# Patient Record
Sex: FEMALE
[De-identification: names, ages, dates, MRNs, and addresses within clinical notes are randomized per-mention and may not be internally consistent; named-entity substitution may affect disease eponyms.]

---

## 2017-07-30 ENCOUNTER — HOSPITAL (OUTPATIENT)
Dept: OTHER | Age: 82
End: 2017-07-30
Attending: EMERGENCY MEDICINE

## 2020-04-17 ENCOUNTER — HOSPITAL ENCOUNTER (EMERGENCY)
Facility: CLINIC | Age: 85
Discharge: HOME | End: 2020-04-17
Attending: EMERGENCY MEDICINE | Admitting: EMERGENCY MEDICINE
Payer: MEDICARE

## 2020-04-17 ENCOUNTER — APPOINTMENT (OUTPATIENT)
Dept: CT IMAGING | Facility: CLINIC | Age: 85
End: 2020-04-17
Attending: EMERGENCY MEDICINE
Payer: MEDICARE

## 2020-04-17 VITALS
HEART RATE: 100 BPM | SYSTOLIC BLOOD PRESSURE: 168 MMHG | RESPIRATION RATE: 13 BRPM | DIASTOLIC BLOOD PRESSURE: 111 MMHG | OXYGEN SATURATION: 98 % | WEIGHT: 170 LBS | TEMPERATURE: 98 F

## 2020-04-17 DIAGNOSIS — R42 DIZZINESS: ICD-10-CM

## 2020-04-17 DIAGNOSIS — R07.89 ATYPICAL CHEST PAIN: ICD-10-CM

## 2020-04-17 LAB
ALBUMIN UR-MCNC: NEGATIVE MG/DL
ANION GAP SERPL CALCULATED.3IONS-SCNC: 7 MMOL/L (ref 3–14)
APPEARANCE UR: CLEAR
BASOPHILS # BLD AUTO: 0.1 10E9/L (ref 0–0.2)
BASOPHILS NFR BLD AUTO: 0.8 %
BILIRUB UR QL STRIP: NEGATIVE
BUN SERPL-MCNC: 16 MG/DL (ref 7–30)
CALCIUM SERPL-MCNC: 9.5 MG/DL (ref 8.5–10.1)
CHLORIDE SERPL-SCNC: 105 MMOL/L (ref 94–109)
CO2 SERPL-SCNC: 26 MMOL/L (ref 20–32)
COLOR UR AUTO: ABNORMAL
CREAT SERPL-MCNC: 0.55 MG/DL (ref 0.52–1.04)
D DIMER PPP FEU-MCNC: 1.2 UG/ML FEU (ref 0–0.5)
DIFFERENTIAL METHOD BLD: NORMAL
EOSINOPHIL # BLD AUTO: 0.3 10E9/L (ref 0–0.7)
EOSINOPHIL NFR BLD AUTO: 2.9 %
ERYTHROCYTE [DISTWIDTH] IN BLOOD BY AUTOMATED COUNT: 13.2 % (ref 10–15)
GFR SERPL CREATININE-BSD FRML MDRD: 86 ML/MIN/{1.73_M2}
GLUCOSE SERPL-MCNC: 97 MG/DL (ref 70–99)
GLUCOSE UR STRIP-MCNC: NEGATIVE MG/DL
HCT VFR BLD AUTO: 45.7 % (ref 35–47)
HGB BLD-MCNC: 14.4 G/DL (ref 11.7–15.7)
HGB UR QL STRIP: NEGATIVE
IMM GRANULOCYTES # BLD: 0 10E9/L (ref 0–0.4)
IMM GRANULOCYTES NFR BLD: 0.4 %
KETONES UR STRIP-MCNC: NEGATIVE MG/DL
LEUKOCYTE ESTERASE UR QL STRIP: ABNORMAL
LYMPHOCYTES # BLD AUTO: 1.9 10E9/L (ref 0.8–5.3)
LYMPHOCYTES NFR BLD AUTO: 21.3 %
MCH RBC QN AUTO: 30.3 PG (ref 26.5–33)
MCHC RBC AUTO-ENTMCNC: 31.5 G/DL (ref 31.5–36.5)
MCV RBC AUTO: 96 FL (ref 78–100)
MONOCYTES # BLD AUTO: 0.9 10E9/L (ref 0–1.3)
MONOCYTES NFR BLD AUTO: 9.4 %
NEUTROPHILS # BLD AUTO: 5.9 10E9/L (ref 1.6–8.3)
NEUTROPHILS NFR BLD AUTO: 65.2 %
NITRATE UR QL: NEGATIVE
NRBC # BLD AUTO: 0 10*3/UL
NRBC BLD AUTO-RTO: 0 /100
PH UR STRIP: 7 PH (ref 5–7)
PLATELET # BLD AUTO: 252 10E9/L (ref 150–450)
POTASSIUM SERPL-SCNC: 4.4 MMOL/L (ref 3.4–5.3)
RBC # BLD AUTO: 4.76 10E12/L (ref 3.8–5.2)
RBC #/AREA URNS AUTO: 1 /HPF (ref 0–2)
SODIUM SERPL-SCNC: 138 MMOL/L (ref 133–144)
SOURCE: ABNORMAL
SP GR UR STRIP: 1.01 (ref 1–1.03)
SQUAMOUS #/AREA URNS AUTO: <1 /HPF (ref 0–1)
TROPONIN I SERPL-MCNC: <0.015 UG/L (ref 0–0.04)
UROBILINOGEN UR STRIP-MCNC: NORMAL MG/DL (ref 0–2)
WBC # BLD AUTO: 9 10E9/L (ref 4–11)
WBC #/AREA URNS AUTO: 2 /HPF (ref 0–5)

## 2020-04-17 PROCEDURE — 25000128 H RX IP 250 OP 636: Performed by: EMERGENCY MEDICINE

## 2020-04-17 PROCEDURE — 85025 COMPLETE CBC W/AUTO DIFF WBC: CPT | Performed by: EMERGENCY MEDICINE

## 2020-04-17 PROCEDURE — 25000125 ZZHC RX 250: Performed by: EMERGENCY MEDICINE

## 2020-04-17 PROCEDURE — 99285 EMERGENCY DEPT VISIT HI MDM: CPT | Mod: 25

## 2020-04-17 PROCEDURE — 81001 URINALYSIS AUTO W/SCOPE: CPT | Performed by: EMERGENCY MEDICINE

## 2020-04-17 PROCEDURE — 85379 FIBRIN DEGRADATION QUANT: CPT | Performed by: EMERGENCY MEDICINE

## 2020-04-17 PROCEDURE — 25000132 ZZH RX MED GY IP 250 OP 250 PS 637: Mod: GY | Performed by: EMERGENCY MEDICINE

## 2020-04-17 PROCEDURE — 80048 BASIC METABOLIC PNL TOTAL CA: CPT | Performed by: EMERGENCY MEDICINE

## 2020-04-17 PROCEDURE — 84484 ASSAY OF TROPONIN QUANT: CPT | Performed by: EMERGENCY MEDICINE

## 2020-04-17 PROCEDURE — 71275 CT ANGIOGRAPHY CHEST: CPT

## 2020-04-17 PROCEDURE — 93005 ELECTROCARDIOGRAM TRACING: CPT

## 2020-04-17 RX ORDER — ACETAMINOPHEN 500 MG
1000 TABLET ORAL ONCE
Status: COMPLETED | OUTPATIENT
Start: 2020-04-17 | End: 2020-04-17

## 2020-04-17 RX ORDER — IOPAMIDOL 755 MG/ML
500 INJECTION, SOLUTION INTRAVASCULAR ONCE
Status: COMPLETED | OUTPATIENT
Start: 2020-04-17 | End: 2020-04-17

## 2020-04-17 RX ADMIN — IOPAMIDOL 60 ML: 755 INJECTION, SOLUTION INTRAVENOUS at 18:36

## 2020-04-17 RX ADMIN — ACETAMINOPHEN 1000 MG: 500 TABLET, FILM COATED ORAL at 19:47

## 2020-04-17 RX ADMIN — SODIUM CHLORIDE 80 ML: 9 INJECTION, SOLUTION INTRAVENOUS at 18:36

## 2020-04-17 NOTE — ED PROVIDER NOTES
History     Chief Complaint:  Shortness of Breath      HPI   Kavita Merlos is a 84 year old female who presents with shortness of breath.  Patient reports 2-3 months of episodes of lightheadedness, bilateral arm pain, and shortness of breath.  She states these episodes only last a few minutes at a time and her last episode was 1 week ago.  She contacted her primary care physician who recommended she be evaluated in the emergency department.  Patient states she would not be here otherwise.  Patient does note a history of hypertension, but states she has had allergies to multiple antihypertensives and that when she checks her blood pressures at home they are 130s to 150 systolic.  She states that her blood pressure is frequently high when it was checked in the clinic and in the hospital.  Patient only takes eyedrops right now for macular degeneration.    Allergies:  Albuterol  Amlodipine  Losartan  Neosporin  Penicillins  Vicodin    Medications:    Eyedrops for macular degeneration    Past Medical History:    Hypertension  Macular degeneration  Heart murmur    Past Surgical History:    The patient does not have any pertinent past surgical history.    Family History:    No past pertinent family history.    Social History:  The patient denies tobacco or alcohol use.       Review of Systems   Constitutional: Negative for chills and fever.   HENT: Negative for congestion.    Respiratory: Positive for shortness of breath.    Cardiovascular: Positive for chest pain.   Gastrointestinal: Negative for abdominal pain, nausea and vomiting.   All other systems reviewed and are negative.        Physical Exam   First Vitals:  Patient Vitals for the past 24 hrs:   BP Temp Temp src Pulse Heart Rate Resp SpO2 Weight   04/17/20 1900 -- -- -- -- 104 18 94 % --   04/17/20 1815 (!) 189/113 -- -- 92 92 (!) 39 95 % --   04/17/20 1800 (!) 177/98 -- -- 95 87 (!) 31 94 % --   04/17/20 1745 (!) 178/93 -- -- 89 89 11 94 % --   04/17/20 1700  (!) 154/104 -- -- 98 -- -- -- --   04/17/20 1513 (!) 211/115 98  F (36.7  C) Tympanic 93 -- 20 97 % 77.1 kg (170 lb)       Physical Exam    Nursing note and vitals reviewed.  Constitutional: Cooperative.   HENT:   Mouth/Throat: Moist mucous membranes.   Eyes: EOMI, nonicteric sclera  Cardiovascular: Normal rate, regular rhythm  Pulmonary/Chest: Effort normal and breath sounds normal. No respiratory distress. No wheezes. No rales.   Abdominal: Soft. Nontender, nondistended, no guarding or rigidity. BS present.   Musculoskeletal: Normal range of motion.   Neurological: Alert. Moves all extremities spontaneously.   Skin: Skin is warm and dry. No rash noted.   Psychiatric: Normal mood and affect.     Emergency Department Course   ECG:  Indication: shortness of breath   Time: 1519  Vent. Rate 88 bpm. OK interval 136. QRS duration 90. QT/QTc 352/425. P-R-T axis 72 67 67.  Sinus rhythm Possible Left atrial enlargement Left ventricular hypertrophy Abnormal ECT. Read time: 1520    Imaging:  CT Chest Pulmonary embolism w/ contrast   IMPRESSION:   1.  No evidence for pulmonary embolism.   2.  Moderate emphysema.   3.  Cardiomegaly.  as per radiology.     Laboratory:  CBC: WBC: 9.0, HGB: 14.4, PLT: 252    BMP: Glucose 97,  o/w WNL (Creatinine: 0.55)    D Dimer: 1.2 (H)    1818 Troponin: <0.015    UA with Microscopic: Leukocyte Esterase: Trace, o/w WNL    Interventions:  1947 Tylenol 1000 mg Oral    Emergency Department Course:  Nursing notes and vitals reviewed. (1621) I performed an exam of the patient as documented above.     IV inserted. Medicine administered as documented above. Blood drawn. This was sent to the lab for further testing, results above.     The patient was sent for a CT Chest Pulmonary embolism w/ contrast while in the emergency department, findings above.     2050 I rechecked the patient and discussed the results of her workup thus far.     Findings and plan explained to the Patient. Patient discharged home  with instructions regarding supportive care, medications, and reasons to return. The importance of close follow-up was reviewed.     I personally reviewed the laboratory results with the Patient and answered all related questions prior to discharge.       Impression & Plan      Medical Decision Making:  Patient presents with chief complaint of short episodes of chest pain, shortness of breath, dizziness, and bilateral arm pain.  These episodes are atypical in nature only lasting a few minutes at a time.  Last episode was about a week ago.  Patient had follow-up with her primary who then recommended she be evaluated in the emergency department.  Her vital signs are unremarkable.  Her EKG suggests LVH, but does not have any signs of ischemia.  Troponin is negative, and given the duration of symptoms, single negative troponin is sufficient to rule out ACS at this time.  A d-dimer was elevated which prompted CT of her chest which was negative for acute etiology.  She was noted to have emphysematous changes which I discussed with her at bedside.  I did not hear any wheezing on exam, but we did discuss that this may be responsible for her shortness of breath.  She is not having significant coughing to suggest any COPD exacerbation.  Cardiomegaly was noted which is consistent with EKG showing LVH.  I discussed with patient that given her age, history, and risk factors, that stress test was likely indicated.  She suggests that she can only have nuclear medicine stress test, and does not want to be admitted to the hospital.  She states she had a stress test 5 or 6 years ago that was normal.  Given COVID-19 pandemic, this is somewhat reasonable, but I did give her strict return precautions.  I also recommended taking daily baby aspirin until her follow-up appointment.  Plan will be for her to contact her primary care provider on Monday to discuss further testing and return to ED for worsening complaints.  She is discharged in  stable condition.  All questions answered and she is in agreement with the plan.    Diagnosis:    ICD-10-CM    1. Atypical chest pain  R07.89    2. Dizziness  R42        Disposition:  discharged to home    Scribe Disclosure:  I, Savage Harmon, am serving as a scribe on 4/17/2020 at 4:21 PM to personally document services performed by Davon Quijano MD based on my observations and the provider's statements to me.     Savage Harmon  4/17/2020   [facility] EMERGENCY DEPARTMENT       Davon Quijano MD  04/18/20 0634

## 2020-04-17 NOTE — ED AVS SNAPSHOT
[facility] Emergency Department  [address]  [facility] [address]  Phone:  [phone]  Fax:  [phone]                                    Kavita Merlos   MRN: [id number]    Department:  [facility] Emergency Department   Date of Visit:  4/17/2020           After Visit Summary Signature Page    I have received my discharge instructions, and my questions have been answered. I have discussed any challenges I see with this plan with the nurse or doctor.    ..........................................................................................................................................  Patient/Patient Representative Signature      ..........................................................................................................................................  Patient Representative Print Name and Relationship to Patient    ..................................................               ................................................  Date                                   Time    ..........................................................................................................................................  Reviewed by Signature/Title    ...................................................              ..............................................  Date                                               Time          22EPIC Rev 08/18

## 2020-04-17 NOTE — ED TRIAGE NOTES
Patient reports 2-3 months of episodes of lightheadedness, bilateral arm pain, and shortness of breath.   She is currently pain free, but does feel short of breath.    Patient has a list of her medications and allergies.

## 2020-04-18 ASSESSMENT — ENCOUNTER SYMPTOMS
FEVER: 0
VOMITING: 0
CHILLS: 0
SHORTNESS OF BREATH: 1
NAUSEA: 0
ABDOMINAL PAIN: 0

## 2020-04-18 NOTE — ED NOTES
Pt verbalizes understanding of discharge plan, need to contact PCP to discuss follow up and S/S to return to ER for. Pt discharged ambulating well

## 2020-04-19 LAB — INTERPRETATION ECG - MUSE: NORMAL

## 2020-04-23 ENCOUNTER — TELEPHONE (OUTPATIENT)
Dept: CARDIOLOGY | Facility: CLINIC | Age: 85
End: 2020-04-23

## 2020-04-23 NOTE — TELEPHONE ENCOUNTER
Patient called asking what this letter meant that she received regarding her stress test. She read the letter and it was telling her to contact her insurance provider. I explained this is to protect her so she knows in advance whether they will pay for the test or not. She said she has never had a problem with Medicare paying for anything. She said she doesn't feel she needs to call. I told her it is up to her. She laughed and  said if they don't pay for it they'll never get the money because I don't have it.

## 2020-04-27 ENCOUNTER — HOSPITAL ENCOUNTER (OUTPATIENT)
Dept: CARDIOLOGY | Facility: CLINIC | Age: 85
Discharge: HOME | End: 2020-04-27
Attending: FAMILY MEDICINE | Admitting: FAMILY MEDICINE
Payer: MEDICARE

## 2020-04-27 DIAGNOSIS — R06.00 DYSPNEA: ICD-10-CM

## 2020-04-27 DIAGNOSIS — R07.89 ATYPICAL CHEST PAIN: ICD-10-CM

## 2020-04-27 PROCEDURE — 93018 CV STRESS TEST I&R ONLY: CPT | Performed by: INTERNAL MEDICINE

## 2020-04-27 PROCEDURE — 93325 DOPPLER ECHO COLOR FLOW MAPG: CPT | Mod: 26 | Performed by: INTERNAL MEDICINE

## 2020-04-27 PROCEDURE — 93016 CV STRESS TEST SUPVJ ONLY: CPT | Performed by: INTERNAL MEDICINE

## 2020-04-27 PROCEDURE — 25000128 H RX IP 250 OP 636

## 2020-04-27 PROCEDURE — 93321 DOPPLER ECHO F-UP/LMTD STD: CPT | Mod: 26 | Performed by: INTERNAL MEDICINE

## 2020-04-27 PROCEDURE — 25500064 ZZH RX 255 OP 636: Performed by: FAMILY MEDICINE

## 2020-04-27 PROCEDURE — 93325 DOPPLER ECHO COLOR FLOW MAPG: CPT | Mod: TC

## 2020-04-27 PROCEDURE — 93350 STRESS TTE ONLY: CPT | Mod: 26 | Performed by: INTERNAL MEDICINE

## 2020-04-27 PROCEDURE — 25000125 ZZHC RX 250: Performed by: FAMILY MEDICINE

## 2020-04-27 RX ORDER — SODIUM CHLORIDE 9 MG/ML
INJECTION, SOLUTION INTRAVENOUS CONTINUOUS
Status: ACTIVE | OUTPATIENT
Start: 2020-04-27 | End: 2020-04-27

## 2020-04-27 RX ORDER — DOBUTAMINE HYDROCHLORIDE 200 MG/100ML
10-50 INJECTION INTRAVENOUS CONTINUOUS
Status: ACTIVE | OUTPATIENT
Start: 2020-04-27 | End: 2020-04-27

## 2020-04-27 RX ORDER — ATROPINE SULFATE 0.1 MG/ML
.2-2 INJECTION INTRAVENOUS
Status: ACTIVE | OUTPATIENT
Start: 2020-04-27 | End: 2020-04-27

## 2020-04-27 RX ORDER — METOPROLOL TARTRATE 1 MG/ML
1-20 INJECTION, SOLUTION INTRAVENOUS
Status: ACTIVE | OUTPATIENT
Start: 2020-04-27 | End: 2020-04-27

## 2020-04-27 RX ORDER — DOBUTAMINE HYDROCHLORIDE 200 MG/100ML
INJECTION INTRAVENOUS
Status: COMPLETED
Start: 2020-04-27 | End: 2020-04-27

## 2020-04-27 RX ADMIN — HUMAN ALBUMIN MICROSPHERES AND PERFLUTREN 3 ML: 10; .22 INJECTION, SOLUTION INTRAVENOUS at 13:20

## 2020-04-27 RX ADMIN — DOBUTAMINE IN DEXTROSE 10 MCG/KG/MIN: 200 INJECTION, SOLUTION INTRAVENOUS at 13:27

## 2020-04-27 RX ADMIN — METOPROLOL TARTRATE 1 MG: 5 INJECTION INTRAVENOUS at 13:34

## 2020-04-27 RX ADMIN — METOPROLOL TARTRATE 1 MG: 5 INJECTION INTRAVENOUS at 13:30

## 2021-01-15 ENCOUNTER — HEALTH MAINTENANCE LETTER (OUTPATIENT)
Age: 86
End: 2021-01-15

## 2021-10-24 ENCOUNTER — HEALTH MAINTENANCE LETTER (OUTPATIENT)
Age: 86
End: 2021-10-24

## 2022-02-13 ENCOUNTER — HEALTH MAINTENANCE LETTER (OUTPATIENT)
Age: 87
End: 2022-02-13

## 2022-10-16 ENCOUNTER — HEALTH MAINTENANCE LETTER (OUTPATIENT)
Age: 87
End: 2022-10-16

## 2023-03-26 ENCOUNTER — HEALTH MAINTENANCE LETTER (OUTPATIENT)
Age: 88
End: 2023-03-26

## 2023-10-09 ASSESSMENT — ENCOUNTER SYMPTOMS
PALPITATIONS: 1
DIARRHEA: 0
DYSURIA: 1
DIZZINESS: 0
WEAKNESS: 0
MYALGIAS: 0
ABDOMINAL PAIN: 0
PARESTHESIAS: 0
FREQUENCY: 1
BREAST MASS: 0
CHILLS: 0
HEARTBURN: 0
JOINT SWELLING: 0
HEADACHES: 0
EYE PAIN: 0
SHORTNESS OF BREATH: 1
ARTHRALGIAS: 0
SORE THROAT: 0
COUGH: 1
FEVER: 0
NAUSEA: 1
HEMATOCHEZIA: 0
CONSTIPATION: 0
HEMATURIA: 0
NERVOUS/ANXIOUS: 0

## 2023-10-09 ASSESSMENT — ACTIVITIES OF DAILY LIVING (ADL)
CURRENT_FUNCTION: TRANSPORTATION REQUIRES ASSISTANCE
CURRENT_FUNCTION: SHOPPING REQUIRES ASSISTANCE

## 2023-10-13 ENCOUNTER — OFFICE VISIT (OUTPATIENT)
Dept: INTERNAL MEDICINE | Facility: CLINIC | Age: 88
End: 2023-10-13
Payer: MEDICARE

## 2023-10-13 ENCOUNTER — ANCILLARY PROCEDURE (OUTPATIENT)
Dept: GENERAL RADIOLOGY | Facility: CLINIC | Age: 88
End: 2023-10-13
Attending: INTERNAL MEDICINE
Payer: MEDICARE

## 2023-10-13 VITALS
HEIGHT: 64 IN | RESPIRATION RATE: 18 BRPM | OXYGEN SATURATION: 95 % | DIASTOLIC BLOOD PRESSURE: 88 MMHG | SYSTOLIC BLOOD PRESSURE: 150 MMHG | HEART RATE: 100 BPM | BODY MASS INDEX: 25.7 KG/M2 | WEIGHT: 150.5 LBS | TEMPERATURE: 98 F

## 2023-10-13 DIAGNOSIS — R00.2 INTERMITTENT PALPITATIONS: ICD-10-CM

## 2023-10-13 DIAGNOSIS — R01.1 HEART MURMUR: ICD-10-CM

## 2023-10-13 DIAGNOSIS — R06.09 DYSPNEA ON EXERTION: ICD-10-CM

## 2023-10-13 DIAGNOSIS — Z13.220 SCREENING FOR HYPERLIPIDEMIA: ICD-10-CM

## 2023-10-13 DIAGNOSIS — Z13.29 SCREENING FOR THYROID DISORDER: ICD-10-CM

## 2023-10-13 DIAGNOSIS — Z00.00 ENCOUNTER FOR MEDICARE ANNUAL WELLNESS EXAM: Primary | ICD-10-CM

## 2023-10-13 PROBLEM — H40.9 GLAUCOMA: Status: ACTIVE | Noted: 2023-10-13

## 2023-10-13 PROBLEM — H35.30 MACULAR DEGENERATION DISEASE: Status: ACTIVE | Noted: 2023-10-13

## 2023-10-13 LAB
ALBUMIN SERPL BCG-MCNC: 4.5 G/DL (ref 3.5–5.2)
ALP SERPL-CCNC: 78 U/L (ref 35–104)
ALT SERPL W P-5'-P-CCNC: 18 U/L (ref 0–50)
ANION GAP SERPL CALCULATED.3IONS-SCNC: 9 MMOL/L (ref 7–15)
AST SERPL W P-5'-P-CCNC: 28 U/L (ref 0–45)
BASO+EOS+MONOS # BLD AUTO: NORMAL 10*3/UL
BASO+EOS+MONOS NFR BLD AUTO: NORMAL %
BASOPHILS # BLD AUTO: 0 10E3/UL (ref 0–0.2)
BASOPHILS NFR BLD AUTO: 0 %
BILIRUB SERPL-MCNC: 0.6 MG/DL
BUN SERPL-MCNC: 19.7 MG/DL (ref 8–23)
CALCIUM SERPL-MCNC: 10 MG/DL (ref 8.8–10.2)
CHLORIDE SERPL-SCNC: 100 MMOL/L (ref 98–107)
CHOLEST SERPL-MCNC: 186 MG/DL
CREAT SERPL-MCNC: 0.62 MG/DL (ref 0.51–0.95)
DEPRECATED HCO3 PLAS-SCNC: 28 MMOL/L (ref 22–29)
EGFRCR SERPLBLD CKD-EPI 2021: 86 ML/MIN/1.73M2
EOSINOPHIL # BLD AUTO: 0.2 10E3/UL (ref 0–0.7)
EOSINOPHIL NFR BLD AUTO: 3 %
ERYTHROCYTE [DISTWIDTH] IN BLOOD BY AUTOMATED COUNT: 13.1 % (ref 10–15)
GLUCOSE SERPL-MCNC: 106 MG/DL (ref 70–99)
HCT VFR BLD AUTO: 46.1 % (ref 35–47)
HDLC SERPL-MCNC: 54 MG/DL
HGB BLD-MCNC: 14.8 G/DL (ref 11.7–15.7)
IMM GRANULOCYTES # BLD: 0 10E3/UL
IMM GRANULOCYTES NFR BLD: 0 %
LDLC SERPL CALC-MCNC: 109 MG/DL
LYMPHOCYTES # BLD AUTO: 1.7 10E3/UL (ref 0.8–5.3)
LYMPHOCYTES NFR BLD AUTO: 20 %
MCH RBC QN AUTO: 29.8 PG (ref 26.5–33)
MCHC RBC AUTO-ENTMCNC: 32.1 G/DL (ref 31.5–36.5)
MCV RBC AUTO: 93 FL (ref 78–100)
MONOCYTES # BLD AUTO: 0.8 10E3/UL (ref 0–1.3)
MONOCYTES NFR BLD AUTO: 9 %
NEUTROPHILS # BLD AUTO: 5.5 10E3/UL (ref 1.6–8.3)
NEUTROPHILS NFR BLD AUTO: 67 %
NONHDLC SERPL-MCNC: 132 MG/DL
PLATELET # BLD AUTO: 215 10E3/UL (ref 150–450)
POTASSIUM SERPL-SCNC: 4.5 MMOL/L (ref 3.4–5.3)
PROT SERPL-MCNC: 8 G/DL (ref 6.4–8.3)
RBC # BLD AUTO: 4.97 10E6/UL (ref 3.8–5.2)
SODIUM SERPL-SCNC: 137 MMOL/L (ref 135–145)
TRIGL SERPL-MCNC: 115 MG/DL
TSH SERPL DL<=0.005 MIU/L-ACNC: 1.38 UIU/ML (ref 0.3–4.2)
WBC # BLD AUTO: 8.2 10E3/UL (ref 4–11)

## 2023-10-13 PROCEDURE — 80053 COMPREHEN METABOLIC PANEL: CPT | Performed by: INTERNAL MEDICINE

## 2023-10-13 PROCEDURE — 36415 COLL VENOUS BLD VENIPUNCTURE: CPT | Performed by: INTERNAL MEDICINE

## 2023-10-13 PROCEDURE — 84443 ASSAY THYROID STIM HORMONE: CPT | Performed by: INTERNAL MEDICINE

## 2023-10-13 PROCEDURE — G0438 PPPS, INITIAL VISIT: HCPCS | Performed by: INTERNAL MEDICINE

## 2023-10-13 PROCEDURE — 71046 X-RAY EXAM CHEST 2 VIEWS: CPT | Mod: TC | Performed by: STUDENT IN AN ORGANIZED HEALTH CARE EDUCATION/TRAINING PROGRAM

## 2023-10-13 PROCEDURE — 99204 OFFICE O/P NEW MOD 45 MIN: CPT | Mod: 25 | Performed by: INTERNAL MEDICINE

## 2023-10-13 PROCEDURE — 93000 ELECTROCARDIOGRAM COMPLETE: CPT | Performed by: INTERNAL MEDICINE

## 2023-10-13 PROCEDURE — 80061 LIPID PANEL: CPT | Performed by: INTERNAL MEDICINE

## 2023-10-13 PROCEDURE — 85025 COMPLETE CBC W/AUTO DIFF WBC: CPT | Performed by: INTERNAL MEDICINE

## 2023-10-13 ASSESSMENT — ENCOUNTER SYMPTOMS
NAUSEA: 1
BREAST MASS: 0
MYALGIAS: 0
CHILLS: 0
HEMATURIA: 0
PARESTHESIAS: 0
HEADACHES: 0
EYE PAIN: 0
SHORTNESS OF BREATH: 1
DIARRHEA: 0
CONSTIPATION: 0
NERVOUS/ANXIOUS: 0
DIZZINESS: 0
COUGH: 1
PALPITATIONS: 1
DYSURIA: 1
JOINT SWELLING: 0
HEMATOCHEZIA: 0
FEVER: 0
HEARTBURN: 0
FREQUENCY: 1
SORE THROAT: 0
WEAKNESS: 0
ABDOMINAL PAIN: 0
ARTHRALGIAS: 0

## 2023-10-13 ASSESSMENT — PAIN SCALES - GENERAL: PAINLEVEL: NO PAIN (0)

## 2023-10-13 ASSESSMENT — ACTIVITIES OF DAILY LIVING (ADL)
CURRENT_FUNCTION: SHOPPING REQUIRES ASSISTANCE
CURRENT_FUNCTION: TRANSPORTATION REQUIRES ASSISTANCE

## 2023-10-13 NOTE — PROGRESS NOTES
"SUBJECTIVE:   Kavita is a 88 year old who presents for Preventive Visit.      Are you in the first 12 months of your Medicare coverage?  No    Patient is an 88-year-old  female who presents to the clinic as a new patient for her annual wellness check.  Her main concern today is in regards to her heart health.  Patient states that she has always had issues over the past several years with what she calls \"flubs of her heart.\"  Review of her records does show that she has had some issues with PVCs and PACs over the years.  She states that recently she did start to experience a new sensation in her heart.  She described it as a quivering sensation.  It has happened 2-3 times over the past 1 month.  These episodes will occur randomly and last for short period of time.  Patient denies any issues with difficulties breathing. She did report one episode of a sharp discomfort in her chest, but that it quickly resolved.  Patient reports that she has had a heart murmur that has been known about for several years, but she states no one has ever been concerned about it.  She does report some issues with fatigue and occasional shortness of breath.  Patient would like to have a cardiac evaluation completed today.  Otherwise, she reports that she is eating per her usual routine.  Patient is stooling voiding without issue.  She sleeps well at night.  Patient is fasting today.  She does take some ophthalmic drops for management of her glaucoma, but she does not currently take any oral medications.    Healthy Habits:     In general, how would you rate your overall health?  Good    Frequency of exercise:  6-7 days/week    Duration of exercise:  30-45 minutes    Do you usually eat at least 4 servings of fruit and vegetables a day, include whole grains    & fiber and avoid regularly eating high fat or \"junk\" foods?  No    Taking medications regularly:  Yes    Medication side effects:  None    Ability to successfully perform " activities of daily living:  Transportation requires assistance and shopping requires assistance    Home Safety:  No safety concerns identified    Hearing Impairment:  No hearing concerns    In the past 6 months, have you been bothered by leaking of urine?  No    In general, how would you rate your overall mental or emotional health?  Good    Additional concerns today:  Yes      Today's PHQ-2 Score:       10/13/2023    10:29 AM   PHQ-2 ( 1999 [employer])   Q1: Little interest or pleasure in doing things 0   Q2: Feeling down, depressed or hopeless 0   PHQ-2 Score 0   Q1: Little interest or pleasure in doing things Not at all   Q2: Feeling down, depressed or hopeless Not at all   PHQ-2 Score 0       Have you ever done Advance Care Planning? (For example, a Health Directive, POLST, or a discussion with a medical provider or your loved ones about your wishes): No, advance care planning information given to patient to review.  Patient declined advance care planning discussion at this time.     Fall risk  Fallen 2 or more times in the past year?: No  Any fall with injury in the past year?: No    Cognitive Screening   1) Repeat 3 items (Leader, Season, Table)    2) Clock draw:   NORMAL  3) 3 item recall: Recalls 3 objects  Results: 3 items recalled: COGNITIVE IMPAIRMENT LESS LIKELY    Mini-CogTM Copyright S [employer]. Licensed by the author for use in [facility]; reprinted with permission ([email or web address]). All rights reserved.      Do you have sleep apnea, excessive snoring or daytime drowsiness? : daytime drowsiness    Reviewed and updated as needed this visit by clinical staff    Allergies  Meds              Reviewed and updated as needed this visit by Provider                 Social History     Tobacco Use    Smoking status: Not on file    Smokeless tobacco: Not on file   Substance Use Topics    Alcohol use: Not on file           10/9/2023     1:21 PM   Alcohol Use   Prescreen: >3 drinks/day or >7 drinks/week? Not  Applicable     Do you have a current opioid prescription? No  Do you use any other controlled substances or medications that are not prescribed by a provider? None    Current providers sharing in care for this patient include:   Patient Care Team:  Ty Cordero MD as PCP - General    The following health maintenance items are reviewed in Epic and correct as of today:  Health Maintenance   Topic Date Due    ANNUAL REVIEW OF  ORDERS  Never done    ADVANCE CARE PLANNING  Never done    DTAP/TDAP/TD IMMUNIZATION (1 - Tdap) Never done    ZOSTER IMMUNIZATION (1 of 2) Never done    RSV VACCINE 60+ (1 - 1-dose 60+ series) Never done    MEDICARE ANNUAL WELLNESS VISIT  Never done    INFLUENZA VACCINE (1) 09/01/2023    COVID-19 Vaccine (5 - 2023-24 season) 09/01/2023    FALL RISK ASSESSMENT  10/13/2024    PHQ-2 (once per calendar year)  Completed    Pneumococcal Vaccine: 65+ Years  Completed    IPV IMMUNIZATION  Aged Out    HPV IMMUNIZATION  Aged Out    MENINGITIS IMMUNIZATION  Aged Out     Lab work is in process      Mammogram Screening - Patient over age 75, has elected to discontinue screenings.  Pertinent mammograms are reviewed under the imaging tab.    Review of Systems   Constitutional:  Negative for chills and fever.   HENT:  Negative for congestion, ear pain, hearing loss and sore throat.    Eyes:  Positive for visual disturbance. Negative for pain.   Respiratory:  Positive for cough and shortness of breath.    Cardiovascular:  Positive for chest pain and palpitations. Negative for peripheral edema.   Gastrointestinal:  Positive for nausea. Negative for abdominal pain, constipation, diarrhea, heartburn and hematochezia.   Breasts:  Negative for tenderness, breast mass and discharge.   Genitourinary:  Positive for dysuria, frequency and urgency. Negative for genital sores, hematuria, pelvic pain, vaginal bleeding and vaginal discharge.   Musculoskeletal:  Negative for arthralgias, joint swelling and  "myalgias.   Skin:  Negative for rash.   Neurological:  Negative for dizziness, weakness, headaches and paresthesias.   Psychiatric/Behavioral:  Positive for mood changes. The patient is not nervous/anxious.          OBJECTIVE:   Blood pressure (!) 150/88, pulse 100, temperature 98  F (36.7  C), temperature source Tympanic, resp. rate 18, height 1.626 m (5' 4\"), weight 68.3 kg (150 lb 8 oz), SpO2 95%, not currently breastfeeding.    Physical Exam  Vitals reviewed.   HENT:      Head: Normocephalic and atraumatic.      Right Ear: Tympanic membrane, ear canal and external ear normal.      Left Ear: Tympanic membrane, ear canal and external ear normal.      Mouth/Throat:      Mouth: Mucous membranes are moist.      Pharynx: Oropharynx is clear.   Eyes:      Extraocular Movements: Extraocular movements intact.      Conjunctiva/sclera: Conjunctivae normal.      Pupils: Pupils are equal, round, and reactive to light.   Cardiovascular:      Rate and Rhythm: Normal rate and regular rhythm.      Heart sounds: Murmur (Systolic ejection murmur best noted at left upper sternal border.) heard.   Pulmonary:      Effort: Pulmonary effort is normal.      Breath sounds: Normal breath sounds.   Abdominal:      General: Bowel sounds are normal.      Palpations: Abdomen is soft.   Musculoskeletal:      Cervical back: Normal range of motion and neck supple.   Skin:     General: Skin is warm and dry.      Capillary Refill: Capillary refill takes less than 2 seconds.   Neurological:      Mental Status: She is alert and oriented to person, place, and time. Mental status is at baseline.     Diagnostic testing: CMP, CBC, FLP, and TSH are pending.  EKG shows sinus rhythm with signs of left atrial enlargement.  Chest x-rays, 2 views, are pending.    ASSESSMENT / PLAN:   (Z00.00) Encounter for Medicare annual wellness exam  (primary encounter diagnosis)  Comment: Adult wellness plan reviewed.    (R00.2) Intermittent palpitations and (R06.09) " Dyspnea on exertion  Comment: At this time, we did elect to proceed with further evaluation of her heart based upon her reported symptoms.  Her EKG did show sinus rhythm with left atrial enlargement.  This does appear to be unchanged when compared with the previous EKG from April 2020.  We did discuss other potential etiologies of her symptoms including paroxysmal arrhythmias, anemia, electrode abnormalities, glucose issues, and thyroid abnormalities.  Patient does have a CMP, CBC, and TSH that is currently pending.  We also have chest x-ray pending to evaluate for her shortness of breath and dyspnea.  Patient will be contacted with results once they are available for review.    (R01.1) Heart murmur  Comment: Chronic issue.  Patient does have a prominent systolic ejection murmur that is most prominent at left upper sternal border.  Once her initial cardiac screening has been completed, it may be worthwhile to consider an echocardiogram to assess the structural integrity of her heart and condition of her valves.    (Z13.220) Screening for hyperlipidemia  Comment: Lipid panel reflex to direct LDL Fasting    (Z13.29) Screening for thyroid disorder  Comment: TSH with free T4 reflex         Patient has been advised of split billing requirements and indicates understanding: Yes      COUNSELING:  Reviewed preventive health counseling, as reflected in patient instructions        She has no history on file for tobacco use.      Appropriate preventive services were discussed with this patient, including applicable screening as appropriate for fall prevention, nutrition, physical activity, Tobacco-use cessation, weight loss and cognition.  Checklist reviewing preventive services available has been given to the patient.    Reviewed patients plan of care and provided an AVS. The Basic Care Plan (routine screening as documented in Health Maintenance) for Kavita meets the Care Plan requirement. This Care Plan has been established  and reviewed with the Patient.      Ty Cordero MD  [facility]    Identified Health Risks:  I have reviewed Opioid Use Disorder and Substance Use Disorder risk factors and made any needed referrals.

## 2023-10-13 NOTE — PATIENT INSTRUCTIONS
Patient Education   Personalized Prevention Plan  You are due for the preventive services outlined below.  Your care team is available to assist you in scheduling these services.  If you have already completed any of these items, please share that information with your care team to update in your medical record.  Health Maintenance Due   Topic Date Due     Diptheria Tetanus Pertussis (DTAP/TDAP/TD) Vaccine (1 - Tdap) Never done     Zoster (Shingles) Vaccine (1 of 2) Never done     RSV VACCINE 60+ (1 - 1-dose 60+ series) Never done     Flu Vaccine (1) 09/01/2023     COVID-19 Vaccine (5 - 2023-24 season) 09/01/2023     Learning About Dietary Guidelines  What are the Dietary Guidelines for Americans?     Dietary Guidelines for Americans provide tips for eating well and staying healthy. This helps reduce the risk for long-term (chronic) diseases.  These guidelines recommend that you:  Eat and drink the right amount for you. The U.[place] government's food guide is called MyPlate. It can help you make your own well-balanced eating plan.  Try to balance your eating with your activity. This helps you stay at a healthy weight.  Drink alcohol in moderation, if at all.  Limit foods high in salt, saturated fat, trans fat, and added sugar.  These guidelines are from the U.S. Department of Agriculture and the U.S. Department of Health and Human Services. They are updated every 5 years.  What is MyPlate?  MyPlate is the U.S. government's food guide. It can help you make your own well-balanced eating plan. A balanced eating plan means that you eat enough, but not too much, and that your food gives you the nutrients you need to stay healthy.  MyPlate focuses on eating plenty of whole grains, fruits, and vegetables, and on limiting fat and sugar. It is available online at www.ChooseMyPlate.gov.  How can you get started?  If you're trying to eat healthier, you can slowly change your eating habits over time. You don't have to make big  "changes all at once. Start by adding one or two healthy foods to your meals each day.  Grains  Choose whole-grain breads and cereals and whole-wheat pasta and whole-grain crackers.  Vegetables  Eat a variety of vegetables every day. They have lots of nutrients and are part of a heart-healthy diet.  Fruits  Eat a variety of fruits every day. Fruits contain lots of nutrients. Choose fresh fruit instead of fruit juice.  Protein foods  Choose fish and lean poultry more often. Eat red meat and fried meats less often. Dried beans, tofu, and nuts are also good sources of protein.  Dairy  Choose low-fat or fat-free products from this food group. If you have problems digesting milk, try eating cheese or yogurt instead.  Fats and oils  Limit fats and oils if you're trying to cut calories. Choose healthy fats when you cook. These include canola oil and olive oil.  Where can you learn more?  Go to https://www.[employer].net/patiented  Enter D676 in the search box to learn more about \"Learning About Dietary Guidelines.\"  Current as of: March 1, 2023               Content Version: 13.7    6546-3666 [employer].   Care instructions adapted under license by your healthcare professional. If you have questions about a medical condition or this instruction, always ask your healthcare professional. [employer] disclaims any warranty or liability for your use of this information.      Activities of Daily Living    Your Health Risk Assessment indicates you have difficulties with activities of daily living such as housework, bathing, preparing meals, taking medication, etc. Please make a follow up appointment for us to address this issue in more detail.     "

## 2023-10-13 NOTE — PROGRESS NOTES
The patient was counseled and encouraged to consider modifying their diet and eating habits. She was provided with information on recommended healthy diet options.  The patient reports that she has difficulty with activities of daily living. I have asked that the patient make a follow up appointment in *** weeks where this issue will be further evaluated and addressed.

## 2023-10-16 ENCOUNTER — MYC MEDICAL ADVICE (OUTPATIENT)
Dept: INTERNAL MEDICINE | Facility: CLINIC | Age: 88
End: 2023-10-16
Payer: MEDICARE

## 2023-10-16 DIAGNOSIS — R01.1 HEART MURMUR: Primary | ICD-10-CM

## 2023-10-16 NOTE — TELEPHONE ENCOUNTER
"Routed to covering provider.  Please see patient's mychart message. The following was discussed at patient's last office visit per Dr. Cordero:    \"(R01.1) Heart murmur  Comment: Chronic issue.  Patient does have a prominent systolic ejection murmur that is most prominent at left upper sternal border.  Once her initial cardiac screening has been completed, it may be worthwhile to consider an echocardiogram to assess the structural integrity of her heart and condition of her valves.\"  "

## 2023-10-24 NOTE — TELEPHONE ENCOUNTER
Sent [email or web address] message to patient.    Joaquin Dale, Triage RN Kelsie Arciniega  10:30 AM 10/24/2023

## 2023-10-25 ENCOUNTER — HOSPITAL ENCOUNTER (OUTPATIENT)
Dept: CARDIOLOGY | Facility: CLINIC | Age: 88
Discharge: HOME | End: 2023-10-25
Attending: INTERNAL MEDICINE | Admitting: INTERNAL MEDICINE
Payer: MEDICARE

## 2023-10-25 ENCOUNTER — OFFICE VISIT (OUTPATIENT)
Dept: CARDIOLOGY | Facility: CLINIC | Age: 88
End: 2023-10-25
Payer: MEDICARE

## 2023-10-25 VITALS
WEIGHT: 148 LBS | DIASTOLIC BLOOD PRESSURE: 103 MMHG | HEART RATE: 92 BPM | BODY MASS INDEX: 25.27 KG/M2 | SYSTOLIC BLOOD PRESSURE: 194 MMHG | HEIGHT: 64 IN | OXYGEN SATURATION: 96 %

## 2023-10-25 DIAGNOSIS — I35.0 NONRHEUMATIC AORTIC VALVE STENOSIS: Primary | ICD-10-CM

## 2023-10-25 DIAGNOSIS — R01.1 HEART MURMUR: ICD-10-CM

## 2023-10-25 LAB — LVEF ECHO: NORMAL

## 2023-10-25 PROCEDURE — 99203 OFFICE O/P NEW LOW 30 MIN: CPT | Mod: 25 | Performed by: INTERNAL MEDICINE

## 2023-10-25 PROCEDURE — 93306 TTE W/DOPPLER COMPLETE: CPT

## 2023-10-25 PROCEDURE — 93306 TTE W/DOPPLER COMPLETE: CPT | Mod: 26 | Performed by: INTERNAL MEDICINE

## 2023-10-25 RX ORDER — TIMOLOL MALEATE 6.8 MG/ML
1 SOLUTION OPHTHALMIC 2 TIMES DAILY
COMMUNITY

## 2023-10-25 RX ORDER — TRIAMCINOLONE ACETONIDE 1 MG/G
CREAM TOPICAL
COMMUNITY
Start: 2022-08-26 | End: 2023-10-25

## 2023-10-25 RX ORDER — TAFLUPROST OPTHALMIC 0 MG/.3ML
1 SOLUTION/ DROPS OPHTHALMIC AT BEDTIME
COMMUNITY

## 2023-10-25 RX ORDER — KETOROLAC TROMETHAMINE 5 MG/ML
SOLUTION OPHTHALMIC
COMMUNITY
Start: 2022-11-04 | End: 2023-10-25

## 2023-10-25 NOTE — PATIENT INSTRUCTIONS
Your echocardiogram shows that one of the valves in your heart, the aortic valve, is thickened and is not opening well.  The medical term for this is aortic stenosis.  This is in the severe range, meaning that the valve is tight, making it harder for the heart to pump the blood out.     The natural history of aortic stenosis is to gradually worsen over the years.  When it becomes severely tight, it can cause stress on the heart and cause symptoms of shortness of breath, chest pain, or fainting.    When the aortic stenosis becomes severe, the only treatment is to replace the valve with a new one.  There are two ways of doing this.  First option is an open heart surgery where a cardiovascular surgeon replaces the aortic valve.  The second option is a transcatheter aortic valve replacement (TAVR) where a cardiologist puts a new valve inside of the old valve, delivered by a catheter that usually goes through a blood vessel in the leg.     We have a team of people at [facility] who work together that order the necessary tests and then meet in a conference to determine the best option for replacing the aortic valve.

## 2023-10-25 NOTE — LETTER
10/25/2023    Ty Cordero MD  [address] [facility] [address]    RE: Kavita MOLINA Gaurang       Dear Colleague,     I had the pleasure of seeing Kavita Merlos in the [facility].  CARDIOLOGY CONSULT    REASON FOR CONSULT: Aortic stenosis    PRIMARY CARE PHYSICIAN:  Ty Cordero    HISTORY OF PRESENT ILLNESS:  88-year-old female seen for aortic stenosis.  She has vision impairment, hypertension.    2020 dobutamine stress echo was normal, aortic stenosis with mean 13 mmHg, V-max 2.5 m/s.    Overall patient is quite independent for her age.  Her main limitation is poor vision.  She lives independently and walks with a walker.  She will go 2 flights of stairs a few times per day.  She can also walk 1000 steps in her apartment unit hallways.  She has been progressively dyspneic over the past 6 to 12 months.  She feels generalized fatigue and tired all of the time.  She has no overt chest pain, syncope, or edema.  Blood pressure typically runs 130s at home.    Echo October 2023 showed EF 60%, severe aortic stenosis with mean 58 mmHg, V-max 4.8 m/s, area 0.6 cm , DI 0.19.    PAST MEDICAL HISTORY:  1.  Aortic stenosis    MEDICATIONS:  No current outpatient medications on file.     No current facility-administered medications for this visit.       ALLERGIES:  Allergies   Allergen Reactions    Albuterol     Amlodipine     Losartan     Neosporin [Neomycin-Polymyxin-Gramicidin]     Penicillins     Tape [Adhesive Tape]     Vicodin [Hydrocodone-Acetaminophen]        SOCIAL HISTORY:  I have reviewed this patient's social history and updated it with pertinent information if needed. Kavita JESSE Merlos  reports that she has never smoked. She has never been exposed to tobacco smoke. She has never used smokeless tobacco.    FAMILY HISTORY:  Brother and mother with valve replacement, details unknown    REVIEW OF SYSTEMS:  Constitutional:  No weight loss, fever, chills  HEENT:  Eyes:  No visual loss,  "blurred vision, double vision or yellow sclerae. No hearing loss, sneezing, congestion, runny nose or sore throat.  Skin:  No rash or itching.  Cardiovascular: per HPI  Respiratory: per HPI  GI:  No anorexia, nausea, vomiting or diarrhea. No abdominal pain or blood.  :  No dysurea, hematuria  Neurologic:  No headache, paralysis, ataxia, numbness or tingling in the extremities. No change in bowel or bladder control.  Musculoskeletal:  No muscle pain  Hematologic:  No bleeding or bruising.  Lymphatics:  No enlarged nodes. No history of splenectomy.  Endocrine:  No reports of sweating, cold or heat intolerance. No polyuria or polydipsia.  Allergies:  No history of asthma, hives, eczema or rhinitis.    PHYSICAL EXAM:  BP (!) 194/103 (BP Location: Right arm, Patient Position: Sitting, Cuff Size: Adult Small)   Pulse 92   Ht 1.626 m (5' 4\")   Wt 67.1 kg (148 lb)   LMP  (LMP Unknown)   SpO2 96%   BMI 25.40 kg/m    Constitutional: awake, alert, no distress  Eyes: PERRL, sclera nonicteric  ENT: trachea midline  Respiratory: Lungs clear  Cardiovascular: Regular rate and rhythm, 3/6 late peaking systolic murmur at the upper sternal border, no edema  GI: nondistended, nontender, bowel sounds present  Lymph/Hematologic: no lymphadenopathy  Skin: dry, no rash  Musculoskeletal: good muscle tone, strength 5/5 in upper and lower extremities  Neurologic: no focal deficits  Neuropsychiatric: appropriate affact    DATA:  Labs:   October 13: Potassium 4.5, creatinine 0.6, TSH 1.4  Recent Labs   Lab Test 10/13/23  1526   CHOL 186   HDL 54   *   TRIG 115     EKG: October 13: Sinus rhythm, rate 80    ASSESSMENT:  88-year-old female seen for very severe aortic stenosis.  She has had progressive symptoms of fatigue and dyspnea in the past 1 year.  Fortunately she has no overt heart failure type symptoms and ejection fraction is still preserved.  We talked about the natural history of aortic stenosis and the need for valve " replacement.  She will hopefully be a good candidate for TAVR.  We talked about needing an angiogram and chest CTA.  She is agreeable to moving forward with this work-up.    RECOMMENDATIONS:  1.  Severe aortic stenosis  -Refer to structural heart clinic to initiate TAVR work-up    Follow-up with general cardiology based on timing of TAVR.    Lazaro Garay MD  Cardiology - [facility]  Pager:  [phone]  Text Page  October 25, 2023        Thank you for allowing me to participate in the care of your patient.      Sincerely,     Lazaro Garay MD     [facility]  cc:   No referring provider defined for this encounter.

## 2023-10-25 NOTE — PROGRESS NOTES
CARDIOLOGY CONSULT    REASON FOR CONSULT: Aortic stenosis    PRIMARY CARE PHYSICIAN:  Ty Cordero    HISTORY OF PRESENT ILLNESS:  88-year-old female seen for aortic stenosis.  She has vision impairment, hypertension.    2020 dobutamine stress echo was normal, aortic stenosis with mean 13 mmHg, V-max 2.5 m/s.    Overall patient is quite independent for her age.  Her main limitation is poor vision.  She lives independently and walks with a walker.  She will go 2 flights of stairs a few times per day.  She can also walk 1000 steps in her apartment unit hallways.  She has been progressively dyspneic over the past 6 to 12 months.  She feels generalized fatigue and tired all of the time.  She has no overt chest pain, syncope, or edema.  Blood pressure typically runs 130s at home.    Echo October 2023 showed EF 60%, severe aortic stenosis with mean 58 mmHg, V-max 4.8 m/s, area 0.6 cm , DI 0.19.    PAST MEDICAL HISTORY:  1.  Aortic stenosis    MEDICATIONS:  No current outpatient medications on file.     No current facility-administered medications for this visit.       ALLERGIES:  Allergies   Allergen Reactions    Albuterol     Amlodipine     Losartan     Neosporin [Neomycin-Polymyxin-Gramicidin]     Penicillins     Tape [Adhesive Tape]     Vicodin [Hydrocodone-Acetaminophen]        SOCIAL HISTORY:  I have reviewed this patient's social history and updated it with pertinent information if needed. Kavita MOLINA Merlos  reports that she has never smoked. She has never been exposed to tobacco smoke. She has never used smokeless tobacco.    FAMILY HISTORY:  Brother and mother with valve replacement, details unknown    REVIEW OF SYSTEMS:  Constitutional:  No weight loss, fever, chills  HEENT:  Eyes:  No visual loss, blurred vision, double vision or yellow sclerae. No hearing loss, sneezing, congestion, runny nose or sore throat.  Skin:  No rash or itching.  Cardiovascular: per HPI  Respiratory: per HPI  GI:  No anorexia,  "nausea, vomiting or diarrhea. No abdominal pain or blood.  :  No dysurea, hematuria  Neurologic:  No headache, paralysis, ataxia, numbness or tingling in the extremities. No change in bowel or bladder control.  Musculoskeletal:  No muscle pain  Hematologic:  No bleeding or bruising.  Lymphatics:  No enlarged nodes. No history of splenectomy.  Endocrine:  No reports of sweating, cold or heat intolerance. No polyuria or polydipsia.  Allergies:  No history of asthma, hives, eczema or rhinitis.    PHYSICAL EXAM:  BP (!) 194/103 (BP Location: Right arm, Patient Position: Sitting, Cuff Size: Adult Small)   Pulse 92   Ht 1.626 m (5' 4\")   Wt 67.1 kg (148 lb)   LMP  (LMP Unknown)   SpO2 96%   BMI 25.40 kg/m    Constitutional: awake, alert, no distress  Eyes: PERRL, sclera nonicteric  ENT: trachea midline  Respiratory: Lungs clear  Cardiovascular: Regular rate and rhythm, 3/6 late peaking systolic murmur at the upper sternal border, no edema  GI: nondistended, nontender, bowel sounds present  Lymph/Hematologic: no lymphadenopathy  Skin: dry, no rash  Musculoskeletal: good muscle tone, strength 5/5 in upper and lower extremities  Neurologic: no focal deficits  Neuropsychiatric: appropriate affact    DATA:  Labs:   October 13: Potassium 4.5, creatinine 0.6, TSH 1.4  Recent Labs   Lab Test 10/13/23  1526   CHOL 186   HDL 54   *   TRIG 115     EKG: October 13: Sinus rhythm, rate 80    ASSESSMENT:  88-year-old female seen for very severe aortic stenosis.  She has had progressive symptoms of fatigue and dyspnea in the past 1 year.  Fortunately she has no overt heart failure type symptoms and ejection fraction is still preserved.  We talked about the natural history of aortic stenosis and the need for valve replacement.  She will hopefully be a good candidate for TAVR.  We talked about needing an angiogram and chest CTA.  She is agreeable to moving forward with this work-up.    RECOMMENDATIONS:  1.  Severe aortic " stenosis  -Refer to structural heart clinic to initiate TAVR work-up    Follow-up with general cardiology based on timing of TAVR.    Lazaro Garay MD  Cardiology - [facility]  Pager:  [phone]  Text Page  October 25, 2023

## 2023-10-27 ENCOUNTER — TELEPHONE (OUTPATIENT)
Dept: INTERNAL MEDICINE | Facility: CLINIC | Age: 88
End: 2023-10-27
Payer: MEDICARE

## 2023-10-27 ENCOUNTER — TELEPHONE (OUTPATIENT)
Dept: CARDIOLOGY | Facility: CLINIC | Age: 88
End: 2023-10-27
Payer: MEDICARE

## 2023-10-27 DIAGNOSIS — I35.0 NONRHEUMATIC AORTIC VALVE STENOSIS: Primary | ICD-10-CM

## 2023-10-27 DIAGNOSIS — R01.1 HEART MURMUR: Primary | ICD-10-CM

## 2023-10-27 NOTE — TELEPHONE ENCOUNTER
Structural Heart Clinic [facility]    TAVR referral received from: Dr. Garay    Chart and recent lab results reviewed.  Most recent BMP from 10/13/23 shows creatinine 0.62 and GFR 86.   TAVR CT: Scheduled 11/16/23 at 1:00pm  Contrast allergy: No  Contacted patient's daughter Penelope to introduce self, provide education on aortic stenosis.   Arranged TAVR consult with Dr. Duarte on 11/30/23  Provided direct contact information. Asked that they call in the meantime with any new or worsening symptoms.     Meagan Foster, RN  Structural Heart Coordinator  [facility]

## 2023-10-27 NOTE — TELEPHONE ENCOUNTER
Patient daughter calls. She is asking for clarification on the patients recent echo appointment. They were able to see a cardiologist that day after the ECHO as there was an opening. Janette is asking if PCP still needs to enter a referral for patient see Cardio or not.     Janette will also call Cardio to see what they have offer for clarification as well.        Daughter [phone] / Janette

## 2023-10-27 NOTE — TELEPHONE ENCOUNTER
[facility] Call Center    Phone Message    May a detailed message be left on voicemail: yes     Reason for Call: Other: please call to schedule for Nonrheumatic aortic valve stenosis     Action Taken: Other: cardio    Travel Screening: Not Applicable

## 2023-10-30 NOTE — TELEPHONE ENCOUNTER
If they have already seen the cardiologist, I would not anticipate that they would require a referral at this time.

## 2023-10-30 NOTE — TELEPHONE ENCOUNTER
Patient's daughter calling back.  Advised of provider's message below.    She is asking for a cardiology referral to be placed in chart - referral pended.    Please advise, thanks.

## 2023-11-16 ENCOUNTER — HOSPITAL ENCOUNTER (OUTPATIENT)
Dept: CARDIOLOGY | Facility: CLINIC | Age: 88
Discharge: HOME | End: 2023-11-16
Attending: INTERNAL MEDICINE | Admitting: INTERNAL MEDICINE
Payer: MEDICARE

## 2023-11-16 VITALS — SYSTOLIC BLOOD PRESSURE: 187 MMHG | DIASTOLIC BLOOD PRESSURE: 99 MMHG | HEART RATE: 103 BPM

## 2023-11-16 DIAGNOSIS — I35.0 NONRHEUMATIC AORTIC VALVE STENOSIS: ICD-10-CM

## 2023-11-16 LAB
CREAT BLD-MCNC: 0.7 MG/DL (ref 0.5–1)
EGFRCR SERPLBLD CKD-EPI 2021: >60 ML/MIN/1.73M2

## 2023-11-16 PROCEDURE — G1010 CDSM STANSON: HCPCS

## 2023-11-16 PROCEDURE — 82565 ASSAY OF CREATININE: CPT

## 2023-11-16 PROCEDURE — G1010 CDSM STANSON: HCPCS | Performed by: INTERNAL MEDICINE

## 2023-11-16 PROCEDURE — 71275 CT ANGIOGRAPHY CHEST: CPT | Mod: 26 | Performed by: INTERNAL MEDICINE

## 2023-11-16 PROCEDURE — 250N000011 HC RX IP 250 OP 636: Performed by: INTERNAL MEDICINE

## 2023-11-16 PROCEDURE — 74174 CTA ABD&PLVS W/CONTRAST: CPT | Mod: 26 | Performed by: INTERNAL MEDICINE

## 2023-11-16 RX ORDER — DIPHENHYDRAMINE HYDROCHLORIDE 50 MG/ML
25-50 INJECTION INTRAMUSCULAR; INTRAVENOUS
Status: DISCONTINUED | OUTPATIENT
Start: 2023-11-16 | End: 2023-11-17 | Stop reason: HOSPADM

## 2023-11-16 RX ORDER — METHYLPREDNISOLONE SODIUM SUCCINATE 125 MG/2ML
125 INJECTION, POWDER, LYOPHILIZED, FOR SOLUTION INTRAMUSCULAR; INTRAVENOUS
Status: DISCONTINUED | OUTPATIENT
Start: 2023-11-16 | End: 2023-11-17 | Stop reason: HOSPADM

## 2023-11-16 RX ORDER — DIPHENHYDRAMINE HCL 25 MG
25 CAPSULE ORAL
Status: DISCONTINUED | OUTPATIENT
Start: 2023-11-16 | End: 2023-11-17 | Stop reason: HOSPADM

## 2023-11-16 RX ORDER — ACYCLOVIR 200 MG/1
0-1 CAPSULE ORAL
Status: DISCONTINUED | OUTPATIENT
Start: 2023-11-16 | End: 2023-11-17 | Stop reason: HOSPADM

## 2023-11-16 RX ORDER — IOPAMIDOL 755 MG/ML
500 INJECTION, SOLUTION INTRAVASCULAR ONCE
Status: COMPLETED | OUTPATIENT
Start: 2023-11-16 | End: 2023-11-16

## 2023-11-16 RX ORDER — ONDANSETRON 2 MG/ML
4 INJECTION INTRAMUSCULAR; INTRAVENOUS
Status: DISCONTINUED | OUTPATIENT
Start: 2023-11-16 | End: 2023-11-17 | Stop reason: HOSPADM

## 2023-11-16 RX ADMIN — IOPAMIDOL 115 ML: 755 INJECTION, SOLUTION INTRAVENOUS at 13:54

## 2023-11-30 ENCOUNTER — TELEPHONE (OUTPATIENT)
Dept: OTHER | Facility: CLINIC | Age: 88
End: 2023-11-30

## 2023-11-30 ENCOUNTER — OFFICE VISIT (OUTPATIENT)
Dept: CARDIOLOGY | Facility: CLINIC | Age: 88
End: 2023-11-30
Attending: INTERNAL MEDICINE
Payer: MEDICARE

## 2023-11-30 VITALS
SYSTOLIC BLOOD PRESSURE: 162 MMHG | BODY MASS INDEX: 24.33 KG/M2 | DIASTOLIC BLOOD PRESSURE: 90 MMHG | HEIGHT: 64 IN | HEART RATE: 86 BPM | OXYGEN SATURATION: 96 % | WEIGHT: 142.5 LBS

## 2023-11-30 DIAGNOSIS — R93.1 ABNORMAL FINDINGS DIAGNOSTIC IMAGING OF HEART AND CORONARY CIRCULATION: ICD-10-CM

## 2023-11-30 DIAGNOSIS — I35.0 NONRHEUMATIC AORTIC VALVE STENOSIS: Primary | ICD-10-CM

## 2023-11-30 DIAGNOSIS — I71.40 ABDOMINAL AORTIC ANEURYSM (AAA) (H): ICD-10-CM

## 2023-11-30 PROCEDURE — 93000 ELECTROCARDIOGRAM COMPLETE: CPT | Performed by: INTERNAL MEDICINE

## 2023-11-30 PROCEDURE — 99214 OFFICE O/P EST MOD 30 MIN: CPT | Performed by: INTERNAL MEDICINE

## 2023-11-30 RX ORDER — ZINC GLUCONATE 50 MG
50 TABLET ORAL EVERY OTHER DAY
COMMUNITY

## 2023-11-30 RX ORDER — UBIDECARENONE 100 MG
100 CAPSULE ORAL DAILY
COMMUNITY

## 2023-11-30 RX ORDER — VITAMIN B COMPLEX
TABLET ORAL DAILY
COMMUNITY

## 2023-11-30 RX ORDER — VITAMIN E 268 MG
400 CAPSULE ORAL EVERY OTHER DAY
COMMUNITY

## 2023-11-30 RX ORDER — MULTIVITAMIN,THERAPEUTIC
1 TABLET ORAL DAILY
COMMUNITY

## 2023-11-30 RX ORDER — MULTIVIT WITH MINERALS/LUTEIN
1000 TABLET ORAL DAILY
COMMUNITY

## 2023-11-30 RX ORDER — UBIDECARENONE 75 MG
100 CAPSULE ORAL
COMMUNITY

## 2023-11-30 RX ORDER — OMEGA-3 FATTY ACIDS/FISH OIL 300-1000MG
1 CAPSULE ORAL EVERY OTHER DAY
COMMUNITY

## 2023-11-30 NOTE — PROGRESS NOTES
Cardiology Consultation       Assessment & Plan     1.  Severe aortic stenosis   2.  Macular degeneration  3.  Hyperlipidemia  4.  PAD with AAA    Recommendations    1.  Severe symptomatic aortic stenosis: Discussed the pathophysiology of your stenosis and potential therapeutic measures to address this.  She is wanting to proceed.  Her family accompany her.  We will need to get a coronary angiogram, risk benefits of potential complications were discussed and she is wanting to proceed.    2.  She has a AAA noted: We will have her established with our vascular surgery colleagues to make sure no additional measures are needed prior to considering TAVR.    3.  She does have some calcification smaller dimensions on her external iliac arteries.  May need intravascular lithotripsy to facilitate TAVR access femoral.  We will review CT scan.    4.  Following diagnostic testing we will present her to our TAVR clinic for a consensus opinion.  Thank you kindly for consult      Seth Duarte MD, MD        HPI:    Patient is an 88-year-old woman who follows with my colleague Dr. Garay.  Her main issues unfortunately are impaired vision due to macular degeneration.  From a cardiovascular perspective she has felt dyspneic and fatigued over the last 6 to 12 months.  It appears that her blood pressure is oftentimes elevated in clinic settings however at home it appears to be in the 130s systolic.  Given her symptoms she underwent an echocardiogram the findings as listed below.  She is sent for evaluation in the TAVR clinic.  She lives independently and typically walks with a walker.  She does go up a flight of stairs if need be and can walk at thousand steps in her apartment unit hallway.  She had a TAVR directed CT and the findings are as listed below.          Echo 2023  Severe valvular aortic stenosis.  The calculated aortic valve area is 0.6cm2.  The mean AoV pressure gradient is 57mmHg.  The visual ejection  fraction is 60-65%.  There is mild concentric left ventricular hypertrophy.  There is mild-moderate biatrial enlargement.        TAVR CT  1.  See below for TAVR related aortic annular and vascular  measurements.   2.  An infrarenal abdominal aortic aneurysm measuring 42.3 mm x 40.9  mm is noted with prominent mural thrombus.  3.  Please review detailed radiology report, to follow separately, for  incidental non-cardiovascular findings.     FINDINGS:     1.  Severely calcified trileafletaortic valve. Aortic valve calcium  score is 1402. The LVOT is not calcified. The ascending aorta is  mildly calcified, aortic arch is  moderately calcified, the descending  thoracic aorta is moderately calcified.   2.  The arch vessel branching pattern is normal.   3.  The suprarenal abdominal aorta is moderately calcified. An  infrarenal abdominal aortic aneurysm measuring 42.3 mm x 40.9 mm is  noted with prominent mural thrombus.  4.  There is mild ostial stenosis of the celiac trunk. Moderate  proximal stenosis of the superior mesenteric artery is noted. Mild  ostial stenosis of the right renal artery is noted.  5.  There is no acute aortic pathology, such as dissection, intramural  hematoma, or contained rupture.      MEASUREMENTS:   Representative dimensions of the thoracoabdominal aorta are as  follows:     1. AORTIC ANNULUS MEASUREMENTS:     1.  The average aortic annulus diameter is 24.3 mm, (long diameter is  25.6 mm and short diameter is 22.7 mm)  2.  Aortic annulus area is 4.66 cm2  3.  Aortic annulus perimeter is 78.1 mm  4.  Suggested 3-cusp coaxial angle for aortic valve is (ELICEO 12 , CAU  10) and alternate A-P coaxial angle is (ELICEO 2 , CAU 26 ), these   angles will vary depending upon the patient's body position  5.  Aortic root angle is 53.4  6.  Left main - Annulus distance: 10.7 mm, RCA - Annulus distance:  11.5 mm     2. LVOT MEASUREMENTS:     1.  The average LVOT diameter (measured 4 mm below annular plane)  is  26.0 mm  2.  LVOT area is 5.32 cm2  3.  LVOT perimeter is 83.6 mm     3. AORTA MEASUREMENTS     1.  The aortic root at the sinuses of Valsalva (left cusp, right cusp,  non cusp): 29.1 mm x  29.2 mm x 28.9 mm  2.  The sinotubular junction:  23.4 mm x 23.2 mm  3.  Sinotubular junction height: 19.6 mm x 18.8 mm  4.  Proximal ascending aorta: 28.9 mm x 27.9 mm  5.  Distal abdominal aorta proximal to the bifurcation: 20.8 mm x 13.6  mm  6.  Innominate artery 3 cm from ostium: 10.8 mm x 8.88 mm  7.  Left common carotid artery 3 cm from ostium: 7.22 mm x 6.80 mm     4. ILIOFEMORAL MEASUREMENTS:     RIGHT:  1.  Right common iliac artery: 5.98 mm x 6.47 mm.This vessel  demonstrates severe eccentric calcification.  2.  Right external iliac artery: 7.44 mm x 7.25 mm   3.  Right common femoral artery: 7.78 mm x 5.59 mm   4.  Tortuosity: mild   5.  Calcification: severe      LEFT:  1.  Left common iliac artery: 7.86 mm x  5.33 mm. This vessel  demonstrates severe eccentric calcification.  2.  Left external iliac artery: 7.43 mm x 7.12 mm  3.  Left common femoral artery: 9.05 mm x 7.87 mm  4.  Tortuosity: moderate   5.  Calcification: severe       Primary Care Physician   Ty Cordero    Patient Active Problem List   Diagnosis    Macular degeneration disease    Glaucoma       Past Medical History   I have reviewed this patient's medical history and updated it with pertinent information if needed.   No past medical history on file.    Past Surgical History   I have reviewed this patient's surgical history and updated it with pertinent information if needed.  No past surgical history on file.    Prior to Admission Medications   Cannot display prior to admission medications because the patient has not been admitted in this contact.     [unfilled]  [unfilled]  Allergies   Allergies   Allergen Reactions    Albuterol     Amlodipine     Bimatoprost Itching    Brimonidine Itching    Clotrimazole Itching    Dorzolamide  Hcl-Timolol Mal Itching    Latanoprost Itching    Lisinopril     Losartan      depression    Neomycin-Polymyxin-Gramicidin Swelling    Neosporin [Neomycin-Polymyxin-Gramicidin]     Penicillins     Tape [Adhesive Tape]     Timolol Itching    Travoprost Itching    Vicodin [Hydrocodone-Acetaminophen]        Social History    reports that she has never smoked. She has never been exposed to tobacco smoke. She has never used smokeless tobacco. She reports that she does not currently use alcohol. She reports that she does not currently use drugs.    Family History   No family history on file.    Review of Systems   The comprehensive 10 point Review of Systems is negative other than noted in the HPI or here.     Physical Exam   Vital Signs with Ranges     Wt Readings from Last 4 Encounters:   10/25/23 67.1 kg (148 lb)   10/13/23 68.3 kg (150 lb 8 oz)   04/17/20 77.1 kg (170 lb)     [unfilled]      Vitals:     Eyes: PERRL, sclera nonicteric  ENT: trachea midline  Respiratory: Lungs clear  Cardiovascular: Regular rate and rhythm, 3/6 late peaking systolic murmur at the upper sternal border, no edema  GI: nondistended, nontender, bowel sounds present  Lymph/Hematologic: no lymphadenopathy  Skin: dry, no rash  Musculoskeletal: good muscle tone, strength 5/5 in upper and lower extremities  Neurologic: no focal deficits  Neuropsychiatric: appropriate affact

## 2023-11-30 NOTE — PROGRESS NOTES
TAVR Coordinator visit:  Provided additional education regarding TAVR procedure, after being present for discussion with physician. Explained the work-up process and next steps for patient. Patient provided our direct contact number and instructed to call with any questions.     Completed frailty testing and KCCQ.   5 meter walk: 6.2 seconds             6.0 seconds             6.4 seconds  Frailty score: 2/5 (eyeball and ambulation)    KCCQ Results:   1a. 5  1b. 5  1c. 6  2. 5  3. 5  4. 7  5. 5  6. 5  7. 1  8a. 5  8b. 5  8c. 5    Preliminary STS Risk Score: 3.79%  NYHA Class: II    Plan is for patient to proceed with coronary angiogram as next step. Will send vascular referral and message vascular team that Dr. Duarte would like patient to see Dr. Barkley to review her abdominal aortic aneurysm found on her TAVR CT.      Dana Avila RN  Structural Heart Coordinator  [facility]

## 2023-11-30 NOTE — TELEPHONE ENCOUNTER
"Referral received via [employer] on 11/30/23.    Pt referred to [facility] by Seth Duarte MD  for abdominal aortic aneurysm.   \"She has a AAA noted: We will have her established with our vascular surgery colleagues to make sure no additional measures are needed prior to considering TAVR.\"       Pt needs to be scheduled for NEW VASCULAR PATIENT consult with vascular medicine.  Will route to scheduling to coordinate an appointment next week.     Appt note:  Pt referred to [facility] by Seth Duarte MD  for abdominal aortic aneurysm.   \"She has a AAA noted: We will have her established with our vascular surgery colleagues to make sure no additional measures are needed prior to considering TAVR.\" CT angiogram TAVR in [facility].    Janae SAUER, RN    [facility]  Office: [phone]  Fax: [phone]        "

## 2023-11-30 NOTE — LETTER
11/30/2023    Ty Cordero MD  [address] [facility] [address]    RE: Kavita MOLINA Gaurang       Dear Colleague,     I had the pleasure of seeing Kavita Jonesoll in the [facility].      Cardiology Consultation       Assessment & Plan    1.  Severe aortic stenosis   2.  Macular degeneration  3.  Hyperlipidemia  4.  PAD with AAA    Recommendations    1.  Severe symptomatic aortic stenosis: Discussed the pathophysiology of your stenosis and potential therapeutic measures to address this.  She is wanting to proceed.  Her family accompany her.  We will need to get a coronary angiogram, risk benefits of potential complications were discussed and she is wanting to proceed.    2.  She has a AAA noted: We will have her established with our vascular surgery colleagues to make sure no additional measures are needed prior to considering TAVR.    3.  She does have some calcification smaller dimensions on her external iliac arteries.  May need intravascular lithotripsy to facilitate TAVR access femoral.  We will review CT scan.    4.  Following diagnostic testing we will present her to our TAVR clinic for a consensus opinion.  Thank you kindly for consult      Seth Duarte MD, MD        HPI:    Patient is an 88-year-old woman who follows with my colleague Dr. Garay.  Her main issues unfortunately are impaired vision due to macular degeneration.  From a cardiovascular perspective she has felt dyspneic and fatigued over the last 6 to 12 months.  It appears that her blood pressure is oftentimes elevated in clinic settings however at home it appears to be in the 130s systolic.  Given her symptoms she underwent an echocardiogram the findings as listed below.  She is sent for evaluation in the TAVR clinic.  She lives independently and typically walks with a walker.  She does go up a flight of stairs if need be and can walk at thousand steps in her apartment unit hallway.  She had a TAVR  directed CT and the findings are as listed below.          Echo 2023  Severe valvular aortic stenosis.  The calculated aortic valve area is 0.6cm2.  The mean AoV pressure gradient is 57mmHg.  The visual ejection fraction is 60-65%.  There is mild concentric left ventricular hypertrophy.  There is mild-moderate biatrial enlargement.        TAVR CT  1.  See below for TAVR related aortic annular and vascular  measurements.   2.  An infrarenal abdominal aortic aneurysm measuring 42.3 mm x 40.9  mm is noted with prominent mural thrombus.  3.  Please review detailed radiology report, to follow separately, for  incidental non-cardiovascular findings.     FINDINGS:     1.  Severely calcified trileafletaortic valve. Aortic valve calcium  score is 1402. The LVOT is not calcified. The ascending aorta is  mildly calcified, aortic arch is  moderately calcified, the descending  thoracic aorta is moderately calcified.   2.  The arch vessel branching pattern is normal.   3.  The suprarenal abdominal aorta is moderately calcified. An  infrarenal abdominal aortic aneurysm measuring 42.3 mm x 40.9 mm is  noted with prominent mural thrombus.  4.  There is mild ostial stenosis of the celiac trunk. Moderate  proximal stenosis of the superior mesenteric artery is noted. Mild  ostial stenosis of the right renal artery is noted.  5.  There is no acute aortic pathology, such as dissection, intramural  hematoma, or contained rupture.      MEASUREMENTS:   Representative dimensions of the thoracoabdominal aorta are as  follows:     1. AORTIC ANNULUS MEASUREMENTS:     1.  The average aortic annulus diameter is 24.3 mm, (long diameter is  25.6 mm and short diameter is 22.7 mm)  2.  Aortic annulus area is 4.66 cm2  3.  Aortic annulus perimeter is 78.1 mm  4.  Suggested 3-cusp coaxial angle for aortic valve is (Divehi 12 , CAU  10) and alternate A-P coaxial angle is (Divehi 2 , CAU 26 ), these   angles will vary depending upon the patient's body  position  5.  Aortic root angle is 53.4  6.  Left main - Annulus distance: 10.7 mm, RCA - Annulus distance:  11.5 mm     2. LVOT MEASUREMENTS:     1.  The average LVOT diameter (measured 4 mm below annular plane) is  26.0 mm  2.  LVOT area is 5.32 cm2  3.  LVOT perimeter is 83.6 mm     3. AORTA MEASUREMENTS     1.  The aortic root at the sinuses of Valsalva (left cusp, right cusp,  non cusp): 29.1 mm x  29.2 mm x 28.9 mm  2.  The sinotubular junction:  23.4 mm x 23.2 mm  3.  Sinotubular junction height: 19.6 mm x 18.8 mm  4.  Proximal ascending aorta: 28.9 mm x 27.9 mm  5.  Distal abdominal aorta proximal to the bifurcation: 20.8 mm x 13.6  mm  6.  Innominate artery 3 cm from ostium: 10.8 mm x 8.88 mm  7.  Left common carotid artery 3 cm from ostium: 7.22 mm x 6.80 mm     4. ILIOFEMORAL MEASUREMENTS:     RIGHT:  1.  Right common iliac artery: 5.98 mm x 6.47 mm.This vessel  demonstrates severe eccentric calcification.  2.  Right external iliac artery: 7.44 mm x 7.25 mm   3.  Right common femoral artery: 7.78 mm x 5.59 mm   4.  Tortuosity: mild   5.  Calcification: severe      LEFT:  1.  Left common iliac artery: 7.86 mm x  5.33 mm. This vessel  demonstrates severe eccentric calcification.  2.  Left external iliac artery: 7.43 mm x 7.12 mm  3.  Left common femoral artery: 9.05 mm x 7.87 mm  4.  Tortuosity: moderate   5.  Calcification: severe       Primary Care Physician  Ty Cordero    Patient Active Problem List   Diagnosis    Macular degeneration disease    Glaucoma       Past Medical History  I have reviewed this patient's medical history and updated it with pertinent information if needed.   No past medical history on file.    Past Surgical History  I have reviewed this patient's surgical history and updated it with pertinent information if needed.  No past surgical history on file.    Prior to Admission Medications  Cannot display prior to admission medications because the patient has not been admitted  in this contact.     [unfilled]  [unfilled]  Allergies  Allergies   Allergen Reactions    Albuterol     Amlodipine     Bimatoprost Itching    Brimonidine Itching    Clotrimazole Itching    Dorzolamide Hcl-Timolol Mal Itching    Latanoprost Itching    Lisinopril     Losartan      depression    Neomycin-Polymyxin-Gramicidin Swelling    Neosporin [Neomycin-Polymyxin-Gramicidin]     Penicillins     Tape [Adhesive Tape]     Timolol Itching    Travoprost Itching    Vicodin [Hydrocodone-Acetaminophen]        Social History   reports that she has never smoked. She has never been exposed to tobacco smoke. She has never used smokeless tobacco. She reports that she does not currently use alcohol. She reports that she does not currently use drugs.    Family History  No family history on file.    Review of Systems  The comprehensive 10 point Review of Systems is negative other than noted in the HPI or here.     Physical Exam  Vital Signs with Ranges     Wt Readings from Last 4 Encounters:   10/25/23 67.1 kg (148 lb)   10/13/23 68.3 kg (150 lb 8 oz)   04/17/20 77.1 kg (170 lb)     [unfilled]      Vitals:     Eyes: PERRL, sclera nonicteric  ENT: trachea midline  Respiratory: Lungs clear  Cardiovascular: Regular rate and rhythm, 3/6 late peaking systolic murmur at the upper sternal border, no edema  GI: nondistended, nontender, bowel sounds present  Lymph/Hematologic: no lymphadenopathy  Skin: dry, no rash  Musculoskeletal: good muscle tone, strength 5/5 in upper and lower extremities  Neurologic: no focal deficits  Neuropsychiatric: appropriate affact      Thank you for allowing me to participate in the care of your patient.      Sincerely,     Seth Duarte MD     [facility]  cc:   Lazaro Garay MD

## 2023-11-30 NOTE — TELEPHONE ENCOUNTER
Future Appointments   Date Time Provider Department Center   12/6/2023 10:30 AM Enrico Mendoza MD [facility]

## 2023-12-05 ENCOUNTER — TELEPHONE (OUTPATIENT)
Dept: OTHER | Facility: CLINIC | Age: 88
End: 2023-12-05
Payer: MEDICARE

## 2023-12-05 NOTE — TELEPHONE ENCOUNTER
----- Message from Peg Crawford sent at 12/4/2023  2:12 PM CST -----  Regarding: RE: new vascular referral  I will have Suki review and then route an encounter to scheduling to get the patient scheduled.     Peg  ----- Message -----  From: Dana Avila, RN  Sent: 11/30/2023  12:50 PM CST  To: Peg Crawford  Subject: new vascular referral                            Zee Ruvalcaba,    I did just place a referral but wanted to give you a heads up that we met this pt in TAVR clinic today and her TAVR CT picked up a 42.3mm X 40.9 mm infrarenal abdominal aortic aneurysm. Dr. Duarte was hoping patient could meet with Dr. Barkley to make sure nothing additional needs to get done for this.     She still has some work-up to go for her TAVR so not emergent by any means!    Thanks,  Dana!   [phone]

## 2023-12-06 ENCOUNTER — HOSPITAL ENCOUNTER (OUTPATIENT)
Dept: ULTRASOUND IMAGING | Facility: CLINIC | Age: 88
Discharge: HOME | End: 2023-12-06
Attending: INTERNAL MEDICINE
Payer: MEDICARE

## 2023-12-06 ENCOUNTER — OFFICE VISIT (OUTPATIENT)
Dept: OTHER | Facility: CLINIC | Age: 88
End: 2023-12-06
Attending: INTERNAL MEDICINE
Payer: MEDICARE

## 2023-12-06 VITALS
SYSTOLIC BLOOD PRESSURE: 177 MMHG | BODY MASS INDEX: 24.89 KG/M2 | HEIGHT: 64 IN | OXYGEN SATURATION: 97 % | HEART RATE: 94 BPM | WEIGHT: 145.8 LBS | DIASTOLIC BLOOD PRESSURE: 97 MMHG

## 2023-12-06 DIAGNOSIS — K55.1 SUPERIOR MESENTERIC ARTERY STENOSIS (H): ICD-10-CM

## 2023-12-06 DIAGNOSIS — I77.1 SUBCLAVIAN ARTERY STENOSIS (H): ICD-10-CM

## 2023-12-06 DIAGNOSIS — R09.89 DIMINISHED PULSES IN LOWER EXTREMITY: ICD-10-CM

## 2023-12-06 DIAGNOSIS — Z87.891 FORMER SMOKER: ICD-10-CM

## 2023-12-06 DIAGNOSIS — I71.43 INFRARENAL ABDOMINAL AORTIC ANEURYSM (AAA) WITHOUT RUPTURE (H): ICD-10-CM

## 2023-12-06 DIAGNOSIS — I71.43 INFRARENAL ABDOMINAL AORTIC ANEURYSM (AAA) WITHOUT RUPTURE (H): Primary | ICD-10-CM

## 2023-12-06 DIAGNOSIS — I35.0 AORTIC VALVE STENOSIS, ETIOLOGY OF CARDIAC VALVE DISEASE UNSPECIFIED: ICD-10-CM

## 2023-12-06 DIAGNOSIS — E78.5 HYPERLIPIDEMIA LDL GOAL <70: ICD-10-CM

## 2023-12-06 DIAGNOSIS — I70.0 ATHEROSCLEROSIS OF ABDOMINAL AORTA (H): ICD-10-CM

## 2023-12-06 DIAGNOSIS — I10 BENIGN ESSENTIAL HYPERTENSION: ICD-10-CM

## 2023-12-06 PROCEDURE — 99417 PROLNG OP E/M EACH 15 MIN: CPT | Performed by: INTERNAL MEDICINE

## 2023-12-06 PROCEDURE — 93924 LWR XTR VASC STDY BILAT: CPT

## 2023-12-06 PROCEDURE — 93925 LOWER EXTREMITY STUDY: CPT

## 2023-12-06 PROCEDURE — 93930 UPPER EXTREMITY STUDY: CPT

## 2023-12-06 PROCEDURE — 99205 OFFICE O/P NEW HI 60 MIN: CPT | Performed by: INTERNAL MEDICINE

## 2023-12-06 PROCEDURE — G0463 HOSPITAL OUTPT CLINIC VISIT: HCPCS | Mod: 25 | Performed by: INTERNAL MEDICINE

## 2023-12-06 RX ORDER — ROSUVASTATIN CALCIUM 5 MG/1
5 TABLET, COATED ORAL DAILY
Qty: 30 TABLET | Refills: 3 | Status: SHIPPED | OUTPATIENT
Start: 2023-12-06 | End: 2024-03-21

## 2023-12-06 RX ORDER — ROSUVASTATIN CALCIUM 5 MG/1
5 TABLET, COATED ORAL DAILY
Qty: 30 TABLET | Refills: 3 | Status: SHIPPED | OUTPATIENT
Start: 2023-12-06 | End: 2023-12-06

## 2023-12-06 NOTE — RESULT ENCOUNTER NOTE
All of your imaging studies done today looks good.    Continue same plan discussed in the clinic    Follow-up with Dr. Barkley vascular surgeon as planned next week

## 2023-12-06 NOTE — PATIENT INSTRUCTIONS
Please take baby aspirin 81 mg with food as advised     Start crestor 5 mg daily new Rx sent     Arrange bilateral upper extremity arterial duplex , include subclavian arteries, SOLEDAD with exercise, bilateral leg arterial duplex     Referral to see Dr. Barkley vascular surgeon at [place] or [place]   Before the scheduled angiogram

## 2023-12-06 NOTE — PROGRESS NOTES
"Patient is here to discuss consult    BP (!) 185/136 (BP Location: Left arm, Patient Position: Chair, Cuff Size: Adult Regular)   Pulse 92   Ht 5' 4\" (1.626 m)   Wt 145 lb 12.8 oz (66.1 kg)   LMP  (LMP Unknown)   SpO2 97%   BMI 25.03 kg/m      Questions patient would like addressed today are: N/A.    Refills are needed: No    Has homecare services and agency name:  [facility]    "

## 2023-12-06 NOTE — PROGRESS NOTES
[facility] INITIAL VASCULAR MEDICINE CONSULT    (New Patient visit)    PRIMARY HEALTH CARE PROVIDER:  Ty Cordero MD      REFERRING HEALTH CARE PROVIDER;  Seth Duarte MD      REASON FOR CONSULT: Evaluation and management of abdominal aortic aneurysm recently found on imaging study for evaluation of severe aortic stenosis planning for TAVR.  She is asymptomatic        HPI: Kavita Merlos is a 88 year old very pleasant female with history of severe aortic stenosis, hypertension, hyperlipidemia recently seen by Dr. Duarte and underwent CT scan for TAVR protocol and noted 4.2 cm infrarenal abdominal aortic aneurysm, she is a former smoker.  She is totally asymptomatic.   Her blood pressure today in the clinic substantial discrepancy right side 68/45 left side 185/136 pulse was 92  At the end of the clinic visit repeat blood pressure not much difference between the right and left arm but the blood pressure is very high as documented 1 70-1 80 systolic  She tried multiple blood pressure medications in the past could not tolerate, she is listed as a 16 different medications allergies    She accompanied by her daughter and son today for this visit  She is not on any statin  She also has extensive atherosclerosis of abdominal aorta    Recent imaging also documented moderate proximal SMA stenosis  She denies any fear of food or abdominal pain or unintentional weight loss etc.    She is new to me reviewed available records in the epic and updated chart          PAST MEDICAL HISTORY  Past Medical History:   Diagnosis Date    AAA (abdominal aortic aneurysm) (H24)     Aortic stenosis     Benign essential hypertension with BP difference between arms     Hyperlipidemia LDL goal <70        CURRENT MEDICATIONS  co-enzyme Q-10 100 MG CAPS capsule, Take 100 mg by mouth daily  cyanocobalamin (VITAMIN B-12) 100 MCG tablet, Take 100 mcg by mouth daily  multivitamin, therapeutic  (THERA-VIT) TABS tablet, Take 1 tablet by mouth daily  omega 3 1000 MG CAPS,   ranibizumab (LUCENTIS) 0.5 MG/0.05ML SOLN, 0.5 mg by Intravitreal route once Once every 6-8 weeks  tafluprost (ZIOPTAN) 0.0015 % SOLN ophthalmic solution,   Timolol Maleate (TIMOPTIC OCUDOSE) 0.5 % ophthalmic solution,   vitamin C (ASCORBIC ACID) 1000 MG TABS, Take 1,000 mg by mouth daily  Vitamin D3 (CHOLECALCIFEROL) 25 mcg (1000 units) tablet, Take by mouth daily  vitamin E (TOCOPHEROL) 400 units (180 mg) capsule, Take by mouth daily  zinc gluconate 50 MG tablet, Take 40 mg by mouth daily    No current facility-administered medications on file prior to visit.      PAST SURGICAL HISTORY:  History reviewed. No pertinent surgical history.    ALLERGIES     Allergies   Allergen Reactions    Albuterol     Amlodipine     Bimatoprost Itching    Brimonidine Itching    Clotrimazole Itching    Dorzolamide Hcl-Timolol Mal Itching    Latanoprost Itching    Lisinopril     Losartan      depression    Neomycin-Polymyxin-Gramicidin Swelling    Neosporin [Neomycin-Polymyxin-Gramicidin]     Penicillins     Tape [Adhesive Tape]     Timolol Itching    Travoprost Itching    Vicodin [Hydrocodone-Acetaminophen]        FAMILY HISTORY  History reviewed. No pertinent family history.    VASCULAR FAMILY HISTORY  1st order relative with atherosclerotic PAD: No  1st order relative with AAA: No  Family history of Familial Hyperlipidemia No  Family History of Hypercoagulable state:No    VASCULAR RISK FACTORS  1. Diabetes:No   2. Smoking: quit smoking some time ago.  3. HTN: uncontrolled  4.Hyperlipidemia: Yes - uncontrolled      SOCIAL HISTORY  Social History     Socioeconomic History    Marital status: Single     Spouse name: Not on file    Number of children: Not on file    Years of education: Not on file    Highest education level: Not on file   Occupational History    Not on file   Tobacco Use    Smoking status: Never     Passive exposure: Never    Smokeless  tobacco: Never   Vaping Use    Vaping Use: Never used   Substance and Sexual Activity    Alcohol use: Not Currently    Drug use: Not Currently    Sexual activity: Not on file   Other Topics Concern    Not on file   Social History Narrative    Not on file     Social Determinants of Health     Financial Resource Strain: Low Risk  (10/9/2023)    Financial Resource Strain     Within the past 12 months, have you or your family members you live with been unable to get utilities (heat, electricity) when it was really needed?: No   Food Insecurity: Low Risk  (10/9/2023)    Food Insecurity     Within the past 12 months, did you worry that your food would run out before you got money to buy more?: No     Within the past 12 months, did the food you bought just not last and you didn t have money to get more?: No   Transportation Needs: Low Risk  (10/9/2023)    Transportation Needs     Within the past 12 months, has lack of transportation kept you from medical appointments, getting your medicines, non-medical meetings or appointments, work, or from getting things that you need?: No   Physical Activity: Not on file   Stress: Not on file   Social Connections: Not on file   Interpersonal Safety: High Risk (10/13/2023)    Interpersonal Safety     Do you feel physically and emotionally safe where you currently live?: No     Within the past 12 months, have you been hit, slapped, kicked or otherwise physically hurt by someone?: No     Within the past 12 months, have you been humiliated or emotionally abused in other ways by your partner or ex-partner?: No   Housing Stability: Low Risk  (10/9/2023)    Housing Stability     Do you have housing? : Yes     Are you worried about losing your housing?: No       ROS:   General: No change in weight, sleep or appetite.  Normal energy.  No fever or chills  Eyes: Negative for vision changes or eye problems  ENT: No problems with ears, nose or throat.  No difficulty swallowing.  Resp: No coughing,  "wheezing or shortness of breath  CV: No chest pains or palpitations  GI: No nausea, vomiting,  heartburn, abdominal pain, diarrhea, constipation or change in bowel habits  : No urinary frequency or dysuria, bladder or kidney problems  Musculoskeletal: No significant muscle or joint pains  Neurologic: No headaches, numbness, tingling, weakness, problems with balance or coordination  Psychiatric: No problems with anxiety, depression or mental health  Heme/immune/allergy: No history of bleeding or clotting problems or anemia.  No allergies or immune system problems  Endocrine: No history of thyroid disease, diabetes or other endocrine disorders  Skin: No rashes,worrisome lesions or skin problems  Vascular:  No claudication, lifestyle limiting or otherwise; no ischemic rest pain; no non-healing ulcers. No weakness, No loss of sensation        EXAM:  BP (!) 63/38 (BP Location: Right arm, Patient Position: Chair, Cuff Size: Adult Regular)   Pulse 91   Ht 5' 4\" (1.626 m)   Wt 145 lb 12.8 oz (66.1 kg)   LMP  (LMP Unknown)   SpO2 97%   BMI 25.03 kg/m    In general, the patient is a pleasant female in no apparent distress.    HEENT: NC/AT.  PERRLA.  EOMI.  Sclerae white, not injected.  Nares clear.  Pharynx without erythema or exudate.  Dentition intact.    Neck: No adenopathy.  No thyromegaly. Carotids +2/2 bilaterally without bruits.  No jugular venous distension.   Heart: RRR. 4/6 aortic stenotic murmur heard on the right side of the chest area  Lungs: CTA.  No ronchi, wheezes, rales.  No dullness to percussion.   Abdomen: Soft, nontender, nondistended. No organomegaly. No AAA.  No bruits.   Extremities: Decreased pedal pulses  Bruit in the right and left supra clavicular space  Aortic stenotic murmur heard in the right side of the chest        Labs:  LIPID RESULTS:  Lab Results   Component Value Date    CHOL 186 10/13/2023    HDL 54 10/13/2023     (H) 10/13/2023    TRIG 115 10/13/2023       LIVER ENZYME " RESULTS:  Lab Results   Component Value Date    AST 28 10/13/2023    ALT 18 10/13/2023       CBC RESULTS:  Lab Results   Component Value Date    WBC 8.2 10/13/2023    WBC 9.0 04/17/2020    RBC 4.97 10/13/2023    RBC 4.76 04/17/2020    HGB 14.8 10/13/2023    HGB 14.4 04/17/2020    HCT 46.1 10/13/2023    HCT 45.7 04/17/2020    MCV 93 10/13/2023    MCV 96 04/17/2020    MCH 29.8 10/13/2023    MCH 30.3 04/17/2020    MCHC 32.1 10/13/2023    MCHC 31.5 04/17/2020    RDW 13.1 10/13/2023    RDW 13.2 04/17/2020     10/13/2023     04/17/2020       BMP RESULTS:  Lab Results   Component Value Date     10/13/2023     04/17/2020    POTASSIUM 4.5 10/13/2023    POTASSIUM 4.4 04/17/2020    CHLORIDE 100 10/13/2023    CHLORIDE 105 04/17/2020    CO2 28 10/13/2023    CO2 26 04/17/2020    ANIONGAP 9 10/13/2023    ANIONGAP 7 04/17/2020     (H) 10/13/2023    GLC 97 04/17/2020    BUN 19.7 10/13/2023    BUN 16 04/17/2020    CR 0.7 11/16/2023    CR 0.62 10/13/2023    CR 0.55 04/17/2020    GFRESTIMATED >60 11/16/2023    GFRESTIMATED 86 04/17/2020    GFRESTBLACK >90 04/17/2020    JERRY 10.0 10/13/2023    JERRY 9.5 04/17/2020          THYROID RESULTS:  Lab Results   Component Value Date    TSH 1.38 10/13/2023       Procedures:       Procedure: CT ANGIOGRAM TAVR   Examination Date: 11/16/2023 4:52 PM   Indication: Severe aortic stenosis, Pre TAVR  Ordering Provider: KOFFI LIZARRAGA     TECHNIQUE: ECG gated CT of the heart, aorta and nongated CT of the  chest abdomen pelvis without and with IV contrast 115 mL was performed  per the PAULA protocol on a Siemens Dual Source Flash scanner without  incident. A noncontrast CT of the chest abdomen and pelvis was  performed followed by contrast-enhanced CTA of the heart in a spiral  gated mode with limited field-of-view followed by gated high pitch  spiral CTA of the chest, abdomen, pelvis at 120 kVP to evaluate the  thoracoabdominal aorta and iliac vasculature. Scan  protocol was  optimized to minimize radiation exposure. The total radiation exposure  was 736 DLP and 10.3 mSv. Images were reconstructed and analyzed on a  Contour Workstation.                                                                       IMPRESSION:     1.  See below for TAVR related aortic annular and vascular  measurements.   2.  An infrarenal abdominal aortic aneurysm measuring 42.3 mm x 40.9  mm is noted with prominent mural thrombus.  3.  Please review detailed radiology report, to follow separately, for  incidental non-cardiovascular findings.     FINDINGS:     1.  Severely calcified trileafletaortic valve. Aortic valve calcium  score is 1402. The LVOT is not calcified. The ascending aorta is  mildly calcified, aortic arch is  moderately calcified, the descending  thoracic aorta is moderately calcified.   2.  The arch vessel branching pattern is normal.   3.  The suprarenal abdominal aorta is moderately calcified. An  infrarenal abdominal aortic aneurysm measuring 42.3 mm x 40.9 mm is  noted with prominent mural thrombus.  4.  There is mild ostial stenosis of the celiac trunk. Moderate  proximal stenosis of the superior mesenteric artery is noted. Mild  ostial stenosis of the right renal artery is noted.  5.  There is no acute aortic pathology, such as dissection, intramural  hematoma, or contained rupture.      MEASUREMENTS:   Representative dimensions of the thoracoabdominal aorta are as  follows:     1. AORTIC ANNULUS MEASUREMENTS:     1.  The average aortic annulus diameter is 24.3 mm, (long diameter is  25.6 mm and short diameter is 22.7 mm)  2.  Aortic annulus area is 4.66 cm2  3.  Aortic annulus perimeter is 78.1 mm  4.  Suggested 3-cusp coaxial angle for aortic valve is (Marshallese 12 , CAU  10) and alternate A-P coaxial angle is (Marshallese 2 , CAU 26 ), these   angles will vary depending upon the patient's body position  5.  Aortic root angle is 53.4  6.  Left main - Annulus distance: 10.7 mm, RCA -  Annulus distance:  11.5 mm     2. LVOT MEASUREMENTS:     1.  The average LVOT diameter (measured 4 mm below annular plane) is  26.0 mm  2.  LVOT area is 5.32 cm2  3.  LVOT perimeter is 83.6 mm     3. AORTA MEASUREMENTS     1.  The aortic root at the sinuses of Valsalva (left cusp, right cusp,  non cusp): 29.1 mm x  29.2 mm x 28.9 mm  2.  The sinotubular junction:  23.4 mm x 23.2 mm  3.  Sinotubular junction height: 19.6 mm x 18.8 mm  4.  Proximal ascending aorta: 28.9 mm x 27.9 mm  5.  Distal abdominal aorta proximal to the bifurcation: 20.8 mm x 13.6  mm  6.  Innominate artery 3 cm from ostium: 10.8 mm x 8.88 mm  7.  Left common carotid artery 3 cm from ostium: 7.22 mm x 6.80 mm     4. ILIOFEMORAL MEASUREMENTS:     RIGHT:  1.  Right common iliac artery: 5.98 mm x 6.47 mm.This vessel  demonstrates severe eccentric calcification.  2.  Right external iliac artery: 7.44 mm x 7.25 mm   3.  Right common femoral artery: 7.78 mm x 5.59 mm   4.  Tortuosity: mild   5.  Calcification: severe      LEFT:  1.  Left common iliac artery: 7.86 mm x  5.33 mm. This vessel  demonstrates severe eccentric calcification.  2.  Left external iliac artery: 7.43 mm x 7.12 mm  3.  Left common femoral artery: 9.05 mm x 7.87 mm  4.  Tortuosity: moderate   5.  Calcification: severe      JT MORALES MD       ARTERIAL DUPLEX ULTRASOUND BILATERAL LOWER EXTREMITY   12/6/2023 1:58  PM      HISTORY: Infrarenal abdominal aortic aneurysm (AAA) without rupture  (H24). Diminished pulses in lower extremity.     COMPARISON: None.     FINDINGS: Color Doppler and spectral waveform analysis was performed  throughout the bilateral common femoral, profunda femoral, superficial  femoral, popliteal, anterior tibial, posterior tibial and peroneal  arteries.     Right: Right common femoral, profunda femoral, superficial femoral,  popliteal, anterior tibial and posterior tibial arteries are patent  with multiphasic waveforms. Peroneal artery is not visualized.      Left: Left common femoral, profunda femoral, superficial femoral,  popliteal, anterior tibial, posterior tibial and peroneal arteries are  patent with multiphasic waveforms.                                                                      IMPRESSION:   1. Patent arteries throughout the right lower extremity with  two-vessel runoff.  3. Patent arteries throughout the left lower extremity with  three-vessel runoff.     MARY IRAHETA DO     US SOLEDAD DOPPLER WITH EXERCISE BILATERAL   12/6/2023 1:58 PM      HISTORY: Atherosclerosis of abdominal aorta (H24); Diminished pulses  in lower extremity.     COMPARISON: None.     FINDINGS:  Right SOLEDAD:   PT: 0.96  DP:  0.98     Left SOLEDAD:   PT:  0.97  DP:  1.0      Right Digital brachial index: 0.72  Left Digital Brachial index: 0.73     Waveforms: Monophasic in the right posterior tibial artery. Triphasic  in the right dorsalis pedis, left posterior tibial and left dorsalis  pedis arteries.                                                                      IMPRESSION: Normal ABIs bilaterally without evidence of arterial  insufficiency.     SOLEDAD CRITERIA:  >1.4 NC  0.95-1.4 Normal  0.90 - 0.94 Mild  0.5 - 0.89 Moderate  0.2 - 0.49 Severe  <0.2 Critical     MARY IRAHETA DO         SYSTEM ID:  [id number]     [place] [facility]  DATE: 12/6/2023     US UPPER EXTREMITY ARTERIAL DUPLEX BILATERAL     INDICATION: Blood pressure difference. Evaluate for subclavian artery  stenosis.  COMPARISON: None.     TECHNIQUE: Duplex imaging is performed utilizing gray-scale,  two-dimensional images, and color-flow imaging. Doppler waveform  analysis and spectral Doppler imaging is also performed.     FINDINGS:   Multiphasic waveforms throughout both upper extremities with no  significant arterial stenosis identified.                                                                      IMPRESSION:  1.  NORMAL BILATERAL UPPER EXTREMITY ARTERIAL DUPLEX WITH NO  SIGNIFICANT ARTERIAL STENOSIS  IDENTIFIED.     AMANDEEP BARRERA MD         SYSTEM ID:  [id number]    Assessment and Plan:     1. Infrarenal abdominal aortic aneurysm (AAA) without rupture (H24) 4.2X4.1 cm 11/2023 on TAVR CT  - Vascular Surgery Referral; Future  - US Lower Extremity Arterial Duplex Bilateral; Future    2. Atherosclerosis of abdominal aorta (H24)  - US SOLEDAD Doppler with Exercise Bilateral; Future    3. Aortic valve stenosis severe 0.6 cm2 on echo 10/2023    4. Hyperlipidemia LDL goal <70  - rosuvastatin (CRESTOR) 5 MG tablet; Take 1 tablet (5 mg) by mouth daily  Dispense: 30 tablet; Refill: 3    5. Benign essential hypertension with  significant BP difference in arms    6. Former smoker    7. Subclavian artery stenosis (H24) with BP difference ???    - Vascular Surgery Referral; Future  - US Upper Ext Arterial Duplex Bilateral; Future    8. Superior mesenteric artery stenosis , moderate proximal (H24), asymptomatic    - Vascular Surgery Referral; Future    9. Diminished pulses in lower extremity  - US SOLEDAD Doppler with Exercise Bilateral; Future  - US Lower Extremity Arterial Duplex Bilateral; Future     This is a very pleasant 88-year-old female with history of multiple medical problems as delineated above with known severe aortic stenosis plan for TAVR recent workup CT imaging revealed infrarenal AAA 4.2 x 4.1 cm and she is asymptomatic and also showed extensive atherosclerosis of abdominal aorta  Her LDL is not at goal and not taking any statins  Her blood pressure is labile and always elevated in the doctor's office today initial blood pressure readings huge discrepancy right side substantially lower than left side of the arm and also she had a bruit supraclavicular area  Repeat blood pressure with smaller cuff, no difference but blood pressure was still high  She tried multiple different blood pressure medications and she is not interested in taking any of the new medication for blood pressure.  She is willing to take  cholesterol-lowering medication  She is not even on aspirin    I reviewed imaging studies, cardiology evaluation in the epic.  Given her situation planning for coronary angiogram radial or brachial approach and also planning for TAVR in the near future I have arranged bilateral upper extremity arterial duplex unremarkable with multiphasic waveforms no arterial stenosis noted.  Arrange bilateral lower extremity arterial duplex which was unremarkable  Normal SOLEDAD    Suggested taking a baby aspirin with food  Initiated Crestor 5 mg daily (she is worried about any new medication and did not tolerate multiple different other medications in the past and she preferred low-dose, ideally she needs high-dose statin) new prescription sent, plan for fasting lipids in few months  She will benefit seeing vascular surgeon , arranged appointment with Dr. Barkley on December 11, 2023.       90 minutes spent on the date of the encounter doing chart review, history and exam, documentation, and further activities as noted above.  Prolonged services due to complex and complicated history, review of previous evaluation, I have arranged multiple imaging studies today reviewed results and also coordinated appointment with Dr. Barkley.  Patient accompanied by her son and daughter all of them had a lot of questions and all of them were answered    AVS with written instructions given    Enrico Mendoza MD,FAWENDY,FSVM,FNLA, FACP  Vascular Medicine  Clinical Hypertension Specialist   Clinical Lipidologist

## 2023-12-11 ENCOUNTER — OFFICE VISIT (OUTPATIENT)
Dept: OTHER | Facility: CLINIC | Age: 88
End: 2023-12-11
Attending: INTERNAL MEDICINE
Payer: MEDICARE

## 2023-12-11 ENCOUNTER — TELEPHONE (OUTPATIENT)
Dept: CARDIOLOGY | Facility: CLINIC | Age: 88
End: 2023-12-11

## 2023-12-11 VITALS — SYSTOLIC BLOOD PRESSURE: 167 MMHG | HEART RATE: 102 BPM | DIASTOLIC BLOOD PRESSURE: 112 MMHG

## 2023-12-11 DIAGNOSIS — I77.1 SUBCLAVIAN ARTERY STENOSIS (H): ICD-10-CM

## 2023-12-11 DIAGNOSIS — I71.43 INFRARENAL ABDOMINAL AORTIC ANEURYSM (AAA) WITHOUT RUPTURE (H): ICD-10-CM

## 2023-12-11 DIAGNOSIS — K55.1 SUPERIOR MESENTERIC ARTERY STENOSIS (H): ICD-10-CM

## 2023-12-11 DIAGNOSIS — R93.1 ABNORMAL FINDINGS DIAGNOSTIC IMAGING OF HEART AND CORONARY CIRCULATION: Primary | ICD-10-CM

## 2023-12-11 PROCEDURE — 99203 OFFICE O/P NEW LOW 30 MIN: CPT | Performed by: SURGERY

## 2023-12-11 PROCEDURE — G0463 HOSPITAL OUTPT CLINIC VISIT: HCPCS | Performed by: SURGERY

## 2023-12-11 RX ORDER — ASPIRIN 81 MG/1
243 TABLET, CHEWABLE ORAL ONCE
Status: CANCELLED | OUTPATIENT
Start: 2023-12-11

## 2023-12-11 RX ORDER — SODIUM CHLORIDE 9 MG/ML
INJECTION, SOLUTION INTRAVENOUS CONTINUOUS
Status: CANCELLED | OUTPATIENT
Start: 2023-12-11

## 2023-12-11 RX ORDER — LIDOCAINE 40 MG/G
CREAM TOPICAL
Status: CANCELLED | OUTPATIENT
Start: 2023-12-11

## 2023-12-11 RX ORDER — ASPIRIN 325 MG
325 TABLET ORAL ONCE
Status: CANCELLED | OUTPATIENT
Start: 2023-12-11 | End: 2023-12-11

## 2023-12-11 RX ORDER — POTASSIUM CHLORIDE 1500 MG/1
20 TABLET, EXTENDED RELEASE ORAL
Status: CANCELLED | OUTPATIENT
Start: 2023-12-11

## 2023-12-11 NOTE — PROGRESS NOTES
[facility]        Patient is here for a  follow up.    Pt is currently taking Statin.    BP (!) 167/112 (BP Location: Left arm, Patient Position: Chair, Cuff Size: Adult Small)   Pulse 102   LMP  (LMP Unknown)     The provider has been notified that the patient has no concerns.     Questions patient would like addressed today are: N/A.    Refills are needed: N/A    Has homecare services and agency name:  Tierra Dowd MA

## 2023-12-11 NOTE — TELEPHONE ENCOUNTER
Coronary angiogram/PCI/Right Heart Cath prep instructions.     Patient is scheduled for a Coronary Angio w/possible PCI at [facility] - [address] [place] [address] - Main Entrance of the Hospital on 12/19/2023.  Check in time is at 07:30 and procedure to follow.    Patient instructed to remain NPO for solid foods 8 hours prior to arrival and may have clear liquids up to 2 hours prior to arrival.    Patient does not require extra fluids prior to procedure.    Patient is not diabetic.    Patient is not on anticoagulation.    Patient is not on diuretics.     Patient is not currently taking ASA and has been advised to take one 325 mg tablet the day prior and morning of the procedure.    Pt is not on a SGLT2 inhibitor.    Pt is not on a GLP-1 Agonist    Patient advised to take their other daily medications the morning of the procedure with small sips of water.     Verified patient does not have a contrast allergy.    Verified patient has someone available to drive them home from the hospital and can stay with them for 24 hours after the procedure.     Patient advised to notify care team with any new COVID like symptoms prior to procedure.    Patient will check their temperature the morning of procedure and call [facility] at [phone] if temp is >100.0.    Patient is aware of visitor policy.    Patient expresses understanding of above instructions and denies further questions at this time.      HARITHA Cortez   [facility]

## 2023-12-11 NOTE — PROGRESS NOTES
I had the pleasure of seeing Mrs. Kavita Vallejo in the vascular surgery clinic today.[email or web address] she is a very pleasant 88-year-old female who is planning on undergoing a transaortic valve replacement under the care of Dr. Nielsen.[email or web address] during her workup she was found to have an infrarenal abdominal aortic aneurysm.  Patient denies abdominal or back pain.    We also do not have any comparative imaging.  Patient tells me that she has relocated from [place] about 4 years ago.  But she does not remember if she was under surveillance for an abdominal aortic aneurysm.    I reviewed the imaging with the patient, her son and her daughter.  She has a 4 cm x 4 cm infrarenal abdominal aortic aneurysm.  There is also mild stenosis of the celiac axis and the superior mesenteric artery.    Diagnosis: Infrarenal, small, asymptomatic abdominal aortic aneurysm.    Plan: I explained the pathophysiology of disease to her in detail.  Her infrarenal abdominal arctic aneurysm is asymptomatic and small.  No need for surgical intervention.  She can proceed with her transaortic valve replacement.  We will image her with sonography in 1 years time for surveillance.

## 2023-12-14 DIAGNOSIS — I71.43 INFRARENAL ABDOMINAL AORTIC ANEURYSM (AAA) WITHOUT RUPTURE (H): Primary | ICD-10-CM

## 2023-12-18 ENCOUNTER — TELEPHONE (OUTPATIENT)
Dept: MEDSURG UNIT | Facility: CLINIC | Age: 88
End: 2023-12-18
Payer: MEDICARE

## 2023-12-18 NOTE — TELEPHONE ENCOUNTER
Chart reviewed for upcoming appt.  Nursing orders in epic for procedure.  Per clinic note medications have been reviewed and pt is aware of arrival time.

## 2023-12-19 ENCOUNTER — HOSPITAL ENCOUNTER (OUTPATIENT)
Facility: CLINIC | Age: 88
Discharge: HOME | End: 2023-12-19
Attending: INTERNAL MEDICINE | Admitting: INTERNAL MEDICINE
Payer: MEDICARE

## 2023-12-19 VITALS
RESPIRATION RATE: 16 BRPM | HEIGHT: 64 IN | TEMPERATURE: 98 F | HEART RATE: 71 BPM | BODY MASS INDEX: 24.24 KG/M2 | SYSTOLIC BLOOD PRESSURE: 134 MMHG | DIASTOLIC BLOOD PRESSURE: 69 MMHG | WEIGHT: 142 LBS | OXYGEN SATURATION: 94 %

## 2023-12-19 DIAGNOSIS — R93.1 ABNORMAL FINDINGS DIAGNOSTIC IMAGING OF HEART AND CORONARY CIRCULATION: ICD-10-CM

## 2023-12-19 PROBLEM — Z98.890 STATUS POST CORONARY ANGIOGRAM: Status: ACTIVE | Noted: 2023-12-19

## 2023-12-19 LAB
ANION GAP SERPL CALCULATED.3IONS-SCNC: 9 MMOL/L (ref 7–15)
APTT PPP: 29 SECONDS (ref 22–38)
BUN SERPL-MCNC: 14.8 MG/DL (ref 8–23)
CALCIUM SERPL-MCNC: 10 MG/DL (ref 8.8–10.2)
CHLORIDE SERPL-SCNC: 102 MMOL/L (ref 98–107)
CREAT SERPL-MCNC: 0.7 MG/DL (ref 0.51–0.95)
DEPRECATED HCO3 PLAS-SCNC: 29 MMOL/L (ref 22–29)
EGFRCR SERPLBLD CKD-EPI 2021: 83 ML/MIN/1.73M2
ERYTHROCYTE [DISTWIDTH] IN BLOOD BY AUTOMATED COUNT: 13.8 % (ref 10–15)
GLUCOSE SERPL-MCNC: 112 MG/DL (ref 70–99)
HCO3 BLDV-SCNC: 26 MMOL/L (ref 21–28)
HCT VFR BLD AUTO: 42.5 % (ref 35–47)
HGB BLD-MCNC: 13.6 G/DL (ref 11.7–15.7)
INR PPP: 1.01 (ref 0.85–1.15)
LACTATE BLD-SCNC: 0.3 MMOL/L
MCH RBC QN AUTO: 29.8 PG (ref 26.5–33)
MCHC RBC AUTO-ENTMCNC: 32 G/DL (ref 31.5–36.5)
MCV RBC AUTO: 93 FL (ref 78–100)
PCO2 BLDV: 46 MM HG (ref 40–50)
PH BLDV: 7.35 [PH] (ref 7.32–7.43)
PLATELET # BLD AUTO: 213 10E3/UL (ref 150–450)
PO2 BLDV: 34 MM HG (ref 25–47)
POTASSIUM SERPL-SCNC: 5 MMOL/L (ref 3.4–5.3)
RBC # BLD AUTO: 4.57 10E6/UL (ref 3.8–5.2)
SAO2 % BLDV: 63 % (ref 94–100)
SODIUM SERPL-SCNC: 140 MMOL/L (ref 135–145)
WBC # BLD AUTO: 7.9 10E3/UL (ref 4–11)

## 2023-12-19 PROCEDURE — 250N000011 HC RX IP 250 OP 636: Mod: JZ | Performed by: INTERNAL MEDICINE

## 2023-12-19 PROCEDURE — 99152 MOD SED SAME PHYS/QHP 5/>YRS: CPT | Performed by: INTERNAL MEDICINE

## 2023-12-19 PROCEDURE — 250N000009 HC RX 250: Performed by: INTERNAL MEDICINE

## 2023-12-19 PROCEDURE — 250N000011 HC RX IP 250 OP 636: Performed by: INTERNAL MEDICINE

## 2023-12-19 PROCEDURE — 80048 BASIC METABOLIC PNL TOTAL CA: CPT | Performed by: INTERNAL MEDICINE

## 2023-12-19 PROCEDURE — 999N000184 HC STATISTIC TELEMETRY

## 2023-12-19 PROCEDURE — 99153 MOD SED SAME PHYS/QHP EA: CPT | Performed by: INTERNAL MEDICINE

## 2023-12-19 PROCEDURE — 85027 COMPLETE CBC AUTOMATED: CPT | Performed by: INTERNAL MEDICINE

## 2023-12-19 PROCEDURE — 272N000001 HC OR GENERAL SUPPLY STERILE: Performed by: INTERNAL MEDICINE

## 2023-12-19 PROCEDURE — 93005 ELECTROCARDIOGRAM TRACING: CPT

## 2023-12-19 PROCEDURE — C1894 INTRO/SHEATH, NON-LASER: HCPCS | Performed by: INTERNAL MEDICINE

## 2023-12-19 PROCEDURE — 82803 BLOOD GASES ANY COMBINATION: CPT

## 2023-12-19 PROCEDURE — 258N000003 HC RX IP 258 OP 636: Performed by: INTERNAL MEDICINE

## 2023-12-19 PROCEDURE — 93456 R HRT CORONARY ARTERY ANGIO: CPT | Mod: 26 | Performed by: INTERNAL MEDICINE

## 2023-12-19 PROCEDURE — 36415 COLL VENOUS BLD VENIPUNCTURE: CPT | Performed by: INTERNAL MEDICINE

## 2023-12-19 PROCEDURE — 93456 R HRT CORONARY ARTERY ANGIO: CPT | Performed by: INTERNAL MEDICINE

## 2023-12-19 PROCEDURE — 999N000071 HC STATISTIC HEART CATH LAB OR EP LAB

## 2023-12-19 PROCEDURE — 85610 PROTHROMBIN TIME: CPT | Performed by: INTERNAL MEDICINE

## 2023-12-19 PROCEDURE — C1751 CATH, INF, PER/CENT/MIDLINE: HCPCS | Performed by: INTERNAL MEDICINE

## 2023-12-19 PROCEDURE — C1769 GUIDE WIRE: HCPCS | Performed by: INTERNAL MEDICINE

## 2023-12-19 PROCEDURE — 85730 THROMBOPLASTIN TIME PARTIAL: CPT | Performed by: INTERNAL MEDICINE

## 2023-12-19 RX ORDER — ACETAMINOPHEN 325 MG/1
650 TABLET ORAL EVERY 4 HOURS PRN
Status: DISCONTINUED | OUTPATIENT
Start: 2023-12-19 | End: 2023-12-19 | Stop reason: HOSPADM

## 2023-12-19 RX ORDER — OXYCODONE HYDROCHLORIDE 5 MG/1
10 TABLET ORAL EVERY 4 HOURS PRN
Status: DISCONTINUED | OUTPATIENT
Start: 2023-12-19 | End: 2023-12-19 | Stop reason: HOSPADM

## 2023-12-19 RX ORDER — NALOXONE HYDROCHLORIDE 0.4 MG/ML
0.2 INJECTION, SOLUTION INTRAMUSCULAR; INTRAVENOUS; SUBCUTANEOUS
Status: DISCONTINUED | OUTPATIENT
Start: 2023-12-19 | End: 2023-12-19 | Stop reason: HOSPADM

## 2023-12-19 RX ORDER — OXYCODONE HYDROCHLORIDE 5 MG/1
5 TABLET ORAL EVERY 4 HOURS PRN
Status: DISCONTINUED | OUTPATIENT
Start: 2023-12-19 | End: 2023-12-19 | Stop reason: HOSPADM

## 2023-12-19 RX ORDER — SODIUM CHLORIDE 9 MG/ML
INJECTION, SOLUTION INTRAVENOUS CONTINUOUS
Status: DISCONTINUED | OUTPATIENT
Start: 2023-12-19 | End: 2023-12-19 | Stop reason: HOSPADM

## 2023-12-19 RX ORDER — FLUMAZENIL 0.1 MG/ML
0.2 INJECTION, SOLUTION INTRAVENOUS
Status: DISCONTINUED | OUTPATIENT
Start: 2023-12-19 | End: 2023-12-19 | Stop reason: HOSPADM

## 2023-12-19 RX ORDER — POTASSIUM CHLORIDE 1500 MG/1
20 TABLET, EXTENDED RELEASE ORAL
Status: DISCONTINUED | OUTPATIENT
Start: 2023-12-19 | End: 2023-12-19 | Stop reason: HOSPADM

## 2023-12-19 RX ORDER — IOPAMIDOL 755 MG/ML
INJECTION, SOLUTION INTRAVASCULAR
Status: DISCONTINUED | OUTPATIENT
Start: 2023-12-19 | End: 2023-12-19 | Stop reason: HOSPADM

## 2023-12-19 RX ORDER — ATROPINE SULFATE 0.1 MG/ML
0.5 INJECTION INTRAVENOUS
Status: DISCONTINUED | OUTPATIENT
Start: 2023-12-19 | End: 2023-12-19 | Stop reason: HOSPADM

## 2023-12-19 RX ORDER — NALOXONE HYDROCHLORIDE 0.4 MG/ML
0.4 INJECTION, SOLUTION INTRAMUSCULAR; INTRAVENOUS; SUBCUTANEOUS
Status: DISCONTINUED | OUTPATIENT
Start: 2023-12-19 | End: 2023-12-19 | Stop reason: HOSPADM

## 2023-12-19 RX ORDER — HEPARIN SODIUM 10000 [USP'U]/100ML
100-1000 INJECTION, SOLUTION INTRAVENOUS CONTINUOUS PRN
Status: DISCONTINUED | OUTPATIENT
Start: 2023-12-19 | End: 2023-12-19 | Stop reason: HOSPADM

## 2023-12-19 RX ORDER — LIDOCAINE 40 MG/G
CREAM TOPICAL
Status: DISCONTINUED | OUTPATIENT
Start: 2023-12-19 | End: 2023-12-19 | Stop reason: HOSPADM

## 2023-12-19 RX ORDER — ASPIRIN 325 MG
325 TABLET ORAL ONCE
Status: DISCONTINUED | OUTPATIENT
Start: 2023-12-19 | End: 2023-12-19 | Stop reason: HOSPADM

## 2023-12-19 RX ORDER — ASPIRIN 81 MG/1
81 TABLET ORAL
Status: ON HOLD | COMMUNITY
End: 2024-02-22

## 2023-12-19 RX ORDER — FENTANYL CITRATE 50 UG/ML
INJECTION, SOLUTION INTRAMUSCULAR; INTRAVENOUS
Status: DISCONTINUED | OUTPATIENT
Start: 2023-12-19 | End: 2023-12-19 | Stop reason: HOSPADM

## 2023-12-19 RX ORDER — FENTANYL CITRATE 50 UG/ML
25 INJECTION, SOLUTION INTRAMUSCULAR; INTRAVENOUS
Status: DISCONTINUED | OUTPATIENT
Start: 2023-12-19 | End: 2023-12-19 | Stop reason: HOSPADM

## 2023-12-19 RX ORDER — ASPIRIN 81 MG/1
243 TABLET, CHEWABLE ORAL ONCE
Status: DISCONTINUED | OUTPATIENT
Start: 2023-12-19 | End: 2023-12-19 | Stop reason: HOSPADM

## 2023-12-19 RX ADMIN — SODIUM CHLORIDE: 9 INJECTION, SOLUTION INTRAVENOUS at 07:56

## 2023-12-19 ASSESSMENT — ACTIVITIES OF DAILY LIVING (ADL)
ADLS_ACUITY_SCORE: 35

## 2023-12-19 NOTE — DISCHARGE INSTRUCTIONS
Cardiac Angiogram Discharge Instructions - Femoral    After you go home:    Have an adult stay with you until tomorrow.  Drink extra fluids for 2 days.  You may resume your normal diet.  No smoking       For 24 hours - due to the sedation you received:  Relax and take it easy.  Do NOT make any important or legal decisions.  Do NOT drive or operate machines at home or at work.  Do NOT drink alcohol.    Care of Groin Puncture Site:    For the first 24 hrs - check the puncture site every 1-2 hours while awake.  For 2 days, when you cough, sneeze, laugh or move your bowels, hold your hand over the puncture site and press firmly.  Remove the bandaid after 24 hours. If there is minor oozing, apply another bandaid and remove it after 12 hours.  It is normal to have a small bruise or pea size lump at the site.  You may shower tomorrow. Do NOT take a bath, or use a hot tub or pool for at least 3 days. Do NOT scrub the site. Do not use lotion or powder near the puncture site.    Activity:            For 2 days:  No stooping or squatting  Do NOT do any heavy activity such as exercise, lifting, or straining.   No housework, yard work or any activity that make you sweat  Do NOT lift more than 10 pounds    Bleeding:    If you start bleeding from the site in your groin, lie down flat and press firmly on/above the site for 10 minutes.   Once bleeding stops, lay flat for 2 hours.   Call [facility] as soon as you can.       Call 911 right away if you have heavy bleeding or bleeding that does not stop.      Medicines:    If you are taking an antiplatelet medication such as Plavix, Brilinta or Effient, do not stop taking it until you talk to your cardiologist.    If you are on Metformin (Glucophage), do not restart it until you have blood tests (within 2 to 3 days after discharge).  After you have your blood drawn, you may restart the Metformin.   Take your medications, including blood thinners, unless your provider tells you not to.     If you take Coumadin (Warfarin), have your INR checked by your provider in  3-5 days. Call your clinic to schedule this.  If you have stopped any medicines, check with your provider about when to restart them.    Follow Up Appointments:    Follow up with [facility] Nurse Practitioner at [facility] of patient preference in 7-10 days.    Call the clinic if:    You have increased pain or a large or growing hard lump around the site.  The site is red, swollen, hot or tender.  Blood or fluid is draining from the site.  You have chills or a fever greater than 101 F (38 C).  Your leg feels numb, cool or changes color.  You have hives, a rash or unusual itching.  New pain in the back or belly that you cannot control with Tylenol.  Any questions or concerns.          [facility] [place]:    [phone] [facility] (7 days a week)

## 2023-12-19 NOTE — PRE-PROCEDURE
GENERAL PRE-PROCEDURE:   Procedure:  Coronary angiogram, rhc  Date/Time:  12/19/2023 10:21 AM    Verbal consent obtained?: Yes    Written consent obtained?: Yes    Risks and benefits: Risks, benefits and alternatives were discussed    Consent given by:  Patient  Patient states understanding of procedure being performed: Yes    Patient's understanding of procedure matches consent: Yes    Procedure consent matches procedure scheduled: Yes    Expected level of sedation:  Moderate  Appropriately NPO:  Yes  ASA Class:  3  Mallampati  :  Grade 3- soft palate visible, posterior pharyngeal wall not visible  Lungs:  Lungs clear with good breath sounds bilaterally  Heart:  Normal heart sounds and rate  History & Physical reviewed:  History and physical reviewed and no updates needed  Statement of review:  I have reviewed the lab findings, diagnostic data, medications, and the plan for sedation

## 2023-12-19 NOTE — PROGRESS NOTES
Care Suites Discharge Summary    Discharge Criteria:   Discharge Criteria met per MD orders: Yes.   Vital signs stable.     Pt demonstrates ability to ambulate safely: Yes.  (See discharge questionnaire for additional information)    Discharge instructions & education:   Discharge instructions reviewed with patient and family. Patient verbalizes  understanding.   Additional patient education provided: cardiac angio    Medications:   Patient will be discharging on new medications- No. Patient verbalizes reason for use, start date, and side effects NA.    Items returned to patient:   Home and hospital acquired medications returned to patient NA   Listed belongings gathered and returned to patient: Yes    Patient discharged to home.     Jareth Nash RN

## 2023-12-19 NOTE — PROGRESS NOTES
Care Suites Post Procedure Note    Patient Information  Name: Kavita Merlos  Age: 88 year old    Post Procedure  Time patient returned to Care Suites: 1110  Concerns/abnormal assessment: none at present  If abnormal assessment, provider notified: N/A  Plan/Other: bedrest 2 hours    Mark Gutierrez RN

## 2023-12-19 NOTE — PROGRESS NOTES
Care Suites Admission Nursing Note    Patient Information  Name: Kavita Merlos  Age: 88 year old  Reason for admission:heart cath  Care Suites arrival time: 0715    Visitor Information  Name: brice     Patient Admission/Assessment   Pre-procedure assessment complete: Yes  If abnormal assessment/labs, provider notified: N/A  NPO: Yes  Medications held per instructions/orders: Yes  Consent: obtained  If applicable, pregnancy test status: n/a  Patient oriented to room: Yes  Education/questions answered: Yes  Plan/other: proceed    Discharge Planning  Discharge name/phone number: [phone] claytonángel- son and DTR  Overnight post sedation caregiver: family  Discharge location: home    Jareth Nash RN

## 2023-12-20 ENCOUNTER — TELEPHONE (OUTPATIENT)
Dept: CARDIOLOGY | Facility: CLINIC | Age: 88
End: 2023-12-20
Payer: MEDICARE

## 2023-12-21 ENCOUNTER — TELEPHONE (OUTPATIENT)
Dept: CARDIOLOGY | Facility: CLINIC | Age: 88
End: 2023-12-21
Payer: MEDICARE

## 2023-12-21 DIAGNOSIS — R06.02 SHORTNESS OF BREATH: ICD-10-CM

## 2023-12-21 DIAGNOSIS — I35.0 SEVERE AORTIC STENOSIS: Primary | ICD-10-CM

## 2023-12-21 NOTE — TELEPHONE ENCOUNTER
Patient was presented this morning at the multidisciplinary valve conference. Plan is to proceed with TAVR procedure.    Valve: Ross  Approach: L. TOY   Sedation: Conscious    Above information was called out to the patient and reviewed with patient's daughter, Janette. Janette was presented with Dr. Duarte's next available TAVR Procedure dates and was going to call me back following a conversation with the patient regarding timing.     Dana Avila RN  Structural Heart Coordinator  [facility]

## 2023-12-27 ENCOUNTER — OFFICE VISIT (OUTPATIENT)
Dept: CARDIOLOGY | Facility: CLINIC | Age: 88
End: 2023-12-27
Payer: MEDICARE

## 2023-12-27 VITALS
OXYGEN SATURATION: 98 % | HEART RATE: 94 BPM | DIASTOLIC BLOOD PRESSURE: 88 MMHG | BODY MASS INDEX: 24.5 KG/M2 | HEIGHT: 64 IN | SYSTOLIC BLOOD PRESSURE: 154 MMHG | WEIGHT: 143.5 LBS

## 2023-12-27 DIAGNOSIS — I10 BENIGN ESSENTIAL HYPERTENSION: ICD-10-CM

## 2023-12-27 DIAGNOSIS — I71.43 INFRARENAL ABDOMINAL AORTIC ANEURYSM (AAA) WITHOUT RUPTURE (H): ICD-10-CM

## 2023-12-27 DIAGNOSIS — E78.5 HYPERLIPIDEMIA LDL GOAL <100: ICD-10-CM

## 2023-12-27 DIAGNOSIS — I35.0 SEVERE AORTIC STENOSIS BY PRIOR ECHOCARDIOGRAM: Primary | ICD-10-CM

## 2023-12-27 DIAGNOSIS — I73.9 PAD (PERIPHERAL ARTERY DISEASE) (H): ICD-10-CM

## 2023-12-27 LAB
ATRIAL RATE - MUSE: 82 BPM
DIASTOLIC BLOOD PRESSURE - MUSE: NORMAL MMHG
INTERPRETATION ECG - MUSE: NORMAL
P AXIS - MUSE: 31 DEGREES
PR INTERVAL - MUSE: 128 MS
QRS DURATION - MUSE: 86 MS
QT - MUSE: 372 MS
QTC - MUSE: 434 MS
R AXIS - MUSE: 80 DEGREES
SYSTOLIC BLOOD PRESSURE - MUSE: NORMAL MMHG
T AXIS - MUSE: 72 DEGREES
VENTRICULAR RATE- MUSE: 82 BPM

## 2023-12-27 PROCEDURE — 99214 OFFICE O/P EST MOD 30 MIN: CPT | Performed by: NURSE PRACTITIONER

## 2023-12-27 NOTE — PROGRESS NOTES
Cardiology Clinic Progress Note  Kavita Merlos MRN# [id number]   YOB: 1935 Age: 88 year old     Primary cardiologist: Dr. Garay     Reason for visit: s/p coronary angiogram     History of presenting illness:    Kavita Merlos is a pleasant 88 year old patient with past medical history significant for hyperlipidemia, macular degeneration, PAD with AAA, and severe aortic stenosis.    She was recently referred to our structural clinic for consideration of transcatheter aortic valve replacement.  Her most recent echocardiogram showed progression of her aortic stenosis into the severe range characterized by valve area of 0.6 cm , mean gradient of 57 mmHg, and LVEF 60-65%.  She reports progressive dyspnea and fatigue over the last 6 to 12 months.      TAVR guided CT noted 4.2 cm infrarenal abdominal aortic aneurysm.  She also has some calcification and smaller dimensions of her external iliac arteries, but overall transfemoral access appears favorable.  She was subsequently seen by vascular surgery.  It was felt her infrarenal abdominal aortic aneurysm is small, and given the fact she is asymptomatic, there is no need for surgical intervention at this time.    More recently she underwent preoperative coronary angiogram right heart catheterization that showed nonobstructive coronary disease and normal right and left filling pressures.    She presents today for follow-up.  She is accompanied by her daughter, Janette.  She endorses ongoing dyspnea and fatigue.  She denies chest pain, syncope or near syncope, or palpitations.  No pain or bleeding from her right groin access site.    She is hypertensive in clinic today, though she generally runs higher during office visits.  Her home blood pressures are reportedly anywhere between 130s to 140s over 80s.  She has not tolerated numerous antihypertensive medications.         Assessment and Plan:     ASSESSMENT:    Severe symptomatic aortic stenosis with valve area  "of 0.6 cm2 and MG 57 mmHg  Infrarenal AAA measuring 4.2 cm, no surgical intervention indicated  Hyperlipidemia on rosuvastatin 5 mg daily  Hypertension, likely component of whitecoat, though has numerous medication intolerances    PLAN:     Overall Kavita is stable from a cardiovascular standpoint.  She will continue her current medication regimen.  Plan for TAVR on 2/20/2023 with Dr. Duarte, we will plan to see her in clinic closer to that date with labs.          Vilma Willett, DNP, APRN, CNP  Page: [phone] (8a-5p M-F)    Orders this Visit:  No orders of the defined types were placed in this encounter.    Orders Placed This Encounter   Medications    Multiple Vitamins-Minerals (PRESERVISION AREDS 2 PO)     Sig: Take by mouth daily     There are no discontinued medications.    Today's clinic visit entailed:  Review of the result(s) of each unique test - echo, labs, CT, cath  Ordering of each unique test  Prescription drug management  35 minutes spent by me on the date of the encounter doing chart review, review of test results, patient visit, documentation, and discussion with family   Provider  Link to [facility] Help Grid     The level of medical decision making during this visit was of moderate complexity.           Review of Systems:     Review of Systems:  Skin:        Eyes:  Positive for glaucoma  ENT:       Respiratory:  Positive for shortness of breath;dyspnea on exertion  Cardiovascular:  Negative for;chest pain;lightheadedness;dizziness;syncope or near-syncope;edema palpitations;Positive for;fatigue  Gastroenterology:      Genitourinary:       Musculoskeletal:       Neurologic:       Psychiatric:       Heme/Lymph/Imm:  Positive for allergies  Endocrine:  Negative thyroid disorder;diabetes            Physical Exam:   Vitals: BP (!) 154/88   Pulse 94   Ht 1.626 m (5' 4\")   Wt 65.1 kg (143 lb 8 oz)   SpO2 98%   BMI 24.63 kg/m    Constitutional:  cooperative        Skin:  warm and dry to the touch   " incision healing well    Head:  normocephalic        Eyes:  pupils equal and round   visually impaired    ENT:  not assessed this visit        Neck:  JVP normal        Chest:  clear to auscultation        Cardiac: regular rhythm;normal S1 and S2       systolic ejection murmur;grade 2          Abdomen:  abdomen soft        Vascular: pulses full and equal                                      Extremities and Back:  no edema        Neurological:  no gross motor deficits;affect appropriate             Medications:     Current Outpatient Medications   Medication Sig Dispense Refill    aspirin 81 MG EC tablet Take 81 mg by mouth twice a week      co-enzyme Q-10 100 MG CAPS capsule Take 100 mg by mouth daily      cyanocobalamin (VITAMIN B-12) 100 MCG tablet Take 100 mcg by mouth Every other day      Multiple Vitamins-Minerals (PRESERVISION AREDS 2 PO) Take by mouth daily      multivitamin, therapeutic (THERA-VIT) TABS tablet Take 1 tablet by mouth daily      omega 3 1000 MG CAPS Every other day      ranibizumab (LUCENTIS) 0.5 MG/0.05ML SOLN 0.5 mg by Intravitreal route once Once every 6-8 weeks, eye injection      rosuvastatin (CRESTOR) 5 MG tablet Take 1 tablet (5 mg) by mouth daily 30 tablet 3    tafluprost (ZIOPTAN) 0.0015 % SOLN ophthalmic solution Place 1 drop into both eyes at bedtime      Timolol Maleate (TIMOPTIC OCUDOSE) 0.5 % ophthalmic solution Place 1 drop into both eyes 2 times daily      vitamin C (ASCORBIC ACID) 1000 MG TABS Take 1,000 mg by mouth daily      Vitamin D3 (CHOLECALCIFEROL) 25 mcg (1000 units) tablet Take by mouth daily Daily in the winter      vitamin E (TOCOPHEROL) 400 units (180 mg) capsule Every other day      zinc gluconate 50 MG tablet Take 40 mg by mouth daily         No family history on file.    Social History     Socioeconomic History    Marital status: Single     Spouse name: Not on file    Number of children: Not on file    Years of education: Not on file    Highest education level:  Not on file   Occupational History    Not on file   Tobacco Use    Smoking status: Never     Passive exposure: Never    Smokeless tobacco: Never   Vaping Use    Vaping Use: Never used   Substance and Sexual Activity    Alcohol use: Not Currently    Drug use: Not Currently    Sexual activity: Not on file   Other Topics Concern    Not on file   Social History Narrative    Not on file     Social Determinants of Health     Financial Resource Strain: Low Risk  (10/9/2023)    Financial Resource Strain     Within the past 12 months, have you or your family members you live with been unable to get utilities (heat, electricity) when it was really needed?: No   Food Insecurity: Low Risk  (10/9/2023)    Food Insecurity     Within the past 12 months, did you worry that your food would run out before you got money to buy more?: No     Within the past 12 months, did the food you bought just not last and you didn t have money to get more?: No   Transportation Needs: Low Risk  (10/9/2023)    Transportation Needs     Within the past 12 months, has lack of transportation kept you from medical appointments, getting your medicines, non-medical meetings or appointments, work, or from getting things that you need?: No   Physical Activity: Not on file   Stress: Not on file   Social Connections: Not on file   Interpersonal Safety: High Risk (10/13/2023)    Interpersonal Safety     Do you feel physically and emotionally safe where you currently live?: No     Within the past 12 months, have you been hit, slapped, kicked or otherwise physically hurt by someone?: No     Within the past 12 months, have you been humiliated or emotionally abused in other ways by your partner or ex-partner?: No   Housing Stability: Low Risk  (10/9/2023)    Housing Stability     Do you have housing? : Yes     Are you worried about losing your housing?: No            Past Medical History:     Past Medical History:   Diagnosis Date    AAA (abdominal aortic aneurysm)  (H24)     Aortic stenosis     Benign essential hypertension with BP difference between arms     Hyperlipidemia LDL goal <70               Past Surgical History:     Past Surgical History:   Procedure Laterality Date    CV CORONARY ANGIOGRAM N/A 12/19/2023    Procedure: Coronary Angiogram;  Surgeon: Seth Duarte MD;  Location:  HEART CARDIAC CATH LAB    CV RIGHT HEART CATH MEASUREMENTS RECORDED N/A 12/19/2023    Procedure: Right Heart Catheterization;  Surgeon: Seth Duarte MD;  Location:  HEART CARDIAC CATH LAB              Allergies:   Albuterol, Amlodipine, Bimatoprost, Brimonidine, Clotrimazole, Dorzolamide hcl-timolol mal, Latanoprost, Lisinopril, Losartan, Neomycin-polymyxin-gramicidin, Neosporin [neomycin-polymyxin-gramicidin], Penicillins, Tape [adhesive tape], Timolol, Travoprost, and Vicodin [hydrocodone-acetaminophen]       Data:   All laboratory data reviewed:    Recent Labs   Lab Test 10/13/23  1526   *   HDL 54   NHDL 132*   CHOL 186   TRIG 115   TSH 1.38       Lab Results   Component Value Date    WBC 7.9 12/19/2023    WBC 9.0 04/17/2020    RBC 4.57 12/19/2023    RBC 4.76 04/17/2020    HGB 13.6 12/19/2023    HGB 14.4 04/17/2020    HCT 42.5 12/19/2023    HCT 45.7 04/17/2020    MCV 93 12/19/2023    MCV 96 04/17/2020    MCH 29.8 12/19/2023    MCH 30.3 04/17/2020    MCHC 32.0 12/19/2023    MCHC 31.5 04/17/2020    RDW 13.8 12/19/2023    RDW 13.2 04/17/2020     12/19/2023     04/17/2020       Lab Results   Component Value Date     12/19/2023     04/17/2020    POTASSIUM 5.0 12/19/2023    POTASSIUM 4.4 04/17/2020    CHLORIDE 102 12/19/2023    CHLORIDE 105 04/17/2020    CO2 29 12/19/2023    CO2 26 04/17/2020    ANIONGAP 9 12/19/2023    ANIONGAP 7 04/17/2020     (H) 12/19/2023    GLC 97 04/17/2020    BUN 14.8 12/19/2023    BUN 16 04/17/2020    CR 0.70 12/19/2023    CR 0.55 04/17/2020    GFRESTIMATED 83 12/19/2023    GFRESTIMATED >60 11/16/2023     "GFRESTIMATED 86 04/17/2020    GFRESTBLACK >90 04/17/2020    JERRY 10.0 12/19/2023    JERRY 9.5 04/17/2020      Lab Results   Component Value Date    AST 28 10/13/2023    ALT 18 10/13/2023       No results found for: \"A1C\"    Lab Results   Component Value Date    INR 1.01 12/19/2023         "

## 2023-12-27 NOTE — LETTER
12/27/2023    Ty Cordero MD  [address] [facility] [address]    RE: Kavita Merlos       Dear Colleague,     I had the pleasure of seeing Kavita Merlos in the [facility].  Cardiology Clinic Progress Note  Kavita Merlos MRN# [id number]   YOB: 1935 Age: 88 year old     Primary cardiologist: Dr. Garay     Reason for visit: s/p coronary angiogram     History of presenting illness:    Kavita Merlos is a pleasant 88 year old patient with past medical history significant for hyperlipidemia, macular degeneration, PAD with AAA, and severe aortic stenosis.    She was recently referred to our structural clinic for consideration of transcatheter aortic valve replacement.  Her most recent echocardiogram showed progression of her aortic stenosis into the severe range characterized by valve area of 0.6 cm , mean gradient of 57 mmHg, and LVEF 60-65%.  She reports progressive dyspnea and fatigue over the last 6 to 12 months.      TAVR guided CT noted 4.2 cm infrarenal abdominal aortic aneurysm.  She also has some calcification and smaller dimensions of her external iliac arteries, but overall transfemoral access appears favorable.  She was subsequently seen by vascular surgery.  It was felt her infrarenal abdominal aortic aneurysm is small, and given the fact she is asymptomatic, there is no need for surgical intervention at this time.    More recently she underwent preoperative coronary angiogram right heart catheterization that showed nonobstructive coronary disease and normal right and left filling pressures.    She presents today for follow-up.  She is accompanied by her daughter, Janette.  She endorses ongoing dyspnea and fatigue.  She denies chest pain, syncope or near syncope, or palpitations.  No pain or bleeding from her right groin access site.    She is hypertensive in clinic today, though she generally runs higher during office visits.  Her home blood pressures  are reportedly anywhere between 130s to 140s over 80s.  She has not tolerated numerous antihypertensive medications.         Assessment and Plan:     ASSESSMENT:    Severe symptomatic aortic stenosis with valve area of 0.6 cm2 and MG 57 mmHg  Infrarenal AAA measuring 4.2 cm, no surgical intervention indicated  Hyperlipidemia on rosuvastatin 5 mg daily  Hypertension, likely component of whitecoat, though has numerous medication intolerances    PLAN:     Overall Kavita is stable from a cardiovascular standpoint.  She will continue her current medication regimen.  Plan for TAVR on 2/20/2023 with Dr. Duarte, we will plan to see her in clinic closer to that date with labs.          Vilma Willett, DNP, APRN, CNP  Page: [phone] (8a-5p M-F)    Orders this Visit:  No orders of the defined types were placed in this encounter.    Orders Placed This Encounter   Medications    Multiple Vitamins-Minerals (PRESERVISION AREDS 2 PO)     Sig: Take by mouth daily     There are no discontinued medications.    Today's clinic visit entailed:  Review of the result(s) of each unique test - echo, labs, CT, cath  Ordering of each unique test  Prescription drug management  35 minutes spent by me on the date of the encounter doing chart review, review of test results, patient visit, documentation, and discussion with family   Provider  Link to [facility] Help Grid     The level of medical decision making during this visit was of moderate complexity.           Review of Systems:     Review of Systems:  Skin:        Eyes:  Positive for glaucoma  ENT:       Respiratory:  Positive for shortness of breath;dyspnea on exertion  Cardiovascular:  Negative for;chest pain;lightheadedness;dizziness;syncope or near-syncope;edema palpitations;Positive for;fatigue  Gastroenterology:      Genitourinary:       Musculoskeletal:       Neurologic:       Psychiatric:       Heme/Lymph/Imm:  Positive for allergies  Endocrine:  Negative thyroid disorder;diabetes         "    Physical Exam:   Vitals: BP (!) 154/88   Pulse 94   Ht 1.626 m (5' 4\")   Wt 65.1 kg (143 lb 8 oz)   SpO2 98%   BMI 24.63 kg/m    Constitutional:  cooperative        Skin:  warm and dry to the touch   incision healing well    Head:  normocephalic        Eyes:  pupils equal and round   visually impaired    ENT:  not assessed this visit        Neck:  JVP normal        Chest:  clear to auscultation        Cardiac: regular rhythm;normal S1 and S2       systolic ejection murmur;grade 2          Abdomen:  abdomen soft        Vascular: pulses full and equal                                      Extremities and Back:  no edema        Neurological:  no gross motor deficits;affect appropriate             Medications:     Current Outpatient Medications   Medication Sig Dispense Refill    aspirin 81 MG EC tablet Take 81 mg by mouth twice a week      co-enzyme Q-10 100 MG CAPS capsule Take 100 mg by mouth daily      cyanocobalamin (VITAMIN B-12) 100 MCG tablet Take 100 mcg by mouth Every other day      Multiple Vitamins-Minerals (PRESERVISION AREDS 2 PO) Take by mouth daily      multivitamin, therapeutic (THERA-VIT) TABS tablet Take 1 tablet by mouth daily      omega 3 1000 MG CAPS Every other day      ranibizumab (LUCENTIS) 0.5 MG/0.05ML SOLN 0.5 mg by Intravitreal route once Once every 6-8 weeks, eye injection      rosuvastatin (CRESTOR) 5 MG tablet Take 1 tablet (5 mg) by mouth daily 30 tablet 3    tafluprost (ZIOPTAN) 0.0015 % SOLN ophthalmic solution Place 1 drop into both eyes at bedtime      Timolol Maleate (TIMOPTIC OCUDOSE) 0.5 % ophthalmic solution Place 1 drop into both eyes 2 times daily      vitamin C (ASCORBIC ACID) 1000 MG TABS Take 1,000 mg by mouth daily      Vitamin D3 (CHOLECALCIFEROL) 25 mcg (1000 units) tablet Take by mouth daily Daily in the winter      vitamin E (TOCOPHEROL) 400 units (180 mg) capsule Every other day      zinc gluconate 50 MG tablet Take 40 mg by mouth daily         No family " history on file.    Social History     Socioeconomic History    Marital status: Single     Spouse name: Not on file    Number of children: Not on file    Years of education: Not on file    Highest education level: Not on file   Occupational History    Not on file   Tobacco Use    Smoking status: Never     Passive exposure: Never    Smokeless tobacco: Never   Vaping Use    Vaping Use: Never used   Substance and Sexual Activity    Alcohol use: Not Currently    Drug use: Not Currently    Sexual activity: Not on file   Other Topics Concern    Not on file   Social History Narrative    Not on file     Social Determinants of Health     Financial Resource Strain: Low Risk  (10/9/2023)    Financial Resource Strain     Within the past 12 months, have you or your family members you live with been unable to get utilities (heat, electricity) when it was really needed?: No   Food Insecurity: Low Risk  (10/9/2023)    Food Insecurity     Within the past 12 months, did you worry that your food would run out before you got money to buy more?: No     Within the past 12 months, did the food you bought just not last and you didn t have money to get more?: No   Transportation Needs: Low Risk  (10/9/2023)    Transportation Needs     Within the past 12 months, has lack of transportation kept you from medical appointments, getting your medicines, non-medical meetings or appointments, work, or from getting things that you need?: No   Physical Activity: Not on file   Stress: Not on file   Social Connections: Not on file   Interpersonal Safety: High Risk (10/13/2023)    Interpersonal Safety     Do you feel physically and emotionally safe where you currently live?: No     Within the past 12 months, have you been hit, slapped, kicked or otherwise physically hurt by someone?: No     Within the past 12 months, have you been humiliated or emotionally abused in other ways by your partner or ex-partner?: No   Housing Stability: Low Risk  (10/9/2023)     Housing Stability     Do you have housing? : Yes     Are you worried about losing your housing?: No            Past Medical History:     Past Medical History:   Diagnosis Date    AAA (abdominal aortic aneurysm) (H24)     Aortic stenosis     Benign essential hypertension with BP difference between arms     Hyperlipidemia LDL goal <70               Past Surgical History:     Past Surgical History:   Procedure Laterality Date    CV CORONARY ANGIOGRAM N/A 12/19/2023    Procedure: Coronary Angiogram;  Surgeon: Seth Duarte MD;  Location:  HEART CARDIAC CATH LAB    CV RIGHT HEART CATH MEASUREMENTS RECORDED N/A 12/19/2023    Procedure: Right Heart Catheterization;  Surgeon: Seth Duarte MD;  Location:  HEART CARDIAC CATH LAB              Allergies:   Albuterol, Amlodipine, Bimatoprost, Brimonidine, Clotrimazole, Dorzolamide hcl-timolol mal, Latanoprost, Lisinopril, Losartan, Neomycin-polymyxin-gramicidin, Neosporin [neomycin-polymyxin-gramicidin], Penicillins, Tape [adhesive tape], Timolol, Travoprost, and Vicodin [hydrocodone-acetaminophen]       Data:   All laboratory data reviewed:    Recent Labs   Lab Test 10/13/23  1526   *   HDL 54   NHDL 132*   CHOL 186   TRIG 115   TSH 1.38       Lab Results   Component Value Date    WBC 7.9 12/19/2023    WBC 9.0 04/17/2020    RBC 4.57 12/19/2023    RBC 4.76 04/17/2020    HGB 13.6 12/19/2023    HGB 14.4 04/17/2020    HCT 42.5 12/19/2023    HCT 45.7 04/17/2020    MCV 93 12/19/2023    MCV 96 04/17/2020    MCH 29.8 12/19/2023    MCH 30.3 04/17/2020    MCHC 32.0 12/19/2023    MCHC 31.5 04/17/2020    RDW 13.8 12/19/2023    RDW 13.2 04/17/2020     12/19/2023     04/17/2020       Lab Results   Component Value Date     12/19/2023     04/17/2020    POTASSIUM 5.0 12/19/2023    POTASSIUM 4.4 04/17/2020    CHLORIDE 102 12/19/2023    CHLORIDE 105 04/17/2020    CO2 29 12/19/2023    CO2 26 04/17/2020    ANIONGAP 9 12/19/2023     "ANIONGAP 7 04/17/2020     (H) 12/19/2023    GLC 97 04/17/2020    BUN 14.8 12/19/2023    BUN 16 04/17/2020    CR 0.70 12/19/2023    CR 0.55 04/17/2020    GFRESTIMATED 83 12/19/2023    GFRESTIMATED >60 11/16/2023    GFRESTIMATED 86 04/17/2020    GFRESTBLACK >90 04/17/2020    JERRY 10.0 12/19/2023    JERRY 9.5 04/17/2020      Lab Results   Component Value Date    AST 28 10/13/2023    ALT 18 10/13/2023       No results found for: \"A1C\"    Lab Results   Component Value Date    INR 1.01 12/19/2023         Thank you for allowing me to participate in the care of your patient.      Sincerely,     Vilam Willett, [facility]  cc:   No referring provider defined for this encounter.      "

## 2023-12-27 NOTE — PATIENT INSTRUCTIONS
Today's Plan:     - Plan for TAVR (valve replacement) on 2/20/24.   - You can get dental clearance at any time before that.   - We will see you in clinic prior to your valve procedure.     Dana RN ([phone]), Zainab RN ([phone]), and Meagan RN ([phone])    After Hours: [phone], option #2, ask for cardiologist on-call    Scheduling phone number: [phone]    Reminder: Please bring in all current medications, over the counter supplements and vitamin bottles to your next appointment.-    It was a pleasure seeing you today!     Vilma Willett, CARLA  12/27/2023    -

## 2023-12-29 NOTE — TELEPHONE ENCOUNTER
Patient's daughter, Janette, called back and stated they would like to plan for patient's TAVR procedure to occur on 02/20/2024 with Dr. Duarte.    Message was sent to , Arpita, to scheduled procedure and to call Janette and get patient scheduled for all pre-procedure required appointments.

## 2024-01-17 ENCOUNTER — TELEPHONE (OUTPATIENT)
Dept: CARDIOLOGY | Facility: CLINIC | Age: 89
End: 2024-01-17
Payer: MEDICARE

## 2024-01-17 NOTE — TELEPHONE ENCOUNTER
Voicemail received for Kavita's daughter, Janette, inquiring about dental forms and appointments. Called Janette back and stated if Kavita recently went to the dentist and did not have any concerns for active infections or abscesses we should be all set before patient's scheduled TAVR procedure on 02/20/2024. Encouraged Janette to call me back at my direct number [phone] with questions.

## 2024-02-08 LAB
ABO/RH(D): NORMAL
ANTIBODY SCREEN: NEGATIVE
SPECIMEN EXPIRATION DATE: NORMAL

## 2024-02-09 ENCOUNTER — LAB (OUTPATIENT)
Dept: LAB | Facility: CLINIC | Age: 89
End: 2024-02-09
Payer: MEDICARE

## 2024-02-09 ENCOUNTER — OFFICE VISIT (OUTPATIENT)
Dept: CARDIOLOGY | Facility: CLINIC | Age: 89
End: 2024-02-09
Payer: MEDICARE

## 2024-02-09 VITALS
WEIGHT: 142.6 LBS | OXYGEN SATURATION: 95 % | BODY MASS INDEX: 24.34 KG/M2 | HEART RATE: 84 BPM | SYSTOLIC BLOOD PRESSURE: 165 MMHG | DIASTOLIC BLOOD PRESSURE: 95 MMHG | HEIGHT: 64 IN

## 2024-02-09 DIAGNOSIS — I35.0 SEVERE AORTIC STENOSIS BY PRIOR ECHOCARDIOGRAM: Primary | ICD-10-CM

## 2024-02-09 DIAGNOSIS — R06.02 SHORTNESS OF BREATH: ICD-10-CM

## 2024-02-09 DIAGNOSIS — I35.0 SEVERE AORTIC STENOSIS: ICD-10-CM

## 2024-02-09 LAB
ANION GAP SERPL CALCULATED.3IONS-SCNC: 8 MMOL/L (ref 7–15)
BUN SERPL-MCNC: 12.2 MG/DL (ref 8–23)
CALCIUM SERPL-MCNC: 10.4 MG/DL (ref 8.8–10.2)
CHLORIDE SERPL-SCNC: 101 MMOL/L (ref 98–107)
CREAT SERPL-MCNC: 0.64 MG/DL (ref 0.51–0.95)
DEPRECATED HCO3 PLAS-SCNC: 30 MMOL/L (ref 22–29)
EGFRCR SERPLBLD CKD-EPI 2021: 85 ML/MIN/1.73M2
ERYTHROCYTE [DISTWIDTH] IN BLOOD BY AUTOMATED COUNT: 14 % (ref 10–15)
GLUCOSE SERPL-MCNC: 99 MG/DL (ref 70–99)
HCT VFR BLD AUTO: 41.9 % (ref 35–47)
HGB BLD-MCNC: 13.4 G/DL (ref 11.7–15.7)
INR PPP: 0.97 (ref 0.85–1.15)
MCH RBC QN AUTO: 29.8 PG (ref 26.5–33)
MCHC RBC AUTO-ENTMCNC: 32 G/DL (ref 31.5–36.5)
MCV RBC AUTO: 93 FL (ref 78–100)
NT-PROBNP SERPL-MCNC: 994 PG/ML (ref 0–1800)
PLATELET # BLD AUTO: 210 10E3/UL (ref 150–450)
POTASSIUM SERPL-SCNC: 5.1 MMOL/L (ref 3.4–5.3)
RBC # BLD AUTO: 4.49 10E6/UL (ref 3.8–5.2)
SODIUM SERPL-SCNC: 139 MMOL/L (ref 135–145)
WBC # BLD AUTO: 6.4 10E3/UL (ref 4–11)

## 2024-02-09 PROCEDURE — 85610 PROTHROMBIN TIME: CPT

## 2024-02-09 PROCEDURE — 80048 BASIC METABOLIC PNL TOTAL CA: CPT

## 2024-02-09 PROCEDURE — 36415 COLL VENOUS BLD VENIPUNCTURE: CPT

## 2024-02-09 PROCEDURE — 83880 ASSAY OF NATRIURETIC PEPTIDE: CPT

## 2024-02-09 PROCEDURE — 85027 COMPLETE CBC AUTOMATED: CPT

## 2024-02-09 PROCEDURE — 86900 BLOOD TYPING SEROLOGIC ABO: CPT

## 2024-02-09 PROCEDURE — 99215 OFFICE O/P EST HI 40 MIN: CPT | Performed by: NURSE PRACTITIONER

## 2024-02-09 NOTE — PROGRESS NOTES
STRUCTURAL HEART CLINIC  H&P    Referring Provider: Dr. Duarte    Primary Cardiologist: Dr. Garay     History of Present Illness  Kavita Merlos is a pleasant 88-year-old female with past medical history of hyperlipidemia, macular degeneration, peripheral arterial disease with AAA, and severe aortic stenosis who presents for pre-operative H&P in preparation for transcatheter aortic valve replacement on 2/20/2024 at [facility].     She was recently referred to our structural clinic for consideration of TAVR.  Recent echocardiogram showed progression of her aortic stenosis into the severe range characterized by valve area of 0.6 cm , mean gradient of 57 mmHg, and LVEF 60-65%.  She reports progressive dyspnea and fatigue over the last 6 to 12 months.       TAVR guided CT noted 4.2 cm infrarenal abdominal aortic aneurysm.  She also has some calcification and smaller dimensions of her external iliac arteries, but overall transfemoral access appears favorable.  She was subsequently seen by vascular surgery.  It was felt her AAA was small therefore no surgical intervention indicated.    More recently she underwent preoperative coronary angiogram right heart catheterization that showed nonobstructive coronary disease and normal right and left filling pressures.    Today she endorses ongoing dyspnea and fatigue.  She denies chest pain, syncope or near syncope, or palpitations.  She has no PND or orthopnea.  No infectious symptoms.  Her blood pressure is elevated today though she has a history of whitecoat hypertension.      Assessment and Plan     Kavita Merlos presents for pre-operative H&P in preparation for transcatheter aortic valve replacement on 2/20/2024 at [facility].       1. Severe aortic valve stenosis: We discussed the procedure in detail, including pre and post-procedure care, restrictions and follow-up.  She understands risks including 5-10% risk of heart block leading to  permanent pacemaker placement, 3% risk of bleeding, infection, vascular complication, 1-3% risk of stroke and very low risk (<1%) of serious complications such as cardiac perforation, aortic root rupture, dissection or death.     All questions answered  Type and screen orders complete  Supplies for scrubbing provided  No known contrast dye allergy  No trouble with anesthesia in the past  Labs today WNL, no s/s of infection    2. Chronic diastolic heart failure, NYHA class II, Stage B: Secondary to valvular heart disease. No acute volume overload on exam, though patient does endorse significant exertional dyspnea.    3.  Infrarenal AAA measuring 4.2 cm: No surgical intervention indicated.    4.  Hyperlipidemia: Continue rosuvastatin 5 mg daily.    5.  White coat hypertension: She has numerous medication intolerances, not currently on antihypertensive regimen.      Medication Recommendations:  Antiplatelet: Continue ASA 81 mg perioperatively   Supplements: Hold morning of procedure    Patient is optimized and is acceptable candidate for the proposed procedure.  No further diagnostic evaluation is needed. Pre-procedure instructions provided in written format.     Vilma Willett, DNP, APRN, CNP  Structural Heart Nurse Practitioner  [facility]   Pager: [phone]    Current Medications:  Current Outpatient Medications   Medication Sig Dispense Refill    aspirin 81 MG EC tablet Take 81 mg by mouth twice a week      co-enzyme Q-10 100 MG CAPS capsule Take 100 mg by mouth daily      cyanocobalamin (VITAMIN B-12) 100 MCG tablet Take 100 mcg by mouth Every other day      Multiple Vitamins-Minerals (PRESERVISION AREDS 2 PO) Take by mouth daily      multivitamin, therapeutic (THERA-VIT) TABS tablet Take 1 tablet by mouth daily      omega 3 1000 MG CAPS Every other day      ranibizumab (LUCENTIS) 0.5 MG/0.05ML SOLN 0.5 mg by Intravitreal route once Once every 6-8 weeks, eye injection      rosuvastatin (CRESTOR) 5 MG  tablet Take 1 tablet (5 mg) by mouth daily 30 tablet 3    tafluprost (ZIOPTAN) 0.0015 % SOLN ophthalmic solution Place 1 drop into both eyes at bedtime      Timolol Maleate (TIMOPTIC OCUDOSE) 0.5 % ophthalmic solution Place 1 drop into both eyes 2 times daily      vitamin C (ASCORBIC ACID) 1000 MG TABS Take 1,000 mg by mouth daily      Vitamin D3 (CHOLECALCIFEROL) 25 mcg (1000 units) tablet Take by mouth daily Daily in the winter      vitamin E (TOCOPHEROL) 400 units (180 mg) capsule Every other day      zinc gluconate 50 MG tablet Take 40 mg by mouth daily         Allergies:     Allergies   Allergen Reactions    Albuterol     Amlodipine     Bimatoprost Itching    Brimonidine Itching    Clotrimazole Itching    Dorzolamide Hcl-Timolol Mal Itching    Latanoprost Itching    Lisinopril     Losartan      depression    Neomycin-Polymyxin-Gramicidin Swelling    Neosporin [Neomycin-Polymyxin-Gramicidin]     Penicillins     Tape [Adhesive Tape]     Timolol Itching    Travoprost Itching    Vicodin [Hydrocodone-Acetaminophen]        Past Medical History:  Past Medical History:   Diagnosis Date    AAA (abdominal aortic aneurysm) (H24)     Aortic stenosis     Benign essential hypertension with BP difference between arms     Hyperlipidemia LDL goal <70        Past Surgical History:  Past Surgical History:   Procedure Laterality Date    CV CORONARY ANGIOGRAM N/A 12/19/2023    Procedure: Coronary Angiogram;  Surgeon: Seth Duarte MD;  Location:  HEART CARDIAC CATH LAB    CV RIGHT HEART CATH MEASUREMENTS RECORDED N/A 12/19/2023    Procedure: Right Heart Catheterization;  Surgeon: Seth Duarte MD;  Location:  HEART CARDIAC CATH LAB       Family History:  No family history on file.    Social History:  Social History     Socioeconomic History    Marital status: Single     Spouse name: None    Number of children: None    Years of education: None    Highest education level: None   Tobacco Use    Smoking status:  Never     Passive exposure: Never    Smokeless tobacco: Never   Vaping Use    Vaping Use: Never used   Substance and Sexual Activity    Alcohol use: Not Currently    Drug use: Not Currently     Social Determinants of Health     Financial Resource Strain: Low Risk  (10/9/2023)    Financial Resource Strain     Within the past 12 months, have you or your family members you live with been unable to get utilities (heat, electricity) when it was really needed?: No   Food Insecurity: Low Risk  (10/9/2023)    Food Insecurity     Within the past 12 months, did you worry that your food would run out before you got money to buy more?: No     Within the past 12 months, did the food you bought just not last and you didn t have money to get more?: No   Transportation Needs: Low Risk  (10/9/2023)    Transportation Needs     Within the past 12 months, has lack of transportation kept you from medical appointments, getting your medicines, non-medical meetings or appointments, work, or from getting things that you need?: No   Interpersonal Safety: High Risk (10/13/2023)    Interpersonal Safety     Do you feel physically and emotionally safe where you currently live?: No     Within the past 12 months, have you been hit, slapped, kicked or otherwise physically hurt by someone?: No     Within the past 12 months, have you been humiliated or emotionally abused in other ways by your partner or ex-partner?: No   Housing Stability: Low Risk  (10/9/2023)    Housing Stability     Do you have housing? : Yes     Are you worried about losing your housing?: No       Review of Systems:  Constitutional: No fever, chills, or sweats. No weight gain/loss   ENT: No visual disturbance, ear ache, epistaxis, sore throat  Allergies/Immunologic: Negative.   Respiratory: No cough, hemoptysia  Cardiovascular: As per HPI  GI: No nausea, vomiting, hematemesis, melena, or hematochezia  : No urinary frequency, dysuria, or hematuria  Integument:  "Negative  Psychiatric: Negative  Neuro: Negative  Endocrinology: Negative   Musculoskeletal: Negative      Physical Exam:  Vitals: BP (!) 165/95   Pulse 84   Ht 1.626 m (5' 4\")   Wt 64.7 kg (142 lb 9.6 oz)   SpO2 95%   BMI 24.48 kg/m     General: NAD  HEENT:  Dentition intact.    Neck: No jugular venous distension.   Heart: RRR with grade II CARLEY at RUSB  Lungs: CTA.    Abdomen: Soft, nontender, nondistended.   Extremities: No clubbing, cyanosis, or edema.  The pulses are +4/4 at the radial, brachial, femoral, popliteal, DP, and PT sites bilaterally.  No bruits are noted.  Neurologic: Alert and oriented to person/place/time, normal speech, gait and affect  Skin: No petechiae, purpura or rash.      Diagnostic Studies:  ECG: NSR  Personally reviewed and interpreted by me.    Coronary Angiogram: 12/2023  Conclusion       1.  Mild nonocclusive coronary artery disease  2.  Right heart cath as follows:                                                         RA:  6/3/3                                                      RV:  45/1/8                                                      PA:  47/12/30                                                      PCWP:  16/24/16                                                      PA sat:  63%                                                      FA sat:  98%                                                      CO:  3.9 L/min (chetan)                                                      CI:  2.3 L/min/m2    Echocardiogram: 10/25/2023  Interpretation Summary     Severe valvular aortic stenosis.  The calculated aortic valve area is 0.6cm2.  The mean AoV pressure gradient is 57mmHg.  The visual ejection fraction is 60-65%.  There is mild concentric left ventricular hypertrophy.  There is mild-moderate biatrial enlargement.      Laboratory Studies:  Personally reviewed and interpreted by me.    LIPID RESULTS:  Lab Results   Component Value Date    CHOL 186 10/13/2023    HDL 54 10/13/2023 " "    (H) 10/13/2023    TRIG 115 10/13/2023       LIVER ENZYME RESULTS:  Lab Results   Component Value Date    AST 28 10/13/2023    ALT 18 10/13/2023       CBC RESULTS:  Lab Results   Component Value Date    WBC 7.9 12/19/2023    WBC 9.0 04/17/2020    RBC 4.57 12/19/2023    RBC 4.76 04/17/2020    HGB 13.6 12/19/2023    HGB 14.4 04/17/2020    HCT 42.5 12/19/2023    HCT 45.7 04/17/2020    MCV 93 12/19/2023    MCV 96 04/17/2020    MCH 29.8 12/19/2023    MCH 30.3 04/17/2020    MCHC 32.0 12/19/2023    MCHC 31.5 04/17/2020    RDW 13.8 12/19/2023    RDW 13.2 04/17/2020     12/19/2023     04/17/2020       BMP RESULTS:  Lab Results   Component Value Date     12/19/2023     04/17/2020    POTASSIUM 5.0 12/19/2023    POTASSIUM 4.4 04/17/2020    CHLORIDE 102 12/19/2023    CHLORIDE 105 04/17/2020    CO2 29 12/19/2023    CO2 26 04/17/2020    ANIONGAP 9 12/19/2023    ANIONGAP 7 04/17/2020     (H) 12/19/2023    GLC 97 04/17/2020    BUN 14.8 12/19/2023    BUN 16 04/17/2020    CR 0.70 12/19/2023    CR 0.55 04/17/2020    GFRESTIMATED 83 12/19/2023    GFRESTIMATED >60 11/16/2023    GFRESTIMATED 86 04/17/2020    GFRESTBLACK >90 04/17/2020    JERRY 10.0 12/19/2023    JERRY 9.5 04/17/2020        A1C RESULTS:  No results found for: \"A1C\"    INR RESULTS:  Lab Results   Component Value Date    INR 1.01 12/19/2023         "

## 2024-02-09 NOTE — LETTER
2/9/2024    Ty Cordero MD  [address] [facility] [address]    RE: Kavita Merlos       Dear Colleague,     I had the pleasure of seeing Kavita Merlos in the [facility].      STRUCTURAL HEART CLINIC  H&P    Referring Provider: Dr. Duarte    Primary Cardiologist: Dr. Garay     History of Present Illness  Kavita Merlos is a pleasant 88-year-old female with past medical history of hyperlipidemia, macular degeneration, peripheral arterial disease with AAA, and severe aortic stenosis who presents for pre-operative H&P in preparation for transcatheter aortic valve replacement on 2/20/2024 at [facility].     She was recently referred to our structural clinic for consideration of TAVR.  Recent echocardiogram showed progression of her aortic stenosis into the severe range characterized by valve area of 0.6 cm , mean gradient of 57 mmHg, and LVEF 60-65%.  She reports progressive dyspnea and fatigue over the last 6 to 12 months.       TAVR guided CT noted 4.2 cm infrarenal abdominal aortic aneurysm.  She also has some calcification and smaller dimensions of her external iliac arteries, but overall transfemoral access appears favorable.  She was subsequently seen by vascular surgery.  It was felt her AAA was small therefore no surgical intervention indicated.    More recently she underwent preoperative coronary angiogram right heart catheterization that showed nonobstructive coronary disease and normal right and left filling pressures.    Today she endorses ongoing dyspnea and fatigue.  She denies chest pain, syncope or near syncope, or palpitations.  She has no PND or orthopnea.  No infectious symptoms.  Her blood pressure is elevated today though she has a history of whitecoat hypertension.      Assessment and Plan     Kavita Merlos presents for pre-operative H&P in preparation for transcatheter aortic valve replacement on 2/20/2024 at [facility].       1.  Severe aortic valve stenosis: We discussed the procedure in detail, including pre and post-procedure care, restrictions and follow-up.  She understands risks including 5-10% risk of heart block leading to permanent pacemaker placement, 3% risk of bleeding, infection, vascular complication, 1-3% risk of stroke and very low risk (<1%) of serious complications such as cardiac perforation, aortic root rupture, dissection or death.     All questions answered  Type and screen orders complete  Supplies for scrubbing provided  No known contrast dye allergy  No trouble with anesthesia in the past  Labs today WNL, no s/s of infection    2. Chronic diastolic heart failure, NYHA class II, Stage B: Secondary to valvular heart disease. No acute volume overload on exam, though patient does endorse significant exertional dyspnea.    3.  Infrarenal AAA measuring 4.2 cm: No surgical intervention indicated.    4.  Hyperlipidemia: Continue rosuvastatin 5 mg daily.    5.  White coat hypertension: She has numerous medication intolerances, not currently on antihypertensive regimen.      Medication Recommendations:  Antiplatelet: Continue ASA 81 mg perioperatively   Supplements: Hold morning of procedure    Patient is optimized and is acceptable candidate for the proposed procedure.  No further diagnostic evaluation is needed. Pre-procedure instructions provided in written format.     Vilma Willett, RAYMOND, APRN, CNP  Structural Heart Nurse Practitioner  [facility]   Pager: [phone]    Current Medications:  Current Outpatient Medications   Medication Sig Dispense Refill    aspirin 81 MG EC tablet Take 81 mg by mouth twice a week      co-enzyme Q-10 100 MG CAPS capsule Take 100 mg by mouth daily      cyanocobalamin (VITAMIN B-12) 100 MCG tablet Take 100 mcg by mouth Every other day      Multiple Vitamins-Minerals (PRESERVISION AREDS 2 PO) Take by mouth daily      multivitamin, therapeutic (THERA-VIT) TABS tablet Take 1 tablet by  mouth daily      omega 3 1000 MG CAPS Every other day      ranibizumab (LUCENTIS) 0.5 MG/0.05ML SOLN 0.5 mg by Intravitreal route once Once every 6-8 weeks, eye injection      rosuvastatin (CRESTOR) 5 MG tablet Take 1 tablet (5 mg) by mouth daily 30 tablet 3    tafluprost (ZIOPTAN) 0.0015 % SOLN ophthalmic solution Place 1 drop into both eyes at bedtime      Timolol Maleate (TIMOPTIC OCUDOSE) 0.5 % ophthalmic solution Place 1 drop into both eyes 2 times daily      vitamin C (ASCORBIC ACID) 1000 MG TABS Take 1,000 mg by mouth daily      Vitamin D3 (CHOLECALCIFEROL) 25 mcg (1000 units) tablet Take by mouth daily Daily in the winter      vitamin E (TOCOPHEROL) 400 units (180 mg) capsule Every other day      zinc gluconate 50 MG tablet Take 40 mg by mouth daily         Allergies:     Allergies   Allergen Reactions    Albuterol     Amlodipine     Bimatoprost Itching    Brimonidine Itching    Clotrimazole Itching    Dorzolamide Hcl-Timolol Mal Itching    Latanoprost Itching    Lisinopril     Losartan      depression    Neomycin-Polymyxin-Gramicidin Swelling    Neosporin [Neomycin-Polymyxin-Gramicidin]     Penicillins     Tape [Adhesive Tape]     Timolol Itching    Travoprost Itching    Vicodin [Hydrocodone-Acetaminophen]        Past Medical History:  Past Medical History:   Diagnosis Date    AAA (abdominal aortic aneurysm) (H24)     Aortic stenosis     Benign essential hypertension with BP difference between arms     Hyperlipidemia LDL goal <70        Past Surgical History:  Past Surgical History:   Procedure Laterality Date    CV CORONARY ANGIOGRAM N/A 12/19/2023    Procedure: Coronary Angiogram;  Surgeon: Seth Duarte MD;  Location:  HEART CARDIAC CATH LAB    CV RIGHT HEART CATH MEASUREMENTS RECORDED N/A 12/19/2023    Procedure: Right Heart Catheterization;  Surgeon: Seth Duarte MD;  Location:  HEART CARDIAC CATH LAB       Family History:  No family history on file.    Social History:  Social  History     Socioeconomic History    Marital status: Single     Spouse name: None    Number of children: None    Years of education: None    Highest education level: None   Tobacco Use    Smoking status: Never     Passive exposure: Never    Smokeless tobacco: Never   Vaping Use    Vaping Use: Never used   Substance and Sexual Activity    Alcohol use: Not Currently    Drug use: Not Currently     Social Determinants of Health     Financial Resource Strain: Low Risk  (10/9/2023)    Financial Resource Strain     Within the past 12 months, have you or your family members you live with been unable to get utilities (heat, electricity) when it was really needed?: No   Food Insecurity: Low Risk  (10/9/2023)    Food Insecurity     Within the past 12 months, did you worry that your food would run out before you got money to buy more?: No     Within the past 12 months, did the food you bought just not last and you didn t have money to get more?: No   Transportation Needs: Low Risk  (10/9/2023)    Transportation Needs     Within the past 12 months, has lack of transportation kept you from medical appointments, getting your medicines, non-medical meetings or appointments, work, or from getting things that you need?: No   Interpersonal Safety: High Risk (10/13/2023)    Interpersonal Safety     Do you feel physically and emotionally safe where you currently live?: No     Within the past 12 months, have you been hit, slapped, kicked or otherwise physically hurt by someone?: No     Within the past 12 months, have you been humiliated or emotionally abused in other ways by your partner or ex-partner?: No   Housing Stability: Low Risk  (10/9/2023)    Housing Stability     Do you have housing? : Yes     Are you worried about losing your housing?: No       Review of Systems:  Constitutional: No fever, chills, or sweats. No weight gain/loss   ENT: No visual disturbance, ear ache, epistaxis, sore throat  Allergies/Immunologic: Negative.  "  Respiratory: No cough, hemoptysia  Cardiovascular: As per HPI  GI: No nausea, vomiting, hematemesis, melena, or hematochezia  : No urinary frequency, dysuria, or hematuria  Integument: Negative  Psychiatric: Negative  Neuro: Negative  Endocrinology: Negative   Musculoskeletal: Negative      Physical Exam:  Vitals: BP (!) 165/95   Pulse 84   Ht 1.626 m (5' 4\")   Wt 64.7 kg (142 lb 9.6 oz)   SpO2 95%   BMI 24.48 kg/m     General: NAD  HEENT:  Dentition intact.    Neck: No jugular venous distension.   Heart: RRR with grade II CARLEY at RUSB  Lungs: CTA.    Abdomen: Soft, nontender, nondistended.   Extremities: No clubbing, cyanosis, or edema.  The pulses are +4/4 at the radial, brachial, femoral, popliteal, DP, and PT sites bilaterally.  No bruits are noted.  Neurologic: Alert and oriented to person/place/time, normal speech, gait and affect  Skin: No petechiae, purpura or rash.      Diagnostic Studies:  ECG: NSR  Personally reviewed and interpreted by me.    Coronary Angiogram: 12/2023  Conclusion       1.  Mild nonocclusive coronary artery disease  2.  Right heart cath as follows:                                                         RA:  6/3/3                                                      RV:  45/1/8                                                      PA:  47/12/30                                                      PCWP:  16/24/16                                                      PA sat:  63%                                                      FA sat:  98%                                                      CO:  3.9 L/min (chetan)                                                      CI:  2.3 L/min/m2    Echocardiogram: 10/25/2023  Interpretation Summary     Severe valvular aortic stenosis.  The calculated aortic valve area is 0.6cm2.  The mean AoV pressure gradient is 57mmHg.  The visual ejection fraction is 60-65%.  There is mild concentric left ventricular hypertrophy.  There is mild-moderate " "biatrial enlargement.      Laboratory Studies:  Personally reviewed and interpreted by me.    LIPID RESULTS:  Lab Results   Component Value Date    CHOL 186 10/13/2023    HDL 54 10/13/2023     (H) 10/13/2023    TRIG 115 10/13/2023       LIVER ENZYME RESULTS:  Lab Results   Component Value Date    AST 28 10/13/2023    ALT 18 10/13/2023       CBC RESULTS:  Lab Results   Component Value Date    WBC 7.9 12/19/2023    WBC 9.0 04/17/2020    RBC 4.57 12/19/2023    RBC 4.76 04/17/2020    HGB 13.6 12/19/2023    HGB 14.4 04/17/2020    HCT 42.5 12/19/2023    HCT 45.7 04/17/2020    MCV 93 12/19/2023    MCV 96 04/17/2020    MCH 29.8 12/19/2023    MCH 30.3 04/17/2020    MCHC 32.0 12/19/2023    MCHC 31.5 04/17/2020    RDW 13.8 12/19/2023    RDW 13.2 04/17/2020     12/19/2023     04/17/2020       BMP RESULTS:  Lab Results   Component Value Date     12/19/2023     04/17/2020    POTASSIUM 5.0 12/19/2023    POTASSIUM 4.4 04/17/2020    CHLORIDE 102 12/19/2023    CHLORIDE 105 04/17/2020    CO2 29 12/19/2023    CO2 26 04/17/2020    ANIONGAP 9 12/19/2023    ANIONGAP 7 04/17/2020     (H) 12/19/2023    GLC 97 04/17/2020    BUN 14.8 12/19/2023    BUN 16 04/17/2020    CR 0.70 12/19/2023    CR 0.55 04/17/2020    GFRESTIMATED 83 12/19/2023    GFRESTIMATED >60 11/16/2023    GFRESTIMATED 86 04/17/2020    GFRESTBLACK >90 04/17/2020    JERRY 10.0 12/19/2023    JERRY 9.5 04/17/2020        A1C RESULTS:  No results found for: \"A1C\"    INR RESULTS:  Lab Results   Component Value Date    INR 1.01 12/19/2023         Thank you for allowing me to participate in the care of your patient.      Sincerely,     Vilma Willett, [facility]  cc:   No referring provider defined for this encounter.      "

## 2024-02-09 NOTE — PATIENT INSTRUCTIONS
TAVR PRE-PROCEDURE INSTRUCTIONS:    - Your TAVR is scheduled Tuesday 02/20/2024, 2nd case. Please check-in at 09:30AM at the Registration Desk in the [facility] at [facility].    - Use the Hibiclens soap I have provided you the night before and morning of the procedure. Wash from chin to toes, please avoid your face and eyes.    - Nothing to eat or drink the morning of your TAVR.     Medication instructions:  - You may take your morning medications with sips of water.   - Please hold all vitamins and supplements the morning of your procedure.  - You will need to take 4 x 81 mg tablets of aspirin the morning of your TAVR.    -On the day of the procedure, you may have two visitors in the pre-operative area. Two visitors may see you when you get to your room in the Heart Center (2nd floor of [facility]).    - You will stay overnight on Tuesday night. We will monitor you overnight for signs of bleeding, stroke, as well as need for pacemaker. On Wednesday morning, you will have an echo of your new valve and work with cardiac rehab.     It was nice to see you today! Please call with any other questions, concerns, or any changes in your health: [phone].    Dana Avila RN  Structural Heart Coordinator  [facility]

## 2024-02-19 DIAGNOSIS — I35.0 SEVERE AORTIC STENOSIS BY PRIOR ECHOCARDIOGRAM: Primary | ICD-10-CM

## 2024-02-19 RX ORDER — ASPIRIN 325 MG
325 TABLET ORAL ONCE
Status: CANCELLED | OUTPATIENT
Start: 2024-02-19 | End: 2024-02-19

## 2024-02-19 RX ORDER — LIDOCAINE 40 MG/G
CREAM TOPICAL
Status: CANCELLED | OUTPATIENT
Start: 2024-02-19

## 2024-02-19 RX ORDER — SODIUM CHLORIDE 9 MG/ML
INJECTION, SOLUTION INTRAVENOUS CONTINUOUS
Status: CANCELLED | OUTPATIENT
Start: 2024-02-19

## 2024-02-19 RX ORDER — CLINDAMYCIN PHOSPHATE 900 MG/50ML
900 INJECTION, SOLUTION INTRAVENOUS
Status: CANCELLED | OUTPATIENT
Start: 2024-02-19

## 2024-02-19 NOTE — PROGRESS NOTES
PTA medications updated by Medication Scribe prior to surgery via phone call with patient (last doses completed by Nurse)     Medication history sources: Patient, Surescripts, and H&P  In the past week, patient estimated taking medication this percent of the time: Greater than 90%      Significant changes made to the medication list:  None      Additional medication history information:   Patient was advised to bring: Timoptic and Zioptan OP solutions    Medication reconciliation completed by provider prior to medication history? No    Time spent in this activity: 40 minutes    The information provided in this note is only as accurate as the sources available at the time of update(s)      Prior to Admission medications    Medication Sig Last Dose Taking? Auth Provider Long Term End Date   aspirin 81 MG EC tablet Take 81 mg by mouth twice a week  at am Yes Reported, Patient     co-enzyme Q-10 100 MG CAPS capsule Take 100 mg by mouth daily 2/18/2024 at am Yes Reported, Patient     cyanocobalamin (VITAMIN B-12) 100 MCG tablet Take 100 mcg by mouth Every other day 2/17/2024 at am Yes Reported, Patient     Multiple Vitamins-Minerals (PRESERVISION AREDS 2 PO) Take by mouth daily 2/18/2024 at PM Yes Reported, Patient     multivitamin, therapeutic (THERA-VIT) TABS tablet Take 1 tablet by mouth daily 2/19/2024 at am Yes Reported, Patient     omega 3 1000 MG CAPS Take 1 capsule by mouth every other day 2/17/2024 at Unknown Yes Reported, Patient     ranibizumab (LUCENTIS) 0.5 MG/0.05ML SOLN 0.5 mg by Intravitreal route once Once every 6-8 weeks, eye injection 1/24/2024 at pm Yes Reported, Patient     rosuvastatin (CRESTOR) 5 MG tablet Take 1 tablet (5 mg) by mouth daily 2/19/2024 at am Yes Enrico Mendoza MD Yes    tafluprost (ZIOPTAN) 0.0015 % SOLN ophthalmic solution Place 1 drop into both eyes at bedtime 2/19/2024 at pm Yes Reported, Patient     Timolol Maleate (TIMOPTIC OCUDOSE) 0.5 % ophthalmic solution Place 1  drop into both eyes 2 times daily  at am Yes Reported, Patient     vitamin C (ASCORBIC ACID) 1000 MG TABS Take 1,000 mg by mouth daily 2/18/2024 at pm Yes Reported, Patient     Vitamin D3 (CHOLECALCIFEROL) 25 mcg (1000 units) tablet Take by mouth daily Daily in the winter 2/18/2024 at pm Yes Reported, Patient     vitamin E (TOCOPHEROL) 400 units (180 mg) capsule Take 400 Units by mouth every other day 2/18/2024 at pm Yes Reported, Patient     zinc gluconate 50 MG tablet Take 50 mg by mouth every other day Unknown at Unknown Yes Reported, Patient         Medication history completed by: Anahy Vines LPN

## 2024-02-20 ENCOUNTER — HOSPITAL ENCOUNTER (INPATIENT)
Facility: CLINIC | Age: 89
LOS: 2 days | Discharge: HOME | DRG: 267 | End: 2024-02-22
Attending: INTERNAL MEDICINE | Admitting: INTERNAL MEDICINE
Payer: MEDICARE

## 2024-02-20 ENCOUNTER — HOSPITAL ENCOUNTER (OUTPATIENT)
Dept: CARDIOLOGY | Facility: CLINIC | Age: 89
Discharge: HOME | DRG: 267 | End: 2024-02-20
Attending: INTERNAL MEDICINE | Admitting: INTERNAL MEDICINE
Payer: MEDICARE

## 2024-02-20 DIAGNOSIS — I35.0 SEVERE AORTIC STENOSIS: ICD-10-CM

## 2024-02-20 DIAGNOSIS — I35.0 SEVERE AORTIC STENOSIS: Primary | ICD-10-CM

## 2024-02-20 DIAGNOSIS — I48.91 ATRIAL FIBRILLATION, UNSPECIFIED TYPE (H): ICD-10-CM

## 2024-02-20 DIAGNOSIS — I35.0 SEVERE AORTIC STENOSIS BY PRIOR ECHOCARDIOGRAM: ICD-10-CM

## 2024-02-20 DIAGNOSIS — Z95.2 S/P TAVR (TRANSCATHETER AORTIC VALVE REPLACEMENT): Primary | ICD-10-CM

## 2024-02-20 LAB
ABO/RH(D): NORMAL
ACT BLD: 220 SECONDS (ref 74–150)
ACT BLD: 321 SECONDS (ref 74–150)
ANION GAP SERPL CALCULATED.3IONS-SCNC: 10 MMOL/L (ref 7–15)
ANTIBODY SCREEN: NEGATIVE
BLD PROD TYP BPU: NORMAL
BLD PROD TYP BPU: NORMAL
BLOOD COMPONENT TYPE: NORMAL
BLOOD COMPONENT TYPE: NORMAL
BUN SERPL-MCNC: 9.1 MG/DL (ref 8–23)
CALCIUM SERPL-MCNC: 8.8 MG/DL (ref 8.8–10.2)
CHLORIDE SERPL-SCNC: 101 MMOL/L (ref 98–107)
CODING SYSTEM: NORMAL
CODING SYSTEM: NORMAL
CREAT SERPL-MCNC: 0.6 MG/DL (ref 0.51–0.95)
CROSSMATCH: NORMAL
CROSSMATCH: NORMAL
DEPRECATED HCO3 PLAS-SCNC: 26 MMOL/L (ref 22–29)
EGFRCR SERPLBLD CKD-EPI 2021: 86 ML/MIN/1.73M2
ERYTHROCYTE [DISTWIDTH] IN BLOOD BY AUTOMATED COUNT: 13.8 % (ref 10–15)
GLUCOSE BLDC GLUCOMTR-MCNC: 100 MG/DL (ref 70–99)
GLUCOSE SERPL-MCNC: 141 MG/DL (ref 70–99)
HCT VFR BLD AUTO: 37.6 % (ref 35–47)
HGB BLD-MCNC: 11.8 G/DL (ref 11.7–15.7)
ISSUE DATE AND TIME: NORMAL
ISSUE DATE AND TIME: NORMAL
MAGNESIUM SERPL-MCNC: 1.8 MG/DL (ref 1.7–2.3)
MCH RBC QN AUTO: 29.5 PG (ref 26.5–33)
MCHC RBC AUTO-ENTMCNC: 31.4 G/DL (ref 31.5–36.5)
MCV RBC AUTO: 94 FL (ref 78–100)
PLATELET # BLD AUTO: 169 10E3/UL (ref 150–450)
POTASSIUM SERPL-SCNC: 4 MMOL/L (ref 3.4–5.3)
RBC # BLD AUTO: 4 10E6/UL (ref 3.8–5.2)
SODIUM SERPL-SCNC: 137 MMOL/L (ref 135–145)
SPECIMEN EXPIRATION DATE: NORMAL
UNIT ABO/RH: NORMAL
UNIT ABO/RH: NORMAL
UNIT NUMBER: NORMAL
UNIT NUMBER: NORMAL
UNIT STATUS: NORMAL
UNIT STATUS: NORMAL
UNIT TYPE ISBT: 5100
UNIT TYPE ISBT: 5100
WBC # BLD AUTO: 10 10E3/UL (ref 4–11)

## 2024-02-20 PROCEDURE — C1725 CATH, TRANSLUMIN NON-LASER: HCPCS | Performed by: INTERNAL MEDICINE

## 2024-02-20 PROCEDURE — 99291 CRITICAL CARE FIRST HOUR: CPT | Performed by: NURSE PRACTITIONER

## 2024-02-20 PROCEDURE — C9764 REVASC INTRAVASC LITHOTRIPSY: HCPCS | Performed by: INTERNAL MEDICINE

## 2024-02-20 PROCEDURE — 250N000011 HC RX IP 250 OP 636: Mod: JZ | Performed by: NURSE PRACTITIONER

## 2024-02-20 PROCEDURE — C1889 IMPLANT/INSERT DEVICE, NOC: HCPCS | Performed by: INTERNAL MEDICINE

## 2024-02-20 PROCEDURE — 86900 BLOOD TYPING SEROLOGIC ABO: CPT | Performed by: INTERNAL MEDICINE

## 2024-02-20 PROCEDURE — 250N000013 HC RX MED GY IP 250 OP 250 PS 637: Performed by: NURSE PRACTITIONER

## 2024-02-20 PROCEDURE — C1760 CLOSURE DEV, VASC: HCPCS | Performed by: INTERNAL MEDICINE

## 2024-02-20 PROCEDURE — 93010 ELECTROCARDIOGRAM REPORT: CPT | Mod: 76 | Performed by: INTERNAL MEDICINE

## 2024-02-20 PROCEDURE — 93005 ELECTROCARDIOGRAM TRACING: CPT

## 2024-02-20 PROCEDURE — 250N000011 HC RX IP 250 OP 636: Performed by: INTERNAL MEDICINE

## 2024-02-20 PROCEDURE — 80048 BASIC METABOLIC PNL TOTAL CA: CPT | Performed by: NURSE PRACTITIONER

## 2024-02-20 PROCEDURE — 85347 COAGULATION TIME ACTIVATED: CPT

## 2024-02-20 PROCEDURE — 99153 MOD SED SAME PHYS/QHP EA: CPT | Performed by: INTERNAL MEDICINE

## 2024-02-20 PROCEDURE — 93325 DOPPLER ECHO COLOR FLOW MAPG: CPT | Mod: 26 | Performed by: INTERNAL MEDICINE

## 2024-02-20 PROCEDURE — 258N000003 HC RX IP 258 OP 636: Performed by: NURSE PRACTITIONER

## 2024-02-20 PROCEDURE — 272N000192 HC ACCESSORY CR2

## 2024-02-20 PROCEDURE — 250N000009 HC RX 250: Performed by: INTERNAL MEDICINE

## 2024-02-20 PROCEDURE — 272N000001 HC OR GENERAL SUPPLY STERILE: Performed by: INTERNAL MEDICINE

## 2024-02-20 PROCEDURE — 999N000157 HC STATISTIC RCP TIME EA 10 MIN

## 2024-02-20 PROCEDURE — 250N000011 HC RX IP 250 OP 636: Performed by: NURSE PRACTITIONER

## 2024-02-20 PROCEDURE — C1894 INTRO/SHEATH, NON-LASER: HCPCS | Performed by: INTERNAL MEDICINE

## 2024-02-20 PROCEDURE — 36415 COLL VENOUS BLD VENIPUNCTURE: CPT | Performed by: NURSE PRACTITIONER

## 2024-02-20 PROCEDURE — 258N000003 HC RX IP 258 OP 636: Performed by: INTERNAL MEDICINE

## 2024-02-20 PROCEDURE — 99152 MOD SED SAME PHYS/QHP 5/>YRS: CPT | Performed by: INTERNAL MEDICINE

## 2024-02-20 PROCEDURE — C1769 GUIDE WIRE: HCPCS | Performed by: INTERNAL MEDICINE

## 2024-02-20 PROCEDURE — C1887 CATHETER, GUIDING: HCPCS | Performed by: INTERNAL MEDICINE

## 2024-02-20 PROCEDURE — 250N000013 HC RX MED GY IP 250 OP 250 PS 637: Performed by: INTERNAL MEDICINE

## 2024-02-20 PROCEDURE — 33361 REPLACE AORTIC VALVE PERQ: CPT | Mod: 62 | Performed by: STUDENT IN AN ORGANIZED HEALTH CARE EDUCATION/TRAINING PROGRAM

## 2024-02-20 PROCEDURE — 04FD3ZZ FRAGMENTATION OF LEFT COMMON ILIAC ARTERY, PERCUTANEOUS APPROACH: ICD-10-PCS | Performed by: INTERNAL MEDICINE

## 2024-02-20 PROCEDURE — 83735 ASSAY OF MAGNESIUM: CPT | Performed by: NURSE PRACTITIONER

## 2024-02-20 PROCEDURE — 210N000002 HC R&B HEART CARE

## 2024-02-20 PROCEDURE — 33361 REPLACE AORTIC VALVE PERQ: CPT | Performed by: INTERNAL MEDICINE

## 2024-02-20 PROCEDURE — 85027 COMPLETE CBC AUTOMATED: CPT | Performed by: NURSE PRACTITIONER

## 2024-02-20 PROCEDURE — 86923 COMPATIBILITY TEST ELECTRIC: CPT | Performed by: INTERNAL MEDICINE

## 2024-02-20 PROCEDURE — 99223 1ST HOSP IP/OBS HIGH 75: CPT | Mod: FS | Performed by: NURSE PRACTITIONER

## 2024-02-20 PROCEDURE — 93325 DOPPLER ECHO COLOR FLOW MAPG: CPT

## 2024-02-20 PROCEDURE — 93321 DOPPLER ECHO F-UP/LMTD STD: CPT | Mod: 26 | Performed by: INTERNAL MEDICINE

## 2024-02-20 PROCEDURE — P9016 RBC LEUKOCYTES REDUCED: HCPCS | Performed by: INTERNAL MEDICINE

## 2024-02-20 PROCEDURE — 02RF38Z REPLACEMENT OF AORTIC VALVE WITH ZOOPLASTIC TISSUE, PERCUTANEOUS APPROACH: ICD-10-PCS | Performed by: INTERNAL MEDICINE

## 2024-02-20 PROCEDURE — 93308 TTE F-UP OR LMTD: CPT | Mod: 26 | Performed by: INTERNAL MEDICINE

## 2024-02-20 PROCEDURE — C1898 LEAD, PMKR, OTHER THAN TRANS: HCPCS | Performed by: INTERNAL MEDICINE

## 2024-02-20 DEVICE — VALVE AORTIC SAPEIN 3 UTLRA TAVR 23MM 9750TFX23A: Type: IMPLANTABLE DEVICE | Status: FUNCTIONAL

## 2024-02-20 DEVICE — DEVICE CLOSURE VASCULAR MANTA 18FR 2115: Type: IMPLANTABLE DEVICE | Status: FUNCTIONAL

## 2024-02-20 RX ORDER — HYDRALAZINE HYDROCHLORIDE 20 MG/ML
10-20 INJECTION INTRAMUSCULAR; INTRAVENOUS EVERY 4 HOURS PRN
Status: DISCONTINUED | OUTPATIENT
Start: 2024-02-20 | End: 2024-02-22 | Stop reason: HOSPADM

## 2024-02-20 RX ORDER — HEPARIN SODIUM 10000 [USP'U]/100ML
0-5000 INJECTION, SOLUTION INTRAVENOUS CONTINUOUS
Status: DISCONTINUED | OUTPATIENT
Start: 2024-02-20 | End: 2024-02-21

## 2024-02-20 RX ORDER — NITROGLYCERIN 0.4 MG/1
0.4 TABLET SUBLINGUAL EVERY 5 MIN PRN
Status: DISCONTINUED | OUTPATIENT
Start: 2024-02-20 | End: 2024-02-22 | Stop reason: HOSPADM

## 2024-02-20 RX ORDER — CLINDAMYCIN PHOSPHATE 900 MG/50ML
900 INJECTION, SOLUTION INTRAVENOUS
Status: COMPLETED | OUTPATIENT
Start: 2024-02-20 | End: 2024-02-20

## 2024-02-20 RX ORDER — DILTIAZEM HYDROCHLORIDE 30 MG/1
30 TABLET, FILM COATED ORAL EVERY 8 HOURS SCHEDULED
Status: COMPLETED | OUTPATIENT
Start: 2024-02-20 | End: 2024-02-21

## 2024-02-20 RX ORDER — LIDOCAINE 40 MG/G
CREAM TOPICAL
Status: DISCONTINUED | OUTPATIENT
Start: 2024-02-20 | End: 2024-02-20 | Stop reason: HOSPADM

## 2024-02-20 RX ORDER — SODIUM CHLORIDE 9 MG/ML
INJECTION, SOLUTION INTRAVENOUS CONTINUOUS
Status: DISCONTINUED | OUTPATIENT
Start: 2024-02-20 | End: 2024-02-20 | Stop reason: HOSPADM

## 2024-02-20 RX ORDER — ROSUVASTATIN CALCIUM 5 MG/1
5 TABLET, COATED ORAL DAILY
Status: DISCONTINUED | OUTPATIENT
Start: 2024-02-20 | End: 2024-02-22 | Stop reason: HOSPADM

## 2024-02-20 RX ORDER — HYDRALAZINE HYDROCHLORIDE 20 MG/ML
10 INJECTION INTRAMUSCULAR; INTRAVENOUS
Status: DISCONTINUED | OUTPATIENT
Start: 2024-02-20 | End: 2024-02-22 | Stop reason: HOSPADM

## 2024-02-20 RX ORDER — ASPIRIN 325 MG
325 TABLET ORAL ONCE
Status: COMPLETED | OUTPATIENT
Start: 2024-02-20 | End: 2024-02-20

## 2024-02-20 RX ORDER — ONDANSETRON 4 MG/1
4 TABLET, ORALLY DISINTEGRATING ORAL EVERY 6 HOURS PRN
Status: DISCONTINUED | OUTPATIENT
Start: 2024-02-20 | End: 2024-02-22 | Stop reason: HOSPADM

## 2024-02-20 RX ORDER — PROTAMINE SULFATE 10 MG/ML
INJECTION, SOLUTION INTRAVENOUS
Status: DISCONTINUED | OUTPATIENT
Start: 2024-02-20 | End: 2024-02-20 | Stop reason: HOSPADM

## 2024-02-20 RX ORDER — HEPARIN SODIUM 1000 [USP'U]/ML
INJECTION, SOLUTION INTRAVENOUS; SUBCUTANEOUS
Status: DISCONTINUED | OUTPATIENT
Start: 2024-02-20 | End: 2024-02-20 | Stop reason: HOSPADM

## 2024-02-20 RX ORDER — TIMOLOL 5 MG/ML
1 SOLUTION/ DROPS OPHTHALMIC 2 TIMES DAILY
Status: DISCONTINUED | OUTPATIENT
Start: 2024-02-21 | End: 2024-02-22 | Stop reason: HOSPADM

## 2024-02-20 RX ORDER — ACETAMINOPHEN 325 MG/1
325 TABLET ORAL EVERY 4 HOURS PRN
Status: DISCONTINUED | OUTPATIENT
Start: 2024-02-20 | End: 2024-02-22 | Stop reason: HOSPADM

## 2024-02-20 RX ORDER — TAFLUPROST OPTHALMIC 0 MG/.3ML
1 SOLUTION/ DROPS OPHTHALMIC AT BEDTIME
Status: DISCONTINUED | OUTPATIENT
Start: 2024-02-20 | End: 2024-02-22 | Stop reason: HOSPADM

## 2024-02-20 RX ORDER — METOPROLOL TARTRATE 1 MG/ML
INJECTION, SOLUTION INTRAVENOUS
Status: DISCONTINUED | OUTPATIENT
Start: 2024-02-20 | End: 2024-02-20 | Stop reason: HOSPADM

## 2024-02-20 RX ORDER — ONDANSETRON 2 MG/ML
4 INJECTION INTRAMUSCULAR; INTRAVENOUS EVERY 6 HOURS PRN
Status: DISCONTINUED | OUTPATIENT
Start: 2024-02-20 | End: 2024-02-22 | Stop reason: HOSPADM

## 2024-02-20 RX ORDER — FENTANYL CITRATE 50 UG/ML
INJECTION, SOLUTION INTRAMUSCULAR; INTRAVENOUS
Status: DISCONTINUED | OUTPATIENT
Start: 2024-02-20 | End: 2024-02-20 | Stop reason: HOSPADM

## 2024-02-20 RX ORDER — ASPIRIN 81 MG/1
81 TABLET ORAL DAILY
Status: DISCONTINUED | OUTPATIENT
Start: 2024-02-21 | End: 2024-02-22 | Stop reason: HOSPADM

## 2024-02-20 RX ADMIN — ONDANSETRON 4 MG: 2 INJECTION INTRAMUSCULAR; INTRAVENOUS at 19:27

## 2024-02-20 RX ADMIN — TAFLUPROST OPTHALMIC 1 DROP: 0 SOLUTION/ DROPS OPHTHALMIC at 21:20

## 2024-02-20 RX ADMIN — CLINDAMYCIN PHOSPHATE 900 MG: 900 INJECTION, SOLUTION INTRAVENOUS at 12:53

## 2024-02-20 RX ADMIN — AMIODARONE HYDROCHLORIDE 1 MG/MIN: 50 INJECTION, SOLUTION INTRAVENOUS at 20:55

## 2024-02-20 RX ADMIN — HEPARIN SODIUM 750 UNITS/HR: 10000 INJECTION, SOLUTION INTRAVENOUS at 21:16

## 2024-02-20 RX ADMIN — SODIUM CHLORIDE 500 ML: 9 INJECTION, SOLUTION INTRAVENOUS at 20:01

## 2024-02-20 RX ADMIN — ROSUVASTATIN CALCIUM 5 MG: 5 TABLET, FILM COATED ORAL at 19:50

## 2024-02-20 RX ADMIN — ACETAMINOPHEN 325 MG: 325 TABLET, FILM COATED ORAL at 19:50

## 2024-02-20 RX ADMIN — AMIODARONE HYDROCHLORIDE 150 MG: 1.5 INJECTION, SOLUTION INTRAVENOUS at 20:16

## 2024-02-20 RX ADMIN — DILTIAZEM HYDROCHLORIDE 30 MG: 30 TABLET, FILM COATED ORAL at 21:20

## 2024-02-20 RX ADMIN — SODIUM CHLORIDE: 9 INJECTION, SOLUTION INTRAVENOUS at 10:15

## 2024-02-20 RX ADMIN — HYDRALAZINE HYDROCHLORIDE 10 MG: 20 INJECTION INTRAMUSCULAR; INTRAVENOUS at 16:40

## 2024-02-20 ASSESSMENT — ACTIVITIES OF DAILY LIVING (ADL)
ADLS_ACUITY_SCORE: 24
ADLS_ACUITY_SCORE: 24
ADLS_ACUITY_SCORE: 27
ADLS_ACUITY_SCORE: 24
ADLS_ACUITY_SCORE: 24
ADLS_ACUITY_SCORE: 27
ADLS_ACUITY_SCORE: 22

## 2024-02-20 NOTE — H&P
[facility]      Cardiology H&P  Kavita Merlos MRN: [id number]  1935  Date of Admission:2/20/2024  Primary care provider: Ty Cordero      Assessment and Plan:     Kavita Merlos is a pleasant 88 year old admitted on 2/20/2024 for elective TAVR.     # Severe aortic stenosis s/p TAVR with 26 mm Ross Adelso 3 valve via RCFA  # New LBBB post valve deployment   Sheath sites soft without hematoma. No arrhythmias. Neuro's intact. Patient developed new LBBB post-valve deployment.     - Bedrest per protocol x 6 hours  - Neuro checks, per protocol  - Echocardiogram tomorrow  - Monitor groin sites  - EKG in morning   - ASA for anti-platelet therapy  - Cardiac rehab/PT/OT  - Continuous telemetry monitoring  - 30-day event monitor at discharge  - Lifelong antibiotic prophylaxis prior to all dental procedures.      # Chronic diastolic heart failure, NYHA class II   NTpBNP 994. Euvolemic on exam. No PTA diuretics   - Diurese as needed/able  - Daily weights  - Strict intake/output    # Hyperlipidemia: continue PTA rosuvastatin 5 mg daily   # AAA measuring measuring 4.2 cm per CT: stable per imaging  # Macular degeneration: continue PTA eye drops     FEN: Cardiac diet  Disposition: Home in 1-2 days with cardiac rehab pending stable post-op period  Code Status: FULL CODE    PRITI Hernandez, CNP  [facility] Structural/Interventional Cardiology Team  Pager: [phone]    Clinically Significant Risk Factors Present on Admission                # Drug Induced Platelet Defect: home medication list includes an antiplatelet medication            Chief Complaint:   Planned TAVR         History of Present Illness:   Kavita Merlos is a pleasant 88-year-old female with past medical history of hyperlipidemia, macular degeneration, peripheral arterial disease with AAA, and severe aortic stenosis who presented today for elective TAVR.     Recent echocardiogram showed progression of her aortic stenosis into the  severe range characterized by valve area of 0.6 cm , mean gradient of 57 mmHg, and LVEF 60-65%.  She reports progressive dyspnea and fatigue over the last 6 to 12 months.       Patient is now s/p 26 mm Ross Resilia valve via RCFA. Procedure went well without complication. No hemodynamic instability. She developed new LBBB post-valve deployment.            Review of Systems:    10 point review of systems negative except for stated above in HPI.          Past Medical History:   Medical History reviewed.   Past Medical History:   Diagnosis Date    AAA (abdominal aortic aneurysm) (H24)     Aortic stenosis     Benign essential hypertension with BP difference between arms     Hyperlipidemia LDL goal <70              Past Surgical History:   Surgical History reviewed.   Past Surgical History:   Procedure Laterality Date    CV CORONARY ANGIOGRAM N/A 12/19/2023    Procedure: Coronary Angiogram;  Surgeon: Seth Duarte MD;  Location:  HEART CARDIAC CATH LAB    CV RIGHT HEART CATH MEASUREMENTS RECORDED N/A 12/19/2023    Procedure: Right Heart Catheterization;  Surgeon: Seth Duarte MD;  Location:  HEART CARDIAC CATH LAB             Social History:   Social History reviewed.  Social History     Tobacco Use    Smoking status: Former     Types: Cigarettes     Passive exposure: Never    Smokeless tobacco: Never    Tobacco comments:     I quit when I was 71 years old, off/on prior to that starting when I was 17   Substance Use Topics    Alcohol use: Not Currently             Family History:   Family History reviewed.   History reviewed. No pertinent family history.          Allergies:     Allergies   Allergen Reactions    Albuterol     Amlodipine     Bimatoprost Itching    Brimonidine Itching    Clotrimazole Itching    Dorzolamide Hcl-Timolol Mal Itching    Latanoprost Itching    Lisinopril     Losartan      depression    Neomycin-Polymyxin-Gramicidin Swelling    Neosporin [Neomycin-Polymyxin-Gramicidin]  "    Penicillins     Tape [Adhesive Tape]     Timolol Itching    Travoprost Itching    Vicodin [Hydrocodone-Acetaminophen]              Medications:   Medications Reviewed.   Current Facility-Administered Medications   Medication    [START ON 2/21/2024] aspirin EC tablet 81 mg    fentaNYL (PF) (SUBLIMAZE) injection    heparin (porcine) injection    lidocaine (LMX4) cream    lidocaine 1 % 0.1-1 mL    lidocaine 1 %    metoprolol (LOPRESSOR) injection    midazolam (VERSED) injection    Patient is NOT allergic to contrast dye    protamine injection    rosuvastatin (CRESTOR) tablet 5 mg    sodium chloride (PF) 0.9% PF flush 3 mL    sodium chloride (PF) 0.9% PF flush 3 mL    sodium chloride (PF) 0.9% PF flush 3 mL    sodium chloride 0.9 % infusion    tafluprost (ZIOPTAN) ophthalmic solution 1 drop    Timoptic Ocudose 0.5 % ophthalmic solution 1 drop             Physical Exam:   Vitals were reviewed.  Blood pressure 105/84, pulse 112, temperature 98  F (36.7  C), temperature source Temporal, resp. rate 17, height 1.626 m (5' 4\"), weight 63.6 kg (140 lb 3.2 oz), SpO2 97%, not currently breastfeeding.    General: AAOx3, NAD  Skin: Not jaundiced, no rash, no ecchymoses; incision site CDI, soft, non-tender, no signs of hematoma. No bruit  HEENT: MMM, PERRLA, EOM intact  CV: RRR, normal S1S2, no murmur, clicks, rubs  Resp: Clear to auscultation bilaterally, no wheezes, rhonchi  Abd: Soft, non-tender, BS+, no masses appreciated  Extremities: warm and well perfused, palpable pulses, no edema  Neuro: No lateralizing symptoms or focal neurologic deficits        Labs:   Routine Labs:  Lab Results   Component Value Date    TROPI <0.015 04/17/2020     CMPNo lab results found in last 7 days.  CBCNo lab results found in last 7 days.  INRNo lab results found in last 7 days.        Diagnostics:    EKG 12Lead: new LBBB post-valve deployment     Echo:  Interpretation Summary     Severe valvular aortic stenosis.  The calculated aortic valve " area is 0.6cm2.  The mean AoV pressure gradient is 57mmHg.  The visual ejection fraction is 60-65%.  There is mild concentric left ventricular hypertrophy.  There is mild-moderate biatrial enlargement.    Coronary Angiogram/Right heart Cath: 12/2023  Conclusion       1.  Mild nonocclusive coronary artery disease  2.  Right heart cath as follows:                                                         RA:  6/3/3                                                      RV:  45/1/8                                                      PA:  47/12/30                                                      PCWP:  16/24/16                                                      PA sat:  63%                                                      FA sat:  98%                                                      CO:  3.9 L/min (chetan)                                                      CI:  2.3 L/min/m2         Plan    1.  Follow-up in TAVR clinic as scheduled     Medical Decision Making       30 MINUTES SPENT BY ME on the date of service doing chart review, history, exam, documentation & further activities per the note.

## 2024-02-20 NOTE — Clinical Note
The first balloon was inserted into the other vessel.Max pressure = 4 kym. Total duration = 25 seconds.     Max pressure = 4 kym. Total duration = 38 seconds.    Balloon reinflated a second time: Max pressure = 4 kym. Total duration = 38 seconds.  Balloon reinflated a third time: Max pressure = 4 kym. Total duration = 60 seconds.  Balloon reinflated a fourth time: Max pressure = 4 kym. Total duration = 40 seconds.

## 2024-02-21 ENCOUNTER — APPOINTMENT (OUTPATIENT)
Dept: CARDIOLOGY | Facility: CLINIC | Age: 89
DRG: 267 | End: 2024-02-21
Attending: NURSE PRACTITIONER
Payer: MEDICARE

## 2024-02-21 ENCOUNTER — APPOINTMENT (OUTPATIENT)
Dept: OCCUPATIONAL THERAPY | Facility: CLINIC | Age: 89
DRG: 267 | End: 2024-02-21
Attending: NURSE PRACTITIONER
Payer: MEDICARE

## 2024-02-21 ENCOUNTER — APPOINTMENT (OUTPATIENT)
Dept: OCCUPATIONAL THERAPY | Facility: CLINIC | Age: 89
DRG: 267 | End: 2024-02-21
Attending: INTERNAL MEDICINE
Payer: MEDICARE

## 2024-02-21 DIAGNOSIS — Z95.2 S/P TAVR (TRANSCATHETER AORTIC VALVE REPLACEMENT): Primary | ICD-10-CM

## 2024-02-21 DIAGNOSIS — Z95.3 S/P TAVR (TRANSCATHETER AORTIC VALVE REPLACEMENT), BIOPROSTHETIC: Primary | ICD-10-CM

## 2024-02-21 LAB
ANION GAP SERPL CALCULATED.3IONS-SCNC: 7 MMOL/L (ref 7–15)
BUN SERPL-MCNC: 13.8 MG/DL (ref 8–23)
CALCIUM SERPL-MCNC: 9 MG/DL (ref 8.8–10.2)
CHLORIDE SERPL-SCNC: 101 MMOL/L (ref 98–107)
CREAT SERPL-MCNC: 0.74 MG/DL (ref 0.51–0.95)
DEPRECATED HCO3 PLAS-SCNC: 28 MMOL/L (ref 22–29)
EGFRCR SERPLBLD CKD-EPI 2021: 78 ML/MIN/1.73M2
ERYTHROCYTE [DISTWIDTH] IN BLOOD BY AUTOMATED COUNT: 14 % (ref 10–15)
GLUCOSE BLDC GLUCOMTR-MCNC: 120 MG/DL (ref 70–99)
GLUCOSE SERPL-MCNC: 112 MG/DL (ref 70–99)
HCT VFR BLD AUTO: 37.8 % (ref 35–47)
HGB BLD-MCNC: 11.8 G/DL (ref 11.7–15.7)
LVEF ECHO: NORMAL
MAGNESIUM SERPL-MCNC: 2.1 MG/DL (ref 1.7–2.3)
MCH RBC QN AUTO: 29.4 PG (ref 26.5–33)
MCHC RBC AUTO-ENTMCNC: 31.2 G/DL (ref 31.5–36.5)
MCV RBC AUTO: 94 FL (ref 78–100)
PHOSPHATE SERPL-MCNC: 3.8 MG/DL (ref 2.5–4.5)
PLATELET # BLD AUTO: 155 10E3/UL (ref 150–450)
POTASSIUM SERPL-SCNC: 4.3 MMOL/L (ref 3.4–5.3)
RBC # BLD AUTO: 4.02 10E6/UL (ref 3.8–5.2)
SODIUM SERPL-SCNC: 136 MMOL/L (ref 135–145)
UFH PPP CHRO-ACNC: 0.19 IU/ML
WBC # BLD AUTO: 7.3 10E3/UL (ref 4–11)

## 2024-02-21 PROCEDURE — 210N000002 HC R&B HEART CARE

## 2024-02-21 PROCEDURE — 83735 ASSAY OF MAGNESIUM: CPT | Performed by: NURSE PRACTITIONER

## 2024-02-21 PROCEDURE — 36415 COLL VENOUS BLD VENIPUNCTURE: CPT | Performed by: NURSE PRACTITIONER

## 2024-02-21 PROCEDURE — 85520 HEPARIN ASSAY: CPT | Performed by: NURSE PRACTITIONER

## 2024-02-21 PROCEDURE — 82374 ASSAY BLOOD CARBON DIOXIDE: CPT | Performed by: NURSE PRACTITIONER

## 2024-02-21 PROCEDURE — 93010 ELECTROCARDIOGRAM REPORT: CPT | Performed by: INTERNAL MEDICINE

## 2024-02-21 PROCEDURE — 99233 SBSQ HOSP IP/OBS HIGH 50: CPT | Mod: FS | Performed by: NURSE PRACTITIONER

## 2024-02-21 PROCEDURE — 97535 SELF CARE MNGMENT TRAINING: CPT | Mod: GO | Performed by: OCCUPATIONAL THERAPIST

## 2024-02-21 PROCEDURE — 250N000013 HC RX MED GY IP 250 OP 250 PS 637: Performed by: INTERNAL MEDICINE

## 2024-02-21 PROCEDURE — 97110 THERAPEUTIC EXERCISES: CPT | Mod: GO | Performed by: OCCUPATIONAL THERAPIST

## 2024-02-21 PROCEDURE — 255N000002 HC RX 255 OP 636: Performed by: INTERNAL MEDICINE

## 2024-02-21 PROCEDURE — 250N000013 HC RX MED GY IP 250 OP 250 PS 637: Performed by: NURSE PRACTITIONER

## 2024-02-21 PROCEDURE — 84100 ASSAY OF PHOSPHORUS: CPT | Performed by: NURSE PRACTITIONER

## 2024-02-21 PROCEDURE — 97530 THERAPEUTIC ACTIVITIES: CPT | Mod: GO | Performed by: OCCUPATIONAL THERAPIST

## 2024-02-21 PROCEDURE — 93308 TTE F-UP OR LMTD: CPT | Mod: 26 | Performed by: INTERNAL MEDICINE

## 2024-02-21 PROCEDURE — 999N000208 ECHOCARDIOGRAM LIMITED

## 2024-02-21 PROCEDURE — 85014 HEMATOCRIT: CPT | Performed by: NURSE PRACTITIONER

## 2024-02-21 PROCEDURE — 93325 DOPPLER ECHO COLOR FLOW MAPG: CPT | Mod: 26 | Performed by: INTERNAL MEDICINE

## 2024-02-21 PROCEDURE — 99233 SBSQ HOSP IP/OBS HIGH 50: CPT | Performed by: INTERNAL MEDICINE

## 2024-02-21 PROCEDURE — 93005 ELECTROCARDIOGRAM TRACING: CPT

## 2024-02-21 PROCEDURE — 97166 OT EVAL MOD COMPLEX 45 MIN: CPT | Mod: GO | Performed by: OCCUPATIONAL THERAPIST

## 2024-02-21 PROCEDURE — 93321 DOPPLER ECHO F-UP/LMTD STD: CPT | Mod: 26 | Performed by: INTERNAL MEDICINE

## 2024-02-21 RX ORDER — DILTIAZEM HYDROCHLORIDE 120 MG/1
120 CAPSULE, COATED, EXTENDED RELEASE ORAL DAILY
Status: DISCONTINUED | OUTPATIENT
Start: 2024-02-22 | End: 2024-02-22 | Stop reason: HOSPADM

## 2024-02-21 RX ADMIN — TIMOLOL 1 DROP: 5 SOLUTION/ DROPS OPHTHALMIC at 06:10

## 2024-02-21 RX ADMIN — HUMAN ALBUMIN MICROSPHERES AND PERFLUTREN 9 ML: 10; .22 INJECTION, SOLUTION INTRAVENOUS at 08:45

## 2024-02-21 RX ADMIN — TIMOLOL 1 DROP: 5 SOLUTION/ DROPS OPHTHALMIC at 14:24

## 2024-02-21 RX ADMIN — APIXABAN 5 MG: 5 TABLET, FILM COATED ORAL at 20:20

## 2024-02-21 RX ADMIN — ACETAMINOPHEN 325 MG: 325 TABLET, FILM COATED ORAL at 06:00

## 2024-02-21 RX ADMIN — ROSUVASTATIN CALCIUM 5 MG: 5 TABLET, FILM COATED ORAL at 08:13

## 2024-02-21 RX ADMIN — DILTIAZEM HYDROCHLORIDE 30 MG: 30 TABLET, FILM COATED ORAL at 20:20

## 2024-02-21 RX ADMIN — TAFLUPROST OPTHALMIC 1 DROP: 0 SOLUTION/ DROPS OPHTHALMIC at 20:17

## 2024-02-21 RX ADMIN — DILTIAZEM HYDROCHLORIDE 30 MG: 30 TABLET, FILM COATED ORAL at 06:01

## 2024-02-21 RX ADMIN — APIXABAN 5 MG: 5 TABLET, FILM COATED ORAL at 08:13

## 2024-02-21 RX ADMIN — ASPIRIN 81 MG: 81 TABLET, COATED ORAL at 08:13

## 2024-02-21 RX ADMIN — DILTIAZEM HYDROCHLORIDE 30 MG: 30 TABLET, FILM COATED ORAL at 14:21

## 2024-02-21 ASSESSMENT — ACTIVITIES OF DAILY LIVING (ADL)
ADLS_ACUITY_SCORE: 28
PREVIOUS_RESPONSIBILITIES: MEAL PREP;HOUSEKEEPING;LAUNDRY;SHOPPING;MEDICATION MANAGEMENT;FINANCES
ADLS_ACUITY_SCORE: 29
ADLS_ACUITY_SCORE: 29
ADLS_ACUITY_SCORE: 28
ADLS_ACUITY_SCORE: 29
ADLS_ACUITY_SCORE: 28
ADLS_ACUITY_SCORE: 30
ADLS_ACUITY_SCORE: 28
ADLS_ACUITY_SCORE: 27
ADLS_ACUITY_SCORE: 28
ADLS_ACUITY_SCORE: 30
ADLS_ACUITY_SCORE: 29

## 2024-02-21 NOTE — CONSULTS
Patient did not elect Medicare D and has no other prescription coverage.     Prices using singlecare.com discount card:     Xarelto: $523/mo   Eliquis: $525/mo   Dabigatran: $177/mo   Jantoven (warfarin): $16/mo     Patient will pay lower drug costs by choosing a Medicare D plan for 2025.  Plans can be reviewed at medicare.gov October 15-December 7.  The Senior Linkage Line provides free assistance in choosing a plan by calling 212-030-1868.     Beth Crum  Pharmacy Technician/Liaison, Discharge Pharmacy   [phone] (voice or text)  [email or web address]

## 2024-02-21 NOTE — DISCHARGE SUMMARY
[address] [address]  p: [phone]    Discharge Summary: Cardiology Service    Kavita Merlos MRN# [id number]   YOB: 1935 Age: 88 year old       Admission Date: 2/20/2024  Discharge Date: 02/21/24      Brief HPI:  Kavita Merlos is a pleasant 88-year-old female with past medical history of hyperlipidemia, macular degeneration, peripheral arterial disease with infrarenal AAA measuring 4.2 cm, and severe aortic stenosis who underwent TAVR with 26 mm Ross Resilia valve on 2/20/24. Her procedure went well without complication. No hemodynamic instability or advanced AV block. Post-operatively she went into atrial fibrillation with RVR prompting RRT, she spontaneously converted. She was hypotensive with SBP in the 60's, improved with IV fluids. She was briefly placed on amiodarone infusion which has since been discontinued. She was initiated on oral diltiazem 30 mg TID for rate control and placed on heparin gtt. She otherwise remains hemodynamically stable. EKG today shows NSR. Post-procedure echocardiogram shows normal functioning valve. She is tolerating cardiac rehab.      Hospital Course by Diagnosis:  # Severe aortic stenosis s/p TAVR with 26 mm Ross Adelso 3 valve via RCFA  Sheath sites soft without hematoma. Neuro's intact. Post-operatively she went into Afib with RVR, spontaneously converted. She remains in NSR today. Post-procedure echo shows well seated bioprosthetic aortic valve with mean gradient of 9 mmHg.   - No aspirin as she will need to be on AC for stroke prophylaxis.   - 30-day event monitor at discharge  - Lifelong antibiotic prophylaxis prior to all dental procedures.  - Follow-up with structural clinic in 1 week.       # Post-operative atrial fibrillation with RVR, new onset   Patient went into Afib with RVR with rates in the 120's. She became hypotensive which improved s/p IV fluids. She spontaneously  converted, placed on amiodarone infusion and oral diltiazem 30 mg TID. Additionally she was placed on heparin gtt for anticoagulation. CHADs-VASc score of 5. She remains in NSR today.  Of note, the patient has no coverage for DOAC's.  She will be discharged home with a free 30-day supply.  She is not interested in Coumadin.  We will defer further discussion of long-term anticoagulation at her 1 week follow-up.  - Continue Eliquis 5 mg BID for stroke prophylaxis.   - Continue diltiazem  mg daily   - 30-day event monitor to assess burden and to ensure appropriate rate control.    # Chronic diastolic heart failure, NYHA class II   NTpBNP 994. LVEDP 18 mmHg. Euvolemic on exam. No PTA diuretics.   - Repeat echocardiogram in 1 month.     # Hyperlipidemia: continue PTA rosuvastatin 5 mg daily   # AAA measuring measuring 4.2 cm per CT: stable per imaging  # Macular degeneration: continue PTA eye drops     Pertinent Procedures:  1. TAVR with 26 mm Ross Resilia valve       Medication Changes:  See below     Discharge medications:   Current Discharge Medication List        START taking these medications    Details   apixaban ANTICOAGULANT (ELIQUIS) 5 MG tablet Take 1 tablet (5 mg) by mouth 2 times daily  Qty: 60 tablet, Refills: 0    Associated Diagnoses: Atrial fibrillation, unspecified type (H)      diltiazem ER COATED BEADS (CARDIZEM CD/CARTIA XT) 120 MG 24 hr capsule Take 1 capsule (120 mg) by mouth daily  Qty: 30 capsule, Refills: 0    Associated Diagnoses: Atrial fibrillation, unspecified type (H)           CONTINUE these medications which have NOT CHANGED    Details   co-enzyme Q-10 100 MG CAPS capsule Take 100 mg by mouth daily      cyanocobalamin (VITAMIN B-12) 100 MCG tablet Take 100 mcg by mouth Every other day      Multiple Vitamins-Minerals (PRESERVISION AREDS 2 PO) Take by mouth daily      multivitamin, therapeutic (THERA-VIT) TABS tablet Take 1 tablet by mouth daily      omega 3 1000 MG CAPS Take 1  capsule by mouth every other day      ranibizumab (LUCENTIS) 0.5 MG/0.05ML SOLN 0.5 mg by Intravitreal route once Once every 6-8 weeks, eye injection      rosuvastatin (CRESTOR) 5 MG tablet Take 1 tablet (5 mg) by mouth daily  Qty: 30 tablet, Refills: 3    Associated Diagnoses: Hyperlipidemia LDL goal <70      tafluprost (ZIOPTAN) 0.0015 % SOLN ophthalmic solution Place 1 drop into both eyes at bedtime      Timolol Maleate (TIMOPTIC OCUDOSE) 0.5 % ophthalmic solution Place 1 drop into both eyes 2 times daily      vitamin C (ASCORBIC ACID) 1000 MG TABS Take 1,000 mg by mouth daily      Vitamin D3 (CHOLECALCIFEROL) 25 mcg (1000 units) tablet Take by mouth daily Daily in the winter      vitamin E (TOCOPHEROL) 400 units (180 mg) capsule Take 400 Units by mouth every other day      zinc gluconate 50 MG tablet Take 50 mg by mouth every other day           STOP taking these medications       aspirin 81 MG EC tablet Comments:   Reason for Stopping:               Follow-up:  - Structural clinic in 1 week.     Labs or imaging requiring follow-up after discharge:  - Repeat echo and labs in 1 month     Condition on discharge  Temp:  [97.5  F (36.4  C)-98.2  F (36.8  C)] 97.5  F (36.4  C)  Pulse:  [] 55  Resp:  [9-37] 12  BP: ()/() 114/58  MAP:  [69 mmHg-108 mmHg] 98 mmHg  Arterial Line BP: ()/(48-74) 136/69  SpO2:  [89 %-100 %] 99 %  General: Alert, interactive, NAD  Eyes: sclera anicteric, EOMI  Neck: JVP 0, carotid 2+ bilaterally  Cardiovascular: regular rate and rhythm, normal S1 and S2, no murmurs, gallops, or rubs  Resp: clear to auscultation bilaterally, no rales, wheezes, or rhonchi  GI: Soft, nontender, nondistended. +BS.  No HSM or masses, no rebound or guarding.  Extremities: no edema, no cyanosis or clubbing, dorsalis pedis and posterior tibialis pulses 2+ bilaterally  Skin: Warm and dry, no jaundice or rash  Neuro: CN 2-12 intact, moves all extremities equally  Psych: Alert & oriented x  3    Imaging with results:  Echocardiogram: 2/21/24  Interpretation Summary     The visual ejection fraction is 65-70%.  There is moderate concentric left ventricular hypertrophy.  There is a bioprosthetic aortic valve. TAVR 23 mm ES. The mean AoV pressure  gradient is 9mm Hg.  There is no pericardial effusion    TAVR: 2/20/24  Conclusion         Infrarenal Abd AO lesion is 50% stenosed.     1. Lifestyle-limiting severe aortic stenosis.   2. Successful transfemoral transcatheter aortic valve replacement with a 23 mm Ross Adelso 3 Ultra valve (plus 2)  3. Adjunctive Shockwave therapy to facilitate large bore access  4. BAV prior to valve placement with a 20 mm balloon  5. Left heart catheterization with LVEDP of 18 mmHg prior to valve deployment.  6. Right and left common femoral arteriotomies successfully closed with closure devices.            Plan    1. Aspirin 81 mg po daily lifelong.  2. Bedrest per protocol.  3. Admit to the primary inpatient team for further evaluation and management.  5. Echocardiogram tomorrow.   6. Lifelong antibiotic prophylaxis prior to all dental procedures.     Patient Care Team:  Ty Cordero MD as PCP - General  Ty Cordero MD as Assigned PCP  Vilma Willett CNP as Assigned Heart and Vascular Provider    Vilma Willett DNP, APRN, CNP  [facility] Card[facility]  [phone]    Time Spent on this Encounter   Vilma ROGEL CNP, personally saw the patient today and spent greater than 30 minutes discharging this patient.

## 2024-02-21 NOTE — PLAN OF CARE
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 Plan for Echo in AM and continue to monitor.      Plan of Care Reviewed With: patient, family    Overall Patient Progress: no changeOverall Patient Progress: no change

## 2024-02-21 NOTE — PLAN OF CARE
Kavita is alert, oriented, pleasant, overwhelmed. VSS on room air while awake, needed 2L while napping. Tele: SR. Started Eliquis and PO diltiazem for episode of rapid a-fib overnight. Will discharge tomorrow on a monitor. Reported significant leg weakness overnight has improved. Neuros at baseline: blind in R eye, low peripheral vision in L. Bilateral groin sites are soft, tender only to palpation.     Up with SBA, gb, walker. Tolerated walking in hallway, but felt weak and dyspneic after the activity.    Anticipate discharge to home tomorrow with assistance from family. Please note, pt does not have insurance coverage for meds.

## 2024-02-21 NOTE — PROGRESS NOTES
[facility]      CSI Progress Note  Kavita Merlos MRN: [id number]  1935  Date of Admission:2/20/2024  Primary care provider: Ty Cordero        Assessment and Plan:     Kavita Merlos is a pleasant 88 year old admitted on 2/20/24 for elective TAVR.      # Severe aortic stenosis s/p TAVR with 26 mm Ross Adelso 3 valve via RCFA  Sheath sites soft without hematoma. Neuro's intact. Patient had transient LBBB post-valve deployment. Post-operatively she went into Afib with RVR, spontaneously converted. She remains in NSR today. Post-procedure echo well seated valve with normal gradient, no AI or PVL.   - Stop aspirin as she will need to be on AC for stroke prophylaxis.   - Cardiac rehab/PT/OT  - Monitor on telemetry   - Lifelong antibiotic prophylaxis prior to all dental procedures.      # Post-operative atrial fibrillation with RVR, new onset   Patient went into Afib with RVR with rates in the 120's. She became hypotensive which improved s/p IV fluids. She spontaneously converted, placed on amiodarone infusion and oral diltiazem 30 mg TID. Additionally she was placed on heparin gtt for anticoagulation. CHADs-VASc score of 5. She remains in NSR today.   - Start Eliquis 5 mg BID for stroke prophylaxis.   - Transition to diltiazem  mg daily   - 30-day event monitor to assess burden and to ensure appropriate rate control.  - Monitor on telemetry     # Chronic diastolic heart failure, NYHA class II   NTpBNP 994. LVEDP 18 mmHg. Euvolemic on exam. No PTA diuretics.   - Daily weights/2G Na diet      # Hyperlipidemia: continue PTA rosuvastatin 5 mg daily   # AAA measuring measuring 4.2 cm per CT: stable per imaging  # Macular degeneration: continue PTA eye drops       FEN: Cardiac diet  Disposition: Likely discharge home tomorrow pending stable post-op period  Code Status: FULL CODE      PRITI Hernandez, CNP  [facility] Structural/Interventional Cardiology   Pager: [phone]         Interval  History:   Patient went into Afib RVR overnight, spontaneously converted. She remains in NSR today. No acute concerns.   - s/p 25 mm Ross Resilia valve   - Neuro's intact  - Groin site CDI, soft, non-tender, no evidence of hematoma  - No arrhythmias overnight       Review of Systems:    10 point review of systems negative except for stated above in HPI.          Past Medical History:   Medical History reviewed.   Past Medical History:   Diagnosis Date    AAA (abdominal aortic aneurysm) (H24)     Aortic stenosis     Benign essential hypertension with BP difference between arms     Hyperlipidemia LDL goal <70              Past Surgical History:   Surgical History reviewed.   Past Surgical History:   Procedure Laterality Date    CV CORONARY ANGIOGRAM N/A 12/19/2023    Procedure: Coronary Angiogram;  Surgeon: Seth Duarte MD;  Location:  HEART CARDIAC CATH LAB    CV RIGHT HEART CATH MEASUREMENTS RECORDED N/A 12/19/2023    Procedure: Right Heart Catheterization;  Surgeon: Seth Duarte MD;  Location:  HEART CARDIAC CATH LAB             Social History:   Social History reviewed.  Social History     Tobacco Use    Smoking status: Former     Types: Cigarettes     Passive exposure: Never    Smokeless tobacco: Never    Tobacco comments:     I quit when I was 71 years old, off/on prior to that starting when I was 17   Substance Use Topics    Alcohol use: Not Currently             Family History:   Family History reviewed.   History reviewed. No pertinent family history.          Allergies:     Allergies   Allergen Reactions    Albuterol     Amlodipine     Bimatoprost Itching    Brimonidine Itching    Clotrimazole Itching    Dorzolamide Hcl-Timolol Mal Itching    Latanoprost Itching    Lisinopril     Losartan      depression    Neomycin-Polymyxin-Gramicidin Swelling    Neosporin [Neomycin-Polymyxin-Gramicidin]     Penicillins     Tape [Adhesive Tape]     Timolol Itching    Travoprost Itching     "Vicodin [Hydrocodone-Acetaminophen]              Medications:   Medications Reviewed.   Current Facility-Administered Medications   Medication    acetaminophen (TYLENOL) tablet 325 mg    apixaban ANTICOAGULANT (ELIQUIS) tablet 5 mg    aspirin EC tablet 81 mg    diltiazem (CARDIZEM) tablet 30 mg    [START ON 2/22/2024] diltiazem ER COATED BEADS (CARDIZEM CD/CARTIA XT) 24 hr capsule 120 mg    HOLD:  Metformin and metformin containing medications if patient received IV contrast with acute kidney injury or severe chronic kidney disease (stage IV or stage V; i.e., eGFR less than 30)    hydrALAZINE (APRESOLINE) injection 10 mg    hydrALAZINE (APRESOLINE) injection 10-20 mg    nitroGLYcerin (NITROSTAT) sublingual tablet 0.4 mg    ondansetron (ZOFRAN ODT) ODT tab 4 mg    Or    ondansetron (ZOFRAN) injection 4 mg    rosuvastatin (CRESTOR) tablet 5 mg    tafluprost (ZIOPTAN) ophthalmic solution 1 drop    timolol (PF) (TIMOPTIC OCUDOSE) 0.5 % ophthalmic solution 1 drop             Physical Exam:   Vitals were reviewed.  Blood pressure 124/58, pulse 85, temperature 97.5  F (36.4  C), temperature source Axillary, resp. rate 12, height 1.626 m (5' 4\"), weight 63.6 kg (140 lb 3.2 oz), SpO2 94%, not currently breastfeeding.    General: AAOx3, NAD  Skin: Not jaundiced, no rash, no ecchymoses. Groin site CDI, soft, non-tender, no signs of hematoma. No bruits.   HEENT: MMM, PERRLA, EOM intact  CV: RRR, normal S1S2, grade I CARLEY   Resp: Clear to auscultation bilaterally, no wheezes, rhonchi  Abd: Soft, non-tender, BS+, no masses appreciated  Extremities: warm and well perfused, palpable pulses, no edema  Neuro: No lateralizing symptoms or focal neurologic deficits        Labs:   Routine Labs:  Lab Results   Component Value Date    TROPI <0.015 04/17/2020     CMP  Recent Labs   Lab 02/21/24  1027 02/21/24  0600 02/20/24  2041 02/20/24  1809     --  137  --    POTASSIUM 4.3  --  4.0  --    CHLORIDE 101  --  101  --    CO2 28  --  26 "  --    ANIONGAP 7  --  10  --    * 120* 141* 100*   BUN 13.8  --  9.1  --    CR 0.74  --  0.60  --    GFRESTIMATED 78  --  86  --    JERRY 9.0  --  8.8  --    MAG 2.1  --  1.8  --    PHOS 3.8  --   --   --      CBC  Recent Labs   Lab 02/21/24  1027 02/20/24  2041   WBC 7.3 10.0   RBC 4.02 4.00   HGB 11.8 11.8   HCT 37.8 37.6   MCV 94 94   MCH 29.4 29.5   MCHC 31.2* 31.4*   RDW 14.0 13.8    169     INRNo lab results found in last 7 days.        Diagnostics:    EKG 12Lead: NSR with narrow complex    Echo: 2/21/24  Interpretation Summary     The visual ejection fraction is 65-70%.  There is moderate concentric left ventricular hypertrophy.  There is a bioprosthetic aortic valve. TAVR 23 mm ES. The mean AoV pressure  gradient is 9mm Hg.  There is no pericardial effusion.    TAVR: 2/20/24  Conclusion         Infrarenal Abd AO lesion is 50% stenosed.     1. Lifestyle-limiting severe aortic stenosis.   2. Successful transfemoral transcatheter aortic valve replacement with a 23 mm Ross Adelso 3 Ultra valve (plus 2)  3. Adjunctive Shockwave therapy to facilitate large bore access  4. BAV prior to valve placement with a 20 mm balloon  5. Left heart catheterization with LVEDP of 18 mmHg prior to valve deployment.  6. Right and left common femoral arteriotomies successfully closed with closure devices.            Plan    1. Aspirin 81 mg po daily lifelong.  2. Bedrest per protocol.  3. Admit to the primary inpatient team for further evaluation and management.  5. Echocardiogram tomorrow.   6. Lifelong antibiotic prophylaxis prior to all dental procedures.     Medical Decision Making       20 MINUTES SPENT BY ME on the date of service doing chart review, history, exam, documentation & further activities per the note.

## 2024-02-21 NOTE — PROGRESS NOTES
"CLINICAL NUTRITION SERVICES  -  ASSESSMENT NOTE    Recommendations Ordered by Registered Dietitian (RD): Encouraged po intake, emphasizing the importance of protein to preserve lean muscle mass   Malnutrition: 2/21  % Weight Loss:  Weight loss does not meet criteria for malnutrition; 7% wt loss in 4 months not meet  % Intake:  No decreased intake noted  Subcutaneous Fat Loss:  None observed  Muscle Loss:  Global mild muscle losses but suspect typical sarcopenia   Fluid Retention:  None noted    Malnutrition Diagnosis: Patient does not meet two of the above criteria necessary for diagnosing malnutrition     REASON FOR ASSESSMENT  Kavita Merlos is a 88 year old female seen by Registered Dietitian for Admission Nutrition Risk Screen for positive Malnutrition Score: 2 (02/20/24 1050) for unsure wt loss.     PMH of: hyperlipidemia, macular degeneration, peripheral arterial disease with AAA, and severe aortic stenosis who is now s/p TAVR on 2/20/2024.  Post procedure she had an RRT when she developed afib/flutter with rapid rates and some hemodynamic instability.  She was given diltiazem and amiodarone.  HR/rhythm improved.   By later AM 2/21 new rhythm medications were stopped as she had some bradycardia.     NUTRITION HISTORY  - Information obtained from chart review and pt at bedside, who's a sweet young lady  - The pt denied the unsure wt loss and reported her wt is \"fine\" and she \"may have lost a pound or two but she could gain that back easily.\"    - They explained she's not a huge eater and only eats when hungry. She said her po intake PTA was normal and had no reduced intake   - Chewing/swallowing issues or other Barriers to PO intakes: none  - Patient on a regular diet at home and a typical day of food/fluid intake is 2 meals  - Use of oral supplements: none and politely declined all supplements options     CURRENT NUTRITION ORDERS  Diet Order:   Regular and 2000 mg Sodium     Current Intake/Tolerance: " "Flowsheets show no intakes recorded yet this admission but pt said she was able to receive food just fine    GI:   No BM recorded yet this admission    NUTRITION FOCUSED PHYSICAL ASSESSMENT FOR DIAGNOSING MALNUTRITION)  Yes          Observed:    Global mild muscle losses but suspect typical sarcopenia     ANTHROPOMETRICS  Height: 5' 4\"  Weight: 140 lbs 3.2 oz (63.6 kg)  Body mass index is 24.07 kg/m .  Weight Status:  Normal BMI  IBW: 120 lb (54.5 kg)  % IBW: 117%  Weight History: 7% wt loss in 4 months  Wt Readings from Last 10 Encounters:   02/20/24 63.6 kg (140 lb 3.2 oz)   02/09/24 64.7 kg (142 lb 9.6 oz)   12/27/23 65.1 kg (143 lb 8 oz)   12/19/23 64.4 kg (142 lb)   12/06/23 66.1 kg (145 lb 12.8 oz)   11/30/23 64.6 kg (142 lb 8 oz)   10/25/23 67.1 kg (148 lb)   10/13/23 68.3 kg (150 lb 8 oz)   04/17/20 77.1 kg (170 lb)      MEDICATIONS  Reviewed    LABS  Electrolytes  Sodium (mmol/L)   Date Value   02/21/2024 136   04/17/2020 138     Potassium (mmol/L)   Date Value   02/21/2024 4.3   04/17/2020 4.4     Magnesium (mg/dL)   Date Value   02/21/2024 2.1     Phosphorus (mg/dL)   Date Value   02/21/2024 3.8    Blood Glucose  Glucose (mg/dL)   Date Value   02/21/2024 112 (H)   04/17/2020 97     GLUCOSE BY METER POCT (mg/dL)   Date Value   02/21/2024 120 (H)    Renal  Urea Nitrogen (mg/dL)   Date Value   02/21/2024 13.8   04/17/2020 16     Creatinine (mg/dL)   Date Value   02/21/2024 0.74   04/17/2020 0.55         ASSESSED NUTRITION NEEDS PER APPROVED PRACTICE GUIDELINES:  Dosing Weight 63.6 kg (actual)  Estimated Energy Needs: 8825-3867 kcals (25-30 Kcal/Kg)  Justification: maintenance  Estimated Protein Needs: 64-76 grams protein (1-1.2 g pro/Kg)  Justification: maintenance  Estimated Fluid Needs: 7102-5148 (1 mL/Kcal)  Justification: per provider pending fluid status    MALNUTRITION: As above     NUTRITION DIAGNOSIS:  Predicted suboptimal nutrient intake related to risk for po intake to decline while in acute care " setting     NUTRITION INTERVENTIONS  Recommendations / Nutrition Prescription  Encouraged po intake, emphasizing the importance of protein to preserve lean muscle mass    Implementation  General/healthful diet    Nutrition Goals  PO intake - Meet estimated needs      MONITORING AND EVALUATION:  Progress towards goals will be monitored and evaluated per protocol and Practice Guidelines    Yvan Dickerson RD, LD

## 2024-02-21 NOTE — PLAN OF CARE
Pt stable over night post TAVR, VSS, no c/o chest pain or SOB.  Bilateral groin sites c/d/I, no bruit noted.  Neuro's are at baseline without changes.  She has c/o bilateral leg weakness this morning and was seen by the House NP Valeria without intervention, weakness r/t bedrest and poor PO intake.  She has been weak when up to the BR with assist times 2 and a belt, she was also given an AM snack.  Tele:SR on an Amiodarone gtt.  Heparin gtt infusing without problems, no bleeding noted.  Tylenol given for c/o a HA. Pt was up to the BR times 2 to void without problems.

## 2024-02-21 NOTE — CONSULTS
[facility]  Consult Note - Hospitalist Service  Date of Admission:  2/20/2024  Consult Requested by: Cardiology Service   Reason for Consult: Post Procedure assistance with medical management    Assessment & Plan   Kavita Merlos, who is a 88 year old female with a past medical history significant for hyperlipidemia, macular degeneration, peripheral arterial disease with AAA, and severe aortic stenosis who is now s/p TAVR on 2/20/2024.  Post procedure she had an RRT when she developed afib/flutter with rapid rates and some hemodynamic instability.  She was given diltiazem and amiodarone.  HR/rhythm improved.   By later AM 2/21 new rhythm medications were stopped as she had some bradycardia.    Severe aortic stenosis s/p TAVR with 26 mm Ross Adelso 3 valve via RCFA  New LBBB post valve deployment   Episode of Afib/Flutter with RVR + RRT 2/20/24:  -  Post procedure site cares/activity restrictions per cardiology.  -  Will defer rhythm/rate control meds and anticoagulation to cardiology.   Echo is pending this AM.  -  Continue cardiac monitoring  -  If she continues doing better, likely can move out of [facility] level of care later today    HFpEF, NYHA class II:  NTpBNP 994. Euvolemic on exam. No PTA diuretics   - Diurese as needed/able.  Doesn't appear volume overloaded at moment.  - Daily weights    Generalized weakness:  -  Occurred last night when up on the new cardiac drip.  Now off the drip/rhythm better and she is feeling much better.  Will see how next activity goes.  Discussed checking orthostatics with RN.  Also therapy ordered for today.    Hyperlipidemia:   -  continue PTA rosuvastatin 5 mg daily     AAA measuring measuring 4.2 cm per CT: stable per imaging    Macular degeneration:   -  continue PTA eye drops, she reports vision stable       Clinically Significant Risk Factors Present on Admission                # Drug Induced Platelet Defect: home medication list includes an  antiplatelet medication                   Zhou Castaneda DO  Hospitalist Service  Securely message with [employer] (more info)  Text page via [facility] Paging/Directory   ______________________________________________________________________    Chief Complaint   Post TAVR    History is obtained from the patient    History of Present Illness   Kavita Merlos is a 88 year old female who underwent TAVR yesterday with post procedure rhythm/rate issues leading to RRT and transient use of amiodarone IV.  Today she is feeling better.  Denies current pain, sob, nausea.  She was weak/unsteady when last up but that was several hours ago.  Staff plans to have her up again shortly when her echo is done.  Patient denies other new complaints.  No change in chronic vision issues.      Past Medical History    Past Medical History:   Diagnosis Date    AAA (abdominal aortic aneurysm) (H24)     Aortic stenosis     Benign essential hypertension with BP difference between arms     Hyperlipidemia LDL goal <70        Past Surgical History   Past Surgical History:   Procedure Laterality Date    CV CORONARY ANGIOGRAM N/A 12/19/2023    Procedure: Coronary Angiogram;  Surgeon: Seth Duarte MD;  Location:  HEART CARDIAC CATH LAB    CV RIGHT HEART CATH MEASUREMENTS RECORDED N/A 12/19/2023    Procedure: Right Heart Catheterization;  Surgeon: Seth Duarte MD;  Location:  HEART CARDIAC CATH LAB       Medications   I have reviewed this patient's current medications          Physical Exam   Vital Signs: Temp: 97.5  F (36.4  C) Temp src: Axillary BP: 100/49 Pulse: 56   Resp: 12 SpO2: 99 % O2 Device: Nasal cannula Oxygen Delivery: 3 LPM  Weight: 140 lbs 3.2 oz    GEN:  Alert, oriented x 3, appears comfortable, NAD.  HEENT:  Normocephalic/atraumatic, no scleral icterus, no nasal discharge, mouth moist.  CV:  Regular rate and rhythm, soft murmur.  LUNGS:  Clear to auscultation bilaterally without rales/rhonchi/wheezing/retractions.   Symmetric chest rise on inhalation noted.  ABD:  Active bowel sounds, soft, non-tender/non-distended.  No rebound/guarding/rigidity.  EXT:  Trace edema.  No cyanosis.   SKIN:  Dry to touch, no exanthems noted in the visualized areas.  NEURO:  Lifts legs off bed, sensation grossly intact.  No new focal neuro deficits appreciated.      Medical Decision Making       50 MINUTES SPENT BY ME on the date of service doing chart review, history, exam, documentation & further activities per the note.      Data     I have personally reviewed the following data over the past 24 hrs:    10.0  \   11.8   / 169     137 101 9.1 /  120 (H)   4.0 26 0.60 \

## 2024-02-21 NOTE — PROVIDER NOTIFICATION
Pt complained on headache, asked for Tylenol, Dr. Chavez was paged, the writer get telephone order with read back for Tylenol 650 mg every 4 hrs PRN for moderate pain.

## 2024-02-21 NOTE — CODE/RAPID RESPONSE
[facility]    RRT Note  2/20/2024   Time Called: 1928    RRT called for AF with RVR    Code Status: Full Code      ASSESSMENT & PLAN  S/p TAVR now with A-flutter and subsequently A-fib with RVR  Hypotension 2/2 above  Kavita Merlos, who is a 88 year old female with a past medical history significant for hyperlipidemia, macular degeneration, peripheral arterial disease with AAA, and severe aortic stenosis who is now s/p TAVR on 2/20/2024.     I was paged to the bedside to evaluate Kavita Merlos for an acute episode of tachycardia with associated shortness of breath and chest pain/pressure.    Initially upon my arrival, the patient was lying comfortably in bed, she had converted to sinus tachycardia since RRT was originally called. Blood pressure 121/67, -115, RR 20, SpO2 95% on RA. Patient denied chest pain, shortness of breath and endorsed feeling much better at this time. Cardiology paged given TAVR this AM. EKG obtained which demonstrated a-flutter. Started on amiodarone and heparin. After my call with cardiology patient flipped into A-fib and again endorsed chest pain. Vital signs at this time included blood pressure: 67/49, -130s, RR 15, SpO2 94% on room air. Second EKG obtained which showed A-fib with RVR, patient became hypotensive at this time. NS bolus administered simultaneously with amiodarone bolus. Heparin gtt ordered without bolus.     Temp: 98.2  F (36.8  C) Temp src: Oral BP: 91/63 Pulse: (!) 125   Resp: 14 SpO2: 98 % O2 Device: None (Room air) Oxygen Delivery: 1 LPM    PLAN:   - STAT EKG x2  - Diltiazem 30mg TID  - 0.9NS bolus   - Amiodarone bolus and gtt. Bolus to be given over 30 minutes.   - Heparin gtt, no bolus   - CBC, BMP, mag    At the conclusion of this RRT patient was back in normal sinus rhythm, hemodynamically stable and will remain on current unit.     Discussed with and defer further cares to bedside nursing staff and Cardiology.    Valeria  PRITI Chun CNP  [facility]  Securely message with the Vocera Web Console (learn more here)  Text page via [employer] Paging/Directory        Physical Exam   Vital Signs with Ranges:  Temp:  [98  F (36.7  C)-98.2  F (36.8  C)] 98.2  F (36.8  C)  Pulse:  [] 125  Resp:  [9-37] 14  BP: ()/() 91/63  MAP:  [69 mmHg-108 mmHg] 98 mmHg  Arterial Line BP: ()/(48-74) 136/69  SpO2:  [89 %-99 %] 98 %  No intake/output data recorded.    Physical Exam  Vitals and nursing note reviewed.   HENT:      Mouth/Throat:      Mouth: Mucous membranes are moist.   Cardiovascular:      Rate and Rhythm: Tachycardia present. Rhythm irregular.      Pulses: Normal pulses.      Heart sounds: Normal heart sounds. No murmur heard.     No friction rub. No gallop.   Pulmonary:      Effort: Pulmonary effort is normal. No respiratory distress.      Breath sounds: Normal breath sounds.   Abdominal:      Palpations: Abdomen is soft.      Tenderness: There is no abdominal tenderness.   Skin:     General: Skin is warm and dry.      Capillary Refill: Capillary refill takes less than 2 seconds.   Neurological:      General: No focal deficit present.      Mental Status: She is alert and oriented to person, place, and time.   Psychiatric:         Mood and Affect: Mood normal.         Behavior: Behavior normal.         Thought Content: Thought content normal.         Judgment: Judgment normal.         Data     EKG:  Interpreted by PRITI Hung CNP  Time reviewed: 2010  Symptoms at time of EKG: chest pain, SOB   Rhythm: atrial fibrillation - rapid  Rate: 120 bpm  Axis: Normal  Ectopy: none  Conduction: normal  ST Segments/ T Waves: No acute ischemic changes  Q Waves: none  Comparison to prior: Previous EKG A-flutter, now A-fib with RVR    Clinical Impression: atrial fibrillation (new onset)    IMAGING: (X-ray/CT/MRI)   Recent Results (from the past 24 hour(s))   Echocardiogram Complete    Narrative     [id number]  [id number]  [id number]  [id number]^ELHAM^KOFFI^ADELAIDE     [facility]  Echocardiography Laboratory  [address] [place] [address]     Name: DYLON PORTER  MRN: [id number]  : 1935  Study Date: 2024 11:20 AM  Age: 88 yrs  Gender: Female  Patient Location: [place] [facility]  Reason For Study: Severe aortic stenosis  Ordering Physician: KOFFI LIZARRAGA  Referring Physician: YAIR BANKS  Performed By: Na Hylton     BSA: 1.7 m2  Height: 64 in  Weight: 142 lb  HR: 86  BP: 175/76 mmHg  ______________________________________________________________________________  ______________________________________________________________________________  Interpretation Summary     23mm Ross     Pre TAVR     EF ~ 60-65%  No RWMA  ? Speckeled LV pattern, consider Amyloidosis  Severe biatrial enlargement  Severe Aortic Stenosis, Mean gradient of 42 mmHg  Mild MR  Trace AI  No pericardial effusion     Post TAVR     EF ~ 60-65%  No RWMA  Well seated 23 mm Ross Adelso Valve  No PVL  Mean gradient of 4 mmHg  No pericardial effusion  ______________________________________________________________________________  ______________________________________________________________________________  MMode/2D Measurements & Calculations  asc Aorta Diam: 2.9 cm  LVOT diam: 2.3 cm  LVOT area: 4.1 cm2  Asc Ao diam index BSA (cm/m2): 1.7  Asc Ao diam index Ht(cm/m): 1.8     Doppler Measurements & Calculations  Ao V2 max: 243.3 cm/sec  Ao max P.0 mmHg  Ao V2 mean: 173.6 cm/sec  Ao mean P.9 mmHg  Ao V2 VTI: 51.7 cm  MISBAH(I,D): 1.4 cm2  MISBAH(V,D): 1.7 cm2  LV V1 max P.1 mmHg  LV V1 max: 101.0 cm/sec  LV V1 VTI: 18.0 cm  SV(LVOT): 73.3 ml  SI(LVOT): 43.4 ml/m2  PA acc time: 0.11 sec  AV Marito Ratio (DI): 0.42  MISBAH Index (cm2/m2): 0.84     ______________________________________________________________________________  Report approved by: MARYANN Scherer 2024 03:36 PM          Cardiac Catheterization    Narrative      Infrarenal Abd AO lesion is 50% stenosed.    1. Lifestyle-limiting severe aortic stenosis.   2. Successful transfemoral transcatheter aortic valve replacement with a   23 mm Ross Adelso 3 Ultra valve (plus 2)  3. Adjunctive Shockwave therapy to facilitate large bore access  4. BAV prior to valve placement with a 20 mm balloon  5. Left heart catheterization with LVEDP of 18 mmHg prior to valve   deployment.  6. Right and left common femoral arteriotomies successfully closed with   closure devices.       CBC with Diff:  Recent Labs   Lab Test 02/09/24  1440   WBC 6.4   HGB 13.4   MCV 93      INR 0.97          Comprehensive Metabolic Panel:  Recent Labs   Lab 02/20/24  1809   *         Time Spent on this Encounter     Medical Decision Making                CRITICAL CARE TIME  I spent 70 minutes of critical care time on the unit/floor managing the care of Kavita Merlos. Upon evaluation, this patient had a high probability of imminent or life-threatening deterioration due to atrial flutter and atrial fibrillation with RVR which required my direct attention, intervention, and personal management. 100% of my time was spent at the bedside counseling the patient and/or coordinating care regarding services listed in this note.

## 2024-02-21 NOTE — DISCHARGE INSTRUCTIONS
TAVR Discharge Instructions  You have just had your aortic valve replaced with a new biological tissue valve. You should feel better after your surgery, but complete recovery may take several weeks. Please follow these instructions carefully and please call your valve coordinator with any questions or concerns.      CONTACT INFORMATION:   [facility]:  [phone] (7 days a week)  Scheduling and general questions - Arpita ([phone])  Valve Coordinators - Dana RN ([phone]) and Meagan MG ([phone])    AFTER YOU GO HOME:  Drink extra fluids for 2 days.  You may resume your normal diet.  Do not smoke.  Do not drink alcohol.  Relax and take it easy.  Do not make any important or legal decisions.    FOR 1 WEEK AFTER TAVR:  Do not drive or operate machines at home or at work. No driving until you return for your 1 week follow-up appointment. You and the provider will discuss this at the appointment.   Refrain from sexual intercourse for 1 week.  You may shower the day after your procedure. Do NOT take a bath, or use a hot tub or pool for at least 1 week. Do NOT scrub the site. Do not use lotion or powder near the puncture site.  Do not lift more than 10 pounds.   Do not do any heavy activity such as exercise, lifting, or straining.  No housework, yard work, or any activities that make you sweat.    CARE OF WRIST AND GROIN SITES:  For 2 days, when you cough, sneeze, laugh or move your bowels, hold your hand over the puncture site and press firmly on/above the site.  Remove the bandage after 24 hours. If there is minor oozing, apply another bandage and remove it after 12 hours.  It is normal to have bruising or pea size lump at the site.  If you have a wrist site puncture: Do not use your hand or arm to support your weight (such as rising from a chair) or bend your wrist (such as lifting a garage door) for 1 week.  No stooping or squatting for 1 week.     BLEEDING:  If you start  bleeding from the site in your wrist:  Sit down and press firmly on/above the site with your fingers for 10 minutes.   Once bleeding stops, keep your arm still for 2 hours.   Call [facility] or valve coordinator as soon as you can.  If you start bleeding from the site in your groin:  Lie down flat and press firmly on/above the site for 10 minutes.  Once bleeding stops lay flat for 2 hours.   Call [facility] or valve coordinator as soon as you can.  Call 911 right away if you have heavy bleeding or bleeding that does not stop.    MEDICINES:  You may be started on an anti-platelet medication, such as Plavix or aspirin. Please call the [facility] with any questions regarding this medication.  If you have stopped any medicines, check with your provider about when to restart them.  You will require lifetime prophylactic antibiotics for dental cleanings and dental work after your TAVR, please inquire with the Heart Clinic prior to your scheduled cleanings.     FOLLOW-UP APPOINTMENTS:  Follow-up with a Structural Heart advanced practice provider (NP or PA) at [facility] in 7-10 days after your procedure.  You will also follow-up at [facility] 1 month after your procedure which will include a visit with an advanced practice provider an echocardiogram (echo), an electrocardiogram (ECG), and lab work. This follow-up should be scheduled for you prior to you leaving the hospital.  Cardiac Rehab will contact you for follow up care. You can enroll at a site convenient to you.  We will have you see your primary cardiologist about 6 months after your TAVR.  We will see you 1 year after your TAVR with a visit with an advanced practice provider (NP or PA) an echocardiogram (echo), electrocardiogram (ECG), and lab work.     CALL THE CLINIC IF:   You have increased pain or a large or growing hard lump around the site.  The site is  red, swollen, hot, or tender.  Blood or fluid is draining from the site.  You have chills or a fever greater than 101 F (38 C).  Your arm or leg feels numb, cool, or changes color.  You have hives, a rash, or unusual itching.  New pain in the back or belly that you cannot control with Tylenol.  Any questions or concerns.    OTHER INSTRUCTIONS:  You will receive a card with your new valve information in the mail, directly from the . Retain this card with your health care records.    DENTAL WORK:  You must have antibiotics before all dental work to prevent endocarditis, or an infection on your new heart valve. Please call the valve coordinators to get a prescription for this. Please do not schedule any dental cleanings for at least 3-6 months after your TAVR.

## 2024-02-22 ENCOUNTER — APPOINTMENT (OUTPATIENT)
Dept: CARDIOLOGY | Facility: CLINIC | Age: 89
DRG: 267 | End: 2024-02-22
Attending: NURSE PRACTITIONER
Payer: MEDICARE

## 2024-02-22 ENCOUNTER — APPOINTMENT (OUTPATIENT)
Dept: OCCUPATIONAL THERAPY | Facility: CLINIC | Age: 89
DRG: 267 | End: 2024-02-22
Attending: INTERNAL MEDICINE
Payer: MEDICARE

## 2024-02-22 VITALS
TEMPERATURE: 97.9 F | DIASTOLIC BLOOD PRESSURE: 68 MMHG | RESPIRATION RATE: 18 BRPM | SYSTOLIC BLOOD PRESSURE: 152 MMHG | HEIGHT: 64 IN | HEART RATE: 104 BPM | WEIGHT: 141 LBS | OXYGEN SATURATION: 95 % | BODY MASS INDEX: 24.07 KG/M2

## 2024-02-22 LAB
ANION GAP SERPL CALCULATED.3IONS-SCNC: 4 MMOL/L (ref 7–15)
ATRIAL RATE - MUSE: 124 BPM
ATRIAL RATE - MUSE: 96 BPM
BUN SERPL-MCNC: 11.7 MG/DL (ref 8–23)
CALCIUM SERPL-MCNC: 8.7 MG/DL (ref 8.8–10.2)
CHLORIDE SERPL-SCNC: 104 MMOL/L (ref 98–107)
CREAT SERPL-MCNC: 0.64 MG/DL (ref 0.51–0.95)
DEPRECATED HCO3 PLAS-SCNC: 29 MMOL/L (ref 22–29)
DIASTOLIC BLOOD PRESSURE - MUSE: NORMAL MMHG
DIASTOLIC BLOOD PRESSURE - MUSE: NORMAL MMHG
EGFRCR SERPLBLD CKD-EPI 2021: 85 ML/MIN/1.73M2
ERYTHROCYTE [DISTWIDTH] IN BLOOD BY AUTOMATED COUNT: 13.7 % (ref 10–15)
GLUCOSE SERPL-MCNC: 100 MG/DL (ref 70–99)
HCT VFR BLD AUTO: 33.4 % (ref 35–47)
HGB BLD-MCNC: 10.7 G/DL (ref 11.7–15.7)
INTERPRETATION ECG - MUSE: NORMAL
INTERPRETATION ECG - MUSE: NORMAL
MAGNESIUM SERPL-MCNC: 1.9 MG/DL (ref 1.7–2.3)
MCH RBC QN AUTO: 29.6 PG (ref 26.5–33)
MCHC RBC AUTO-ENTMCNC: 32 G/DL (ref 31.5–36.5)
MCV RBC AUTO: 93 FL (ref 78–100)
P AXIS - MUSE: 66 DEGREES
P AXIS - MUSE: NORMAL DEGREES
PHOSPHATE SERPL-MCNC: 3 MG/DL (ref 2.5–4.5)
PLATELET # BLD AUTO: 123 10E3/UL (ref 150–450)
POTASSIUM SERPL-SCNC: 4.2 MMOL/L (ref 3.4–5.3)
PR INTERVAL - MUSE: 144 MS
PR INTERVAL - MUSE: NORMAL MS
QRS DURATION - MUSE: 102 MS
QRS DURATION - MUSE: 84 MS
QT - MUSE: 332 MS
QT - MUSE: 362 MS
QTC - MUSE: 457 MS
QTC - MUSE: 476 MS
R AXIS - MUSE: 269 DEGREES
R AXIS - MUSE: 59 DEGREES
RBC # BLD AUTO: 3.61 10E6/UL (ref 3.8–5.2)
SODIUM SERPL-SCNC: 137 MMOL/L (ref 135–145)
SYSTOLIC BLOOD PRESSURE - MUSE: NORMAL MMHG
SYSTOLIC BLOOD PRESSURE - MUSE: NORMAL MMHG
T AXIS - MUSE: 87 DEGREES
T AXIS - MUSE: 92 DEGREES
VENTRICULAR RATE- MUSE: 124 BPM
VENTRICULAR RATE- MUSE: 96 BPM
WBC # BLD AUTO: 7.7 10E3/UL (ref 4–11)

## 2024-02-22 PROCEDURE — 36415 COLL VENOUS BLD VENIPUNCTURE: CPT | Performed by: NURSE PRACTITIONER

## 2024-02-22 PROCEDURE — 93228 REMOTE 30 DAY ECG REV/REPORT: CPT | Performed by: INTERNAL MEDICINE

## 2024-02-22 PROCEDURE — 84100 ASSAY OF PHOSPHORUS: CPT | Performed by: NURSE PRACTITIONER

## 2024-02-22 PROCEDURE — 250N000013 HC RX MED GY IP 250 OP 250 PS 637: Performed by: NURSE PRACTITIONER

## 2024-02-22 PROCEDURE — 97110 THERAPEUTIC EXERCISES: CPT | Mod: GO

## 2024-02-22 PROCEDURE — 97535 SELF CARE MNGMENT TRAINING: CPT | Mod: GO

## 2024-02-22 PROCEDURE — 999N000096 CARDIAC MOBILE TELEMETRY MONITOR

## 2024-02-22 PROCEDURE — 93005 ELECTROCARDIOGRAM TRACING: CPT

## 2024-02-22 PROCEDURE — 85027 COMPLETE CBC AUTOMATED: CPT | Performed by: NURSE PRACTITIONER

## 2024-02-22 PROCEDURE — 83735 ASSAY OF MAGNESIUM: CPT | Performed by: NURSE PRACTITIONER

## 2024-02-22 PROCEDURE — 99232 SBSQ HOSP IP/OBS MODERATE 35: CPT | Performed by: INTERNAL MEDICINE

## 2024-02-22 PROCEDURE — 93010 ELECTROCARDIOGRAM REPORT: CPT | Performed by: INTERNAL MEDICINE

## 2024-02-22 PROCEDURE — 80048 BASIC METABOLIC PNL TOTAL CA: CPT | Performed by: NURSE PRACTITIONER

## 2024-02-22 RX ORDER — IOPAMIDOL 755 MG/ML
INJECTION, SOLUTION INTRAVASCULAR
Status: DISCONTINUED | OUTPATIENT
Start: 2024-02-20 | End: 2024-02-22 | Stop reason: HOSPADM

## 2024-02-22 RX ORDER — DILTIAZEM HYDROCHLORIDE 120 MG/1
120 CAPSULE, COATED, EXTENDED RELEASE ORAL DAILY
Qty: 30 CAPSULE | Refills: 0 | Status: SHIPPED | OUTPATIENT
Start: 2024-02-22 | End: 2024-03-21

## 2024-02-22 RX ADMIN — TIMOLOL 1 DROP: 5 SOLUTION/ DROPS OPHTHALMIC at 07:50

## 2024-02-22 RX ADMIN — ASPIRIN 81 MG: 81 TABLET, COATED ORAL at 07:49

## 2024-02-22 RX ADMIN — APIXABAN 5 MG: 5 TABLET, FILM COATED ORAL at 07:49

## 2024-02-22 RX ADMIN — DILTIAZEM HYDROCHLORIDE 120 MG: 120 CAPSULE, COATED, EXTENDED RELEASE ORAL at 06:48

## 2024-02-22 RX ADMIN — ROSUVASTATIN CALCIUM 5 MG: 5 TABLET, FILM COATED ORAL at 07:49

## 2024-02-22 ASSESSMENT — ACTIVITIES OF DAILY LIVING (ADL)
ADLS_ACUITY_SCORE: 28
ADLS_ACUITY_SCORE: 30
ADLS_ACUITY_SCORE: 28

## 2024-02-22 NOTE — PROGRESS NOTES
[facility]    Medicine Progress Note - Hospitalist Service    Date of Admission:  2/20/2024    Assessment & Plan   Kavita Merlos, who is a 88 year old female with a past medical history significant for hyperlipidemia, macular degeneration, peripheral arterial disease with AAA, and severe aortic stenosis who is now s/p TAVR on 2/20/2024.  Post procedure she had an RRT when she developed afib/flutter with rapid rates and some hemodynamic instability.  She was given diltiazem and amiodarone.  HR/rhythm improved.   By later AM 2/21 new rhythm medications were stopped as she had some bradycardia and had otherwise improved. Hospitalist service consulted to assist with medical management otherwise.    Severe aortic stenosis s/p TAVR with 26 mm Ross Adelso 3 valve via RCFA  New LBBB post valve deployment   Episode of Afib/Flutter with RVR + RRT 2/20/24:  -  Post procedure site cares/activity restrictions per cardiology.  Will defer rhythm/rate control meds and anticoagulation to cardiology.     -  She feels well today and tentative plan is for discharge.  Will defer discharge cardiac medications (anticoag and rhythm meds) to cardiology team.  I also reviewed risk of bleeding with patient and explained need to seek eval if ever having trauma with a fall/accident.    HFpEF, NYHA class II:  NTpBNP 994. Euvolemic on exam. No PTA diuretics   - Diurese as needed/able.  Doesn't appear volume overloaded at moment.    Generalized weakness:  -  Improved now, appears more rhythm and initial med related.  Up moving much better now.  She does have a walker at home she plans to continue using.    Hyperlipidemia  Hypertension:   -  continue PTA rosuvastatin 5 mg daily   -  BP variable, she appears sensitive to meds.  Likely best to continue current diltiazem and have her follow-up for recheck.  Would be cautious trying to aggressively control BP.    AAA measuring measuring 4.2 cm per CT: stable per imaging,  continue outpatient follow-up monitoring.    Macular degeneration:   -  continue PTA eye drops, she reports vision stable.  I ordered continuation for discharge.    Mild thrombocytopenia:  -  Recommend recheck next week when has PCP follow-up.    Medical Decision Making       35 MINUTES SPENT BY ME on the date of service doing chart review, history, exam, documentation & further activities per the note.      Labs/Imaging Reviewed:  See Information above and Data section below    PPE Used:  Mask       Diet: Combination Diet Regular Diet; 2 gm NA Diet    DVT Prophylaxis: DOAC  Mendieta Catheter: Not present  Lines: None     Cardiac Monitoring: ACTIVE order. Indication: Transcatheter structural interventions (24 hours)  Code Status: Full Code      Clinically Significant Risk Factors                                   Disposition Plan     Expected Discharge Date: 02/22/2024                  Zhou Castaneda DO  Hospitalist Service  [facility]  Securely message with [employer] (more info)  Text page via [employer] Paging/Directory   ______________________________________________________________________    Interval History   Doing well this AM, no new complaints.  Expressed comfort with plan for discharge today.    Physical Exam   Vital Signs: Temp: 97.9  F (36.6  C) Temp src: Oral BP: (!) 152/68 Pulse: 104   Resp: 18 SpO2: 95 % O2 Device: None (Room air) Oxygen Delivery: 1.5 LPM  Weight: 141 lbs 0 oz    GEN:  Alert, oriented x 3, appears comfortable, no overt distress.  HEENT:  Normocephalic/atraumatic, no scleral icterus, no nasal discharge, mouth moist.  CV:  Regular rate and rhythm, very soft murmur.  LUNGS:  Clear to auscultation bilaterally without rales/rhonchi/wheezing/retractions.  Symmetric chest rise on inhalation noted.  ABD:  Active bowel sounds, soft, non-tender/non-distended.  No guarding/rigidity.  EXT:  No edema.  No cyanosis.  No acute joint synovitis noted.    Medications      apixaban  ANTICOAGULANT  5 mg Oral BID    aspirin  81 mg Oral Daily    diltiazem ER COATED BEADS  120 mg Oral Daily    rosuvastatin  5 mg Oral Daily    tafluprost  1 drop Both Eyes At Bedtime    timolol (PF)  1 drop Both Eyes BID       Data     Labs and Imaging results below reviewed today.    I have personally reviewed the following data over the past 24 hrs:    7.7  \   10.7 (L)   / 123 (L)     137 104 11.7 /  100 (H)   4.2 29 0.64 \           No results found for this or any previous visit (from the past 24 hour(s)).

## 2024-02-22 NOTE — PLAN OF CARE
Occupational Therapy Discharge Summary    Reason for therapy discharge:    Discharged to home with outpatient therapy.    Progress towards therapy goal(s). See goals on Care Plan in [facility] electronic health record for goal details.  Goals not met.  Barriers to achieving goals:   discharge from facility.    Therapy recommendation(s):    Home with assist in all I/ADLs and OP CR for monitored and progressive physical activity and education.

## 2024-02-22 NOTE — PLAN OF CARE
Kavita is discharging to her independent living facility today after a TAVR. She's alert, oriented, pleasant, cooperative. She remains in SR. Will discharge with a monitor to assess her a-fib burden. Ambulated in hallway with minimal assist; mostly needs d/t low vision. VSS on room air. Family will provide transportation and further assistance. Pt's eyedrops sent home at discharge.

## 2024-02-22 NOTE — PLAN OF CARE
Pt stable over night with no new issues noted.  She is post TAVR and groin sites are c/d/I, and neuro's are at baseline.  She does continue to c/o bilateral LE weakness that she states in not her normal.  She is up with assist of 1 and a walker to the BR.  Tele:SR with occasional PAC's.  VSS. She is hoping to go home today, plan of care is on going.

## 2024-02-23 ENCOUNTER — PATIENT OUTREACH (OUTPATIENT)
Dept: CARE COORDINATION | Facility: CLINIC | Age: 89
End: 2024-02-23
Payer: MEDICARE

## 2024-02-23 ENCOUNTER — TELEPHONE (OUTPATIENT)
Dept: CARDIOLOGY | Facility: CLINIC | Age: 89
End: 2024-02-23
Payer: MEDICARE

## 2024-02-23 LAB
ATRIAL RATE - MUSE: 124 BPM
ATRIAL RATE - MUSE: 357 BPM
ATRIAL RATE - MUSE: 68 BPM
ATRIAL RATE - MUSE: 90 BPM
DIASTOLIC BLOOD PRESSURE - MUSE: NORMAL MMHG
INTERPRETATION ECG - MUSE: NORMAL
P AXIS - MUSE: 53 DEGREES
P AXIS - MUSE: 59 DEGREES
P AXIS - MUSE: NORMAL DEGREES
P AXIS - MUSE: NORMAL DEGREES
PR INTERVAL - MUSE: 124 MS
PR INTERVAL - MUSE: 134 MS
PR INTERVAL - MUSE: 142 MS
PR INTERVAL - MUSE: NORMAL MS
QRS DURATION - MUSE: 100 MS
QRS DURATION - MUSE: 102 MS
QRS DURATION - MUSE: 84 MS
QRS DURATION - MUSE: 92 MS
QT - MUSE: 334 MS
QT - MUSE: 348 MS
QT - MUSE: 378 MS
QT - MUSE: 450 MS
QTC - MUSE: 462 MS
QTC - MUSE: 478 MS
QTC - MUSE: 479 MS
QTC - MUSE: 491 MS
R AXIS - MUSE: 76 DEGREES
R AXIS - MUSE: 77 DEGREES
R AXIS - MUSE: 79 DEGREES
R AXIS - MUSE: 81 DEGREES
SYSTOLIC BLOOD PRESSURE - MUSE: NORMAL MMHG
T AXIS - MUSE: 100 DEGREES
T AXIS - MUSE: 123 DEGREES
T AXIS - MUSE: 231 DEGREES
T AXIS - MUSE: 89 DEGREES
VENTRICULAR RATE- MUSE: 120 BPM
VENTRICULAR RATE- MUSE: 124 BPM
VENTRICULAR RATE- MUSE: 68 BPM
VENTRICULAR RATE- MUSE: 90 BPM

## 2024-02-23 NOTE — PROGRESS NOTES
Clinic Care Coordination Contact  [facility]: Post-Discharge Note  SITUATION                                                      Admission:    Admission Date: 02/20/24   Reason for Admission: Aortic stenosis, severe  Discharge:   Discharge Date: 02/22/24  Discharge Diagnosis: Aortic stenosis, severe    BACKGROUND                                                      Follow-up:  - Structural clinic in 1 week.      Labs or imaging requiring follow-up after discharge:  - Repeat echo and labs in 1 month      Condition on discharge  Temp:  [97.5  F (36.4  C)-98.2  F (36.8  C)] 97.5  F (36.4  C)  Pulse:  [] 55  Resp:  [9-37] 12  BP: ()/() 114/58  MAP:  [69 mmHg-108 mmHg] 98 mmHg  Arterial Line BP: ()/(48-74) 136/69  SpO2:  [89 %-100 %] 99 %  General: Alert, interactive, NAD  Eyes: sclera anicteric, EOMI  Neck: JVP 0, carotid 2+ bilaterally  Cardiovascular: regular rate and rhythm, normal S1 and S2, no murmurs, gallops, or rubs  Resp: clear to auscultation bilaterally, no rales, wheezes, or rhonchi  GI: Soft, nontender, nondistended. +BS.  No HSM or masses, no rebound or guarding.  Extremities: no edema, no cyanosis or clubbing, dorsalis pedis and posterior tibialis pulses 2+ bilaterally  Skin: Warm and dry, no jaundice or rash  Neuro: CN 2-12 intact, moves all extremities equally  Psych: Alert & oriented x 3     Imaging with results:  Echocardiogram: 2/21/24  Interpretation Summary     The visual ejection fraction is 65-70%.  There is moderate concentric left ventricular hypertrophy.  There is a bioprosthetic aortic valve. TAVR 23 mm ES. The mean AoV pressure  gradient is 9mm Hg.  There is no pericardial effusion     TAVR: 2/20/24  Conclusion          Infrarenal Abd AO lesion is 50% stenosed.     1. Lifestyle-limiting severe aortic stenosis.   2. Successful transfemoral transcatheter aortic valve replacement with a 23 mm Ross Adelso 3 Ultra valve (plus 2)  3. Adjunctive Shockwave therapy  to facilitate large bore access  4. BAV prior to valve placement with a 20 mm balloon  5. Left heart catheterization with LVEDP of 18 mmHg prior to valve deployment.  6. Right and left common femoral arteriotomies successfully closed with closure devices.          ASSESSMENT           Discharge Assessment  How are you doing now that you are home?: Spoke with patient and daughter who shares that things are going well. They do have a few questions but have left a message for Dana at the heart clinic and are waiting for a call back. Patient's daughter shares that she lives down the road from patient so is close by if she is needed.  How are your symptoms? (Red Flag symptoms escalate to triage hotline per guidelines): Improved  Do you feel your condition is stable enough to be safe at home until your provider visit?: Yes  Does the patient have their discharge instructions? : Yes  Does the patient have questions regarding their discharge instructions? : No  Were you started on any new medications or were there changes to any of your previous medications? : Yes  Does the patient have all of their medications?: Yes  Do you have questions regarding any of your medications? : No  Do you have all of your needed medical supplies or equipment (DME)?  (i.e. oxygen tank, CPAP, cane, etc.): Yes  Discharge follow-up appointment scheduled within 14 calendar days? : Yes  Discharge Follow Up Appointment Date: 02/29/24  Discharge Follow Up Appointment Scheduled with?: Specialty Care Provider    Post-op ([facility] Only)  If the patient had a surgery or procedure, do they have any questions for a nurse?: No    Post-op (Clinicians Only)  Did the patient have surgery or a procedure: Yes  Incision: closed  Drainage: No  Fever: No  Chills: No  Redness: No  Warmth: No  Swelling: No  Incision site pain: No      PLAN                                                      Outpatient Plan:  1. Follow-up with primary care provider next week for a  blood pressure  recheck. Also recommend recheck of platelet count and hemoglobin  lab test at time of appointment.  2. Follow-up cardiology as they separately recommend.  Follow-up and recommended labs and tests  Follow up with Vilma roberts CNP, within 1 week. for hospital  follow- up. No follow up labs or test are needed.    Future Appointments   Date Time Provider Department Center   2/29/2024 12:30 PM Vilma Willett CNP [facility]   3/7/2024  1:00 PM [facility] CARDIAC REHAB 5 [facility]   3/20/2024 10:45 AM RSCCECHO2 [facility]   3/20/2024 12:00 PM RU LAB [facility]   3/21/2024 12:30 PM [facility]         For any urgent concerns, please contact our 24 hour nurse triage line: [phone] ([phone])         RHONDA Bey

## 2024-02-23 NOTE — TELEPHONE ENCOUNTER
Voicemail received from patient's daughter, Janette, asking for a callback to discuss Kavita's access sites on both groin areas.    Called Janette back and she stated that they are able to feel something just a little above both puncture sites on Kavita. Janette stated they seem a little swollen (pea size) but are not hard, warm, and it is not painful to Kavita when she pushes on them.     Reviewed with Janette at so far it seems like both punctures sites are ok. Reviewed that if anything changes and they are now getting larger in size, hard, painful, or warm to please call us back. Janette stated she understood.    Additionally Janette reviewed that this AM Kavita stated she could feel her heart pounding for a little bit when she got up. Did review [facility] reports available on [employer] website and patient does not have any episodes save to review besides a baseline reading of SR.

## 2024-02-29 ENCOUNTER — OFFICE VISIT (OUTPATIENT)
Dept: CARDIOLOGY | Facility: CLINIC | Age: 89
End: 2024-02-29
Payer: MEDICARE

## 2024-02-29 VITALS
WEIGHT: 141 LBS | HEART RATE: 70 BPM | HEIGHT: 64 IN | BODY MASS INDEX: 24.07 KG/M2 | OXYGEN SATURATION: 98 % | SYSTOLIC BLOOD PRESSURE: 157 MMHG | DIASTOLIC BLOOD PRESSURE: 68 MMHG

## 2024-02-29 DIAGNOSIS — Z95.2 S/P TAVR (TRANSCATHETER AORTIC VALVE REPLACEMENT): Primary | ICD-10-CM

## 2024-02-29 PROCEDURE — 99214 OFFICE O/P EST MOD 30 MIN: CPT | Performed by: NURSE PRACTITIONER

## 2024-02-29 NOTE — LETTER
2/29/2024    Ty Cordero MD  [address] [facility] [address]    RE: Kavita Merlos       Dear Colleague,     I had the pleasure of seeing Kavita Merlos in the [facility].  Cardiology Clinic Progress Note  Kavita Merlos MRN# [id number]   YOB: 1935 Age: 88 year old     Primary cardiologist: Dr. Garay     Reason for visit: s/p TAVR     History of presenting illness:    Kavita Merlos is a pleasant 88 year old patient with past medical history significant for hyperlipidemia, macular degeneration, peripheral arterial disease with infrarenal AAA measuring 4.2 cm, and severe aortic stenosis status post TAVR with 26 mm Ross Adelso valve (2/20/23), who is here today for follow-up.     Briefly, her TAVR procedure went well without any complication.  She had no hemodynamic instability or conduction abnormalities.  She went into A-fib RVR postoperatively, she spontaneously converted.  She was placed on oral diltiazem 120 mg daily for rate controle and Eliquis 5 mg BID for a [id number] score of 5.  Post-op echocardiogram showed well-seated bioprosthetic aortic valve with a MG of 9 mmHg, no AI or PVL.  Her LV function is preserved.  She was tolerating cardiac rehab and discharged on POD#2.     Today the patient appears to be doing well.  She is walking on most days without any significant limitation or exertional symptoms.  She feels somewhat fatigued and weak since being discharged from the hospital but this seems to be improving.  She has not started cardiac rehab yet.  She otherwise denies chest pain, shortness of breath, syncope or near syncope, or palpitations. No PND or orthopnea. No pain or bleeding at groin sites.          Assessment and Plan:     ASSESSMENT:    S/p TAVR with 26 mm Ross Adelso 3 valve (2/20/24).  Overall recovering well, she is tolerating activity without any exertional symptoms.  Postprocedure echocardiogram showed normal functioning  "valve.  Newly diagnosed atrial fibrillation.  On Eliquis for stroke prophylaxis and diltiazem  mg daily for rate control.   Chronic diastolic heart failure, NYHA class I.  Euvolemic on exam, not on diuretic therapy.  AAA measuring 4.2 cm per recent TTE.  Stable on recent imaging.  Hypertension.  Elevated in clinic, though she has whitecoat hypertension.  On diltiazem  mg daily.       PLAN:     Overall Kavita is doing well since her TAVR procedure, she denies any exertional symptoms.  She will start outpatient cardiac rehab next week.  Continue Eliquis and diltiazem, will uptitrate her diltiazem as needed.  Will need lifelong antibiotic prophylaxis prior to any dental procedure.  Follow-up with me in 1 month with repeat echocardiogram, labs, and EKG.         Vilma Willett, RAYMOND, APRN, CNP  Page: [phone] (8a-5p M-F)    Orders this Visit:  No orders of the defined types were placed in this encounter.    No orders of the defined types were placed in this encounter.    There are no discontinued medications.    Today's clinic visit entailed:  Review of the result(s) of each unique test - echo, labs, EKG, TAVR  Ordering of each unique test  Prescription drug management  35 minutes spent by me on the date of the encounter doing chart review, review of test results, interpretation of tests, patient visit, and documentation   Provider  Link to [facility] Help Grid     The level of medical decision making during this visit was of moderate complexity.           Review of Systems:     Review of Systems:  Skin:        Eyes:       ENT:       Respiratory:  Positive for dyspnea on exertion  Cardiovascular:  Negative;edema;lightheadedness;dizziness Positive for;palpitations;fatigue  Gastroenterology:      Genitourinary:       Musculoskeletal:       Neurologic:       Psychiatric:       Heme/Lymph/Imm:       Endocrine:                 Physical Exam:   Vitals: BP (!) 157/68   Pulse 70   Ht 1.626 m (5' 4\")   Wt 64 kg (141 lb) "   LMP  (LMP Unknown)   SpO2 98%   BMI 24.20 kg/m    Constitutional:  cooperative        Skin:  warm and dry to the touch        Head:  normocephalic        Eyes:  pupils equal and round        ENT:  not assessed this visit        Neck:  JVP normal        Chest:  normal breath sounds, clear to auscultation, normal A-P diameter, normal symmetry, normal respiratory excursion, no use of accessory muscles        Cardiac: regular rhythm       systolic ejection murmur;grade 1          Abdomen:  abdomen soft        Vascular: pulses full and equal                                      Extremities and Back:  no edema        Neurological:  no gross motor deficits;affect appropriate             Medications:     Current Outpatient Medications   Medication Sig Dispense Refill    apixaban ANTICOAGULANT (ELIQUIS) 5 MG tablet Take 1 tablet (5 mg) by mouth 2 times daily 60 tablet 0    co-enzyme Q-10 100 MG CAPS capsule Take 100 mg by mouth daily      cyanocobalamin (VITAMIN B-12) 100 MCG tablet Take 100 mcg by mouth Every other day      diltiazem ER COATED BEADS (CARDIZEM CD/CARTIA XT) 120 MG 24 hr capsule Take 1 capsule (120 mg) by mouth daily 30 capsule 0    Multiple Vitamins-Minerals (PRESERVISION AREDS 2 PO) Take by mouth daily      multivitamin, therapeutic (THERA-VIT) TABS tablet Take 1 tablet by mouth daily      omega 3 1000 MG CAPS Take 1 capsule by mouth every other day      ranibizumab (LUCENTIS) 0.5 MG/0.05ML SOLN 0.5 mg by Intravitreal route once Once every 6-8 weeks, eye injection      rosuvastatin (CRESTOR) 5 MG tablet Take 1 tablet (5 mg) by mouth daily 30 tablet 3    tafluprost (ZIOPTAN) 0.0015 % SOLN ophthalmic solution Place 1 drop into both eyes at bedtime      Timolol Maleate (TIMOPTIC OCUDOSE) 0.5 % ophthalmic solution Place 1 drop into both eyes 2 times daily      vitamin C (ASCORBIC ACID) 1000 MG TABS Take 1,000 mg by mouth daily      Vitamin D3 (CHOLECALCIFEROL) 25 mcg (1000 units) tablet Take by mouth daily  Daily in the winter      vitamin E (TOCOPHEROL) 400 units (180 mg) capsule Take 400 Units by mouth every other day      zinc gluconate 50 MG tablet Take 50 mg by mouth every other day         History reviewed. No pertinent family history.    Social History     Socioeconomic History    Marital status: Single     Spouse name: Not on file    Number of children: Not on file    Years of education: Not on file    Highest education level: Not on file   Occupational History    Not on file   Tobacco Use    Smoking status: Former     Types: Cigarettes     Passive exposure: Never    Smokeless tobacco: Never    Tobacco comments:     I quit when I was 71 years old, off/on prior to that starting when I was 17   Vaping Use    Vaping Use: Never used   Substance and Sexual Activity    Alcohol use: Not Currently    Drug use: Not Currently    Sexual activity: Not on file   Other Topics Concern    Not on file   Social History Narrative    Not on file     Social Determinants of Health     Financial Resource Strain: Low Risk  (10/9/2023)    Financial Resource Strain     Within the past 12 months, have you or your family members you live with been unable to get utilities (heat, electricity) when it was really needed?: No   Food Insecurity: Low Risk  (10/9/2023)    Food Insecurity     Within the past 12 months, did you worry that your food would run out before you got money to buy more?: No     Within the past 12 months, did the food you bought just not last and you didn t have money to get more?: No   Transportation Needs: Low Risk  (10/9/2023)    Transportation Needs     Within the past 12 months, has lack of transportation kept you from medical appointments, getting your medicines, non-medical meetings or appointments, work, or from getting things that you need?: No   Physical Activity: Not on file   Stress: Not on file   Social Connections: Not on file   Interpersonal Safety: High Risk (10/13/2023)    Interpersonal Safety     Do you  feel physically and emotionally safe where you currently live?: No     Within the past 12 months, have you been hit, slapped, kicked or otherwise physically hurt by someone?: No     Within the past 12 months, have you been humiliated or emotionally abused in other ways by your partner or ex-partner?: No   Housing Stability: Low Risk  (10/9/2023)    Housing Stability     Do you have housing? : Yes     Are you worried about losing your housing?: No            Past Medical History:     Past Medical History:   Diagnosis Date    AAA (abdominal aortic aneurysm) (H24)     Aortic stenosis     Benign essential hypertension with BP difference between arms     Hyperlipidemia LDL goal <70               Past Surgical History:     Past Surgical History:   Procedure Laterality Date    CV CORONARY ANGIOGRAM N/A 12/19/2023    Procedure: Coronary Angiogram;  Surgeon: Seth Duarte MD;  Location: [facility] CARDIAC CATH LAB    CV PERIPHERAL VASCULAR LITHOTRIPSY N/A 2/20/2024    Procedure: Peripheral Vascular Lithotripsy;  Surgeon: Seth Duarte MD;  Location: [facility] CARDIAC CATH LAB    CV RIGHT HEART CATH MEASUREMENTS RECORDED N/A 12/19/2023    Procedure: Right Heart Catheterization;  Surgeon: Seth Duarte MD;  Location: [facility] CARDIAC CATH LAB    CV TRANSCATHETER AORTIC VALVE REPLACEMENT-FEMORAL APPROACH N/A 2/20/2024    Procedure: Transcatheter Aortic Valve Replacement-Femoral Approach;  Surgeon: Seth Duarte MD;  Location: [facility] CARDIAC CATH LAB              Allergies:   Albuterol, Amlodipine, Bimatoprost, Brimonidine, Clotrimazole, Dorzolamide hcl-timolol mal, Latanoprost, Lisinopril, Losartan, Neomycin-polymyxin-gramicidin, Neosporin [neomycin-polymyxin-gramicidin], Penicillins, Tape [adhesive tape], Timolol, Travoprost, and Vicodin [hydrocodone-acetaminophen]       Data:   All laboratory data reviewed:    Recent Labs   Lab Test 02/09/24  1440 10/13/23  1526   LDL  --  109*   HDL  --  " 54   NHDL  --  132*   CHOL  --  186   TRIG  --  115   TSH  --  1.38   NTBNP 994  --        Lab Results   Component Value Date    WBC 7.7 02/22/2024    WBC 9.0 04/17/2020    RBC 3.61 (L) 02/22/2024    RBC 4.76 04/17/2020    HGB 10.7 (L) 02/22/2024    HGB 14.4 04/17/2020    HCT 33.4 (L) 02/22/2024    HCT 45.7 04/17/2020    MCV 93 02/22/2024    MCV 96 04/17/2020    MCH 29.6 02/22/2024    MCH 30.3 04/17/2020    MCHC 32.0 02/22/2024    MCHC 31.5 04/17/2020    RDW 13.7 02/22/2024    RDW 13.2 04/17/2020     (L) 02/22/2024     04/17/2020       Lab Results   Component Value Date     02/22/2024     04/17/2020    POTASSIUM 4.2 02/22/2024    POTASSIUM 4.4 04/17/2020    CHLORIDE 104 02/22/2024    CHLORIDE 105 04/17/2020    CO2 29 02/22/2024    CO2 26 04/17/2020    ANIONGAP 4 (L) 02/22/2024    ANIONGAP 7 04/17/2020     (H) 02/22/2024     (H) 02/21/2024    GLC 97 04/17/2020    BUN 11.7 02/22/2024    BUN 16 04/17/2020    CR 0.64 02/22/2024    CR 0.55 04/17/2020    GFRESTIMATED 85 02/22/2024    GFRESTIMATED >60 11/16/2023    GFRESTIMATED 86 04/17/2020    GFRESTBLACK >90 04/17/2020    JERRY 8.7 (L) 02/22/2024    JERRY 9.5 04/17/2020      Lab Results   Component Value Date    AST 28 10/13/2023    ALT 18 10/13/2023       No results found for: \"A1C\"    Lab Results   Component Value Date    INR 0.97 02/09/2024    INR 1.01 12/19/2023         Thank you for allowing me to participate in the care of your patient.      Sincerely,     Vilma Willett, [facility]  cc:   No referring provider defined for this encounter.      "

## 2024-02-29 NOTE — OP NOTE
OPERATIVE DATE: 2/20/2024    PRE-OPERATIVE DIAGNOSIS:  1) Severe aortic stenosis  2) Chronic diastolic heart failure  3) Hyperlipidemia  4) Abdominal aortic aneurysm     POST-OPERATIVE DIAGNOSIS:  1) Severe aortic stenosis  2) Chronic diastolic heart failure  3) Hyperlipidemia  4) Abdominal aortic aneurysm     PROCEDURE:  1) Iliofemoral artery angiography  2) Ascending aorta angiography  3) Left heart catheterization  4) Successful transfemoral transcatheter aortic valve replacement with 23mm Ross Adelso 3 valve  5) Arteriotomy closure with Manta device    SURGEON: KAREN Drake MD    CARDIOLOGIST: Seth Duarte MD; Milton Núñez MD    ANESTHESIA: Monitored anesthesia care with local analgesia    ESTIMATED BLOOD LOSS: 25 mL    INDICATIONS:  Ms. Kavita Merlos is a 88 year old year old female with severe symptomatic aortic stenosis. We were asked to evaluate for transcatheter aortic valve replacement. Risks and benefits of the operation were explained to the patient and their family including, but not limited to, bleeding, infection, stroke and even death. They understood these risks and agreed to proceed electively.    OPERATIVE REPORT:  The patient was transferred to the operating room and positioned supine on the OR table. Monitored anesthesia care was begun. The patients chest, abdomen and bilateral lower extremities were clipped, prepped and draped in sterile fashion. A pre-procedure time-out was performed confirming the correct patient, correct site and correct procedure.    Intravenous heparin was administered. Arterial access of the right and left femoral arteries was performed. The side used for delivery was prepared for percutaneous closure at completion.    Due to extensive calcification, shockwave lithotripsy was performed using a 7 X 50 balloon on the left external iliac artery. 6 treatments applied to facilitate E-sheath access     A Lunderkirk wire was advanced to support the Ross sheath  insertion. A pigtail catheter was advanced to the aortic root and angiogram performed to confirm optimal implant angle. The pigtail was placed in the non-coronary cusp.    The LV was accessed using an AL-1 catheter over a straight wire. The pigtail catheter was advanced into the LV. The pigtail catheter was used to advance a Safari wire into the LV and the pigtail catheter was removed.    Aortic valve balloon valvuloplasty was performed with 20mm balloon using rapid pacing.    The bioprosthetic valve was positioned across the native aortic valve and deployed using rapid pacing.    Transthoracic echocardiography showed no paravalvular insufficiency.    The deployment system and wires were removed. The femoral access was made hemostatic.    A final iliofemoral angiogram showed no extravasation or stenosis.    The patient was then transferred to the recovery area in stable condition.    All needle, sponge and instrument counts were correct at the end of the case.    KAREN Drake MD  Cardiothoracic Surgery  Pager [phone]

## 2024-02-29 NOTE — PATIENT INSTRUCTIONS
Today's Plan:     - Continue current medication regimen.   - Follow-up with me in 1 month.     If you have questions or concerns please call my nurse team:  Dana RN ([phone]) and Meagan RN ([phone])    After Hours: [phone], option #2, ask for cardiologist on-call    Scheduling phone number: [phone]    Reminder: Please bring in all current medications, over the counter supplements and vitamin bottles to your next appointment.-    It was a pleasure seeing you today!     Vilma Willett, CARLA  2/29/2024    -     YUE martinez

## 2024-02-29 NOTE — PROGRESS NOTES
Cardiology Clinic Progress Note  Kavita Merlos MRN# [id number]   YOB: 1935 Age: 88 year old     Primary cardiologist: Dr. Garay     Reason for visit: s/p TAVR     History of presenting illness:    Kavita Merlos is a pleasant 88 year old patient with past medical history significant for hyperlipidemia, macular degeneration, peripheral arterial disease with infrarenal AAA measuring 4.2 cm, and severe aortic stenosis status post TAVR with 26 mm Ross Adelso valve (2/20/23), who is here today for follow-up.     Briefly, her TAVR procedure went well without any complication.  She had no hemodynamic instability or conduction abnormalities.  She went into A-fib RVR postoperatively, she spontaneously converted.  She was placed on oral diltiazem 120 mg daily for rate controle and Eliquis 5 mg BID for a [id number] score of 5.  Post-op echocardiogram showed well-seated bioprosthetic aortic valve with a MG of 9 mmHg, no AI or PVL.  Her LV function is preserved.  She was tolerating cardiac rehab and discharged on POD#2.     Today the patient appears to be doing well.  She is walking on most days without any significant limitation or exertional symptoms.  She feels somewhat fatigued and weak since being discharged from the hospital but this seems to be improving.  She has not started cardiac rehab yet.  She otherwise denies chest pain, shortness of breath, syncope or near syncope, or palpitations. No PND or orthopnea. No pain or bleeding at groin sites.          Assessment and Plan:     ASSESSMENT:    S/p TAVR with 26 mm Ross Adelso 3 valve (2/20/24).  Overall recovering well, she is tolerating activity without any exertional symptoms.  Postprocedure echocardiogram showed normal functioning valve.  Newly diagnosed atrial fibrillation.  On Eliquis for stroke prophylaxis and diltiazem  mg daily for rate control.   Chronic diastolic heart failure, NYHA class I.  Euvolemic on exam, not on diuretic  "therapy.  AAA measuring 4.2 cm per recent TTE.  Stable on recent imaging.  Hypertension.  Elevated in clinic, though she has whitecoat hypertension.  On diltiazem  mg daily.       PLAN:     Overall Kavita is doing well since her TAVR procedure, she denies any exertional symptoms.  She will start outpatient cardiac rehab next week.  Continue Eliquis and diltiazem, will uptitrate her diltiazem as needed.  Will need lifelong antibiotic prophylaxis prior to any dental procedure.  Follow-up with me in 1 month with repeat echocardiogram, labs, and EKG.         Vilma Willett, RAYMOND, APRN, CNP  Page: [phone] (8a-5p M-F)    Orders this Visit:  No orders of the defined types were placed in this encounter.    No orders of the defined types were placed in this encounter.    There are no discontinued medications.    Today's clinic visit entailed:  Review of the result(s) of each unique test - echo, labs, EKG, TAVR  Ordering of each unique test  Prescription drug management  35 minutes spent by me on the date of the encounter doing chart review, review of test results, interpretation of tests, patient visit, and documentation   Provider  Link to [facility] Help Grid     The level of medical decision making during this visit was of moderate complexity.           Review of Systems:     Review of Systems:  Skin:        Eyes:       ENT:       Respiratory:  Positive for dyspnea on exertion  Cardiovascular:  Negative;edema;lightheadedness;dizziness Positive for;palpitations;fatigue  Gastroenterology:      Genitourinary:       Musculoskeletal:       Neurologic:       Psychiatric:       Heme/Lymph/Imm:       Endocrine:                 Physical Exam:   Vitals: BP (!) 157/68   Pulse 70   Ht 1.626 m (5' 4\")   Wt 64 kg (141 lb)   LMP  (LMP Unknown)   SpO2 98%   BMI 24.20 kg/m    Constitutional:  cooperative        Skin:  warm and dry to the touch        Head:  normocephalic        Eyes:  pupils equal and round        ENT:  not " assessed this visit        Neck:  JVP normal        Chest:  normal breath sounds, clear to auscultation, normal A-P diameter, normal symmetry, normal respiratory excursion, no use of accessory muscles        Cardiac: regular rhythm       systolic ejection murmur;grade 1          Abdomen:  abdomen soft        Vascular: pulses full and equal                                      Extremities and Back:  no edema        Neurological:  no gross motor deficits;affect appropriate             Medications:     Current Outpatient Medications   Medication Sig Dispense Refill    apixaban ANTICOAGULANT (ELIQUIS) 5 MG tablet Take 1 tablet (5 mg) by mouth 2 times daily 60 tablet 0    co-enzyme Q-10 100 MG CAPS capsule Take 100 mg by mouth daily      cyanocobalamin (VITAMIN B-12) 100 MCG tablet Take 100 mcg by mouth Every other day      diltiazem ER COATED BEADS (CARDIZEM CD/CARTIA XT) 120 MG 24 hr capsule Take 1 capsule (120 mg) by mouth daily 30 capsule 0    Multiple Vitamins-Minerals (PRESERVISION AREDS 2 PO) Take by mouth daily      multivitamin, therapeutic (THERA-VIT) TABS tablet Take 1 tablet by mouth daily      omega 3 1000 MG CAPS Take 1 capsule by mouth every other day      ranibizumab (LUCENTIS) 0.5 MG/0.05ML SOLN 0.5 mg by Intravitreal route once Once every 6-8 weeks, eye injection      rosuvastatin (CRESTOR) 5 MG tablet Take 1 tablet (5 mg) by mouth daily 30 tablet 3    tafluprost (ZIOPTAN) 0.0015 % SOLN ophthalmic solution Place 1 drop into both eyes at bedtime      Timolol Maleate (TIMOPTIC OCUDOSE) 0.5 % ophthalmic solution Place 1 drop into both eyes 2 times daily      vitamin C (ASCORBIC ACID) 1000 MG TABS Take 1,000 mg by mouth daily      Vitamin D3 (CHOLECALCIFEROL) 25 mcg (1000 units) tablet Take by mouth daily Daily in the winter      vitamin E (TOCOPHEROL) 400 units (180 mg) capsule Take 400 Units by mouth every other day      zinc gluconate 50 MG tablet Take 50 mg by mouth every other day         History  reviewed. No pertinent family history.    Social History     Socioeconomic History    Marital status: Single     Spouse name: Not on file    Number of children: Not on file    Years of education: Not on file    Highest education level: Not on file   Occupational History    Not on file   Tobacco Use    Smoking status: Former     Types: Cigarettes     Passive exposure: Never    Smokeless tobacco: Never    Tobacco comments:     I quit when I was 71 years old, off/on prior to that starting when I was 17   Vaping Use    Vaping Use: Never used   Substance and Sexual Activity    Alcohol use: Not Currently    Drug use: Not Currently    Sexual activity: Not on file   Other Topics Concern    Not on file   Social History Narrative    Not on file     Social Determinants of Health     Financial Resource Strain: Low Risk  (10/9/2023)    Financial Resource Strain     Within the past 12 months, have you or your family members you live with been unable to get utilities (heat, electricity) when it was really needed?: No   Food Insecurity: Low Risk  (10/9/2023)    Food Insecurity     Within the past 12 months, did you worry that your food would run out before you got money to buy more?: No     Within the past 12 months, did the food you bought just not last and you didn t have money to get more?: No   Transportation Needs: Low Risk  (10/9/2023)    Transportation Needs     Within the past 12 months, has lack of transportation kept you from medical appointments, getting your medicines, non-medical meetings or appointments, work, or from getting things that you need?: No   Physical Activity: Not on file   Stress: Not on file   Social Connections: Not on file   Interpersonal Safety: High Risk (10/13/2023)    Interpersonal Safety     Do you feel physically and emotionally safe where you currently live?: No     Within the past 12 months, have you been hit, slapped, kicked or otherwise physically hurt by someone?: No     Within the past 12  months, have you been humiliated or emotionally abused in other ways by your partner or ex-partner?: No   Housing Stability: Low Risk  (10/9/2023)    Housing Stability     Do you have housing? : Yes     Are you worried about losing your housing?: No            Past Medical History:     Past Medical History:   Diagnosis Date    AAA (abdominal aortic aneurysm) (H24)     Aortic stenosis     Benign essential hypertension with BP difference between arms     Hyperlipidemia LDL goal <70               Past Surgical History:     Past Surgical History:   Procedure Laterality Date    CV CORONARY ANGIOGRAM N/A 12/19/2023    Procedure: Coronary Angiogram;  Surgeon: Seth Duarte MD;  Location:  HEART CARDI[facility] CATH LAB    CV PERIPHERAL VASCULAR LITHOTRIPSY N/A 2/20/2024    Procedure: Peripheral Vascular Lithotripsy;  Surgeon: Seth Duarte MD;  Location:  HEART CARDI[facility] CATH LAB    CV RIGHT HEART CATH MEASUREMENTS RECORDED N/A 12/19/2023    Procedure: Right Heart Catheterization;  Surgeon: Seth Duarte MD;  Location:  HEART CARDIAC CATH LAB    CV TRANSCATHETER AORTIC VALVE REPLACEMENT-FEMORAL APPROACH N/A 2/20/2024    Procedure: Transcatheter Aortic Valve Replacement-Femoral Approach;  Surgeon: Seth Duarte MD;  Location:  HEART CARDIAC CATH LAB              Allergies:   Albuterol, Amlodipine, Bimatoprost, Brimonidine, Clotrimazole, Dorzolamide hcl-timolol mal, Latanoprost, Lisinopril, Losartan, Neomycin-polymyxin-gramicidin, Neosporin [neomycin-polymyxin-gramicidin], Penicillins, Tape [adhesive tape], Timolol, Travoprost, and Vicodin [hydrocodone-acetaminophen]       Data:   All laboratory data reviewed:    Recent Labs   Lab Test 02/09/24  1440 10/13/23  1526   LDL  --  109*   HDL  --  54   NHDL  --  132*   CHOL  --  186   TRIG  --  115   TSH  --  1.38   NTBNP 994  --        Lab Results   Component Value Date    WBC 7.7 02/22/2024    WBC 9.0 04/17/2020    RBC 3.61 (L) 02/22/2024     "RBC 4.76 04/17/2020    HGB 10.7 (L) 02/22/2024    HGB 14.4 04/17/2020    HCT 33.4 (L) 02/22/2024    HCT 45.7 04/17/2020    MCV 93 02/22/2024    MCV 96 04/17/2020    MCH 29.6 02/22/2024    MCH 30.3 04/17/2020    MCHC 32.0 02/22/2024    MCHC 31.5 04/17/2020    RDW 13.7 02/22/2024    RDW 13.2 04/17/2020     (L) 02/22/2024     04/17/2020       Lab Results   Component Value Date     02/22/2024     04/17/2020    POTASSIUM 4.2 02/22/2024    POTASSIUM 4.4 04/17/2020    CHLORIDE 104 02/22/2024    CHLORIDE 105 04/17/2020    CO2 29 02/22/2024    CO2 26 04/17/2020    ANIONGAP 4 (L) 02/22/2024    ANIONGAP 7 04/17/2020     (H) 02/22/2024     (H) 02/21/2024    GLC 97 04/17/2020    BUN 11.7 02/22/2024    BUN 16 04/17/2020    CR 0.64 02/22/2024    CR 0.55 04/17/2020    GFRESTIMATED 85 02/22/2024    GFRESTIMATED >60 11/16/2023    GFRESTIMATED 86 04/17/2020    GFRESTBLACK >90 04/17/2020    JERRY 8.7 (L) 02/22/2024    JERRY 9.5 04/17/2020      Lab Results   Component Value Date    AST 28 10/13/2023    ALT 18 10/13/2023       No results found for: \"A1C\"    Lab Results   Component Value Date    INR 0.97 02/09/2024    INR 1.01 12/19/2023         "

## 2024-03-04 ENCOUNTER — TELEPHONE (OUTPATIENT)
Dept: CARDIOLOGY | Facility: CLINIC | Age: 89
End: 2024-03-04
Payer: MEDICARE

## 2024-03-04 ENCOUNTER — DOCUMENTATION ONLY (OUTPATIENT)
Dept: CARDIOLOGY | Facility: CLINIC | Age: 89
End: 2024-03-04
Payer: MEDICARE

## 2024-03-04 DIAGNOSIS — I48.91 ATRIAL FIBRILLATION, UNSPECIFIED TYPE (H): ICD-10-CM

## 2024-03-04 NOTE — PROGRESS NOTES
3-4-2024 I spoke w/ pts DTR Janette Mendieta. She will be helping her mom  fill out the Eliquis assistance application.She asked that I mail it to her @[address] [address]  I will have Vilma sign provider portion of the paul and RX  Once completed I will fax the provider portion to [employer].  Janette is awair to fax or mail her moms portion along w/ income documents  Ibsi Loving lpn

## 2024-03-04 NOTE — PROGRESS NOTES
3-4-2024 faxed 2 pages of completed Provider portion of the [employer] application   To [employer]  Ibis faust lpn

## 2024-03-04 NOTE — TELEPHONE ENCOUNTER
Received call from patient's daughter stating that patient has been experiencing a reaction to the tape from her monitor. She states that patient's skin is raw and itching. Discussed with Vilma Willett NP who recommended that patient take off monitor and mail back. Updated patient's daughter with plan.

## 2024-03-07 ENCOUNTER — HOSPITAL ENCOUNTER (OUTPATIENT)
Dept: CARDIAC REHAB | Facility: CLINIC | Age: 89
Discharge: HOME | End: 2024-03-07
Attending: NURSE PRACTITIONER
Payer: MEDICARE

## 2024-03-07 DIAGNOSIS — Z95.2 S/P TAVR (TRANSCATHETER AORTIC VALVE REPLACEMENT): ICD-10-CM

## 2024-03-07 PROCEDURE — 93797 PHYS/QHP OP CAR RHAB WO ECG: CPT | Mod: XU

## 2024-03-07 PROCEDURE — 93798 PHYS/QHP OP CAR RHAB W/ECG: CPT

## 2024-03-20 ENCOUNTER — HOSPITAL ENCOUNTER (OUTPATIENT)
Dept: CARDIOLOGY | Facility: CLINIC | Age: 89
Discharge: HOME | End: 2024-03-20
Attending: INTERNAL MEDICINE | Admitting: INTERNAL MEDICINE
Payer: MEDICARE

## 2024-03-20 ENCOUNTER — LAB (OUTPATIENT)
Dept: LAB | Facility: CLINIC | Age: 89
End: 2024-03-20
Attending: INTERNAL MEDICINE
Payer: MEDICARE

## 2024-03-20 DIAGNOSIS — Z95.3 S/P TAVR (TRANSCATHETER AORTIC VALVE REPLACEMENT), BIOPROSTHETIC: ICD-10-CM

## 2024-03-20 LAB
ANION GAP SERPL CALCULATED.3IONS-SCNC: 8 MMOL/L (ref 7–15)
BUN SERPL-MCNC: 16.4 MG/DL (ref 8–23)
CALCIUM SERPL-MCNC: 9.5 MG/DL (ref 8.8–10.2)
CHLORIDE SERPL-SCNC: 100 MMOL/L (ref 98–107)
CREAT SERPL-MCNC: 0.61 MG/DL (ref 0.51–0.95)
DEPRECATED HCO3 PLAS-SCNC: 29 MMOL/L (ref 22–29)
EGFRCR SERPLBLD CKD-EPI 2021: 86 ML/MIN/1.73M2
ERYTHROCYTE [DISTWIDTH] IN BLOOD BY AUTOMATED COUNT: 14.1 % (ref 10–15)
GLUCOSE SERPL-MCNC: 99 MG/DL (ref 70–99)
HCT VFR BLD AUTO: 40.3 % (ref 35–47)
HGB BLD-MCNC: 12.8 G/DL (ref 11.7–15.7)
LVEF ECHO: NORMAL
MCH RBC QN AUTO: 29.9 PG (ref 26.5–33)
MCHC RBC AUTO-ENTMCNC: 31.8 G/DL (ref 31.5–36.5)
MCV RBC AUTO: 94 FL (ref 78–100)
PLATELET # BLD AUTO: 164 10E3/UL (ref 150–450)
POTASSIUM SERPL-SCNC: 4.5 MMOL/L (ref 3.4–5.3)
RBC # BLD AUTO: 4.28 10E6/UL (ref 3.8–5.2)
SODIUM SERPL-SCNC: 137 MMOL/L (ref 135–145)
WBC # BLD AUTO: 7.1 10E3/UL (ref 4–11)

## 2024-03-20 PROCEDURE — 80048 BASIC METABOLIC PNL TOTAL CA: CPT | Performed by: INTERNAL MEDICINE

## 2024-03-20 PROCEDURE — 85027 COMPLETE CBC AUTOMATED: CPT | Performed by: INTERNAL MEDICINE

## 2024-03-20 PROCEDURE — 36415 COLL VENOUS BLD VENIPUNCTURE: CPT | Performed by: INTERNAL MEDICINE

## 2024-03-20 PROCEDURE — 93306 TTE W/DOPPLER COMPLETE: CPT

## 2024-03-20 PROCEDURE — 93306 TTE W/DOPPLER COMPLETE: CPT | Mod: 26 | Performed by: INTERNAL MEDICINE

## 2024-03-21 ENCOUNTER — OFFICE VISIT (OUTPATIENT)
Dept: CARDIOLOGY | Facility: CLINIC | Age: 89
End: 2024-03-21
Attending: INTERNAL MEDICINE
Payer: MEDICARE

## 2024-03-21 VITALS
BODY MASS INDEX: 23.56 KG/M2 | HEIGHT: 64 IN | OXYGEN SATURATION: 98 % | WEIGHT: 138 LBS | SYSTOLIC BLOOD PRESSURE: 195 MMHG | HEART RATE: 77 BPM | DIASTOLIC BLOOD PRESSURE: 75 MMHG

## 2024-03-21 DIAGNOSIS — E78.5 HYPERLIPIDEMIA LDL GOAL <70: ICD-10-CM

## 2024-03-21 DIAGNOSIS — I10 BENIGN ESSENTIAL HYPERTENSION: ICD-10-CM

## 2024-03-21 DIAGNOSIS — Z95.3 S/P TAVR (TRANSCATHETER AORTIC VALVE REPLACEMENT), BIOPROSTHETIC: Primary | ICD-10-CM

## 2024-03-21 PROCEDURE — 93000 ELECTROCARDIOGRAM COMPLETE: CPT | Performed by: NURSE PRACTITIONER

## 2024-03-21 PROCEDURE — 99214 OFFICE O/P EST MOD 30 MIN: CPT | Performed by: NURSE PRACTITIONER

## 2024-03-21 RX ORDER — ROSUVASTATIN CALCIUM 5 MG/1
5 TABLET, COATED ORAL DAILY
Qty: 90 TABLET | Refills: 3 | Status: SHIPPED | OUTPATIENT
Start: 2024-03-21 | End: 2024-04-05

## 2024-03-21 NOTE — LETTER
3/21/2024    Ty Cordero MD  [address] [facility] [address]    RE: Kavita Merlos       Dear Colleague,     I had the pleasure of seeing Kavita Merlos in the [facility].  Cardiology Clinic Progress Note  Kavita Merlos MRN# [id number]   YOB: 1935 Age: 88 year old     Primary cardiologist: Dr. Garay     Reason for visit: s/p TAVR     History of presenting illness:    Kavita Merlos is a pleasant 88 year old patient with past medical history significant for hyperlipidemia, macular degeneration, peripheral arterial disease with infrarenal AAA measuring 4.2 cm, and severe aortic stenosis status post TAVR with 26 mm Ross Adelso valve (2/20/23), who is here today for follow-up.     Briefly, her TAVR procedure went well without any complication.  She had no hemodynamic instability or conduction abnormalities.  She went into A-fib RVR postoperatively, she spontaneously converted.  She was placed on oral diltiazem 120 mg daily for rate controle and Eliquis 5 mg BID for a [id number] score of 5.  She was tolerating cardiac rehab and discharged on POD#2.     Today she reports fatigue and overall low energy levels since her TAVR.  She thinks this may be from diltiazem.  Her legs feel weak, but this is improving.  She is walking up to a mile a day and up 1 flight of stairs without any significant exertional symptoms.  She opted out of cardiac rehab, but may be interested in 1 day a week.  She otherwise denies chest pain, shortness of breath, syncope or near syncope, or palpitations.    Repeat echocardiogram on 3/20/2024 shows well-seated bioprosthetic aortic valve with mean gradient 11 mmHg, mild paravalvular AI, and preserved LV systolic function with an EF of 60 to 65%.  Her CBC and BMP, which I reviewed, are essentially normal.         Assessment and Plan:     ASSESSMENT:    S/p TAVR with 26 mm Ross Adelso 3 valve (2/20/24).  Continues to feel somewhat fatigued  with low energy levels, has a strong suspicion that this is her diltiazem.  She remains relatively active without any significant exertional symptoms.  Repeat echocardiogram today shows well-functioning valve with preserved LV systolic function  Newly diagnosed atrial fibrillation.  On Eliquis for stroke prophylaxis and diltiazem  mg daily for rate control.  On exam, she appears to be in a normal rhythm today.  Of note, she has not tolerated beta-blockers in the past  Chronic diastolic heart failure, NYHA class I.  Euvolemic on exam, not on diuretic therapy.  AAA measuring 4.2 cm per recent TTE.  Stable on recent imaging.  Hypertension.  Elevated in clinic, though she has whitecoat hypertension.  She reports home blood pressure readings anywhere between 130-140 over 80s.  Currently on diltiazem  mg daily.       PLAN:     The patient feels quite certain that the diltiazem is causing her symptoms, as such, will discontinue this today.  She will continue to take her blood pressure at home and let us know if BP is consistently > 140/80.   Continue Eliquis 5 mg BID.   She will consider starting cardiac rehab at least once a week.  She will need lifelong antibiotic prophylaxis prior to any dental procedure.  Follow-up with Dr. Duarte in 6 months, sooner if needed.         Vilma Willett, RAYMOND, APRN, CNP  Page: [phone] (8a-5p M-F)    Orders this Visit:  No orders of the defined types were placed in this encounter.    Orders Placed This Encounter   Medications    rosuvastatin (CRESTOR) 5 MG tablet     Sig: Take 1 tablet (5 mg) by mouth daily     Dispense:  90 tablet     Refill:  3     Medications Discontinued During This Encounter   Medication Reason    diltiazem ER COATED BEADS (CARDIZEM CD/CARTIA XT) 120 MG 24 hr capsule     rosuvastatin (CRESTOR) 5 MG tablet Reorder (No AVS)       Today's clinic visit entailed:  Review of the result(s) of each unique test - echo, labs, EKG, TAVR  Ordering of each unique  "test  Prescription drug management  35 minutes spent by me on the date of the encounter doing chart review, review of test results, interpretation of tests, patient visit, and documentation   Provider  Link to [facility] Help Grid     The level of medical decision making during this visit was of moderate complexity.           Review of Systems:     Review of Systems:  Skin:        Eyes:       ENT:       Respiratory:  Positive for dyspnea on exertion  Cardiovascular:  Negative;edema;lightheadedness;dizziness Positive for;fatigue;palpitations  Gastroenterology:      Genitourinary:       Musculoskeletal:       Neurologic:       Psychiatric:       Heme/Lymph/Imm:       Endocrine:                 Physical Exam:   Vitals: BP (!) 195/75   Pulse 77   Ht 1.626 m (5' 4\")   Wt 62.6 kg (138 lb)   LMP  (LMP Unknown)   SpO2 98%   BMI 23.69 kg/m    Constitutional:  cooperative        Skin:  warm and dry to the touch        Head:  normocephalic        Eyes:  pupils equal and round        ENT:  not assessed this visit        Neck:  JVP normal        Chest:  normal breath sounds, clear to auscultation, normal A-P diameter, normal symmetry, normal respiratory excursion, no use of accessory muscles        Cardiac: regular rhythm;normal S1 and S2       systolic ejection murmur;grade 1          Abdomen:  abdomen soft        Vascular: pulses full and equal                                      Extremities and Back:  no edema        Neurological:  no gross motor deficits;affect appropriate             Medications:     Current Outpatient Medications   Medication Sig Dispense Refill    apixaban ANTICOAGULANT (ELIQUIS) 5 MG tablet Take 1 tablet (5 mg) by mouth 2 times daily 180 tablet 3    co-enzyme Q-10 100 MG CAPS capsule Take 100 mg by mouth daily      cyanocobalamin (VITAMIN B-12) 100 MCG tablet Take 100 mcg by mouth Every other day      Multiple Vitamins-Minerals (PRESERVISION AREDS 2 PO) Take by mouth daily      multivitamin, therapeutic " (THERA-VIT) TABS tablet Take 1 tablet by mouth daily      omega 3 1000 MG CAPS Take 1 capsule by mouth every other day      ranibizumab (LUCENTIS) 0.5 MG/0.05ML SOLN 0.5 mg by Intravitreal route once Once every 6-8 weeks, eye injection      rosuvastatin (CRESTOR) 5 MG tablet Take 1 tablet (5 mg) by mouth daily 90 tablet 3    tafluprost (ZIOPTAN) 0.0015 % SOLN ophthalmic solution Place 1 drop into both eyes at bedtime      Timolol Maleate (TIMOPTIC OCUDOSE) 0.5 % ophthalmic solution Place 1 drop into both eyes 2 times daily      vitamin C (ASCORBIC ACID) 1000 MG TABS Take 1,000 mg by mouth daily      Vitamin D3 (CHOLECALCIFEROL) 25 mcg (1000 units) tablet Take by mouth daily Daily in the winter      vitamin E (TOCOPHEROL) 400 units (180 mg) capsule Take 400 Units by mouth every other day      zinc gluconate 50 MG tablet Take 50 mg by mouth every other day         History reviewed. No pertinent family history.    Social History     Socioeconomic History    Marital status: Single     Spouse name: Not on file    Number of children: Not on file    Years of education: Not on file    Highest education level: Not on file   Occupational History    Not on file   Tobacco Use    Smoking status: Former     Types: Cigarettes     Passive exposure: Never    Smokeless tobacco: Never    Tobacco comments:     I quit when I was 71 years old, off/on prior to that starting when I was 17   Vaping Use    Vaping Use: Never used   Substance and Sexual Activity    Alcohol use: Not Currently    Drug use: Not Currently    Sexual activity: Not on file   Other Topics Concern    Not on file   Social History Narrative    Not on file     Social Determinants of Health     Financial Resource Strain: Low Risk  (10/9/2023)    Financial Resource Strain     Within the past 12 months, have you or your family members you live with been unable to get utilities (heat, electricity) when it was really needed?: No   Food Insecurity: Low Risk  (10/9/2023)    Food  Insecurity     Within the past 12 months, did you worry that your food would run out before you got money to buy more?: No     Within the past 12 months, did the food you bought just not last and you didn t have money to get more?: No   Transportation Needs: Low Risk  (10/9/2023)    Transportation Needs     Within the past 12 months, has lack of transportation kept you from medical appointments, getting your medicines, non-medical meetings or appointments, work, or from getting things that you need?: No   Physical Activity: Not on file   Stress: Not on file   Social Connections: Not on file   Interpersonal Safety: High Risk (10/13/2023)    Interpersonal Safety     Do you feel physically and emotionally safe where you currently live?: No     Within the past 12 months, have you been hit, slapped, kicked or otherwise physically hurt by someone?: No     Within the past 12 months, have you been humiliated or emotionally abused in other ways by your partner or ex-partner?: No   Housing Stability: Low Risk  (10/9/2023)    Housing Stability     Do you have housing? : Yes     Are you worried about losing your housing?: No            Past Medical History:     Past Medical History:   Diagnosis Date    AAA (abdominal aortic aneurysm) (H24)     Aortic stenosis     Benign essential hypertension with BP difference between arms     Hyperlipidemia LDL goal <70               Past Surgical History:     Past Surgical History:   Procedure Laterality Date    CV CORONARY ANGIOGRAM N/A 12/19/2023    Procedure: Coronary Angiogram;  Surgeon: Seth Duarte MD;  Location: [facility] CARDIAC CATH LAB    CV PERIPHERAL VASCULAR LITHOTRIPSY N/A 2/20/2024    Procedure: Peripheral Vascular Lithotripsy;  Surgeon: Seth Duarte MD;  Location: [facility] CARDIAC CATH LAB    CV RIGHT HEART CATH MEASUREMENTS RECORDED N/A 12/19/2023    Procedure: Right Heart Catheterization;  Surgeon: Seth Duarte MD;  Location: [facility] CARDIAC  "CATH LAB    CV TRANSCATHETER AORTIC VALVE REPLACEMENT-FEMORAL APPROACH N/A 2/20/2024    Procedure: Transcatheter Aortic Valve Replacement-Femoral Approach;  Surgeon: Seth Duarte MD;  Location:  HEART CARDIAC CATH LAB              Allergies:   Albuterol, Amlodipine, Bimatoprost, Brimonidine, Clotrimazole, Dorzolamide hcl-timolol mal, Latanoprost, Lisinopril, Losartan, Neomycin-polymyxin-gramicidin, Neosporin [neomycin-polymyxin-gramicidin], Penicillins, Tape [adhesive tape], Timolol, Travoprost, and Vicodin [hydrocodone-acetaminophen]       Data:   All laboratory data reviewed:    Recent Labs   Lab Test 02/09/24  1440 10/13/23  1526   LDL  --  109*   HDL  --  54   NHDL  --  132*   CHOL  --  186   TRIG  --  115   TSH  --  1.38   NTBNP 994  --        Lab Results   Component Value Date    WBC 7.1 03/20/2024    WBC 9.0 04/17/2020    RBC 4.28 03/20/2024    RBC 4.76 04/17/2020    HGB 12.8 03/20/2024    HGB 14.4 04/17/2020    HCT 40.3 03/20/2024    HCT 45.7 04/17/2020    MCV 94 03/20/2024    MCV 96 04/17/2020    MCH 29.9 03/20/2024    MCH 30.3 04/17/2020    MCHC 31.8 03/20/2024    MCHC 31.5 04/17/2020    RDW 14.1 03/20/2024    RDW 13.2 04/17/2020     03/20/2024     04/17/2020       Lab Results   Component Value Date     03/20/2024     04/17/2020    POTASSIUM 4.5 03/20/2024    POTASSIUM 4.4 04/17/2020    CHLORIDE 100 03/20/2024    CHLORIDE 105 04/17/2020    CO2 29 03/20/2024    CO2 26 04/17/2020    ANIONGAP 8 03/20/2024    ANIONGAP 7 04/17/2020    GLC 99 03/20/2024     (H) 02/21/2024    GLC 97 04/17/2020    BUN 16.4 03/20/2024    BUN 16 04/17/2020    CR 0.61 03/20/2024    CR 0.55 04/17/2020    GFRESTIMATED 86 03/20/2024    GFRESTIMATED >60 11/16/2023    GFRESTIMATED 86 04/17/2020    GFRESTBLACK >90 04/17/2020    JERRY 9.5 03/20/2024    JERRY 9.5 04/17/2020      Lab Results   Component Value Date    AST 28 10/13/2023    ALT 18 10/13/2023       No results found for: \"A1C\"    Lab " Results   Component Value Date    INR 0.97 02/09/2024    INR 1.01 12/19/2023         Thank you for allowing me to participate in the care of your patient.      Sincerely,     Vilma Willett [facility]  cc:   Seth Duarte MD  [address]  [place]  [place] [address]

## 2024-03-21 NOTE — PROGRESS NOTES
Cardiology Clinic Progress Note  Kavita Merlos MRN# [id number]   YOB: 1935 Age: 88 year old     Primary cardiologist: Dr. Garay     Reason for visit: s/p TAVR     History of presenting illness:    Kavita Merlos is a pleasant 88 year old patient with past medical history significant for hyperlipidemia, macular degeneration, peripheral arterial disease with infrarenal AAA measuring 4.2 cm, and severe aortic stenosis status post TAVR with 26 mm Ross Adelso valve (2/20/23), who is here today for follow-up.     Briefly, her TAVR procedure went well without any complication.  She had no hemodynamic instability or conduction abnormalities.  She went into A-fib RVR postoperatively, she spontaneously converted.  She was placed on oral diltiazem 120 mg daily for rate controle and Eliquis 5 mg BID for a [id number] score of 5.  She was tolerating cardiac rehab and discharged on POD#2.     Today she reports fatigue and overall low energy levels since her TAVR.  She thinks this may be from diltiazem.  Her legs feel weak, but this is improving.  She is walking up to a mile a day and up 1 flight of stairs without any significant exertional symptoms.  She opted out of cardiac rehab, but may be interested in 1 day a week.  She otherwise denies chest pain, shortness of breath, syncope or near syncope, or palpitations.    Repeat echocardiogram on 3/20/2024 shows well-seated bioprosthetic aortic valve with mean gradient 11 mmHg, mild paravalvular AI, and preserved LV systolic function with an EF of 60 to 65%.  Her CBC and BMP, which I reviewed, are essentially normal.         Assessment and Plan:     ASSESSMENT:    S/p TAVR with 26 mm Ross Adelso 3 valve (2/20/24).  Continues to feel somewhat fatigued with low energy levels, has a strong suspicion that this is her diltiazem.  She remains relatively active without any significant exertional symptoms.  Repeat echocardiogram today shows well-functioning valve  with preserved LV systolic function  Newly diagnosed atrial fibrillation.  On Eliquis for stroke prophylaxis and diltiazem  mg daily for rate control.  On exam, she appears to be in a normal rhythm today.  Of note, she has not tolerated beta-blockers in the past  Chronic diastolic heart failure, NYHA class I.  Euvolemic on exam, not on diuretic therapy.  AAA measuring 4.2 cm per recent TTE.  Stable on recent imaging.  Hypertension.  Elevated in clinic, though she has whitecoat hypertension.  She reports home blood pressure readings anywhere between 130-140 over 80s.  Currently on diltiazem  mg daily.       PLAN:     The patient feels quite certain that the diltiazem is causing her symptoms, as such, will discontinue this today.  She will continue to take her blood pressure at home and let us know if BP is consistently > 140/80.   Continue Eliquis 5 mg BID.   She will consider starting cardiac rehab at least once a week.  She will need lifelong antibiotic prophylaxis prior to any dental procedure.  Follow-up with Dr. Duarte in 6 months, sooner if needed.         Vilma Willett, DNP, APRN, CNP  Page: [phone] (8a-5p M-F)    Orders this Visit:  No orders of the defined types were placed in this encounter.    Orders Placed This Encounter   Medications    rosuvastatin (CRESTOR) 5 MG tablet     Sig: Take 1 tablet (5 mg) by mouth daily     Dispense:  90 tablet     Refill:  3     Medications Discontinued During This Encounter   Medication Reason    diltiazem ER COATED BEADS (CARDIZEM CD/CARTIA XT) 120 MG 24 hr capsule     rosuvastatin (CRESTOR) 5 MG tablet Reorder (No AVS)       Today's clinic visit entailed:  Review of the result(s) of each unique test - echo, labs, EKG, TAVR  Ordering of each unique test  Prescription drug management  35 minutes spent by me on the date of the encounter doing chart review, review of test results, interpretation of tests, patient visit, and documentation   Provider  Link to  "[facility] Help Grid     The level of medical decision making during this visit was of moderate complexity.           Review of Systems:     Review of Systems:  Skin:        Eyes:       ENT:       Respiratory:  Positive for dyspnea on exertion  Cardiovascular:  Negative;edema;lightheadedness;dizziness Positive for;fatigue;palpitations  Gastroenterology:      Genitourinary:       Musculoskeletal:       Neurologic:       Psychiatric:       Heme/Lymph/Imm:       Endocrine:                 Physical Exam:   Vitals: BP (!) 195/75   Pulse 77   Ht 1.626 m (5' 4\")   Wt 62.6 kg (138 lb)   LMP  (LMP Unknown)   SpO2 98%   BMI 23.69 kg/m    Constitutional:  cooperative        Skin:  warm and dry to the touch        Head:  normocephalic        Eyes:  pupils equal and round        ENT:  not assessed this visit        Neck:  JVP normal        Chest:  normal breath sounds, clear to auscultation, normal A-P diameter, normal symmetry, normal respiratory excursion, no use of accessory muscles        Cardiac: regular rhythm;normal S1 and S2       systolic ejection murmur;grade 1          Abdomen:  abdomen soft        Vascular: pulses full and equal                                      Extremities and Back:  no edema        Neurological:  no gross motor deficits;affect appropriate             Medications:     Current Outpatient Medications   Medication Sig Dispense Refill    apixaban ANTICOAGULANT (ELIQUIS) 5 MG tablet Take 1 tablet (5 mg) by mouth 2 times daily 180 tablet 3    co-enzyme Q-10 100 MG CAPS capsule Take 100 mg by mouth daily      cyanocobalamin (VITAMIN B-12) 100 MCG tablet Take 100 mcg by mouth Every other day      Multiple Vitamins-Minerals (PRESERVISION AREDS 2 PO) Take by mouth daily      multivitamin, therapeutic (THERA-VIT) TABS tablet Take 1 tablet by mouth daily      omega 3 1000 MG CAPS Take 1 capsule by mouth every other day      ranibizumab (LUCENTIS) 0.5 MG/0.05ML SOLN 0.5 mg by Intravitreal route once Once every " 6-8 weeks, eye injection      rosuvastatin (CRESTOR) 5 MG tablet Take 1 tablet (5 mg) by mouth daily 90 tablet 3    tafluprost (ZIOPTAN) 0.0015 % SOLN ophthalmic solution Place 1 drop into both eyes at bedtime      Timolol Maleate (TIMOPTIC OCUDOSE) 0.5 % ophthalmic solution Place 1 drop into both eyes 2 times daily      vitamin C (ASCORBIC ACID) 1000 MG TABS Take 1,000 mg by mouth daily      Vitamin D3 (CHOLECALCIFEROL) 25 mcg (1000 units) tablet Take by mouth daily Daily in the winter      vitamin E (TOCOPHEROL) 400 units (180 mg) capsule Take 400 Units by mouth every other day      zinc gluconate 50 MG tablet Take 50 mg by mouth every other day         History reviewed. No pertinent family history.    Social History     Socioeconomic History    Marital status: Single     Spouse name: Not on file    Number of children: Not on file    Years of education: Not on file    Highest education level: Not on file   Occupational History    Not on file   Tobacco Use    Smoking status: Former     Types: Cigarettes     Passive exposure: Never    Smokeless tobacco: Never    Tobacco comments:     I quit when I was 71 years old, off/on prior to that starting when I was 17   Vaping Use    Vaping Use: Never used   Substance and Sexual Activity    Alcohol use: Not Currently    Drug use: Not Currently    Sexual activity: Not on file   Other Topics Concern    Not on file   Social History Narrative    Not on file     Social Determinants of Health     Financial Resource Strain: Low Risk  (10/9/2023)    Financial Resource Strain     Within the past 12 months, have you or your family members you live with been unable to get utilities (heat, electricity) when it was really needed?: No   Food Insecurity: Low Risk  (10/9/2023)    Food Insecurity     Within the past 12 months, did you worry that your food would run out before you got money to buy more?: No     Within the past 12 months, did the food you bought just not last and you didn t  have money to get more?: No   Transportation Needs: Low Risk  (10/9/2023)    Transportation Needs     Within the past 12 months, has lack of transportation kept you from medical appointments, getting your medicines, non-medical meetings or appointments, work, or from getting things that you need?: No   Physical Activity: Not on file   Stress: Not on file   Social Connections: Not on file   Interpersonal Safety: High Risk (10/13/2023)    Interpersonal Safety     Do you feel physically and emotionally safe where you currently live?: No     Within the past 12 months, have you been hit, slapped, kicked or otherwise physically hurt by someone?: No     Within the past 12 months, have you been humiliated or emotionally abused in other ways by your partner or ex-partner?: No   Housing Stability: Low Risk  (10/9/2023)    Housing Stability     Do you have housing? : Yes     Are you worried about losing your housing?: No            Past Medical History:     Past Medical History:   Diagnosis Date    AAA (abdominal aortic aneurysm) (H24)     Aortic stenosis     Benign essential hypertension with BP difference between arms     Hyperlipidemia LDL goal <70               Past Surgical History:     Past Surgical History:   Procedure Laterality Date    CV CORONARY ANGIOGRAM N/A 12/19/2023    Procedure: Coronary Angiogram;  Surgeon: Seth Duarte MD;  Location: [facility] CARDIAC CATH LAB    CV PERIPHERAL VASCULAR LITHOTRIPSY N/A 2/20/2024    Procedure: Peripheral Vascular Lithotripsy;  Surgeon: Seth Duarte MD;  Location: [facility] CARDIAC CATH LAB    CV RIGHT HEART CATH MEASUREMENTS RECORDED N/A 12/19/2023    Procedure: Right Heart Catheterization;  Surgeon: Seth Duarte MD;  Location: [facility] CARDIAC CATH LAB    CV TRANSCATHETER AORTIC VALVE REPLACEMENT-FEMORAL APPROACH N/A 2/20/2024    Procedure: Transcatheter Aortic Valve Replacement-Femoral Approach;  Surgeon: Seth Duarte MD;  Location:  [facility] CARDI[facility]              Allergies:   Albuterol, Amlodipine, Bimatoprost, Brimonidine, Clotrimazole, Dorzolamide hcl-timolol mal, Latanoprost, Lisinopril, Losartan, Neomycin-polymyxin-gramicidin, Neosporin [neomycin-polymyxin-gramicidin], Penicillins, Tape [adhesive tape], Timolol, Travoprost, and Vicodin [hydrocodone-acetaminophen]       Data:   All laboratory data reviewed:    Recent Labs   Lab Test 02/09/24  1440 10/13/23  1526   LDL  --  109*   HDL  --  54   NHDL  --  132*   CHOL  --  186   TRIG  --  115   TSH  --  1.38   NTBNP 994  --        Lab Results   Component Value Date    WBC 7.1 03/20/2024    WBC 9.0 04/17/2020    RBC 4.28 03/20/2024    RBC 4.76 04/17/2020    HGB 12.8 03/20/2024    HGB 14.4 04/17/2020    HCT 40.3 03/20/2024    HCT 45.7 04/17/2020    MCV 94 03/20/2024    MCV 96 04/17/2020    MCH 29.9 03/20/2024    MCH 30.3 04/17/2020    MCHC 31.8 03/20/2024    MCHC 31.5 04/17/2020    RDW 14.1 03/20/2024    RDW 13.2 04/17/2020     03/20/2024     04/17/2020       Lab Results   Component Value Date     03/20/2024     04/17/2020    POTASSIUM 4.5 03/20/2024    POTASSIUM 4.4 04/17/2020    CHLORIDE 100 03/20/2024    CHLORIDE 105 04/17/2020    CO2 29 03/20/2024    CO2 26 04/17/2020    ANIONGAP 8 03/20/2024    ANIONGAP 7 04/17/2020    GLC 99 03/20/2024     (H) 02/21/2024    GLC 97 04/17/2020    BUN 16.4 03/20/2024    BUN 16 04/17/2020    CR 0.61 03/20/2024    CR 0.55 04/17/2020    GFRESTIMATED 86 03/20/2024    GFRESTIMATED >60 11/16/2023    GFRESTIMATED 86 04/17/2020    GFRESTBLACK >90 04/17/2020    JERRY 9.5 03/20/2024    JERRY 9.5 04/17/2020      Lab Results   Component Value Date    AST 28 10/13/2023    ALT 18 10/13/2023       No results found for: \"A1C\"    Lab Results   Component Value Date    INR 0.97 02/09/2024    INR 1.01 12/19/2023       KCCQ-12  5  4  1  5  1  3  5  4  4  6  4  6    "

## 2024-03-21 NOTE — PATIENT INSTRUCTIONS
Today's Plan:     - Stop diltiazem today.   - Start cardiac rehab, okay with 1 day per week.   - Monitor your salt intake.   - Goal blood pressure is < 140/80.   - Follow-up with Dr. Duarte in 6 month, sooner if needed.   - Follow-up with primary care doctor.     If you have questions or concerns please call my nurse team:  Dana RN ([phone]) and Meagan RN ([phone])    After Hours: [phone], option #2, ask for cardiologist on-call    Scheduling phone number: [phone]    Reminder: Please bring in all current medications, over the counter supplements and vitamin bottles to your next appointment.-    It was a pleasure seeing you today!     Vilma Willett, CARLA  3/21/2024    -

## 2024-04-05 ENCOUNTER — HOSPITAL ENCOUNTER (OUTPATIENT)
Dept: CARDIAC REHAB | Facility: CLINIC | Age: 89
Discharge: HOME | End: 2024-04-05
Attending: INTERNAL MEDICINE
Payer: MEDICARE

## 2024-04-05 ENCOUNTER — TELEPHONE (OUTPATIENT)
Dept: CARDIOLOGY | Facility: CLINIC | Age: 89
End: 2024-04-05
Payer: MEDICARE

## 2024-04-05 DIAGNOSIS — I10 BENIGN ESSENTIAL HYPERTENSION: Primary | ICD-10-CM

## 2024-04-05 PROCEDURE — 93798 PHYS/QHP OP CAR RHAB W/ECG: CPT | Performed by: REHABILITATION PRACTITIONER

## 2024-04-05 RX ORDER — HYDROCHLOROTHIAZIDE 25 MG/1
25 TABLET ORAL DAILY
Qty: 30 TABLET | Refills: 11 | Status: SHIPPED | OUTPATIENT
Start: 2024-04-05 | End: 2024-04-10

## 2024-04-05 NOTE — TELEPHONE ENCOUNTER
Received voicemail from Valeria with [facility]. Patient was there today for cardiac rehab. Valeria noted that patient's BP at rest was 182/96 and with minimal activity her BP increased to 230/96.   Valeria is looking for guidelines for rehab. Will route to Vilma Willett NP.

## 2024-04-05 NOTE — TELEPHONE ENCOUNTER
Left voicemail requesting call back to discuss recommendations. Direct call back number of [phone] provided.

## 2024-04-05 NOTE — TELEPHONE ENCOUNTER
Received return call from patient's daughter Janette. Reviewed patient's blood pressures from today at cardiac rehab was 230 systolic with minimal activity. We discussed Vilma's recommendation to start hydrochlorothiazide if patient is willing. Janette states that she will discuss with Kavita and call us back after speaking with her. She also noted that Kavita stopped her rosuvastatin last Friday due to constipation/headaches.     Returned call to cardiac rehab. Will follow up next week with plan for cardiac rehab. Per Vilma, patient should not continue cardiac rehab if blood pressures remain >200 with minimal activity.       Addendum: Received return call from Janette. She states that Kavita is willing to try hydrochlorothiazide. Kavita stated that this is the last blood pressure mediation she is willing to try and she will self discontinue if she feels that she is experiencing any side effects. Sent prescription to preferred pharmacy. Will plan to touch base with Janette next week.   If patient remains on hydrochlorothiazide she will need a repeat BMP next week.

## 2024-04-10 ENCOUNTER — TELEPHONE (OUTPATIENT)
Dept: CARDIOLOGY | Facility: CLINIC | Age: 89
End: 2024-04-10
Payer: MEDICARE

## 2024-04-10 NOTE — TELEPHONE ENCOUNTER
Called patient's daughter to follow up on hydrochlorothiazide. Janette states that patient took hydrochlorothiazide on Sunday and Monday and then stopped taking it. Kavita states that she is done taking medication for her blood pressure. Kavita continues to take her eliquis but does not taking any other medication.   Kavita plans to attend her cardiac rehab appointment on Friday 4/12/24. Kavita will bring her home BP cuff to compare.   She is aware that if her BP is elevated at cardiac rehab they may stop cardiac rehab early.

## 2024-04-12 NOTE — PROGRESS NOTES
Pt showed up to cardiac rehab today. Pt stated she is no longer taking her BP med as it gave her flu like symptoms. Previously therapist had spoke to Mds who stated if Pt is not taking any BP meds and her Bps are still high, we are not to exercise Pt.     Pt seemed to be aware of this and just wanted home BP cuff assessed. Home BP cuff read 226/94. Therapist manual BP was 202/84.     Pt states she will be using her BP machine and home and engage in some walking. Pt was advised to not be walking if her BP is that high.     Family member with Pt states they are going to look into home physical therapy. Pt has longstanding history of refusing to take BP meds.     Surya

## 2024-04-17 ENCOUNTER — TELEPHONE (OUTPATIENT)
Dept: CARDIOLOGY | Facility: CLINIC | Age: 89
End: 2024-04-17
Payer: MEDICARE

## 2024-04-17 NOTE — TELEPHONE ENCOUNTER
Patient daughter, Janette, called RN direct line. RN returned call. Janette stated patient went to cardiac rehab and it was cancelled as SBP was 205 on arrival. Per Janette, blood pressure was taken on home BP cuff at cardiac rehab and it correlated within a couple points. Cardiac rehab then cancelled patients next two appointments. Janette stated patient will keep trying to go to cardiac rehab if blood pressure allows. Per patient, home systolic blood pressures range 150-180. Patient continues to not want to be on any medication to help lower blood pressure. Patient aware of risks of hypertension. Educated Janette to call if patient change her mind. Janette voiced understanding and in agreement with plan.

## 2024-06-26 ENCOUNTER — TELEPHONE (OUTPATIENT)
Dept: CARDIOLOGY | Facility: CLINIC | Age: 89
End: 2024-06-26
Payer: MEDICARE

## 2024-06-26 NOTE — TELEPHONE ENCOUNTER
1st attempt- Left voicemail for the patient to call back and schedule the following:    Appointment type:  RTN Structural   Provider:  Dr. Garay  Return date:   August   Additional appointment(s) needed:  N/A  Additonal Notes:  Reschedule [place] appt w/ General Cardiologist Dr. Garay     Specialty phone number: [phone]    Lina Sarabia  Structural Heart Procedure   [facility]/ [facility]

## 2024-08-05 ENCOUNTER — HOSPITAL ENCOUNTER (EMERGENCY)
Facility: CLINIC | Age: 89
Discharge: HOME | End: 2024-08-05
Attending: EMERGENCY MEDICINE | Admitting: EMERGENCY MEDICINE
Payer: MEDICARE

## 2024-08-05 ENCOUNTER — APPOINTMENT (OUTPATIENT)
Dept: MRI IMAGING | Facility: CLINIC | Age: 89
End: 2024-08-05
Attending: EMERGENCY MEDICINE
Payer: MEDICARE

## 2024-08-05 ENCOUNTER — NURSE TRIAGE (OUTPATIENT)
Dept: CARDIOLOGY | Facility: CLINIC | Age: 89
End: 2024-08-05

## 2024-08-05 ENCOUNTER — TELEPHONE (OUTPATIENT)
Dept: CARDIOLOGY | Facility: CLINIC | Age: 89
End: 2024-08-05
Payer: MEDICARE

## 2024-08-05 VITALS
HEART RATE: 68 BPM | SYSTOLIC BLOOD PRESSURE: 187 MMHG | WEIGHT: 137.13 LBS | TEMPERATURE: 97.8 F | RESPIRATION RATE: 18 BRPM | DIASTOLIC BLOOD PRESSURE: 74 MMHG | BODY MASS INDEX: 23.54 KG/M2 | OXYGEN SATURATION: 95 %

## 2024-08-05 DIAGNOSIS — R30.0 DYSURIA: ICD-10-CM

## 2024-08-05 DIAGNOSIS — R29.898 LEFT LEG WEAKNESS: ICD-10-CM

## 2024-08-05 DIAGNOSIS — N30.01 ACUTE CYSTITIS WITH HEMATURIA: ICD-10-CM

## 2024-08-05 DIAGNOSIS — I10 UNCONTROLLED HYPERTENSION: ICD-10-CM

## 2024-08-05 DIAGNOSIS — R20.0 LEFT ARM NUMBNESS: ICD-10-CM

## 2024-08-05 LAB
ALBUMIN SERPL BCG-MCNC: 4.2 G/DL (ref 3.5–5.2)
ALBUMIN UR-MCNC: 30 MG/DL
ALP SERPL-CCNC: 85 U/L (ref 40–150)
ALT SERPL W P-5'-P-CCNC: 39 U/L (ref 0–50)
ANION GAP SERPL CALCULATED.3IONS-SCNC: 13 MMOL/L (ref 7–15)
APPEARANCE UR: ABNORMAL
AST SERPL W P-5'-P-CCNC: 80 U/L (ref 0–45)
ATRIAL RATE - MUSE: 79 BPM
BACTERIA #/AREA URNS HPF: ABNORMAL /HPF
BASOPHILS # BLD AUTO: 0.1 10E3/UL (ref 0–0.2)
BASOPHILS NFR BLD AUTO: 1 %
BILIRUB SERPL-MCNC: 1.5 MG/DL
BILIRUB UR QL STRIP: NEGATIVE
BUN SERPL-MCNC: 14.8 MG/DL (ref 8–23)
CALCIUM SERPL-MCNC: 9.5 MG/DL (ref 8.8–10.4)
CHLORIDE SERPL-SCNC: 98 MMOL/L (ref 98–107)
COLOR UR AUTO: ABNORMAL
CREAT SERPL-MCNC: 0.63 MG/DL (ref 0.51–0.95)
DIASTOLIC BLOOD PRESSURE - MUSE: NORMAL MMHG
EGFRCR SERPLBLD CKD-EPI 2021: 85 ML/MIN/1.73M2
EOSINOPHIL # BLD AUTO: 0.1 10E3/UL (ref 0–0.7)
EOSINOPHIL NFR BLD AUTO: 2 %
ERYTHROCYTE [DISTWIDTH] IN BLOOD BY AUTOMATED COUNT: 13.9 % (ref 10–15)
GLUCOSE SERPL-MCNC: 111 MG/DL (ref 70–99)
GLUCOSE UR STRIP-MCNC: NEGATIVE MG/DL
HCO3 SERPL-SCNC: 25 MMOL/L (ref 22–29)
HCT VFR BLD AUTO: 35.2 % (ref 35–47)
HGB BLD-MCNC: 11.1 G/DL (ref 11.7–15.7)
HGB UR QL STRIP: ABNORMAL
HOLD SPECIMEN: NORMAL
HOLD SPECIMEN: NORMAL
HYALINE CASTS: 3 /LPF
IMM GRANULOCYTES # BLD: 0 10E3/UL
IMM GRANULOCYTES NFR BLD: 0 %
INTERPRETATION ECG - MUSE: NORMAL
KETONES UR STRIP-MCNC: NEGATIVE MG/DL
LEUKOCYTE ESTERASE UR QL STRIP: ABNORMAL
LYMPHOCYTES # BLD AUTO: 1.5 10E3/UL (ref 0.8–5.3)
LYMPHOCYTES NFR BLD AUTO: 22 %
MCH RBC QN AUTO: 29.8 PG (ref 26.5–33)
MCHC RBC AUTO-ENTMCNC: 31.5 G/DL (ref 31.5–36.5)
MCV RBC AUTO: 95 FL (ref 78–100)
MONOCYTES # BLD AUTO: 0.7 10E3/UL (ref 0–1.3)
MONOCYTES NFR BLD AUTO: 9 %
MUCOUS THREADS #/AREA URNS LPF: PRESENT /LPF
NEUTROPHILS # BLD AUTO: 4.7 10E3/UL (ref 1.6–8.3)
NEUTROPHILS NFR BLD AUTO: 66 %
NITRATE UR QL: NEGATIVE
NRBC # BLD AUTO: 0 10E3/UL
NRBC BLD AUTO-RTO: 0 /100
P AXIS - MUSE: 59 DEGREES
PH UR STRIP: 8 [PH] (ref 5–7)
PLATELET # BLD AUTO: 197 10E3/UL (ref 150–450)
POTASSIUM SERPL-SCNC: 4.5 MMOL/L (ref 3.4–5.3)
PR INTERVAL - MUSE: 134 MS
PROT SERPL-MCNC: 7.3 G/DL (ref 6.4–8.3)
QRS DURATION - MUSE: 86 MS
QT - MUSE: 368 MS
QTC - MUSE: 421 MS
R AXIS - MUSE: 76 DEGREES
RBC # BLD AUTO: 3.72 10E6/UL (ref 3.8–5.2)
RBC URINE: 50 /HPF
SODIUM SERPL-SCNC: 136 MMOL/L (ref 135–145)
SP GR UR STRIP: 1.01 (ref 1–1.03)
SQUAMOUS EPITHELIAL: 1 /HPF
SYSTOLIC BLOOD PRESSURE - MUSE: NORMAL MMHG
T AXIS - MUSE: 80 DEGREES
UROBILINOGEN UR STRIP-MCNC: NORMAL MG/DL
VENTRICULAR RATE- MUSE: 79 BPM
WBC # BLD AUTO: 7.1 10E3/UL (ref 4–11)
WBC URINE: 85 /HPF

## 2024-08-05 PROCEDURE — 81003 URINALYSIS AUTO W/O SCOPE: CPT | Performed by: EMERGENCY MEDICINE

## 2024-08-05 PROCEDURE — 70549 MR ANGIOGRAPH NECK W/O&W/DYE: CPT | Mod: MA

## 2024-08-05 PROCEDURE — 87086 URINE CULTURE/COLONY COUNT: CPT | Performed by: EMERGENCY MEDICINE

## 2024-08-05 PROCEDURE — 255N000002 HC RX 255 OP 636: Performed by: EMERGENCY MEDICINE

## 2024-08-05 PROCEDURE — 70553 MRI BRAIN STEM W/O & W/DYE: CPT | Mod: MA

## 2024-08-05 PROCEDURE — 85048 AUTOMATED LEUKOCYTE COUNT: CPT | Performed by: EMERGENCY MEDICINE

## 2024-08-05 PROCEDURE — 99285 EMERGENCY DEPT VISIT HI MDM: CPT | Mod: 25

## 2024-08-05 PROCEDURE — 250N000013 HC RX MED GY IP 250 OP 250 PS 637: Performed by: EMERGENCY MEDICINE

## 2024-08-05 PROCEDURE — 93005 ELECTROCARDIOGRAM TRACING: CPT

## 2024-08-05 PROCEDURE — 70544 MR ANGIOGRAPHY HEAD W/O DYE: CPT | Mod: MA

## 2024-08-05 PROCEDURE — A9585 GADOBUTROL INJECTION: HCPCS | Performed by: EMERGENCY MEDICINE

## 2024-08-05 PROCEDURE — 80053 COMPREHEN METABOLIC PANEL: CPT | Performed by: EMERGENCY MEDICINE

## 2024-08-05 PROCEDURE — 36415 COLL VENOUS BLD VENIPUNCTURE: CPT | Performed by: EMERGENCY MEDICINE

## 2024-08-05 RX ORDER — CEPHALEXIN 500 MG/1
500 CAPSULE ORAL 4 TIMES DAILY
Qty: 20 CAPSULE | Refills: 0 | Status: SHIPPED | OUTPATIENT
Start: 2024-08-05 | End: 2024-08-05

## 2024-08-05 RX ORDER — CEPHALEXIN 500 MG/1
500 CAPSULE ORAL ONCE
Status: COMPLETED | OUTPATIENT
Start: 2024-08-05 | End: 2024-08-05

## 2024-08-05 RX ORDER — CEPHALEXIN 500 MG/1
500 CAPSULE ORAL 4 TIMES DAILY
Qty: 20 CAPSULE | Refills: 0 | Status: SHIPPED | OUTPATIENT
Start: 2024-08-05 | End: 2024-08-14

## 2024-08-05 RX ORDER — GADOBUTROL 604.72 MG/ML
10 INJECTION INTRAVENOUS ONCE
Status: COMPLETED | OUTPATIENT
Start: 2024-08-05 | End: 2024-08-05

## 2024-08-05 RX ADMIN — CEPHALEXIN 500 MG: 500 CAPSULE ORAL at 17:36

## 2024-08-05 RX ADMIN — GADOBUTROL 10 ML: 604.72 INJECTION INTRAVENOUS at 15:13

## 2024-08-05 ASSESSMENT — ACTIVITIES OF DAILY LIVING (ADL)
ADLS_ACUITY_SCORE: 38

## 2024-08-05 ASSESSMENT — COLUMBIA-SUICIDE SEVERITY RATING SCALE - C-SSRS
6. HAVE YOU EVER DONE ANYTHING, STARTED TO DO ANYTHING, OR PREPARED TO DO ANYTHING TO END YOUR LIFE?: NO
2. HAVE YOU ACTUALLY HAD ANY THOUGHTS OF KILLING YOURSELF IN THE PAST MONTH?: NO
1. IN THE PAST MONTH, HAVE YOU WISHED YOU WERE DEAD OR WISHED YOU COULD GO TO SLEEP AND NOT WAKE UP?: NO

## 2024-08-05 NOTE — DISCHARGE INSTRUCTIONS
Discharge Instructions  Urinary Tract Infection  You or your child have been diagnosed with a urinary tract infection, or UTI. The urinary tract includes the kidneys (which make urine/pee), ureters (the tubes that carry urine/pee from the kidneys to the bladder), the bladder (which stores urine/pee), and urethra (the tube that carries urine/pee out of the bladder). Urinary tract infections occur when bacteria travel up the urethra into the bladder (bladder infection) and, in some cases, from there into the kidneys (kidney infection).  Generally, every Emergency Department visit should have a follow-up clinic visit with either a primary or a specialty clinic/provider. Please follow-up as instructed by your emergency provider today.  Return to the Emergency Department if:  You or your child have severe back pain.  You or your child are vomiting (throwing up) so that you cannot take your medicine.  You or your child have a new fever (had not previously had a fever) over 101 F.  You or your child have confusion or are very weak, or feel very ill.  Your child seems much more ill, will not wake up, will not respond right, or is crying for a long time and will not calm down.  You or your child are showing signs of dehydration. These signs may include decreased urination (pee), dry mouth/gums/tongue, or decreased activity.    Follow-up with your provider:   Children under 24 months need to be seen by their regular provider within one week after a diagnosis of a UTI. It may be necessary to do some more tests to look at the child s kidney or bladder.  You should begin to feel better within 24 - 48 hours of starting your antibiotic; follow-up with your regular clinic/doctor/provider if this is not the case.    Treatment:   You will be treated with an antibiotic to kill the bacteria. We have to make an educated guess, based on what we know about common bacteria and antibiotics, as to which antibiotic will work for your  "infection. We will be correct most times but there will be some cases where the antibiotic chosen is not correct (see urine cultures below).  Take a pain medication such as acetaminophen (Tylenol ) or ibuprofen (Advil , Motrin , Nuprin ).  Phenazopyridine (Pyridium , Uristat ) is a prescription medication that numbs the bladder to reduce the burning pain of some UTIs.  The same medication is available in a non-prescription version (Azo-Standard , Urodol ). This medication will change the color of the urine and tears (usually blue or orange). If you wear contacts, do not wear them while taking this medication as they may be stained by the medication.    Urine Cultures:  If indicated, a urine culture may have been performed today. This test generally takes 24-48 hours to complete so the results are not known at this time. The results can confirm that an infection is present but also determine which antibiotic is effective for the specific bacteria that is causing the infection. If your urine culture shows that the antibiotic you were given today will not work to treat your infection, we will attempt to contact you to make arrangements to change the antibiotic. If the culture confirms that the antibiotic is effective for your infection, you will not be contacted. We often recommend follow-up with your regular physician/provider on the culture results regardless of this process.    Antibiotic Warning:   If you have been placed on antibiotics - watch for signs of allergic reaction.  These include rash, lip swelling, difficulty breathing, wheezing, and dizziness.  If you develop any of these symptoms, stop the antibiotic immediately and go to an emergency room or urgent care for evaluation.    Probiotics: If you have been given an antibiotic, you may want to also take a probiotic pill or eat yogurt with live cultures. Probiotics have \"good bacteria\" to help your intestines stay healthy. Studies have shown that probiotics " help prevent diarrhea and other intestine problems (including C. diff infection) when you take antibiotics. You can buy these without a prescription in the pharmacy section of the store.   If you were given a prescription for medicine here today, be sure to read all of the information (including the package insert) that comes with your prescription.  This will include important information about the medicine, its side effects, and any warnings that you need to know about.  The pharmacist who fills the prescription can provide more information and answer questions you may have about the medicine.  If you have questions or concerns that the pharmacist cannot address, please call or return to the Emergency Department.   Remember that you can always come back to the Emergency Department if you are not able to see your regular provider in the amount of time listed above, if you get any new symptoms, or if there is anything that worries you.     Please follow up with neurology.  Please return to the emergency department if your symptoms worsen, you have facial droop, slurred speech, numbness or weakness on one side of your body.    Please follow-up with your primary care physician and your cardiologist regarding your blood pressure.

## 2024-08-05 NOTE — TELEPHONE ENCOUNTER
[facility] Call Center    Phone Message    May a detailed message be left on voicemail: yes     Reason for Call: Symptoms or Concerns     If patient has red-flag symptoms, warm transfer to triage line    Current symptom or concern: Pt's left wrist and arm went numb this morning.  She is fine now but concern is that she had bypass 6 months ago.  Pt's daughter stated that her mom said it didn't feel like it went to sleep.    Symptoms have been present for: 2 hour(s)    Has patient previously been seen for this? No      Are there any new or worsening symptoms? No    Action Taken: Other: cardio    Travel Screening: Not Applicable    Thank you!  [facility]       Date of Service:

## 2024-08-05 NOTE — ED PROVIDER NOTES
Emergency Department Note      History of Present Illness     Chief Complaint   Hypertension and Extremity Weakness      HPI   Kavita Merlos is a 89 year old female who is anticoagulated on Eliquis with a history of hypertension, hyperlipidemia who presents with hypertension and left upper extremity numbness. Kavita states that she has difficulty with blood pressure medications due to them causing her depression. She states that she's tried over four different blood pressure medications and cannot tolerate any of them. Patient notes that she was peeling a banana at 0800 when her left upper extremity went limp for 15 minutes, describing that she had simultaneous numbness and weakness. Patient's daughter notes that the patient has occasionally dragged her left foot while walking intermittently this month. Of note, patient's daughter states that the patient is S/P heart valve replacement in February. She also notes that the patient receives eye injections for macular degeneration, reporting that the patient is legally blind. Patient denies nausea, vomiting. She is currently asymptomatic.    Independent Historian   Daughter as detailed above.    Review of External Notes   Reviewed patient's chart, and she has an appointment with cardiology in August.    Reviewed patient's office visit with cardiology on 3/21/2024, patient was placed on diltiazem, but was discontinued that day.  Her hypertension also she reports was being monitored at home with readings of 130/40 over 80s.  They believe her hypertension is from whitecoat hypertension.  She is to advise the cardiologist if her blood pressure is consistently greater than 140/80.  Past Medical History     Medical History and Problem List   Hypertension  Hyperlipidemia  Glaucoma  Macular degeneration disease  Aortic stenosis    Medications   Eliquis    Surgical History   Aortic valve transcatheter replacement    Physical Exam     Patient Vitals for the past 24 hrs:   BP  Temp Temp src Pulse Resp SpO2 Weight   08/05/24 1700 (!) 187/74 -- -- 68 -- 95 % --   08/05/24 1645 (!) 188/79 -- -- -- -- 97 % --   08/05/24 1630 (!) 220/102 -- -- -- -- -- --   08/05/24 1508 -- -- -- -- -- 96 % --   08/05/24 1507 (!) 220/88 -- -- 79 -- -- --   08/05/24 1254 (!) 249/129 97.8  F (36.6  C) Temporal 90 18 97 % 62.2 kg (137 lb 2 oz)     Physical Exam  Gen: No distress.  Nontoxic.  Head: Atraumatic.  No hematomas, lesions, abrasions  Neck: No midline tenderness of the spine.  Mouth: No oral lesions, or abrasions.  Mucous membranes are moist.  Resp: Equal breath sounds, normal respiratory effort  Cardio: Regular rate and rhythm, no murmurs  Abdomen: Soft, nontender, nondistended  MSK; no extremity swelling, bruising, or abrasions.  Neuro: Cranial nerves II through XII intact.  5 out of 5 muscle strength in the upper and lower extremities.  Sensation intact in the upper and lower extremities.  Finger-to-nose negative.  Heel-to-shin negative.  No truncal ataxia.  Psych: Appropriate affect.    Diagnostics     Lab Results   Labs Ordered and Resulted from Time of ED Arrival to Time of ED Departure   COMPREHENSIVE METABOLIC PANEL - Abnormal       Result Value    Sodium 136      Potassium 4.5      Carbon Dioxide (CO2) 25      Anion Gap 13      Urea Nitrogen 14.8      Creatinine 0.63      GFR Estimate 85      Calcium 9.5      Chloride 98      Glucose 111 (*)     Alkaline Phosphatase 85      AST 80 (*)     ALT 39      Protein Total 7.3      Albumin 4.2      Bilirubin Total 1.5 (*)    ROUTINE UA WITH MICROSCOPIC REFLEX TO CULTURE - Abnormal    Color Urine Light Yellow      Appearance Urine Slightly Cloudy (*)     Glucose Urine Negative      Bilirubin Urine Negative      Ketones Urine Negative      Specific Gravity Urine 1.008      Blood Urine Moderate (*)     pH Urine 8.0 (*)     Protein Albumin Urine 30 (*)     Urobilinogen Urine Normal      Nitrite Urine Negative      Leukocyte Esterase Urine Large (*)      Bacteria Urine Few (*)     Mucus Urine Present (*)     RBC Urine 50 (*)     WBC Urine 85 (*)     Squamous Epithelials Urine 1      Hyaline Casts Urine 3 (*)    CBC WITH PLATELETS AND DIFFERENTIAL - Abnormal    WBC Count 7.1      RBC Count 3.72 (*)     Hemoglobin 11.1 (*)     Hematocrit 35.2      MCV 95      MCH 29.8      MCHC 31.5      RDW 13.9      Platelet Count 197      % Neutrophils 66      % Lymphocytes 22      % Monocytes 9      % Eosinophils 2      % Basophils 1      % Immature Granulocytes 0      NRBCs per 100 WBC 0      Absolute Neutrophils 4.7      Absolute Lymphocytes 1.5      Absolute Monocytes 0.7      Absolute Eosinophils 0.1      Absolute Basophils 0.1      Absolute Immature Granulocytes 0.0      Absolute NRBCs 0.0     URINE CULTURE       Imaging   MR Brain w/o & w Contrast   Final Result   IMPRESSION:  No restricted diffusion to suggest acute ischemia.   Chronic changes as above.         EDUARDO REED MD            SYSTEM ID:  [id number]      MRA Angiogram Head w/o Contrast   Final Result   IMPRESSION:  There are abnormal vascular findings particularly notably   involving the left proximal PCA distribution where the P1 segment is   severely stenotic and there is a diminutive appearance to the   remainder of the left PCA distribution. Of note, no restricted   diffusion in the left PCA distribution is noted on the concurrently   obtained MRI of the brain.      There is poor visualization of the left vertebral artery   intracranially on the MRA time-of-flight images but contrast-enhanced   imaging on the MRA evaluation of the neck demonstrates opacification   with filling of the vessel and on the postcontrast assessment in the   left V4 segment.           EDUARDO REED MD            SYSTEM ID:  [id number]      MRA Angiogram Neck w/o & w Contrast   Final Result   IMPRESSION:  No high-grade stenosis in the neck. There is atheromatous   disease involving the left vertebral artery to the greatest extent but    also involving the carotid bifurcations bilaterally          EDUARDO REED MD            SYSTEM ID:  [id number]          EKG   ECG results from 08/05/24   EKG 12 lead     Value    Systolic Blood Pressure     Diastolic Blood Pressure     Ventricular Rate 79    Atrial Rate 79    AK Interval 134    QRS Duration 86        QTc 421    P Axis 59    R AXIS 76    T Axis 80    Interpretation ECG      Sinus rhythm  Possible Left atrial enlargement  Left ventricular hypertrophy ( Sokolow-Barnes , Romhilt-Scott )  Nonspecific ST abnormality  When compared with ECG of 22-Feb-2024 07:21,  Incomplete right bundle branch block is no longer Present         Independent Interpretation   None    ED Course      Medications Administered   Medications   gadobutrol (GADAVIST) injection 10 mL (10 mLs Intravenous $Given 8/5/24 1513)   sodium chloride (PF) 0.9% PF flush 60 mL (100 mLs Intravenous $Given 8/5/24 1514)   cephALEXin (KEFLEX) capsule 500 mg (500 mg Oral $Given 8/5/24 6429)       Procedures   Procedures     Discussion of Management   Spoke with Dr. Lechuga with stroke neuro, MRI looks chronic, no acute changes.  Would not recommend admission to the hospital.  Would not recommend any medication changes as the patient is on Eliquis and adding anything would increase her bleeding risk.  Recommends outpatient neurology follow-up.    ED Course   ED Course as of 08/05/24 2134   Mon Aug 05, 2024   1338 I obtained history and examined the patient as noted above         Additional Documentation  None    Medical Decision Making / Diagnosis     CMS Diagnoses: None    MIPS       None    [facility]   Kavita Merlos is a 89 year old female presents to the emergency department with a complaint of left wrist numbness and weakness.  Patient reports that she had an episode earlier today for 15 minutes when her left arm around her wrist went numb and weak.  She also reports her left foot has been intermittently weak over the past month.  She is  currently asymptomatic.  On exam patient does not have any focal neurologic deficits.  Patient is hypertensive.  She is not having any chest pain or shortness of breath.  She reports she cannot take any blood pressure medication.  On review of the patient's note from cardiology on 3/21/2024, her blood pressure was 195/75.  At today's visit her blood pressure was significantly high at 249/129.  Once the patient was reassured, and she was laying calmly in the bed, her blood pressure came down to 187/74.  She states that it is normally much lower at home.  I will have her recheck her blood pressure at home, and call her cardiologist tomorrow if her blood pressure continues to be high.  She does not have any endorgan damage on her lab work, and I do not think that we need to start anything emergently tonight.  As far as her neurologic symptoms, I did speak with stroke neuro, MRI shows chronic changes, nothing acute.  They do not advise any medication changes.  Also do not advise admission to the hospital.  Her urinalysis is suspicious for UTI, and the patient is having some increased frequency and pain with urination.  I will start her on antibiotics.  She feels comfortable with this plan.  I did advise her if she has any more neurologic symptoms to come to the emergency department.  She will follow-up outpatient with neurology.  Patient is discharged home asymptomatic.    Disposition   The patient was discharged.     Diagnosis     ICD-10-CM    1. Acute cystitis with hematuria  N30.01       2. Dysuria  R30.0       3. Uncontrolled hypertension  I10       4. Left arm numbness  R20.0 Adult Neurology  Referral      5. Left leg weakness  R29.898 Adult Neurology  Referral           Discharge Medications   Discharge Medication List as of 8/5/2024  5:36 PM        START taking these medications    Details   cephALEXin (KEFLEX) 500 MG capsule Take 1 capsule (500 mg) by mouth 4 times daily for 5 days, Disp-20  capsule, R-0, E-Prescribe               Scribe Disclosure:  I, Samson Luisnealnate, am serving as a scribe at 1:55 PM on 8/5/2024 to document services personally performed by Veronica Kaur MD based on my observations and the provider's statements to me.        Veronica Kaur MD  08/05/24 [id number]

## 2024-08-05 NOTE — TELEPHONE ENCOUNTER
"Patient daughter Janette called RN back. Janette stated that patient blood pressure today has been 222/101, 235/104 and several readings that were \"unreadable\". Patient took blood pressures in both arms  Patient had an episode of L wrist and mid arm numbness for about 15-20 minutes while peeling a banana. Patient hand/arm flopped and patient was unable to hold arm up. Janette stated numbness went away and patient is back at baseline. Patient denies vision or speech changes. Patient complaining of off and on headache and fatigue. Patient stated 1 or 2 times in the past month she has experienced a right foot drag. Patient advised to be evaluated in the emergency room for hypertensive emergency and potential TIA. Janette verbalized understanding and in agreement with plan.   "

## 2024-08-05 NOTE — ED TRIAGE NOTES
Pt here for multiple complaints - had an episode of L forearm/wrist weakness that began at 0800 while eating a banana. Symptoms resolved fairly quickly. Also has had intermittent L leg weakness for the last month. BP has been running high at home, but is unable to tolerate any BP medications (they make me depressed.) Pt has been having daily headaches, fatigue, and dysuria. Pt is also legally blind. On eliquis. Valve replacement this year. Denies pain currently.

## 2024-08-05 NOTE — TELEPHONE ENCOUNTER
Attempted to call patient daughter, Monik, back. Monik did not answer. Voicemail left with direct call back number.

## 2024-08-05 NOTE — TELEPHONE ENCOUNTER
"Received a call from the [facility] to discuss a numbness issue with daughter Monik.  Patient has a scheduled appointment with Dr. Garay 8/23/24.     HX: S/P TAVR, HTN, A fib, infrarenal AAA, HLD  HX: macular degeneration    Spoke to daughter Monik, who is currently with Kavita. She says she informed her that this morning she had an episode of her left arm/hand/wrist going \"numbish\" while she was sitting in a chair and trying to peel a banana. She says it lasted 10-15 minutes. She says she also told her that she has had a couple of incidents in the last month where her left foot will \"drag\" and she has to tell herself to pick it up, and then is able to continue walking.   She denies having any other unusual symptoms during these episodes such as facial droop, difficulty speaking, confusion, unsteady gait.  She says in the last month, she has been getting daily headaches mid-day, is very tired, and not feeling well. She says she is not taking any blood pressure medications as she is intolerant.  She says she is no longer having any numbish symptoms, but currently has a headache. They have not checked her BP today. Asked to check while on the call- per right arm: 222/101. She tried to recheck it twice more but monitor kept showing an error message.  Advised it is recommended to go to ED for an evaluation.  She verbalized agreement and understanding.    1. BLOOD PRESSURE: \"What is the blood pressure?\" \"Did you take at least two measurements 5 minutes apart?\"  2. ONSET: \"When did you take your blood pressure?\" During the call  3. HOW: \"How did you take your blood pressure?\" (e.g., automatic home BP monitor, visiting nurse) home monitor  4. HISTORY: \"Do you have a history of high blood pressure?\" yes  5. MEDICINES: \"Are you taking any medicines for blood pressure?\" \"Have you missed any doses recently?\" Not prescribed blood pressure medications  6. OTHER SYMPTOMS: \"Do you have any symptoms?\" (e.g., blurred vision, chest " "pain, difficulty breathing, headache, weakness) left arm numbness, headache, fatigue, feeling unwell  7. PREGNANCY: \"Is there any chance you are pregnant?\" \"When was your last menstrual period?\"    Reason for Disposition    Systolic BP >= 160 OR Diastolic >= 100, and any cardiac (e.g., breathing difficulty, chest pain) or neurologic symptoms (e.g., new-onset blurred or double vision)    Protocols used: Blood Pressure - High-A-OH    "

## 2024-08-06 ENCOUNTER — PATIENT OUTREACH (OUTPATIENT)
Dept: INTERNAL MEDICINE | Facility: CLINIC | Age: 89
End: 2024-08-06
Payer: MEDICARE

## 2024-08-06 LAB — BACTERIA UR CULT: NORMAL

## 2024-08-06 NOTE — TELEPHONE ENCOUNTER
"Daughter calling back (on CTC). Writer was unable to reconcile medications because daughter is not with the patient and doesn't have the med list on hand.       Transitions of Care Outreach  Chief Complaint   Patient presents with    Hospital F/U       Most Recent Admission Date: 8/5/2024   Most Recent Admission Diagnosis:      Most Recent Discharge Date: 8/5/2024   Most Recent Discharge Diagnosis: Acute cystitis with hematuria - N30.01  Dysuria - R30.0  Uncontrolled hypertension - I10  Left arm numbness - R20.0  Left leg weakness - R29.898     Transitions of Care Assessment    Discharge Assessment  How are you doing now that you are home?: Daughter says \"she seems to be better from the medication for UTI. Improved pain and able to pee. Will check BP later. Eating and doing daily routine as usual.\"  How are your symptoms? (Red Flag symptoms escalate to triage hotline per guidelines): Improved  Do you know how to contact your clinic care team if you have future questions or changes to your health status? : Yes  Does the patient have their discharge instructions? : Yes  Does the patient have questions regarding their discharge instructions? : No  Were you started on any new medications or were there changes to any of your previous medications? : Yes  Does the patient have all of their medications?: Yes  Do you have questions regarding any of your medications? : No    Follow up Plan     Discharge Follow-Up  Discharge follow up appointment scheduled in alignment with recommended follow up timeframe or Transitions of Risk Category? (Low = within 30 days; Moderate= within 14 days; High= within 7 days):  (Daughter will call back tomorrow to set up an appt with PCP. Somebody will call them to set up neurology appt.)    Future Appointments   Date Time Provider Department Center   8/23/2024 12:45 PM Lazaro Garay MD [facility]       Outpatient Plan as outlined on AVS reviewed with patient.    For any " urgent concerns, please contact our 24 hour nurse triage line: [phone] ([phone])       Arash Max RN

## 2024-08-06 NOTE — RESULT ENCOUNTER NOTE
Final urine culture report is negative.  Adult: Negative urine culture parameters per protocol: Any # urogenital calin, single or mixed   [facility] Emergency Dept discharge antibiotic prescribed (If applicable): Cephalexin  Treatment recommendations per [facility] ED Lab Result Urine Culture protocol: No change in plan of care.

## 2024-08-06 NOTE — TELEPHONE ENCOUNTER
ED / Discharge Outreach Protocol    Patient Contact    Attempt # 1    Was call answered?  No.  Left message on voicemail with information to call me back.    On Call Back:     Please relay triage RN was attempting to contact patient to follow up with them regarding their most recent hospital visit. If patient calls back, please route call to triage RN for hospital follow up assessment.     Thank you,  Joaquin Dale, Triage RN Kelsie Arciniega  12:28 PM 8/6/2024

## 2024-08-14 ENCOUNTER — VIRTUAL VISIT (OUTPATIENT)
Dept: INTERNAL MEDICINE | Facility: CLINIC | Age: 89
End: 2024-08-14
Payer: MEDICARE

## 2024-08-14 DIAGNOSIS — I10 HYPERTENSION, UNSPECIFIED TYPE: ICD-10-CM

## 2024-08-14 DIAGNOSIS — R30.0 DYSURIA: Primary | ICD-10-CM

## 2024-08-14 DIAGNOSIS — R20.0 LEFT UPPER EXTREMITY NUMBNESS: ICD-10-CM

## 2024-08-14 PROCEDURE — 99443 PR PHYSICIAN TELEPHONE EVALUATION 21-30 MIN: CPT | Mod: 93 | Performed by: INTERNAL MEDICINE

## 2024-08-14 RX ORDER — CEPHALEXIN 500 MG/1
500 CAPSULE ORAL 2 TIMES DAILY
Qty: 20 CAPSULE | Refills: 0 | Status: SHIPPED | OUTPATIENT
Start: 2024-08-14

## 2024-08-14 NOTE — PATIENT INSTRUCTIONS
Cardiology and neurology follow-up have been arranged.  Patient will complete 10 additional days of cephalexin for treatment of her urinary symptoms.

## 2024-08-14 NOTE — PROGRESS NOTES
Kavita is a 89 year old who is being evaluated via a billable telephone visit.    What phone number would you like to be contacted at? [phone]  How would you like to obtain your AVS? Margarita  Originating Location (pt. Location): Home  Distant Location (provider location):  On-site    Assessment & Plan     Dysuria  Patient reports that her urinary symptoms did return after completing the 5-day course of cephalexin.  She is wondering if any repeat prescription could be submitted to her pharmacy.  I will provide her with a prescription for cephalexin 500 mg p.o. twice per day for 10 additional days to complete a full 2 weeks of treatment.  Patient states that she is typically required 2 weeks of antibiotics to help resolve any urinary tract infections that she has experienced previously.  - cephALEXin (KEFLEX) 500 MG capsule; Take 1 capsule (500 mg) by mouth 2 times daily    Left upper extremity numbness  At this time, patient has had resolution of her recent issues with numbness in her left upper extremity.  Her MRI did not reveal any acute changes.  She does have a follow-up with her neurologist scheduled for August 15, 2024 for further evaluation and treatment recommendations.    Hypertension, unspecified type  Patient blood pressure has improved since getting out of the emergency department.  She reports that her systolic pressures are in the 140s and 150s.  She is working with cardiology in regards to ongoing management of her blood pressure.        MED REC REQUIRED  Post Medication Reconciliation Status: discharge medications reconciled and changed, per note/orders    See Patient Instructions    Subjective   Kavita is a 89 year old, presenting for the following health issues:  ER F/U    Patient is an 89-year-old female who participates in virtual visit for an ER follow-up visit. She was seen in the [facility] emergency department on August 5, 2024 after experiencing some issues with left upper extremity numbness.   Patient does have a history of hypertension that has been difficult to get under good control as she does seem to have difficulties tolerating blood pressure medications.  She is working with her cardiologist in this regard.  While in the emergency department, patient was noted to have initial blood pressure of 249/129.  Her blood pressure did improve while in the emergency department as her last blood pressure reading was noted to be 187/74.  She did have a CMP and CBC collected.  No concerning abnormalities were noted on her blood test.  Her urine sample was notable for presence of leukocyte esterase, white blood cells, and red blood cells, she did undergo an MRI of her head and neck with no acute findings noted.  Her case was discussed with the neurology stroke team, and they did not feel that she required any interventions or admission.  Patient blood pressure did improve following the emergency department without intervention.  She was deemed stable for discharge.  She was sent home on 5-day course of cephalexin for her suspected urinary tract infection.  Patient does report that her blood pressure has improved since being at home.  Her systolic pressures have been in the 140s and 150s.  She states that she is no longer having issues with any numbness or tingling in her left upper extremity.  Patient does report that she completed a 5-day course of antibiotics, but her dysuria and frequency did return after completing the medication.  Patient does state that she is typically required 2 weeks worth of antibiotics for previous urinary tract infections.         ED/UC Followup:    Facility:  [facility]  Date of visit: 8/5/24  Reason for visit:   Acute cystitis with hematuria  Dysuria  Uncontrolled hypertension  Left arm numbness  Left leg weakness  Current Status: stable        Review of Systems  CONSTITUTIONAL: NEGATIVE for fever, chills, change in weight  INTEGUMENTARY/SKIN: NEGATIVE for worrisome rashes, moles or  lesions  ENT/MOUTH: NEGATIVE for ear, mouth and throat problems  RESP: NEGATIVE for significant cough or SOB  CV: NEGATIVE for chest pain, palpitations or peripheral edema  GI: NEGATIVE for nausea, abdominal pain, heartburn, or change in bowel habits  : Positive for dysuria and frequency.  MUSCULOSKELETAL: NEGATIVE for significant arthralgias or myalgia  NEURO: Positive for headaches and weakness.      Objective       Vitals:  No vitals were obtained today due to virtual visit.    Physical Exam   General: Alert and no distress //Respiratory: No audible wheeze, cough, or shortness of breath // Psychiatric:  Appropriate affect, tone, and pace of words          Phone call duration: 24 minutes  Signed Electronically by: Ty Cordero MD

## 2024-08-20 NOTE — PROGRESS NOTES
CARDIOLOGY VISIT    REASON FOR VISIT: Status post TAVR    SUBJECTIVE:  89-year-old female seen for TAVR. She has vision impairment, hypertension.    She has been on multiple antihypertensives including beta-blockers, amlodipine, lisinopril, losartan, and HCTZ.  She reports feeling depressed on all of them.     Echo October 2023 showed EF 60%, severe aortic stenosis with mean 58 mmHg, V-max 4.8 m/s, area 0.6 cm , DI 0.19.    February 2024 she underwent TAVR with 26 mm Ross ABEL.  Echo showed EF 60%, TAVR with mean 11 mmHg, V-max 2.3 m/s, DI 0.52, 1+ PVL.  She had postop A-fib and spontaneously converted.  Eliquis was started.    She has had a variety complaints the past few months, none really are cardiac.  She denies chest pain, she has some occasional palpitations which is chronic.  Her blood pressure has been quite volatile, mostly on the higher side.  Home can be 150 to sometimes 160.  She was seen in the ER a few weeks ago with blood pressure over 200, stroke was ruled out.    Her main complaint is some intermittent abdominal pain, worse at night.  She is very worried about having a kidney stone or bladder infection, she is currently on a course of Keflex.    MEDICATIONS:  Current Outpatient Medications   Medication Sig Dispense Refill    apixaban ANTICOAGULANT (ELIQUIS) 5 MG tablet Take 1 tablet (5 mg) by mouth 2 times daily 180 tablet 3    cephALEXin (KEFLEX) 500 MG capsule Take 1 capsule (500 mg) by mouth 2 times daily 20 capsule 0    co-enzyme Q-10 100 MG CAPS capsule Take 100 mg by mouth daily      cyanocobalamin (VITAMIN B-12) 100 MCG tablet Take 100 mcg by mouth Every other day      Multiple Vitamins-Minerals (PRESERVISION AREDS 2 PO) Take by mouth daily      multivitamin, therapeutic (THERA-VIT) TABS tablet Take 1 tablet by mouth daily      omega 3 1000 MG CAPS Take 1 capsule by mouth every other day      ranibizumab (LUCENTIS) 0.5 MG/0.05ML SOLN 0.5 mg by Intravitreal route once Once every 6-8  weeks, eye injection      tafluprost (ZIOPTAN) 0.0015 % SOLN ophthalmic solution Place 1 drop into both eyes at bedtime      Timolol Maleate (TIMOPTIC OCUDOSE) 0.5 % ophthalmic solution Place 1 drop into both eyes 2 times daily      vitamin C (ASCORBIC ACID) 1000 MG TABS Take 1,000 mg by mouth daily      Vitamin D3 (CHOLECALCIFEROL) 25 mcg (1000 units) tablet Take by mouth daily Daily in the winter      vitamin E (TOCOPHEROL) 400 units (180 mg) capsule Take 400 Units by mouth every other day      zinc gluconate 50 MG tablet Take 50 mg by mouth every other day       No current facility-administered medications for this visit.       ALLERGIES:  Allergies   Allergen Reactions    Albuterol     Amlodipine     Bimatoprost Itching    Brimonidine Itching    Clotrimazole Itching    Dorzolamide Hcl-Timolol Mal Itching    Latanoprost Itching    Lisinopril     Losartan      depression    Neomycin-Polymyxin-Gramicidin Swelling    Neosporin [Neomycin-Polymyxin-Gramicidin]     Penicillins     Tape [Adhesive Tape]     Timolol Itching    Travoprost Itching    Vicodin [Hydrocodone-Acetaminophen]        REVIEW OF SYSTEMS:  Constitutional:  No weight loss, fever, chills  HEENT:  Eyes:  No visual loss, blurred vision, double vision or yellow sclerae. No hearing loss, sneezing, congestion, runny nose or sore throat.  Skin:  No rash or itching.  Cardiovascular: per HPI  Respiratory: per HPI  GI:  No anorexia, nausea, vomiting or diarrhea. No abdominal pain or blood.  :  No dysurea, hematuria  Neurologic:  No headache, paralysis, ataxia, numbness or tingling in the extremities. No change in bowel or bladder control.  Musculoskeletal:  No muscle pain  Hematologic:  No bleeding or bruising.  Lymphatics:  No enlarged nodes. No history of splenectomy.  Endocrine:  No reports of sweating, cold or heat intolerance. No polyuria or polydipsia.  Allergies:  No history of asthma, hives, eczema or rhinitis.    PHYSICAL EXAM:  BP (!) 172/80   Pulse  "81   Ht 1.626 m (5' 4\")   Wt 61.7 kg (136 lb)   LMP  (LMP Unknown)   BMI 23.34 kg/m    Constitutional: awake, alert, no distress  Eyes: PERRL, sclera nonicteric  ENT: trachea midline  Respiratory: Lungs clear  Cardiovascular: Regular rate and rhythm, 2/6 systolic murmur  GI: nondistended, nontender, bowel sounds present  Lymph/Hematologic: no lymphadenopathy  Skin: dry, no rash  Musculoskeletal: good muscle tone, strength 5/5 in upper and lower extremities  Neurologic: no focal deficits  Neuropsychiatric: appropriate affact    DATA:  Lab: August 2024: Potassium 4.5, creatinine 0.6  Recent Labs   Lab Test 10/13/23  1526   CHOL 186   HDL 54   *   TRIG 115     ASSESSMENT:  89-year-old female seen for TAVR.  We mostly talked about blood pressure today.  She really believes there is some underlying process driving up her blood pressure.  We talked about how she probably has primary hypertension that will require a medication.  She is quite reluctant to try anything as she believes every blood pressure medication will cause depression.    She also had some intermittent abdominal pain and has been talking to her primary care office the last few days.  Lab work will be done to rule out any acute inflammatory process, she can contact her primary care office early next week for abdominal imaging or other issues that may be needed to look for kidney stone.    RECOMMENDATIONS:  1.  Status post TAVR  -Stable, echo in 6 months  -Antibiotic prophylaxis for dental work    2.  Hypertension, volatile, largely uncontrolled  -Patient is reluctant to start any antihypertensives at this point    3.  Abdominal pain  -CBC with differential, CMP, UA, CRP  -Will message primary care office    Follow-up in 6 months with echo.    Lazaro Garay MD  Cardiology - [facility]  Pager:  [phone]  Text Page  August 23, 2024      "

## 2024-08-22 ENCOUNTER — TELEPHONE (OUTPATIENT)
Dept: INTERNAL MEDICINE | Facility: CLINIC | Age: 89
End: 2024-08-22

## 2024-08-22 NOTE — TELEPHONE ENCOUNTER
Call back from daughter. Advised. She will talk to her Mom and see if she can convince her to go to the ER. Also discussed with Peg. [facility] may be an option. Call to [facility]. Provider will review and respond.

## 2024-08-22 NOTE — TELEPHONE ENCOUNTER
Probably needs to go to the emergency room to have imaging to see if she has a kidney stone/blockage and/or if this is more of a pyelonephritis versus urinary tract infection

## 2024-08-22 NOTE — TELEPHONE ENCOUNTER
"Call to daughter. States patient has taken 8 of 10 days of Keflex for possible UTI. States symptoms have improved slightly but she is up all night feeling like she needs to urinate but only goes very small amounts. Night before last patient was woken up with \"kidney pain\", low right side back pain. Patient was seen in the ER 8/5/24 and was started on Keflex for 5 days four times daily. During virtual visit on 8/14 this was extended for another 10 days.    Please advise.    "

## 2024-08-22 NOTE — TELEPHONE ENCOUNTER
Symptoms    Describe your symptoms: frequency w/ just a dribble of urine. R flank pain that woke her night before last.     Any pain: Yes: R flank pain    How long have you been having symptoms: 17 days      Have you been seen for this:  Yes: 8-5-2024 in the ER for other issues and and the UTI and 8- video visit with Dr Cordero      Appointment offered?: No. Daughter Gamal is asking for a lab only and the na possible script     Triage offered?:     Home remedies tried: unknown    Preferred Pharmacy:   [id number] [place], [place] - [address] AT [place] [address]  [address] [place] [id number]  Phone: [phone] Fax: [phone]    [facility] [address] [place] [address]  Phone: [phone] Fax: [phone]      Could we send this information to you in [place] or would you prefer to receive a phone call?:   Patient daughter Janette, on consent to communicate     Okay to leave a detailed message?: Yes at Other phone number:  [phone]

## 2024-08-23 ENCOUNTER — LAB (OUTPATIENT)
Dept: LAB | Facility: CLINIC | Age: 89
End: 2024-08-23
Payer: MEDICARE

## 2024-08-23 ENCOUNTER — OFFICE VISIT (OUTPATIENT)
Dept: CARDIOLOGY | Facility: CLINIC | Age: 89
End: 2024-08-23
Attending: INTERNAL MEDICINE
Payer: MEDICARE

## 2024-08-23 VITALS
HEIGHT: 64 IN | DIASTOLIC BLOOD PRESSURE: 80 MMHG | WEIGHT: 136 LBS | HEART RATE: 81 BPM | BODY MASS INDEX: 23.22 KG/M2 | SYSTOLIC BLOOD PRESSURE: 172 MMHG

## 2024-08-23 DIAGNOSIS — R10.84 ABDOMINAL PAIN, GENERALIZED: Primary | ICD-10-CM

## 2024-08-23 DIAGNOSIS — R10.84 ABDOMINAL PAIN, GENERALIZED: ICD-10-CM

## 2024-08-23 DIAGNOSIS — Z95.3 S/P TAVR (TRANSCATHETER AORTIC VALVE REPLACEMENT), BIOPROSTHETIC: ICD-10-CM

## 2024-08-23 LAB
ALBUMIN SERPL BCG-MCNC: 4.6 G/DL (ref 3.5–5.2)
ALBUMIN UR-MCNC: 20 MG/DL
ALP SERPL-CCNC: 98 U/L (ref 40–150)
ALT SERPL W P-5'-P-CCNC: 49 U/L (ref 0–50)
ANION GAP SERPL CALCULATED.3IONS-SCNC: 12 MMOL/L (ref 7–15)
APPEARANCE UR: CLEAR
AST SERPL W P-5'-P-CCNC: 97 U/L (ref 0–45)
BASOPHILS # BLD AUTO: 0.1 10E3/UL (ref 0–0.2)
BASOPHILS NFR BLD AUTO: 1 %
BILIRUB SERPL-MCNC: 1.8 MG/DL
BILIRUB UR QL STRIP: NEGATIVE
BUN SERPL-MCNC: 15.2 MG/DL (ref 8–23)
CALCIUM SERPL-MCNC: 9.9 MG/DL (ref 8.8–10.4)
CHLORIDE SERPL-SCNC: 98 MMOL/L (ref 98–107)
COLOR UR AUTO: YELLOW
CREAT SERPL-MCNC: 0.67 MG/DL (ref 0.51–0.95)
CRP SERPL-MCNC: <3 MG/L
EGFRCR SERPLBLD CKD-EPI 2021: 84 ML/MIN/1.73M2
EOSINOPHIL # BLD AUTO: 0.1 10E3/UL (ref 0–0.7)
EOSINOPHIL NFR BLD AUTO: 1 %
ERYTHROCYTE [DISTWIDTH] IN BLOOD BY AUTOMATED COUNT: 13.9 % (ref 10–15)
GLUCOSE SERPL-MCNC: 116 MG/DL (ref 70–99)
GLUCOSE UR STRIP-MCNC: NEGATIVE MG/DL
HCO3 SERPL-SCNC: 25 MMOL/L (ref 22–29)
HCT VFR BLD AUTO: 38 % (ref 35–47)
HGB BLD-MCNC: 11.7 G/DL (ref 11.7–15.7)
HGB UR QL STRIP: ABNORMAL
IMM GRANULOCYTES # BLD: 0 10E3/UL
IMM GRANULOCYTES NFR BLD: 1 %
KETONES UR STRIP-MCNC: NEGATIVE MG/DL
LEUKOCYTE ESTERASE UR QL STRIP: NEGATIVE
LYMPHOCYTES # BLD AUTO: 1.5 10E3/UL (ref 0.8–5.3)
LYMPHOCYTES NFR BLD AUTO: 17 %
MCH RBC QN AUTO: 29.5 PG (ref 26.5–33)
MCHC RBC AUTO-ENTMCNC: 30.8 G/DL (ref 31.5–36.5)
MCV RBC AUTO: 96 FL (ref 78–100)
MONOCYTES # BLD AUTO: 0.7 10E3/UL (ref 0–1.3)
MONOCYTES NFR BLD AUTO: 8 %
MUCOUS THREADS #/AREA URNS LPF: PRESENT /LPF
NEUTROPHILS # BLD AUTO: 6.4 10E3/UL (ref 1.6–8.3)
NEUTROPHILS NFR BLD AUTO: 73 %
NITRATE UR QL: NEGATIVE
NRBC # BLD AUTO: 0 10E3/UL
NRBC BLD AUTO-RTO: 0 /100
PH UR STRIP: 6.5 [PH] (ref 5–7)
PLATELET # BLD AUTO: 228 10E3/UL (ref 150–450)
POTASSIUM SERPL-SCNC: 4.9 MMOL/L (ref 3.4–5.3)
PROT SERPL-MCNC: 8.2 G/DL (ref 6.4–8.3)
RBC # BLD AUTO: 3.96 10E6/UL (ref 3.8–5.2)
RBC URINE: <1 /HPF
SODIUM SERPL-SCNC: 135 MMOL/L (ref 135–145)
SP GR UR STRIP: 1.01 (ref 1–1.03)
SQUAMOUS EPITHELIAL: 1 /HPF
UROBILINOGEN UR STRIP-MCNC: NORMAL MG/DL
WBC # BLD AUTO: 8.7 10E3/UL (ref 4–11)
WBC URINE: 1 /HPF

## 2024-08-23 PROCEDURE — 99214 OFFICE O/P EST MOD 30 MIN: CPT | Performed by: INTERNAL MEDICINE

## 2024-08-23 PROCEDURE — 80053 COMPREHEN METABOLIC PANEL: CPT

## 2024-08-23 PROCEDURE — 86140 C-REACTIVE PROTEIN: CPT

## 2024-08-23 PROCEDURE — 81001 URINALYSIS AUTO W/SCOPE: CPT

## 2024-08-23 PROCEDURE — 85025 COMPLETE CBC W/AUTO DIFF WBC: CPT

## 2024-08-23 PROCEDURE — 36415 COLL VENOUS BLD VENIPUNCTURE: CPT

## 2024-08-23 NOTE — RESULT ENCOUNTER NOTE
Labs all look good today, urinalysis does not suggest UTI, CRP is normal making acute inflammation unlikely.  Please let patient know nothing on labs that would warrant her going to the emergency room.  I sent a message to her primary physician as well. Dr. Garay  Contacted daughter with the above information and will follow up with PCP Patient verbalized understanding.    Norma Hernandez RN on 8/23/2024 at 4:35 PM

## 2024-08-23 NOTE — PATIENT INSTRUCTIONS
Your blood pressure was elevated at your appointment today.  Elevated blood pressure can increase your risk of a heart attack, stroke and heart failure.  For this reason, we feel it is important to monitor your blood pressure closely.  If you have access to a home blood pressure monitor or are able to check your blood pressure at a local pharmacy in the next week we would like you to do so and call our clinic with those readings. Please call [phone] ([place]) and leave a message with your name, date of birth, blood pressure reading, date and location it was completed. If your blood pressure remains elevated your care team will be notified.  We appreciate being a part of your healthcare team and look forward to hearing from you soon.

## 2024-08-23 NOTE — LETTER
8/23/2024    Ty Cordero MD  [address] [facility] [address]    RE: Kavita Merlos       Dear Colleague,     I had the pleasure of seeing Kavita JESSE Merlos in the [facility].  CARDIOLOGY VISIT    REASON FOR VISIT: Status post TAVR    SUBJECTIVE:  89-year-old female seen for TAVR. She has vision impairment, hypertension.    She has been on multiple antihypertensives including beta-blockers, amlodipine, lisinopril, losartan, and HCTZ.  She reports feeling depressed on all of them.     Echo October 2023 showed EF 60%, severe aortic stenosis with mean 58 mmHg, V-max 4.8 m/s, area 0.6 cm , DI 0.19.    February 2024 she underwent TAVR with 26 mm Ross ABEL.  Echo showed EF 60%, TAVR with mean 11 mmHg, V-max 2.3 m/s, DI 0.52, 1+ PVL.  She had postop A-fib and spontaneously converted.  Eliquis was started.    She has had a variety complaints the past few months, none really are cardiac.  She denies chest pain, she has some occasional palpitations which is chronic.  Her blood pressure has been quite volatile, mostly on the higher side.  Home can be 150 to sometimes 160.  She was seen in the ER a few weeks ago with blood pressure over 200, stroke was ruled out.    Her main complaint is some intermittent abdominal pain, worse at night.  She is very worried about having a kidney stone or bladder infection, she is currently on a course of Keflex.    MEDICATIONS:  Current Outpatient Medications   Medication Sig Dispense Refill     apixaban ANTICOAGULANT (ELIQUIS) 5 MG tablet Take 1 tablet (5 mg) by mouth 2 times daily 180 tablet 3     cephALEXin (KEFLEX) 500 MG capsule Take 1 capsule (500 mg) by mouth 2 times daily 20 capsule 0     co-enzyme Q-10 100 MG CAPS capsule Take 100 mg by mouth daily       cyanocobalamin (VITAMIN B-12) 100 MCG tablet Take 100 mcg by mouth Every other day       Multiple Vitamins-Minerals (PRESERVISION AREDS 2 PO) Take by mouth daily       multivitamin, therapeutic  (THERA-VIT) TABS tablet Take 1 tablet by mouth daily       omega 3 1000 MG CAPS Take 1 capsule by mouth every other day       ranibizumab (LUCENTIS) 0.5 MG/0.05ML SOLN 0.5 mg by Intravitreal route once Once every 6-8 weeks, eye injection       tafluprost (ZIOPTAN) 0.0015 % SOLN ophthalmic solution Place 1 drop into both eyes at bedtime       Timolol Maleate (TIMOPTIC OCUDOSE) 0.5 % ophthalmic solution Place 1 drop into both eyes 2 times daily       vitamin C (ASCORBIC ACID) 1000 MG TABS Take 1,000 mg by mouth daily       Vitamin D3 (CHOLECALCIFEROL) 25 mcg (1000 units) tablet Take by mouth daily Daily in the winter       vitamin E (TOCOPHEROL) 400 units (180 mg) capsule Take 400 Units by mouth every other day       zinc gluconate 50 MG tablet Take 50 mg by mouth every other day       No current facility-administered medications for this visit.       ALLERGIES:  Allergies   Allergen Reactions     Albuterol      Amlodipine      Bimatoprost Itching     Brimonidine Itching     Clotrimazole Itching     Dorzolamide Hcl-Timolol Mal Itching     Latanoprost Itching     Lisinopril      Losartan      depression     Neomycin-Polymyxin-Gramicidin Swelling     Neosporin [Neomycin-Polymyxin-Gramicidin]      Penicillins      Tape [Adhesive Tape]      Timolol Itching     Travoprost Itching     Vicodin [Hydrocodone-Acetaminophen]        REVIEW OF SYSTEMS:  Constitutional:  No weight loss, fever, chills  HEENT:  Eyes:  No visual loss, blurred vision, double vision or yellow sclerae. No hearing loss, sneezing, congestion, runny nose or sore throat.  Skin:  No rash or itching.  Cardiovascular: per HPI  Respiratory: per HPI  GI:  No anorexia, nausea, vomiting or diarrhea. No abdominal pain or blood.  :  No dysurea, hematuria  Neurologic:  No headache, paralysis, ataxia, numbness or tingling in the extremities. No change in bowel or bladder control.  Musculoskeletal:  No muscle pain  Hematologic:  No bleeding or bruising.  Lymphatics:   "No enlarged nodes. No history of splenectomy.  Endocrine:  No reports of sweating, cold or heat intolerance. No polyuria or polydipsia.  Allergies:  No history of asthma, hives, eczema or rhinitis.    PHYSICAL EXAM:  BP (!) 172/80   Pulse 81   Ht 1.626 m (5' 4\")   Wt 61.7 kg (136 lb)   LMP  (LMP Unknown)   BMI 23.34 kg/m    Constitutional: awake, alert, no distress  Eyes: PERRL, sclera nonicteric  ENT: trachea midline  Respiratory: Lungs clear  Cardiovascular: Regular rate and rhythm, 2/6 systolic murmur  GI: nondistended, nontender, bowel sounds present  Lymph/Hematologic: no lymphadenopathy  Skin: dry, no rash  Musculoskeletal: good muscle tone, strength 5/5 in upper and lower extremities  Neurologic: no focal deficits  Neuropsychiatric: appropriate affact    DATA:  Lab: August 2024: Potassium 4.5, creatinine 0.6  Recent Labs   Lab Test 10/13/23  1526   CHOL 186   HDL 54   *   TRIG 115     ASSESSMENT:  89-year-old female seen for TAVR.  We mostly talked about blood pressure today.  She really believes there is some underlying process driving up her blood pressure.  We talked about how she probably has primary hypertension that will require a medication.  She is quite reluctant to try anything as she believes every blood pressure medication will cause depression.    She also had some intermittent abdominal pain and has been talking to her primary care office the last few days.  Lab work will be done to rule out any acute inflammatory process, she can contact her primary care office early next week for abdominal imaging or other issues that may be needed to look for kidney stone.    RECOMMENDATIONS:  1.  Status post TAVR  -Stable, echo in 6 months  -Antibiotic prophylaxis for dental work    2.  Hypertension, volatile, largely uncontrolled  -Patient is reluctant to start any antihypertensives at this point    3.  Abdominal pain  -CBC with differential, CMP, UA, CRP  -Will message primary care " office    Follow-up in 6 months with echo.    aLzaro Garay MD  Cardiology - [facility]  Pager:  [phone]  Text Page  August 23, 2024        Thank you for allowing me to participate in the care of your patient.      Sincerely,     Lazaro Garay MD     [facility]  cc:   Seth Duarte MD  [address] [facility] [address] [place] [id number]

## 2024-08-26 ENCOUNTER — MYC MEDICAL ADVICE (OUTPATIENT)
Dept: INTERNAL MEDICINE | Facility: CLINIC | Age: 89
End: 2024-08-26
Payer: MEDICARE

## 2024-08-28 NOTE — TELEPHONE ENCOUNTER
Sent [employer]hart message to patient with message from Dr. Cordero.  Beth Roberts MA

## 2024-08-28 NOTE — TELEPHONE ENCOUNTER
After reviewing her chart, her recent urine sample from the cardiologist was not suspicious for infection.  It is possible that she could be having some issues with bladder spasms based upon the reported symptoms.  We could consider a trial of a medication called oxybutynin to see if this would help with her symptoms.  If they would like to proceed with a short trial of medication, I can submit a prescription to the pharmacy.

## 2024-09-06 ENCOUNTER — MYC MEDICAL ADVICE (OUTPATIENT)
Dept: INTERNAL MEDICINE | Facility: CLINIC | Age: 89
End: 2024-09-06
Payer: MEDICARE

## 2024-09-06 DIAGNOSIS — R30.0 DYSURIA: Primary | ICD-10-CM

## 2024-09-06 DIAGNOSIS — R30.0 DYSURIA: ICD-10-CM

## 2024-09-06 RX ORDER — OXYBUTYNIN CHLORIDE 5 MG/1
5 TABLET ORAL 2 TIMES DAILY
Qty: 60 TABLET | Refills: 1 | Status: SHIPPED | OUTPATIENT
Start: 2024-09-06 | End: 2024-09-09

## 2024-09-06 RX ORDER — OXYBUTYNIN CHLORIDE 5 MG/1
5 TABLET ORAL 2 TIMES DAILY
Qty: 180 TABLET | OUTPATIENT
Start: 2024-09-06

## 2024-09-06 NOTE — TELEPHONE ENCOUNTER
[employer] message sent to patient with provider recommendation.     Summer RN 9:14 AM September 6, 2024   [facility]

## 2024-09-06 NOTE — TELEPHONE ENCOUNTER
"Per 8/26 mychart communication PCP had recommended \"After reviewing her chart, her recent urine sample from the cardiologist was not suspicious for infection. It is possible that she could be having some issues with bladder spasms based upon the reported symptoms. We could consider a trial of a medication called oxybutynin to see if this would help with her symptoms. If they would like to proceed with a short trial of medication, I can submit a prescription to the pharmacy. \"    Patient is willing to trial medication.     Summer RN 8:48 AM September 6, 2024   [facility]    "

## 2024-09-09 NOTE — TELEPHONE ENCOUNTER
Oxybutynin should be avoided if her glaucoma is acute angle or uncontrolled.  Given given that there are some potential contraindications to the use of oxybutynin, I would recommend that we replace that medication with Gemtesa 75 mg daily.

## 2024-09-10 NOTE — TELEPHONE ENCOUNTER
Patient cannot afford Gemtesa, any other alternatives?     Summer RN 7:20 AM September 10, 2024   [facility]

## 2024-09-11 NOTE — TELEPHONE ENCOUNTER
Many of the medications that are utilized for bladder spasms have contraindications for narrow angle glaucoma or uncontrolled glaucoma.  They may want to consider asking her eye specialist whether or not her eye symptoms are under good enough control for the use of oxybutynin.

## 2024-09-16 ENCOUNTER — TELEPHONE (OUTPATIENT)
Dept: CARDIOLOGY | Facility: CLINIC | Age: 89
End: 2024-09-16

## 2024-09-16 NOTE — TELEPHONE ENCOUNTER
Called to schedule 1 yr TAVR follow up due in Feb    LVM and callback information    [phone]    Lina Sarabia  Structural Heart Procedure   [facility]

## 2024-09-18 NOTE — PROGRESS NOTES
9--going through records at my desk-[employer] never received pts portion of the application. Appears pt id filling the Eliquis at a  pharmacy  [facility]

## 2024-10-21 SDOH — HEALTH STABILITY: PHYSICAL HEALTH: ON AVERAGE, HOW MANY MINUTES DO YOU ENGAGE IN EXERCISE AT THIS LEVEL?: PATIENT DECLINED

## 2024-10-21 SDOH — HEALTH STABILITY: PHYSICAL HEALTH
ON AVERAGE, HOW MANY DAYS PER WEEK DO YOU ENGAGE IN MODERATE TO STRENUOUS EXERCISE (LIKE A BRISK WALK)?: PATIENT DECLINED

## 2024-10-21 ASSESSMENT — SOCIAL DETERMINANTS OF HEALTH (SDOH): HOW OFTEN DO YOU GET TOGETHER WITH FRIENDS OR RELATIVES?: TWICE A WEEK

## 2024-10-22 ENCOUNTER — TRANSFERRED RECORDS (OUTPATIENT)
Dept: HEALTH INFORMATION MANAGEMENT | Facility: CLINIC | Age: 89
End: 2024-10-22
Payer: MEDICARE

## 2024-10-24 ENCOUNTER — OFFICE VISIT (OUTPATIENT)
Dept: INTERNAL MEDICINE | Facility: CLINIC | Age: 89
End: 2024-10-24
Attending: INTERNAL MEDICINE
Payer: MEDICARE

## 2024-10-24 VITALS
DIASTOLIC BLOOD PRESSURE: 77 MMHG | HEIGHT: 64 IN | HEART RATE: 78 BPM | RESPIRATION RATE: 20 BRPM | TEMPERATURE: 97.7 F | WEIGHT: 132.1 LBS | BODY MASS INDEX: 22.55 KG/M2 | SYSTOLIC BLOOD PRESSURE: 173 MMHG | OXYGEN SATURATION: 97 %

## 2024-10-24 DIAGNOSIS — Z00.00 ENCOUNTER FOR ANNUAL WELLNESS EXAM IN MEDICARE PATIENT: Primary | ICD-10-CM

## 2024-10-24 DIAGNOSIS — I73.9 PAD (PERIPHERAL ARTERY DISEASE) (H): ICD-10-CM

## 2024-10-24 DIAGNOSIS — R33.9 INCOMPLETE BLADDER EMPTYING: ICD-10-CM

## 2024-10-24 DIAGNOSIS — I10 HYPERTENSION, UNSPECIFIED TYPE: ICD-10-CM

## 2024-10-24 DIAGNOSIS — R53.83 FATIGUE, UNSPECIFIED TYPE: ICD-10-CM

## 2024-10-24 DIAGNOSIS — I35.0 AORTIC STENOSIS, SEVERE: ICD-10-CM

## 2024-10-24 DIAGNOSIS — Z13.220 SCREENING FOR HYPERLIPIDEMIA: ICD-10-CM

## 2024-10-24 LAB
ANION GAP SERPL CALCULATED.3IONS-SCNC: 11 MMOL/L (ref 7–15)
BASOPHILS # BLD AUTO: 0.1 10E3/UL (ref 0–0.2)
BASOPHILS NFR BLD AUTO: 1 %
BUN SERPL-MCNC: 17.4 MG/DL (ref 8–23)
CALCIUM SERPL-MCNC: 10 MG/DL (ref 8.8–10.4)
CHLORIDE SERPL-SCNC: 102 MMOL/L (ref 98–107)
CHOLEST SERPL-MCNC: 153 MG/DL
CREAT SERPL-MCNC: 0.66 MG/DL (ref 0.51–0.95)
EGFRCR SERPLBLD CKD-EPI 2021: 84 ML/MIN/1.73M2
EOSINOPHIL # BLD AUTO: 0.1 10E3/UL (ref 0–0.7)
EOSINOPHIL NFR BLD AUTO: 2 %
ERYTHROCYTE [DISTWIDTH] IN BLOOD BY AUTOMATED COUNT: 13.8 % (ref 10–15)
FASTING STATUS PATIENT QL REPORTED: YES
FASTING STATUS PATIENT QL REPORTED: YES
GLUCOSE SERPL-MCNC: 99 MG/DL (ref 70–99)
HCO3 SERPL-SCNC: 25 MMOL/L (ref 22–29)
HCT VFR BLD AUTO: 34.4 % (ref 35–47)
HDLC SERPL-MCNC: 64 MG/DL
HGB BLD-MCNC: 11.1 G/DL (ref 11.7–15.7)
IMM GRANULOCYTES # BLD: 0 10E3/UL
IMM GRANULOCYTES NFR BLD: 0 %
LDLC SERPL CALC-MCNC: 78 MG/DL
LYMPHOCYTES # BLD AUTO: 1.2 10E3/UL (ref 0.8–5.3)
LYMPHOCYTES NFR BLD AUTO: 21 %
MCH RBC QN AUTO: 30.4 PG (ref 26.5–33)
MCHC RBC AUTO-ENTMCNC: 32.3 G/DL (ref 31.5–36.5)
MCV RBC AUTO: 94 FL (ref 78–100)
MONOCYTES # BLD AUTO: 0.5 10E3/UL (ref 0–1.3)
MONOCYTES NFR BLD AUTO: 9 %
NEUTROPHILS # BLD AUTO: 3.8 10E3/UL (ref 1.6–8.3)
NEUTROPHILS NFR BLD AUTO: 67 %
NONHDLC SERPL-MCNC: 89 MG/DL
PLATELET # BLD AUTO: 196 10E3/UL (ref 150–450)
POTASSIUM SERPL-SCNC: 4.6 MMOL/L (ref 3.4–5.3)
RBC # BLD AUTO: 3.65 10E6/UL (ref 3.8–5.2)
SODIUM SERPL-SCNC: 138 MMOL/L (ref 135–145)
TRIGL SERPL-MCNC: 56 MG/DL
TSH SERPL DL<=0.005 MIU/L-ACNC: 1.85 UIU/ML (ref 0.3–4.2)
WBC # BLD AUTO: 5.6 10E3/UL (ref 4–11)

## 2024-10-24 PROCEDURE — 80048 BASIC METABOLIC PNL TOTAL CA: CPT | Performed by: INTERNAL MEDICINE

## 2024-10-24 PROCEDURE — 99214 OFFICE O/P EST MOD 30 MIN: CPT | Mod: 25 | Performed by: INTERNAL MEDICINE

## 2024-10-24 PROCEDURE — 36415 COLL VENOUS BLD VENIPUNCTURE: CPT | Performed by: INTERNAL MEDICINE

## 2024-10-24 PROCEDURE — 84443 ASSAY THYROID STIM HORMONE: CPT | Performed by: INTERNAL MEDICINE

## 2024-10-24 PROCEDURE — G0439 PPPS, SUBSEQ VISIT: HCPCS | Performed by: INTERNAL MEDICINE

## 2024-10-24 PROCEDURE — 85025 COMPLETE CBC W/AUTO DIFF WBC: CPT | Performed by: INTERNAL MEDICINE

## 2024-10-24 PROCEDURE — 80061 LIPID PANEL: CPT | Performed by: INTERNAL MEDICINE

## 2024-10-24 ASSESSMENT — PAIN SCALES - GENERAL: PAINLEVEL_OUTOF10: NO PAIN (0)

## 2024-10-24 NOTE — PROGRESS NOTES
Preventive Care Visit  [facility]  Ty Cordero MD, Internal Medicine  Oct 24, 2024      Assessment & Plan     Encounter for annual wellness exam in Medicare patient  Adult wellness plan reviewed.    Hypertension, unspecified type  Patient blood pressure is not under good control, but she is very reluctant to consider starting medications.  Patient reports that she has had issues with depressed mood almost all blood pressure medications that she has been on previously.  Patient did elect to monitor her blood pressure at home at this time.    Aortic stenosis, severe  Status post aortic valve replacement.  Followed by cardiology.  Will continue Eliquis as currently prescribed.    Fatigue, unspecified type  Given her issues with ongoing fatigue, we did discuss potential etiologies including anemia, glucose abnormalities, thyroid disease, and uncontrolled blood pressure.  Patient was agreeable to submit a fasting blood sample today for CBC, BMP, and TSH.  Results are currently pending.  Patient will be contacted once results are available for review.  - Basic metabolic panel  (Ca, Cl, CO2, Creat, Gluc, K, Na, BUN); Future  - CBC with platelets and differential; Future  - TSH with free T4 reflex; Future    Screening for hyperlipidemia  - Lipid panel reflex to direct LDL Fasting; Future    PAD (peripheral artery disease) (H)  Chronic condition.  Lipid panel is pending.  Followed by cardiology.    Incomplete bladder emptying  Given her ongoing issues with difficulty emptying her bladder, a referral to urology has been placed.  Will follow-up with the recommendations once the evaluation has been completed.  - Adult Urology  Referral; Future    Patient has been advised of split billing requirements and indicates understanding: Yes        Counseling  Appropriate preventive services were addressed with this patient via screening, questionnaire, or discussion as appropriate for fall  prevention, nutrition, physical activity, Tobacco-use cessation, social engagement, weight loss and cognition.  Checklist reviewing preventive services available has been given to the patient.  Reviewed patient's diet, addressing concerns and/or questions.   The patient was instructed to see the dentist every 6 months.   She is at risk for psychosocial distress and has been provided with information to reduce risk.   Updated plan of care.  Patient reported difficulty with activities of daily living were addressed today.Information on urinary incontinence and treatment options given to patient.       See Patient Instructions    Juice Walls is a 89 year old, presenting for the following:  Wellness Visit        10/24/2024     9:10 AM   Additional Questions   Roomed by hope r   Accompanied by daughter         10/24/2024     9:10 AM   Patient Reported Additional Medications   Patient reports taking the following new medications no         Patient is an 89-year-old  female who presents to the clinic for her annual wellness exam.  Her major concerns today are in regards to fatigue and her bladder.  Patient reports that for the past 1 year she has had issues with persistent fatigue, and she does feel that it is getting progressively worse.  Patient states that her fatigue began after she did undergo aortic valve replacement in February 2024.  Patient has had no recent changes in her medication regimen other than all blood pressure medications have been stopped.  Patient reports she was having issues with significant depression going on blood pressure medication.  Her cardiologist has discontinued all antihypertensive medications at this time.  Patient has not noted any changes in her weight.  She is stooling per usual routine.  Patient does sleep well at night, but she still feels very fatigued.  She also has concerns about her bladder.  Patient states that she has difficulty initiating a urine stream and  emptying her bladder.  She does report occasional episodes of dysuria.  This has been going on for the past several months.  Patient had previously been on medication for possible bladder spasms, but she did not do well with those medications as they did cause her to have issues with dry mouth.        Health Care Directive  Patient does not have a Health Care Directive: Discussed advance care planning with patient; information given to patient to review.      10/21/2024   General Health   How would you rate your overall physical health? Good   Feel stress (tense, anxious, or unable to sleep) Only a little      (!) STRESS CONCERN      10/21/2024   Nutrition   Diet: Regular (no restrictions)            10/21/2024   Exercise   Days per week of moderate/strenous exercise Patient declined   Average minutes spent exercising at this level Patient declined            10/21/2024   Social Factors   Frequency of gathering with friends or relatives Twice a week   Worry food won't last until get money to buy more No   Food not last or not have enough money for food? No   Do you have housing? (Housing is defined as stable permanent housing and does not include staying ouside in a car, in a tent, in an abandoned building, in an overnight shelter, or couch-surfing.) Yes   Are you worried about losing your housing? No   Lack of transportation? No   Unable to get utilities (heat,electricity)? No            10/24/2024   Fall Risk   Reason Gait Speed Test Not Completed Patient verbalizes unable to perform test             10/21/2024   Activities of Daily Living- Home Safety   Needs help with the following daily activites Shopping    Housework    Laundry   Safety concerns in the home None of the above       Multiple values from one day are sorted in reverse-chronological order         10/21/2024   Dental   Dentist two times every year? (!) NO            10/21/2024   Hearing Screening   Hearing concerns? None of the above             10/21/2024   Driving Risk Screening   Patient/family members have concerns about driving (!) DECLINE            10/21/2024   General Alertness/Fatigue Screening   Have you been more tired than usual lately? No            10/21/2024   Urinary Incontinence Screening   Bothered by leaking urine in past 6 months Yes            10/21/2024   TB Screening   Were you born outside of the [place]? No            Today's PHQ-2 Score:       10/24/2024     8:38 AM   PHQ-2 (  [employer])   Q1: Little interest or pleasure in doing things 0    Q2: Feeling down, depressed or hopeless 0    PHQ-2 Score 0    Q1: Little interest or pleasure in doing things Not at all   Q2: Feeling down, depressed or hopeless Not at all   PHQ-2 Score 0       Patient-reported           10/21/2024   Substance Use   Alcohol more than 3/day or more than 7/wk No   Do you have a current opioid prescription? No   How severe/bad is pain from 1 to 10? 0/10 (No Pain)   Do you use any other substances recreationally? No        Social History     Tobacco Use    Smoking status: Former     Current packs/day: 0.00     Types: Cigarettes     Quit date: 10/24/2006     Years since quittin.0     Passive exposure: Never    Smokeless tobacco: Never    Tobacco comments:     I quit when I was 71 years old, off/on prior to that starting when I was 17   Vaping Use    Vaping status: Never Used   Substance Use Topics    Alcohol use: Not Currently    Drug use: Not Currently          Mammogram Screening - After age 74- determine frequency with patient based on health status, life expectancy and patient goals          Reviewed and updated as needed this visit by Provider                    Lab work is in process  Current providers sharing in care for this patient include:  Patient Care Team:  Ty Cordero MD as PCP - General  Ty Cordero MD as Assigned PCP  Vilma Willett CNP as Assigned Heart and Vascular Provider    The following health maintenance items are  "reviewed in Epic and correct as of today:  Health Maintenance   Topic Date Due    DEXA  Never done    DTAP/TDAP/TD IMMUNIZATION (1 - Tdap) Never done    ZOSTER IMMUNIZATION (1 of 2) Never done    RSV VACCINE (1 - 1-dose 75+ series) Never done    INFLUENZA VACCINE (1) 09/01/2024    COVID-19 Vaccine (6 - 2024-25 season) 09/01/2024    ANNUAL REVIEW OF HM ORDERS  10/13/2024    MEDICARE ANNUAL WELLNESS VISIT  10/13/2024    [facility]  08/23/2025    FALL RISK ASSESSMENT  10/24/2025    ADVANCE CARE PLANNING  10/13/2028    PHQ-2 (once per calendar year)  Completed    Pneumococcal Vaccine: 65+ Years  Completed    HPV IMMUNIZATION  Aged Out    MENINGITIS IMMUNIZATION  Aged Out    RSV MONOCLONAL ANTIBODY  Aged Out         Review of Systems  CONSTITUTIONAL: NEGATIVE for fever, chills, change in weight.  Positive for fatigue.  INTEGUMENTARY/SKIN: NEGATIVE for worrisome rashes, moles or lesions  ENT/MOUTH: NEGATIVE for ear, mouth and throat problems  RESP: NEGATIVE for significant cough or SOB  CV: NEGATIVE for chest pain, palpitations or peripheral edema  GI: NEGATIVE for nausea, abdominal pain, heartburn, or change in bowel habits  : Positive for occasional dysuria and difficulty emptying bladder.  MUSCULOSKELETAL: NEGATIVE for significant arthralgias or myalgia  NEURO: NEGATIVE for weakness, dizziness or paresthesias     Objective    Exam  Blood pressure (!) 173/77, pulse 78, temperature 97.7  F (36.5  C), temperature source Oral, resp. rate 20, height 1.626 m (5' 4\"), weight 59.9 kg (132 lb 1.6 oz), SpO2 97%, not currently breastfeeding.      Physical Exam  Vitals reviewed.   Constitutional:       Appearance: Normal appearance.   HENT:      Head: Normocephalic and atraumatic.      Right Ear: Tympanic membrane, ear canal and external ear normal.      Left Ear: Tympanic membrane, ear canal and external ear normal.      Mouth/Throat:      Mouth: Mucous membranes are moist.      Pharynx: Oropharynx is clear.   Eyes:      Extraocular " Movements: Extraocular movements intact.      Conjunctiva/sclera: Conjunctivae normal.      Pupils: Pupils are equal, round, and reactive to light.   Cardiovascular:      Rate and Rhythm: Normal rate and regular rhythm.      Pulses: Normal pulses.      Heart sounds: Normal heart sounds.   Pulmonary:      Effort: Pulmonary effort is normal.      Breath sounds: Normal breath sounds.   Abdominal:      General: Bowel sounds are normal.      Palpations: Abdomen is soft.   Musculoskeletal:      Cervical back: Normal range of motion and neck supple.   Skin:     General: Skin is warm and dry.      Capillary Refill: Capillary refill takes less than 2 seconds.   Neurological:      Mental Status: She is alert and oriented to person, place, and time.               10/24/2024   Mini Cog   Mini-Cog Not Completed (choose reason) Patient declines          Patient declines, there are NO concerns for cognitive deficits.           Signed Electronically by: Ty Cordero MD

## 2024-10-24 NOTE — PATIENT INSTRUCTIONS
Patient Education   Well Visit, Over 65: Care Instructions  Well visits can help you stay healthy. Your doctor has checked your overall health and may have suggested ways to take good care of yourself. Your doctor also may have recommended tests. You can help prevent illness with healthy eating, good sleep, vaccinations, regular exercise, and other steps.    Get the tests that you and your doctor decide on. Depending on your age and risks, examples might include hearing tests as well as screening for colon, breast, and lung cancer. Screening helps find diseases before any symptoms appear.   Eat healthy foods. Choose fruits, vegetables, whole grains, lean protein, and low-fat dairy foods. Limit saturated fat, and reduce salt.     Limit alcohol. Men should have no more than 2 drinks a day. Women should have no more than 1. For some people, no alcohol is the best choice.   Exercise. It can help prevent falls. Get at least 30 minutes of exercise on most days of the week. Walking, yoga, and ervin chi can be good choices.     Reach and stay at your healthy weight. This will lower your risk for many health problems.   Take care of your mental health. Try to stay connected with friends, family, and community, and find ways to manage stress.     If you're feeling depressed or hopeless, talk to someone. A counselor can help. If you don't have a counselor, talk to your doctor.   Talk to your doctor if you think you may have a problem with alcohol or drug use. This includes prescription medicines and illegal drugs.     Avoid tobacco and nicotine: Don't smoke, vape, or chew. If you need help quitting, talk to your doctor.   Practice safer sex. Getting tested, using condoms or dental dams, and limiting sex partners can help prevent STIs.     Make an advance directive. This is a legal way to tell your family and doctor what you want to happen at the end of your life or when you can't speak for yourself.   Prevent problems where you  "can. Protect your skin from too much sun, wash your hands, brush your teeth twice a day, and wear a seat belt in the car.   Where can you learn more?  Go to https://www.[employer].net/patiented  Enter K859 in the search box to learn more about \"Well Visit, Over 65: Care Instructions.\"  Current as of: August 6, 2023  Content Version: 14.2 2024 [facility] [employer] [facility].   Care instructions adapted under license by your healthcare professional. If you have questions about a medical condition or this instruction, always ask your healthcare professional. [employer] disclaims any warranty or liability for your use of this information.       "

## 2024-10-25 ENCOUNTER — MYC MEDICAL ADVICE (OUTPATIENT)
Dept: CARDIOLOGY | Facility: CLINIC | Age: 89
End: 2024-10-25
Payer: MEDICARE

## 2024-10-25 ENCOUNTER — MYC MEDICAL ADVICE (OUTPATIENT)
Dept: OTHER | Facility: CLINIC | Age: 89
End: 2024-10-25
Payer: MEDICARE

## 2024-10-25 NOTE — TELEPHONE ENCOUNTER
Did not order monitor if patient prefers not to and wants to stay on Eliquis.  Her primary physician should be able to workup her anemia.  No need follow-up now if she does not really want to discuss Eliquis over a monitor.    Ari

## 2024-10-25 NOTE — TELEPHONE ENCOUNTER
Eliquis does not cause anemia, but can unmask a slow GI bleed or something else that would cause anemia.  I would like her to wear a 7-day ZIO monitor to see if she is having any A-fib, she did have some postprocedure A-fib after TAVR.  If she is not having any more A-fib, could discuss the pros and cons of going off Eliquis.  Follow-up can be arranged after monitor.    Ari

## 2024-10-28 NOTE — TELEPHONE ENCOUNTER
Per Dr. Garay    Ok not to have monitor and ok to stay on Eliquis but needs to follow up with Primary Care Provider with anemia and to discuss medicacations for RBC's  See note below     Did not order monitor if patient prefers not to and wants to stay on Eliquis.  Her primary physician should be able to workup her anemia.  No need follow-up now if she does not really want to discuss Eliquis over a monitor.         Norma RN  [phone] Nursing Line  [phone] Scheduling number  [facility]

## 2024-10-31 ENCOUNTER — MYC MEDICAL ADVICE (OUTPATIENT)
Dept: INTERNAL MEDICINE | Facility: CLINIC | Age: 89
End: 2024-10-31
Payer: MEDICARE

## 2024-10-31 NOTE — TELEPHONE ENCOUNTER
It would be reasonable for her to start an iron supplement, and I would recommend she consider starting ferrous sulfate 325 mg by mouth per day.  If she is in need of a prescription, I can submit to your pharmacy.

## 2024-10-31 NOTE — TELEPHONE ENCOUNTER
Per MyChart message with cardiology:    Ok not to have monitor and ok to stay on Eliquis but needs to follow up with Primary Care Provider with anemia and to discuss medicacations for RBC's  See note below   Did not order monitor if patient prefers not to and wants to stay on Eliquis.  Her primary physician should be able to workup her anemia.  No need follow-up now if she does not really want to discuss Eliquis over a monitor.   Eliquis does not cause anemia, but can unmask a slow GI bleed or something else that would cause anemia. I would like her to wear a 7-day ZIO monitor to see if she is having any A-fib, she did have some postprocedure A-fib after TAVR. If she is not having any more A-fib, could discuss the pros and cons of going off Eliquis. Follow-up can be arranged after monitor.    Cardiology also recommends checking with primary care provider regarding low dose iron supplement. Please advise.

## 2024-11-13 ENCOUNTER — DOCUMENTATION ONLY (OUTPATIENT)
Dept: CARDIOLOGY | Facility: CLINIC | Age: 89
End: 2024-11-13
Payer: MEDICARE

## 2024-11-13 DIAGNOSIS — I48.91 ATRIAL FIBRILLATION, UNSPECIFIED TYPE (H): ICD-10-CM

## 2024-11-13 NOTE — PROGRESS NOTES
11- I spoke w/ pts dtr Janette Mendieta, they are working on pts application. She does NOT have a medicare D plan so is not doing the medicare d extra help. I wrote that on the provider application.  Sending to Vilma for signature, will also print RX  [facility]    11- 3:49 pm  I faxed 2 pages of providers application to [employer]

## 2024-11-27 ENCOUNTER — TELEPHONE (OUTPATIENT)
Dept: ANTICOAGULATION | Facility: CLINIC | Age: 89
End: 2024-11-27

## 2024-11-27 NOTE — TELEPHONE ENCOUNTER
ANTICOAGULATION DIRECT ORAL ANTICOAGULANT MONITORING    SUBJECTIVE     The [facility] is evaluating Kavita Merlos's Apixaban (Eliquis) as part of its Anticoagulation Monitoring Program.    Indication:Atrial Fibrillation  Current dose per medication list: Apixaban 5 mg TWICE daily  Recent hospitalizations/ED/Office Visits for bleeding/clotting concerns: Yes: ER on 8/5/24 for acute cystitis with hematuria  Other bleeding or side effect concerns: Yes: chronic anemia  Additional findings: Snap Shot list includes: PAD; aortic stenosis; status post angiogram    OBJECTIVE     Age: 89 year old    Wt Readings from Last 4 Encounters:   10/24/24 59.9 kg (132 lb 1.6 oz)   08/23/24 61.7 kg (136 lb)   08/05/24 62.2 kg (137 lb 2 oz)   03/21/24 62.6 kg (138 lb)        Lab Results   Component Value Date    CR 0.66 10/24/2024    CR 0.67 08/23/2024    CR 0.63 08/05/2024     Creatinine Clearance (using actual bodyweight, mL/min):     Lab Results   Component Value Date    HGB 11.1 10/24/2024    HGB 14.4 04/17/2020     10/24/2024     04/17/2020     ASSESSMENT/PLAN     A chart review for Direct Oral Anticoagulant (DOAC) Stewardship has been completed for:     Dosing: recommend adjustment to Apixaban 2.5 mg TWICE daily for age >= 80 years and weight <= 60 kg (consistent with package insert dosing) OR you could continue to monitor weight; however, weight has been trending down and now < 60 kg as of 10/24/24.    Plan made per [facility] anticoagulation protocol    Gena Darby RN  Anticoagulation Clinic

## 2024-11-29 NOTE — TELEPHONE ENCOUNTER
Provider's note:  Let's continue current dose for now. We will continue to monitor weight. Thank you!       Will leave I-Vent open for now, recheck chart in 3 months.    Gena Darby RN  Anticoagulation Clinic

## 2024-12-11 ENCOUNTER — HOSPITAL ENCOUNTER (OUTPATIENT)
Dept: ULTRASOUND IMAGING | Facility: CLINIC | Age: 89
Discharge: HOME | End: 2024-12-11
Attending: SURGERY
Payer: MEDICARE

## 2024-12-11 ENCOUNTER — OFFICE VISIT (OUTPATIENT)
Dept: SURGERY | Facility: CLINIC | Age: 89
End: 2024-12-11
Attending: SURGERY
Payer: MEDICARE

## 2024-12-11 VITALS
BODY MASS INDEX: 22.53 KG/M2 | OXYGEN SATURATION: 97 % | HEIGHT: 64 IN | WEIGHT: 132 LBS | HEART RATE: 80 BPM | DIASTOLIC BLOOD PRESSURE: 82 MMHG | SYSTOLIC BLOOD PRESSURE: 164 MMHG

## 2024-12-11 DIAGNOSIS — I71.43 INFRARENAL ABDOMINAL AORTIC ANEURYSM (AAA) WITHOUT RUPTURE (H): Primary | ICD-10-CM

## 2024-12-11 DIAGNOSIS — I71.43 INFRARENAL ABDOMINAL AORTIC ANEURYSM (AAA) WITHOUT RUPTURE (H): ICD-10-CM

## 2024-12-11 PROCEDURE — 99213 OFFICE O/P EST LOW 20 MIN: CPT | Performed by: SURGERY

## 2024-12-11 PROCEDURE — 93978 VASCULAR STUDY: CPT

## 2024-12-11 NOTE — PROGRESS NOTES
Kavita to follow up with Primary Care provider regarding elevated blood pressure.    Patient watches her blood pressure at home, and it is usually good.

## 2024-12-11 NOTE — PROGRESS NOTES
The pleasure of seeing Mrs. Kavita Merlos in the vascular surgery clinic.  She is an 89-year-old female who I had seen previously for an incidentally found abdominal aortic aneurysm which was discovered as she was undergoing workup for a trans aortic valve replacement procedure.  When I saw her, which was in December 2023 the aneurysm measured 4.2 x 4 cm.  This was on a CT scan.    Patient presently is at an assisted living.  She has poor vision due to macular degeneration but still manages to cope for herself.  She is otherwise active.    On examination the aortic impulse is palpable but not tender.    Sonography shows that the aneurysm measures 4.6 x 5 cm in anterior-posterior and transverse diameters respectively.    I had a long and detailed discussion with the patient, her son and her daughter.  The aneurysm has grown in size.  However we are comparing a CT scan to an ultrasound.  We also had to consider the fact that she is 89 years old.  Presently I do not feel that the risk of repair is justifiable with the current size.  If it continues to grow then we can make an argument for repair.  Looking at the CT scan from 2023 this should be amenable to endovascular therapy.  She does look pretty good for an 89-year-old female and she continues to enjoy a relatively independent life.    We will see her back in the clinic with CT angiogram of the abdomen and pelvis in 4 months time.

## 2024-12-12 NOTE — PATIENT INSTRUCTIONS
Thank you for allowing [facility] to provide your medical care.      Please see below for the follow up instructions pertaining to your medical appointment with Dr. Barkley at the [facility].    Follow up plan: CTA abdomen/pelvis and visit with Dr. Barkley in 4 months.      Our office will send you a letter by mail, closer to your follow up time frame, with a reminder to call to schedule appointment(s).    Please call our office with any concerns or questions [phone].

## 2025-01-09 ENCOUNTER — HOSPITAL ENCOUNTER (INPATIENT)
Facility: CLINIC | Age: 89
End: 2025-01-09
Attending: EMERGENCY MEDICINE | Admitting: HOSPITALIST
Payer: MEDICARE

## 2025-01-09 ENCOUNTER — APPOINTMENT (OUTPATIENT)
Dept: CT IMAGING | Facility: CLINIC | Age: 89
End: 2025-01-09
Attending: EMERGENCY MEDICINE
Payer: MEDICARE

## 2025-01-09 VITALS
TEMPERATURE: 97 F | BODY MASS INDEX: 22.3 KG/M2 | RESPIRATION RATE: 27 BRPM | DIASTOLIC BLOOD PRESSURE: 60 MMHG | OXYGEN SATURATION: 96 % | SYSTOLIC BLOOD PRESSURE: 121 MMHG | WEIGHT: 130.6 LBS | HEIGHT: 64 IN | HEART RATE: 101 BPM

## 2025-01-09 DIAGNOSIS — I71.43 INFRARENAL ABDOMINAL AORTIC ANEURYSM (AAA) WITHOUT RUPTURE: ICD-10-CM

## 2025-01-09 DIAGNOSIS — R07.9 CHEST PAIN, UNSPECIFIED TYPE: ICD-10-CM

## 2025-01-09 DIAGNOSIS — R91.8 PULMONARY NODULES: ICD-10-CM

## 2025-01-09 DIAGNOSIS — N28.9 LESION OF LEFT NATIVE KIDNEY: ICD-10-CM

## 2025-01-09 DIAGNOSIS — N13.30 HYDROURETERONEPHROSIS: ICD-10-CM

## 2025-01-09 DIAGNOSIS — I16.1 HYPERTENSIVE EMERGENCY: ICD-10-CM

## 2025-01-09 LAB
ALBUMIN UR-MCNC: NEGATIVE MG/DL
ANION GAP SERPL CALCULATED.3IONS-SCNC: 13 MMOL/L (ref 7–15)
APPEARANCE UR: CLEAR
ATRIAL RATE - MUSE: 106 BPM
BASOPHILS # BLD AUTO: 0 10E3/UL (ref 0–0.2)
BASOPHILS NFR BLD AUTO: 0 %
BILIRUB UR QL STRIP: NEGATIVE
BUN SERPL-MCNC: 16.5 MG/DL (ref 8–23)
CALCIUM SERPL-MCNC: 9.4 MG/DL (ref 8.8–10.4)
CHLORIDE SERPL-SCNC: 100 MMOL/L (ref 98–107)
COLOR UR AUTO: ABNORMAL
CREAT SERPL-MCNC: 0.55 MG/DL (ref 0.51–0.95)
DIASTOLIC BLOOD PRESSURE - MUSE: NORMAL MMHG
EGFRCR SERPLBLD CKD-EPI 2021: 88 ML/MIN/1.73M2
EOSINOPHIL # BLD AUTO: 0.1 10E3/UL (ref 0–0.7)
EOSINOPHIL NFR BLD AUTO: 1 %
ERYTHROCYTE [DISTWIDTH] IN BLOOD BY AUTOMATED COUNT: 13.7 % (ref 10–15)
ERYTHROCYTE [DISTWIDTH] IN BLOOD BY AUTOMATED COUNT: 13.7 % (ref 10–15)
GLUCOSE BLDC GLUCOMTR-MCNC: 112 MG/DL (ref 70–99)
GLUCOSE BLDC GLUCOMTR-MCNC: 120 MG/DL (ref 70–99)
GLUCOSE BLDC GLUCOMTR-MCNC: 147 MG/DL (ref 70–99)
GLUCOSE SERPL-MCNC: 109 MG/DL (ref 70–99)
GLUCOSE UR STRIP-MCNC: NEGATIVE MG/DL
HCO3 SERPL-SCNC: 24 MMOL/L (ref 22–29)
HCT VFR BLD AUTO: 34.7 % (ref 35–47)
HCT VFR BLD AUTO: 34.9 % (ref 35–47)
HGB BLD-MCNC: 11 G/DL (ref 11.7–15.7)
HGB BLD-MCNC: 11.4 G/DL (ref 11.7–15.7)
HGB UR QL STRIP: ABNORMAL
IMM GRANULOCYTES # BLD: 0 10E3/UL
IMM GRANULOCYTES NFR BLD: 0 %
INR PPP: 1.51 (ref 0.85–1.15)
INTERPRETATION ECG - MUSE: NORMAL
KETONES UR STRIP-MCNC: NEGATIVE MG/DL
LEUKOCYTE ESTERASE UR QL STRIP: NEGATIVE
LYMPHOCYTES # BLD AUTO: 0.8 10E3/UL (ref 0.8–5.3)
LYMPHOCYTES NFR BLD AUTO: 9 %
MCH RBC QN AUTO: 29.6 PG (ref 26.5–33)
MCH RBC QN AUTO: 30.5 PG (ref 26.5–33)
MCHC RBC AUTO-ENTMCNC: 31.7 G/DL (ref 31.5–36.5)
MCHC RBC AUTO-ENTMCNC: 32.7 G/DL (ref 31.5–36.5)
MCV RBC AUTO: 93 FL (ref 78–100)
MCV RBC AUTO: 94 FL (ref 78–100)
MONOCYTES # BLD AUTO: 0.9 10E3/UL (ref 0–1.3)
MONOCYTES NFR BLD AUTO: 9 %
MUCOUS THREADS #/AREA URNS LPF: PRESENT /LPF
NEUTROPHILS # BLD AUTO: 7.8 10E3/UL (ref 1.6–8.3)
NEUTROPHILS NFR BLD AUTO: 81 %
NITRATE UR QL: NEGATIVE
NRBC # BLD AUTO: 0 10E3/UL
NRBC BLD AUTO-RTO: 0 /100
P AXIS - MUSE: 59 DEGREES
PH UR STRIP: 8 [PH] (ref 5–7)
PLAT MORPH BLD: NORMAL
PLATELET # BLD AUTO: 150 10E3/UL (ref 150–450)
PLATELET # BLD AUTO: 198 10E3/UL (ref 150–450)
POTASSIUM SERPL-SCNC: 4 MMOL/L (ref 3.4–5.3)
PR INTERVAL - MUSE: 146 MS
QRS DURATION - MUSE: 86 MS
QT - MUSE: 342 MS
QTC - MUSE: 454 MS
R AXIS - MUSE: 64 DEGREES
RBC # BLD AUTO: 3.71 10E6/UL (ref 3.8–5.2)
RBC # BLD AUTO: 3.74 10E6/UL (ref 3.8–5.2)
RBC MORPH BLD: NORMAL
RBC URINE: 4 /HPF
SODIUM SERPL-SCNC: 137 MMOL/L (ref 135–145)
SP GR UR STRIP: 1.02 (ref 1–1.03)
SQUAMOUS EPITHELIAL: <1 /HPF
SYSTOLIC BLOOD PRESSURE - MUSE: NORMAL MMHG
T AXIS - MUSE: 74 DEGREES
TROPONIN T SERPL HS-MCNC: 15 NG/L
TROPONIN T SERPL HS-MCNC: 20 NG/L
UROBILINOGEN UR STRIP-MCNC: NORMAL MG/DL
VENTRICULAR RATE- MUSE: 106 BPM
WBC # BLD AUTO: 10.5 10E3/UL (ref 4–11)
WBC # BLD AUTO: 9.7 10E3/UL (ref 4–11)
WBC URINE: 9 /HPF

## 2025-01-09 PROCEDURE — 36415 COLL VENOUS BLD VENIPUNCTURE: CPT | Performed by: EMERGENCY MEDICINE

## 2025-01-09 PROCEDURE — 250N000013 HC RX MED GY IP 250 OP 250 PS 637: Performed by: HOSPITALIST

## 2025-01-09 PROCEDURE — 85610 PROTHROMBIN TIME: CPT | Performed by: EMERGENCY MEDICINE

## 2025-01-09 PROCEDURE — 250N000009 HC RX 250: Performed by: EMERGENCY MEDICINE

## 2025-01-09 PROCEDURE — 93005 ELECTROCARDIOGRAM TRACING: CPT

## 2025-01-09 PROCEDURE — 84484 ASSAY OF TROPONIN QUANT: CPT | Performed by: EMERGENCY MEDICINE

## 2025-01-09 PROCEDURE — 250N000011 HC RX IP 250 OP 636: Performed by: EMERGENCY MEDICINE

## 2025-01-09 PROCEDURE — 85018 HEMOGLOBIN: CPT | Performed by: EMERGENCY MEDICINE

## 2025-01-09 PROCEDURE — 85004 AUTOMATED DIFF WBC COUNT: CPT | Performed by: EMERGENCY MEDICINE

## 2025-01-09 PROCEDURE — 96366 THER/PROPH/DIAG IV INF ADDON: CPT

## 2025-01-09 PROCEDURE — 96375 TX/PRO/DX INJ NEW DRUG ADDON: CPT

## 2025-01-09 PROCEDURE — 36415 COLL VENOUS BLD VENIPUNCTURE: CPT | Performed by: HOSPITALIST

## 2025-01-09 PROCEDURE — 81001 URINALYSIS AUTO W/SCOPE: CPT | Performed by: EMERGENCY MEDICINE

## 2025-01-09 PROCEDURE — 80048 BASIC METABOLIC PNL TOTAL CA: CPT | Performed by: EMERGENCY MEDICINE

## 2025-01-09 PROCEDURE — 74177 CT ABD & PELVIS W/CONTRAST: CPT

## 2025-01-09 PROCEDURE — 99222 1ST HOSP IP/OBS MODERATE 55: CPT | Mod: AI | Performed by: HOSPITALIST

## 2025-01-09 PROCEDURE — 200N000001 HC R&B ICU

## 2025-01-09 PROCEDURE — 99291 CRITICAL CARE FIRST HOUR: CPT | Mod: 25

## 2025-01-09 PROCEDURE — 250N000013 HC RX MED GY IP 250 OP 250 PS 637: Performed by: EMERGENCY MEDICINE

## 2025-01-09 PROCEDURE — 96365 THER/PROPH/DIAG IV INF INIT: CPT | Mod: 59

## 2025-01-09 PROCEDURE — 85027 COMPLETE CBC AUTOMATED: CPT | Performed by: HOSPITALIST

## 2025-01-09 PROCEDURE — 82310 ASSAY OF CALCIUM: CPT | Performed by: EMERGENCY MEDICINE

## 2025-01-09 PROCEDURE — 71260 CT THORAX DX C+: CPT

## 2025-01-09 RX ORDER — TAFLUPROST OPTHALMIC 0 MG/.3ML
1 SOLUTION/ DROPS OPHTHALMIC AT BEDTIME
Status: DISCONTINUED | OUTPATIENT
Start: 2025-01-09 | End: 2025-01-10 | Stop reason: HOSPADM

## 2025-01-09 RX ORDER — DEXTROSE MONOHYDRATE 25 G/50ML
25-50 INJECTION, SOLUTION INTRAVENOUS
Status: DISCONTINUED | OUTPATIENT
Start: 2025-01-09 | End: 2025-01-10 | Stop reason: HOSPADM

## 2025-01-09 RX ORDER — ONDANSETRON 4 MG/1
4 TABLET, ORALLY DISINTEGRATING ORAL EVERY 6 HOURS PRN
Status: DISCONTINUED | OUTPATIENT
Start: 2025-01-09 | End: 2025-01-10 | Stop reason: HOSPADM

## 2025-01-09 RX ORDER — ACETAMINOPHEN 325 MG/1
650 TABLET ORAL EVERY 4 HOURS PRN
Status: DISCONTINUED | OUTPATIENT
Start: 2025-01-09 | End: 2025-01-10 | Stop reason: HOSPADM

## 2025-01-09 RX ORDER — HEPARIN SODIUM 10000 [USP'U]/100ML
0-5000 INJECTION, SOLUTION INTRAVENOUS CONTINUOUS
Status: DISCONTINUED | OUTPATIENT
Start: 2025-01-09 | End: 2025-01-09

## 2025-01-09 RX ORDER — ONDANSETRON 2 MG/ML
4 INJECTION INTRAMUSCULAR; INTRAVENOUS EVERY 6 HOURS PRN
Status: DISCONTINUED | OUTPATIENT
Start: 2025-01-09 | End: 2025-01-10 | Stop reason: HOSPADM

## 2025-01-09 RX ORDER — MULTIVITAMIN,THERAPEUTIC
1 TABLET ORAL DAILY
Status: DISCONTINUED | OUTPATIENT
Start: 2025-01-10 | End: 2025-01-10 | Stop reason: HOSPADM

## 2025-01-09 RX ORDER — ONDANSETRON 2 MG/ML
INJECTION INTRAMUSCULAR; INTRAVENOUS
Status: COMPLETED
Start: 2025-01-09 | End: 2025-01-09

## 2025-01-09 RX ORDER — MULTIPLE VITAMINS W/ MINERALS TAB 9MG-400MCG
1 TAB ORAL DAILY
Status: DISCONTINUED | OUTPATIENT
Start: 2025-01-10 | End: 2025-01-09

## 2025-01-09 RX ORDER — IOPAMIDOL 755 MG/ML
500 INJECTION, SOLUTION INTRAVASCULAR ONCE
Status: COMPLETED | OUTPATIENT
Start: 2025-01-09 | End: 2025-01-09

## 2025-01-09 RX ORDER — NICOTINE POLACRILEX 4 MG
15-30 LOZENGE BUCCAL
Status: DISCONTINUED | OUTPATIENT
Start: 2025-01-09 | End: 2025-01-10 | Stop reason: HOSPADM

## 2025-01-09 RX ORDER — HYDROMORPHONE HCL IN WATER/PF 6 MG/30 ML
0.2 PATIENT CONTROLLED ANALGESIA SYRINGE INTRAVENOUS
Status: DISCONTINUED | OUTPATIENT
Start: 2025-01-09 | End: 2025-01-10 | Stop reason: HOSPADM

## 2025-01-09 RX ORDER — ESMOLOL HYDROCHLORIDE 20 MG/ML
50-300 INJECTION, SOLUTION INTRAVENOUS CONTINUOUS
Status: DISCONTINUED | OUTPATIENT
Start: 2025-01-09 | End: 2025-01-10 | Stop reason: HOSPADM

## 2025-01-09 RX ORDER — ZINC SULFATE 50(220)MG
220 CAPSULE ORAL EVERY OTHER DAY
Status: DISCONTINUED | OUTPATIENT
Start: 2025-01-10 | End: 2025-01-10 | Stop reason: HOSPADM

## 2025-01-09 RX ORDER — NITROGLYCERIN 0.4 MG/1
0.4 TABLET SUBLINGUAL EVERY 5 MIN PRN
Status: DISCONTINUED | OUTPATIENT
Start: 2025-01-09 | End: 2025-01-10 | Stop reason: HOSPADM

## 2025-01-09 RX ORDER — HYDROMORPHONE HYDROCHLORIDE 1 MG/ML
0.5 INJECTION, SOLUTION INTRAMUSCULAR; INTRAVENOUS; SUBCUTANEOUS
Status: DISCONTINUED | OUTPATIENT
Start: 2025-01-09 | End: 2025-01-09

## 2025-01-09 RX ORDER — TIMOLOL MALEATE 6.8 MG/ML
1 SOLUTION OPHTHALMIC 2 TIMES DAILY
Status: DISCONTINUED | OUTPATIENT
Start: 2025-01-09 | End: 2025-01-10 | Stop reason: HOSPADM

## 2025-01-09 RX ORDER — ACETAMINOPHEN 325 MG/10.15ML
650 LIQUID ORAL EVERY 4 HOURS PRN
Status: DISCONTINUED | OUTPATIENT
Start: 2025-01-09 | End: 2025-01-10 | Stop reason: HOSPADM

## 2025-01-09 RX ADMIN — NITROGLYCERIN 0.4 MG: 0.4 TABLET SUBLINGUAL at 10:06

## 2025-01-09 RX ADMIN — ONDANSETRON 4 MG: 2 INJECTION INTRAMUSCULAR; INTRAVENOUS at 14:10

## 2025-01-09 RX ADMIN — APIXABAN 5 MG: 5 TABLET, FILM COATED ORAL at 22:24

## 2025-01-09 RX ADMIN — IOPAMIDOL 72 ML: 755 INJECTION, SOLUTION INTRAVENOUS at 09:27

## 2025-01-09 RX ADMIN — SODIUM CHLORIDE 60 ML: 9 INJECTION, SOLUTION INTRAVENOUS at 09:27

## 2025-01-09 RX ADMIN — TIMOLOL MALEATE 1 DROP: 6.8 SOLUTION OPHTHALMIC at 22:24

## 2025-01-09 RX ADMIN — TAFLUPROST OPTHALMIC 1 DROP: 0 SOLUTION/ DROPS OPHTHALMIC at 22:25

## 2025-01-09 RX ADMIN — ESMOLOL HYDROCHLORIDE IN SODIUM CHLORIDE 200 MCG/KG/MIN: 20 INJECTION INTRAVENOUS at 14:04

## 2025-01-09 RX ADMIN — ESMOLOL HYDROCHLORIDE IN SODIUM CHLORIDE 50 MCG/KG/MIN: 20 INJECTION INTRAVENOUS at 10:52

## 2025-01-09 RX ADMIN — HYDROMORPHONE HYDROCHLORIDE 0.2 MG: 0.2 INJECTION, SOLUTION INTRAMUSCULAR; INTRAVENOUS; SUBCUTANEOUS at 10:18

## 2025-01-09 ASSESSMENT — ACTIVITIES OF DAILY LIVING (ADL)
ADLS_ACUITY_SCORE: 56
ADLS_ACUITY_SCORE: 54
ADLS_ACUITY_SCORE: 44
ADLS_ACUITY_SCORE: 56
ADLS_ACUITY_SCORE: 56
ADLS_ACUITY_SCORE: 44
ADLS_ACUITY_SCORE: 44
ADLS_ACUITY_SCORE: 56
ADLS_ACUITY_SCORE: 56
ADLS_ACUITY_SCORE: 44
ADLS_ACUITY_SCORE: 44
ADLS_ACUITY_SCORE: 56
ADLS_ACUITY_SCORE: 54

## 2025-01-09 ASSESSMENT — COLUMBIA-SUICIDE SEVERITY RATING SCALE - C-SSRS
2. HAVE YOU ACTUALLY HAD ANY THOUGHTS OF KILLING YOURSELF IN THE PAST MONTH?: NO
6. HAVE YOU EVER DONE ANYTHING, STARTED TO DO ANYTHING, OR PREPARED TO DO ANYTHING TO END YOUR LIFE?: NO
1. IN THE PAST MONTH, HAVE YOU WISHED YOU WERE DEAD OR WISHED YOU COULD GO TO SLEEP AND NOT WAKE UP?: NO

## 2025-01-09 NOTE — PHARMACY-ADMISSION MEDICATION HISTORY
Pharmacist Admission Medication History    Admission medication history is complete. The information provided in this note is only as accurate as the sources available at the time of the update.    Information Source(s): Patient, Family member, and CareEverywhere/SureScripts via in-person    Pertinent Information: None    Changes made to PTA medication list:  Added: None  Deleted: None  Changed: None    Allergies reviewed with patient and updates made in EHR: no    Medication History Completed By: López Sigala RPH 1/9/2025 1:37 PM    PTA Med List   Medication Sig Last Dose/Taking    apixaban ANTICOAGULANT (ELIQUIS) 5 MG tablet Take 1 tablet (5 mg) by mouth 2 times daily. 1/9/2025 Morning    co-enzyme Q-10 100 MG CAPS capsule Take 100 mg by mouth daily Taking    cyanocobalamin (VITAMIN B-12) 100 MCG tablet Take 100 mcg by mouth Every other day Taking    Ferrous Sulfate (SLOW RELEASE IRON) 47.5 MG TBCR Take 1 tablet by mouth 2 times daily. Taking    Multiple Vitamins-Minerals (PRESERVISION AREDS 2 PO) Take by mouth daily Taking    multivitamin, therapeutic (THERA-VIT) TABS tablet Take 1 tablet by mouth daily Taking    omega 3 1000 MG CAPS Take 1 capsule by mouth every other day Taking    tafluprost (ZIOPTAN) 0.0015 % SOLN ophthalmic solution Place 1 drop into both eyes at bedtime 1/8/2025 Bedtime    Timolol Maleate (TIMOPTIC OCUDOSE) 0.5 % ophthalmic solution Place 1 drop into both eyes 2 times daily 1/9/2025 Morning    vitamin C (ASCORBIC ACID) 1000 MG TABS Take 1,000 mg by mouth 2 times daily. Taking    Vitamin D3 (CHOLECALCIFEROL) 25 mcg (1000 units) tablet Take by mouth daily Daily in the winter Taking    vitamin E (TOCOPHEROL) 400 units (180 mg) capsule Take 400 Units by mouth every other day Taking    zinc gluconate 50 MG tablet Take 50 mg by mouth every other day Taking

## 2025-01-09 NOTE — ED TRIAGE NOTES
Pt comes in from [facility] via EMS. Hx of HTN, AAA 5 cm and macular degeneration. At 0600 pt developed chest pressure with radiation to back and numbess going to R arm and then both. Pt on Elliquis, given 1 SL nitroglycerin tab. Improvement with meds and rest. Worse breathing. /120 for EMS. . Daughter in route     Triage Assessment (Adult)       Row Name 01/09/25 0847          Triage Assessment    Airway WDL WDL        Respiratory WDL    Respiratory WDL WDL        Skin Circulation/Temperature WDL    Skin Circulation/Temperature WDL WDL        Cardiac WDL    Cardiac WDL X;all        Chest Pain Assessment    Chest Pain Location anterior chest, left;anterior chest, right     Chest Pain Radiation arm;back     Character pressure     Precipitating Factors activity  breathing     Alleviating Factors medications;rest     Associated Signs/Symptoms hypertension;nausea     Chest Pain Intervention nitroglycerin SL given;12-lead ECG obtained        Peripheral/Neurovascular WDL    Peripheral Neurovascular WDL WDL        Cognitive/Neuro/Behavioral WDL    Cognitive/Neuro/Behavioral WDL WDL

## 2025-01-09 NOTE — ED NOTES
Pt having intermittent complaints of pain and numbness through both arms. Dilaudid given and MD notified.

## 2025-01-09 NOTE — PLAN OF CARE
ICU End of Shift Summary. For vital signs, labs, and complete assessment please see Documentation Flowsheet    Pertinent Assessment:  A/O x4  Blind in R eye, can see light/dark gradient in L eye  Lungs clear in upper lobes, faint crackles in bilat bases  Tele: ST  Abd rounded, soft. Last BM Monday per pt. Clear liquid diet  Ambulated from cart to bed  Voiding via christopher cath  Trace BLE edema  Major Shift Events:  Arrived on unit at ~1600  Cards saw pt at 1700  Hep gtt not started per cards  Info given on advanced directives per pt request  Discharge/Transfer needs: TBD    Bedside shift Report Completed: Y  Bedside Safety Check Completed: Y  -----------------------------------------------  Notified provider about indwelling christopher catheter discussed removal or continued need.    Did provider choose to remove indwelling christopher catheter? NO    Provider's christopher indication for keeping indwelling christopher catheter: Indication for continued use: Retention    Is there an order for indwelling christopher catheter? YES    *If there is a plan to keep christopher catheter in place at discharge daily notification with provider is not necessary, but please add a notation in the treatment team sticky note that the patient will be discharging with the catheter.     Problem: Adult Inpatient Plan of Care  Goal: Plan of Care Review  Description: The Plan of Care Review/Shift note should be completed every shift.  The Outcome Evaluation is a brief statement about your assessment that the patient is improving, declining, or no change.  This information will be displayed automatically on your shift  note.  Outcome: Progressing  Flowsheets (Taken 1/9/2025 1732)  Outcome Evaluation: see note  Plan of Care Reviewed With: patient  Overall Patient Progress: improving  Goal: Absence of Hospital-Acquired Illness or Injury  Intervention: Identify and Manage Fall Risk  Recent Flowsheet Documentation  Taken 1/9/2025 1600 by Meagan Penny, RN  Safety  Promotion/Fall Prevention:   activity supervised   nonskid shoes/slippers when out of bed   increase visualization of patient   room door open  Intervention: Prevent Skin Injury  Recent Flowsheet Documentation  Taken 1/9/2025 1600 by Meagan Penny RN  Body Position: position changed independently  Goal: Optimal Comfort and Wellbeing  Intervention: Provide Person-Centered Care  Recent Flowsheet Documentation  Taken 1/9/2025 1600 by Meagan Penny RN  Trust Relationship/Rapport:   care explained   emotional support provided   questions answered  Goal: Readiness for Transition of Care  Intervention: Mutually Develop Transition Plan  Recent Flowsheet Documentation  Taken 1/9/2025 1600 by Meagan Penny RN  Equipment Currently Used at Home:   grab bar, tub/shower   grab bar, toilet   raised toilet seat   shower chair     Problem: Skin Injury Risk Increased  Goal: Skin Health and Integrity  Intervention: Plan: Nurse Driven Intervention: Moisture Management  Recent Flowsheet Documentation  Taken 1/9/2025 1600 by Meagan Penny RN  Bathing/Skin Care:   bath, chlorhexidine wipes   wipes, CHG   electrode patches/site rotation   linen changed  Intervention: Plan: Nurse Driven Intervention: Friction and Shear  Recent Flowsheet Documentation  Taken 1/9/2025 1600 by Meagan Penny RN  Friction/Shear Interventions:   HOB 30 degrees or less   Repositioning device (TAP system, etc.)  Intervention: Optimize Skin Protection  Recent Flowsheet Documentation  Taken 1/9/2025 1600 by Meagan Penny RN  Activity Management: ambulated in room  Head of Bed (HOB) Positioning: HOB at 20-30 degrees   Goal Outcome Evaluation:      Plan of Care Reviewed With: patient    Overall Patient Progress: improvingOverall Patient Progress: improving    Outcome Evaluation: see note

## 2025-01-09 NOTE — ED NOTES
[facility]  ED Nurse Handoff Report    ED Chief complaint: Chest Pain (Chest presure) and Hypertension  . ED Diagnosis:   Final diagnoses:   Hypertensive emergency   Chest pain, unspecified type   Infrarenal abdominal aortic aneurysm (AAA) without rupture   Hydroureteronephrosis   Pulmonary nodules   Lesion of left native kidney       Allergies:   Allergies   Allergen Reactions    Albuterol     Amlodipine     Bimatoprost Itching    Brimonidine Itching    Clotrimazole Itching    Dorzolamide Hcl-Timolol Mal Itching    Latanoprost Itching    Lisinopril     Losartan      depression    Neomycin-Polymyxin-Gramicidin Swelling    Neosporin [Neomycin-Polymyxin-Gramicidin]     Penicillins     Tape [Adhesive Tape]     Timolol Itching    Travoprost Itching    Vicodin [Hydrocodone-Acetaminophen]        Code Status: Full Code    Activity level - Baseline/Home:  standby.  Activity Level - Current:   assist of 1.   Lift room needed: No.   Bariatric: No   Needed: No   Isolation: No.   Infection: Not Applicable.     Respiratory status: Room air    Vital Signs (within 30 minutes):   Vitals:    01/09/25 1330 01/09/25 1345 01/09/25 1400 01/09/25 1409   BP: 128/63 123/48 162/78 150/81   Pulse: 85 80 84 89   Resp: 13 16 17 15   Temp:       TempSrc:       SpO2: 92% 93% 92% 94%       Cardiac Rhythm:  ,   Cardiac  Cardiac Rhythm: Normal sinus rhythm  Pain level:    Patient confused: No.   Patient Falls Risk: nonskid shoes/slippers when out of bed, arm band in place, patient and family education, and activity supervised.   Elimination Status: Urethral catheter (christopher) in place; refer to orders to discontinue as per protocol      Patient Report - Initial Complaint: Pt comes in from [facility] via EMS. Hx of HTN, AAA 5 cm and macular degeneration. At 0600 pt developed chest pressure with radiation to back and numbess going to R arm and then both. Pt on Elliquis, given 1 SL nitroglycerin tab. Improvement with meds and  rest. Worse breathing. /120 for EMS. BG 12 .   Focused Assessment: Pt is hypertensive and was hving intermittent chest pain/pressure radiating to back and bilateral arms. Has a known AAA and has been having urinary issues with ABD tenderness. Pt started on esmolol gtt. BP has decreased and pain has diminished. Pt has a christopher in place with UA pending. PIV x 2.      Abnormal Results:   Labs Ordered and Resulted from Time of ED Arrival to Time of ED Departure   BASIC METABOLIC PANEL - Abnormal       Result Value    Sodium 137      Potassium 4.0      Chloride 100      Carbon Dioxide (CO2) 24      Anion Gap 13      Urea Nitrogen 16.5      Creatinine 0.55      GFR Estimate 88      Calcium 9.4      Glucose 109 (*)    TROPONIN T, HIGH SENSITIVITY - Abnormal    Troponin T, High Sensitivity 15 (*)    INR - Abnormal    INR 1.51 (*)    CBC WITH PLATELETS AND DIFFERENTIAL - Abnormal    WBC Count 9.7      RBC Count 3.74 (*)     Hemoglobin 11.4 (*)     Hematocrit 34.9 (*)     MCV 93      MCH 30.5      MCHC 32.7      RDW 13.7      Platelet Count 198      % Neutrophils 81      % Lymphocytes 9      % Monocytes 9      % Eosinophils 1      % Basophils 0      % Immature Granulocytes 0      NRBCs per 100 WBC 0      Absolute Neutrophils 7.8      Absolute Lymphocytes 0.8      Absolute Monocytes 0.9      Absolute Eosinophils 0.1      Absolute Basophils 0.0      Absolute Immature Granulocytes 0.0      Absolute NRBCs 0.0     TROPONIN T, HIGH SENSITIVITY - Abnormal    Troponin T, High Sensitivity 20 (*)    ROUTINE UA WITH MICROSCOPIC REFLEX TO CULTURE        CT Aortic Survey w Contrast   Final Result   Addendum (preliminary) 1 of 1   CLINICAL ADDENDUM:    Clinical information in this report has been modified from the previous    version as follows: Mild to moderate bilateral hydroureteronephrosis.      END ADDENDUM      Final   IMPRESSION:   1.  No evidence of aortic dissection. Infrarenal abdominal aortic aneurysm is present measuring  up to 4.8 cm. Severe atherosclerosis is present within the aorta. Recommend surveillance imaging at 12 months per guidelines below.   2.  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 Reconstitution is seen within the mid segment, approximately 1.4 cm from the origin.   3.  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   4.  Small pulmonary nodules measuring up to 2 mm. See follow-up guidelines below.   5.  Mild wall thickening is seen along the distal descending and proximal sigmoid colon. Findings may be due to underdistention. Focal colitis is also in the differential diagnosis but considered to be less likely given lack of surrounding inflammatory    changes.   6.  10 mm hyperdense lesion in the lower pole of the left kidney may reflect a proteinaceous or hemorrhagic cyst. Suggest follow-up renal ultrasound to exclude solid mass.      REFERENCE:   Guidelines for Management of Incidental Pulmonary Nodules Detected on CT Images: From the Fleischner Society 2017.    Guidelines apply to incidental nodules in patients who are 35 years or older.   Guidelines do not apply to lung cancer screening, patients with immunosuppression, or patients with known primary cancer.      MULTIPLE NODULES   Nodule size <6 mm   Low-risk patients: No follow-up needed.   High-risk patients: Optional follow-up at 12 months.      Nodule size 6 mm or larger   Low-risk patients: Follow-up CT at 3-6 months, then consider CT at 18-24 months.   High-risk patients: Follow-up CT at 3-6 months, then at 18-24 months if no change.   -Use most suspicious nodule as guide to management.      REFERENCE:   Management of Incidental Adrenal Masses: A White Paper of the ACR Incidental Findings Committee. J Am Mela Radiol 2017;14:2112-7723.      <1 cm in short axis: No follow-up.      REFERENCE:   [facility] practice guidelines on the care of patients with an  abdominal aortic aneurysm. Bradley GUEVARA. J Vasc Surg, 2018. PMID:[id number].      Aorta size: 4.0 cm to 4.9 cm: Surveillance imaging at 12-month intervals.          Treatments provided: IVF, Esmolol, nitroglycerin SL, dilaudid IV and zofran.  Family Comments: son and daughter at bedside   OBS brochure/video discussed/provided to patient:  No  ED Medications:   Medications   nitroGLYcerin (NITROSTAT) sublingual tablet 0.4 mg (0.4 mg Sublingual $Given 1/9/25 1006)   HYDROmorphone (DILAUDID) injection 0.2 mg (0.2 mg Intravenous $Given 1/9/25 1018)   esmolol (BREVIBLOC) infusion 100 mL (200 mcg/kg/min × 59.9 kg Intravenous $New Bag 1/9/25 1404)   iopamidol (ISOVUE-370) solution 500 mL (72 mLs Intravenous $Given 1/9/25 0927)   CT scan flush use (60 mLs As instructed $Given 1/9/25 0927)   ondansetron (ZOFRAN) 2 MG/ML injection (4 mg  $Given 1/9/25 1410)       Drips infusing:  Yes  For the majority of the shift this patient was Green.   Interventions performed were AN.    Sepsis treatment initiated: No    Cares/treatment/interventions/medications to be completed following ED care: NA    ED Nurse Name: Florencio Boss RN  2:28 PM

## 2025-01-09 NOTE — H&P
[facility]    History and Physical - Hospitalist Service       Date of Admission:  1/9/2025    Assessment & Plan      Kavita Merlos is a 89 year old female with a PMHX of AAA (4.6x5cm), HTN, hyperlipidemia and aortic stenosis (TAVR Feb '24) admitted on 1/9/2025 after presenting with chest pain radiating to the back, numbness in bilateral arms, and HTN found to be 220/120 in the ED. Patient on Eliquis which she took this morning. Given 1 SL nitroglycerin and started on esmolol with improvement in sxs and bp. CT showed no evidence of aortic dissection or worsening of known AAA and EKG showed no acute ischemic changes. Admitted to ICU for strict blood pressure control and monitoring.    #Hypertensive Emergency  #Chest Pain  #AAA without rupture - 4.8 x 5.0cm    Patient with known AAA that was incidentally found while undergoing workup for a TAVR procedure. CT at the time showed anuerysm measuring 4.2 x 4 cm in Dec '23. Recently had follow up with vascular surgery for possible surgical intervention in Dec '24 where an abdominal US showed growth to aneurysm measuring 4.6 x 5 cm but did not feel that the risk of repair was justifiable and felt it was amenable to endovascular therapy with plan to follow up in 4 months. Patient arrived to the ED with chest pain radiating into her back and abdomen with a BP of 220/120 improved with 1 SL nitroglycerin and esmolol. CTA negative for aortic dissection or worsening AAA. EKG with no acute ischemic changes.   - Cardiology consulted, will await recs  - Obtain ECHO  - Continue Esmolol with SBP <170mmHg (failed attempt at nittro drip due to tachycardia)  - Dilaudid 0.2 mg PRN for pain  - Start continuous Heparin infusion and hold home DOAC out of abundance of caution, can likely safely resume tomorrow if BP stable    Hx TAVR  ? PAF  -s/p TAVR February 2024, per cardiology note also had afib during that hospitalizatoin and has been on DOAC since  -as above,  transition to heparin drip for now w/eventual resumption of DOAC  -recommend she discuss further discussion with cardiology and CV surgery risk/benefit for DOAC moving forward    #urinary Retention  #Bladder Pain  Patient has been experiencing difficulty initiating a urine stream and emptying her bladder with associated bladder pain that has been occurring for several months. Previously has been medicated for possible bladder spasms but caused her to have issues with dry mouth. At annual wellness check up in Oct '24, a referral to Urology was made. CTA while in the ED for AAA workup showed incidental findings of a left renal mass. Labs are otherwise wnl.  - Mendieta placed for retention, consider     #10mm L renal lesion  -noted on imaging, likely cyst per Ct report  -recommend PCP follow up and imaging per them    #Macular Degeneration  Resume PTA Zioptan and Timolol Maleate        Diet: Clear Liquid Diet    DVT Prophylaxis: Heparin infusion  Mendieta Catheter: Not present  Lines: None     Cardiac Monitoring: None  Code Status: Full Code      Clinically Significant Risk Factors Present on Admission                # Drug Induced Coagulation Defect: home medication list includes an anticoagulant medication    # Hypertension: Noted on problem list                      Disposition Plan     Medically Ready for Discharge: TBD       The patient's care was discussed with the Attending Physician, Dr. Marcial .    BHAVANA BOND  Hospitalist Service  [facility]  Securely message with Peer[employer] (more info)  Text page via [employer] Paging/Directory     ______________________________________________________________________    Chief Complaint   Chest Pain and Hypertension    History is obtained from the patient, electronic health record, and patient's family    History of Present Illness   Kavita Merlos is a 89 year old female with a PMHx of AAA, HTN, hyperlipidemia, aortic stenosis with aortic valve replacement in Feb  2024 on Eliquis and macular degeneration who presented to the ED after experiencing chest pain that radiated into her back and stomach with numbness/tingling in her arms and neck. She reports breathing exacerbated the pain. Her blood pressure was found to be 220/120 while in the ED and the patient was given 1 SL nitroglycerin and started on Esmolol with improvement in symptoms and BP.     Patient was seen for an annual wellness exam in Oct '24 where it was noted that her HTN was not well controlled but that her cardiologist had discontinued all of her antihypertensive medications given some significant depression and side effects and she was reluctant to restart any meds with plan for her to monitor her blood pressures at home. Per vascular surgery note on 12/11/24, the patient was noted to have an aneurysm measuring 4.2x4cm on CT in Dec '23 with abdominal aorta US measuring 4.6x5.0cm during this visit. At this time, although the aneurysm had grown, they did not feel like the risk of repair was justifiable with current size and if it were to continue to grow they would consider further surgical management with plan to do a CTA of abdomen pelvis in 4 months.       Past Medical History    Past Medical History:   Diagnosis Date    AAA (abdominal aortic aneurysm)     Aortic stenosis     Benign essential hypertension with BP difference between arms     Hyperlipidemia LDL goal <70        Past Surgical History   Past Surgical History:   Procedure Laterality Date    CV CORONARY ANGIOGRAM N/A 12/19/2023    Procedure: Coronary Angiogram;  Surgeon: Seth Duarte MD;  Location:  [facility]    CV PERIPHERAL VASCULAR LITHOTRIPSY N/A 2/20/2024    Procedure: Peripheral Vascular Lithotripsy;  Surgeon: Seth Duarte MD;  Location:  [facility]    CV RIGHT HEART CATH MEASUREMENTS RECORDED N/A 12/19/2023    Procedure: Right Heart Catheterization;  Surgeon: Seth Duarte MD;   Location:  HE[facility]ARD[facility] CATH LAB    CV TRANSCATHETER AORTIC VALVE REPLACEMENT-FEMORAL APPROACH N/A 2/20/2024    Procedure: Transcatheter Aortic Valve Replacement-Femoral Approach;  Surgeon: Seth Duarte MD;  Location:  HE[facility]ARD[facility] CATH LAB       Prior to Admission Medications   Prior to Admission Medications   Prescriptions Last Dose Informant Patient Reported? Taking?   Ferrous Sulfate (SLOW RELEASE IRON) 47.5 MG TBCR   Yes Yes   Sig: Take 1 tablet by mouth 2 times daily.   Multiple Vitamins-Minerals (PRESERVISION AREDS 2 PO)  Self Yes Yes   Sig: Take by mouth daily   Timolol Maleate (TIMOPTIC OCUDOSE) 0.5 % ophthalmic solution 1/9/2025 Morning Self Yes Yes   Sig: Place 1 drop into both eyes 2 times daily   Vitamin D3 (CHOLECALCIFEROL) 25 mcg (1000 units) tablet  Self Yes Yes   Sig: Take by mouth daily Daily in the winter   apixaban ANTICOAGULANT (ELIQUIS) 5 MG tablet 1/9/2025 Morning  No Yes   Sig: Take 1 tablet (5 mg) by mouth 2 times daily.   co-enzyme Q-10 100 MG CAPS capsule  Self Yes Yes   Sig: Take 100 mg by mouth daily   cyanocobalamin (VITAMIN B-12) 100 MCG tablet  Self Yes Yes   Sig: Take 100 mcg by mouth Every other day   multivitamin, therapeutic (THERA-VIT) TABS tablet  Self Yes Yes   Sig: Take 1 tablet by mouth daily   omega 3 1000 MG CAPS  Self Yes Yes   Sig: Take 1 capsule by mouth every other day   tafluprost (ZIOPTAN) 0.0015 % SOLN ophthalmic solution 1/8/2025 Bedtime Self Yes Yes   Sig: Place 1 drop into both eyes at bedtime   vitamin C (ASCORBIC ACID) 1000 MG TABS  Self Yes Yes   Sig: Take 1,000 mg by mouth 2 times daily.   vitamin E (TOCOPHEROL) 400 units (180 mg) capsule  Self Yes Yes   Sig: Take 400 Units by mouth every other day   zinc gluconate 50 MG tablet  Self Yes Yes   Sig: Take 50 mg by mouth every other day      Facility-Administered Medications: None        Allergies   Allergies   Allergen Reactions    Albuterol     Amlodipine     Bimatoprost Itching    Brimonidine  Itching    Clotrimazole Itching    Dorzolamide Hcl-Timolol Mal Itching    Latanoprost Itching    Lisinopril     Losartan      depression    Neomycin-Polymyxin-Gramicidin Swelling    Neosporin [Neomycin-Polymyxin-Gramicidin]     Penicillins     Tape [Adhesive Tape]     Timolol Itching    Travoprost Itching    Vicodin [Hydrocodone-Acetaminophen]         Physical Exam   Vital Signs: Temp: 98.1  F (36.7  C) Temp src: Temporal BP: 150/55 Pulse: 87   Resp: 18 SpO2: 94 %      Weight: 0 lbs 0 oz    Constitutional: awake, alert, cooperative, no apparent distress, and appears stated age  Neuro: No focal deficits.   Cardiovascular: RRR  Pulm: No wheezes/rochi/rales. Breathing comfortably on RA  GI: Mild distension and tenderness given bladder retention.   Musculoskeletal: Moving all extremities. No lower extremity pitting edema present  there is no redness, warmth, or swelling of the joints      Medical Decision Making       65 MINUTES SPENT BY ME on the date of service doing chart review, history, exam, documentation & further activities per the note.      Data   Imaging results reviewed over the past 24 hrs:   Recent Results (from the past 24 hours)   CT Aortic Survey w Contrast    Addendum: 1/9/2025    CLINICAL ADDENDUM:   Clinical information in this report has been modified from the previous version as follows: Mild to moderate bilateral hydroureteronephrosis.    END ADDENDUM      Narrative    EXAM: CT AORTIC SURVEY W CONTRAST  LOCATION: [facility]  DATE: 1/9/2025    INDICATION: Chest pain, Hypertension  COMPARISON: CT chest performed on 4/17/2020  TECHNIQUE: CT angiogram chest abdomen pelvis during arterial phase of injection of IV contrast. 2D and 3D MIP reconstructions were performed by the CT technologist. Dose reduction techniques were used.   CONTRAST: 72mL Isovue 370    FINDINGS:   CT ANGIOGRAM CHEST, ABDOMEN, AND PELVIS: No evidence of aortic dissection is present. Severe vascular  calcifications are seen within the thoracoabdominal aorta. 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 Reconstitution is seen along the mid segment of the superior mesenteric artery. Infrarenal abdominal   aortic aneurysm is present measuring up to 4.8 x 4.5 cm and extends for approximately 8.4 cm in length. Right common iliac artery measures 1.2 cm while the left common iliac artery measures 1.1 cm. Moderate to severe vascular calcifications are seen   within the iliac branches. Intramural thrombus is seen within the left common femoral vein measuring up to 4 mm (series 6, image 277).    LUNGS AND PLEURA: Subsegmental atelectasis is seen in the lung bases. Emphysematous changes are seen in the lungs with upper lobe predominance. Stable small pulmonary nodules measuring up to 2 mm in the left lower lobe (series 7, image 130).    MEDIASTINUM/AXILLAE: Stable 9 mm precarinal lymph node in the mediastinum (series 5, image 26). Findings are nonspecific and may be reactive. Heart size is mildly enlarged. Aortic valve replacement is present.    CORONARY ARTERY CALCIFICATION: Mild to moderate    HEPATOBILIARY: Calcified granuloma seen in the right hepatic dome. Cholelithiasis is present within the gallbladder measuring up to 3.5 cm. Cholelithiasis is also seen within the gallbladder.    PANCREAS: No significant mass, duct dilatation, or inflammatory change.    SPLEEN: Normal size.    ADRENAL GLANDS: Bilateral thickening of the adrenal and small adrenal nodules are unchanged measuring up to 8 mm on the left and 5 mm on the right.    KIDNEYS/BLADDER: Mild to moderate bilateral hydronephrosis and hydroureter. Bilateral renal cysts are present measuring up to 2.9 cm along the inferior pole of the right kidney. 10 mm hyperdense  lesion along the lower pole of the left kidney may reflect   a proteinaceous or hemorrhagic cyst (series 5, image 69).    BOWEL: Small to moderate hiatal hernia is present. Colonic diverticulosis without evidence of diverticulitis. Mild wall thickening is seen along the distal descending and proximal sigmoid colon. No evidence of bowel obstruction.    LYMPH NODES: No lymphadenopathy.    PELVIC ORGANS: No pelvic masses.    MUSCULOSKELETAL: Diffuse osteopenia is present. Multilevel degenerative changes are present in the spine.      Impression    IMPRESSION:  1.  No evidence of aortic dissection. Infrarenal abdominal aortic aneurysm is present measuring up to 4.8 cm. Severe atherosclerosis is present within the aorta. Recommend surveillance imaging at 12 months per guidelines below.  2.  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 Reconstitution is seen within the mid segment, approximately 1.4 cm from the origin.  3.  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  4.  Small pulmonary nodules measuring up to 2 mm. See follow-up guidelines below.  5.  Mild wall thickening is seen along the distal descending and proximal sigmoid colon. Findings may be due to underdistention. Focal colitis is also in the differential diagnosis but considered to be less likely given lack of surrounding inflammatory   changes.  6.  10 mm hyperdense lesion in the lower pole of the left kidney may reflect a proteinaceous or hemorrhagic cyst. Suggest follow-up renal ultrasound to exclude solid mass.    REFERENCE:  Guidelines for Management of Incidental Pulmonary Nodules Detected on CT Images: From the Fleischner Society 2017.   Guidelines apply to incidental nodules in patients who are 35 years or older.  Guidelines do not apply to lung cancer screening, patients with immunosuppression, or patients with known primary cancer.    MULTIPLE  NODULES  Nodule size <6 mm  Low-risk patients: No follow-up needed.  High-risk patients: Optional follow-up at 12 months.    Nodule size 6 mm or larger  Low-risk patients: Follow-up CT at 3-6 months, then consider CT at 18-24 months.  High-risk patients: Follow-up CT at 3-6 months, then at 18-24 months if no change.  -Use most suspicious nodule as guide to management.    REFERENCE:  Management of Incidental Adrenal Masses: A White Paper of the ACR Incidental Findings Committee. J Am Mela Radiol 2017;14:1957-6325.    <1 cm in short axis: No follow-up.    REFERENCE:  SVS practice guidelines on the care of patients with an abdominal aortic aneurysm. Bradley GUEVARA. J Vasc Surg, 2018. PMID:[id number].    Aorta size: 4.0 cm to 4.9 cm: Surveillance imaging at 12-month intervals.

## 2025-01-09 NOTE — ED PROVIDER NOTES
Emergency Department Note      History of Present Illness     Chief Complaint   Chest Pain (Chest presure) and Hypertension      HPI   Kavtia Merlos is a 89 year old female on Eliquis with a history of AAA, hypertension, hyperlipidemia, aortic stenosis presenting with chest pain and hypertension. The patient reports this morning at 0600 she experienced chest pressure that radiated to her back. She states feeling pain in her stomach, tingling in her arms and neck. The patient reports that breathing makes the chest pressure worse. She endorses nausea en route to the ED. She denies shortness of breath, dizziness, fever or a headache. The patient reports she can not take any blood pressure medication.The patient notes she has 5 cm AAA with macular degeneration. She states having heart mummers. The patient's daughter reports she took her Eliquis this morning.      Independent Historian   Daughter as detailed above.    Review of External Notes   None    Past Medical History     Medical History and Problem List   Hypertension  Hyperlipidemia  Glaucoma  Macular degeneration disease  Aortic stenosis  AAA    Medications   Eliquis  Co-enzyme  Lucentis    Surgical History   Aortic valve transcatheter replacement  Coronary angiogram  Vascular lithotripsy  Replacement femoral approach    Physical Exam     Patient Vitals for the past 24 hrs:   BP Temp Temp src Pulse Resp SpO2   01/09/25 1100 (!) 174/141 -- -- 117 18 96 %   01/09/25 1045 (!) 216/111 -- -- 120 17 95 %   01/09/25 1030 (!) 208/106 -- -- 110 12 93 %   01/09/25 1015 (!) 223/128 -- -- (!) 141 (!) 32 94 %   01/09/25 1000 (!) 190/88 -- -- 89 16 96 %   01/09/25 0957 -- 98.1  F (36.7  C) Temporal -- -- --   01/09/25 0945 (!) 181/116 -- -- -- -- --   01/09/25 0915 (!) 213/110 -- -- 105 23 96 %   01/09/25 0900 (!) 213/108 -- -- 105 15 95 %     Physical Exam  General: Alert, no acute distress  Neuro:  No focal deficits  HEENT:  Moist mucous membranes.  Conjunctiva  normal.  CV:  RRR, no m/r/g, skin warm and well perfused; 2+ radial pulses.   Pulm:  CTAB, no wheezes/ronchi/rales.  No acute distress, breathing comfortably  GI:  Soft, nontender, nondistended.  No rebound or guarding.    MSK:  Moving all extremities.  No focal areas of edema, erythema, or tenderness  Skin:  WWP, no rashes, no lower extremity edema, skin color normal, no diaphoresis  Psych:  Well-appearing, normal affect, regular speech    Diagnostics     Lab Results   Labs Ordered and Resulted from Time of ED Arrival to Time of ED Departure   BASIC METABOLIC PANEL - Abnormal       Result Value    Sodium 137      Potassium 4.0      Chloride 100      Carbon Dioxide (CO2) 24      Anion Gap 13      Urea Nitrogen 16.5      Creatinine 0.55      GFR Estimate 88      Calcium 9.4      Glucose 109 (*)    TROPONIN T, HIGH SENSITIVITY - Abnormal    Troponin T, High Sensitivity 15 (*)    INR - Abnormal    INR 1.51 (*)    CBC WITH PLATELETS AND DIFFERENTIAL - Abnormal    WBC Count 9.7      RBC Count 3.74 (*)     Hemoglobin 11.4 (*)     Hematocrit 34.9 (*)     MCV 93      MCH 30.5      MCHC 32.7      RDW 13.7      Platelet Count 198      % Neutrophils 81      % Lymphocytes 9      % Monocytes 9      % Eosinophils 1      % Basophils 0      % Immature Granulocytes 0      NRBCs per 100 WBC 0      Absolute Neutrophils 7.8      Absolute Lymphocytes 0.8      Absolute Monocytes 0.9      Absolute Eosinophils 0.1      Absolute Basophils 0.0      Absolute Immature Granulocytes 0.0      Absolute NRBCs 0.0     TROPONIN T, HIGH SENSITIVITY   ROUTINE UA WITH MICROSCOPIC REFLEX TO CULTURE       Imaging   CT Aortic Survey w Contrast   Final Result   IMPRESSION:   1.  No evidence of aortic dissection. Infrarenal abdominal aortic aneurysm is present measuring up to 4.8 cm. Severe atherosclerosis is present within the aorta. Recommend surveillance imaging at 12 months per guidelines below.   2.  Severe stenosis with near complete occlusion is seen  along the origin and proximal segment of the superior mesenteric artery. Reconstitution is seen within the mid segment, approximately 1.4 cm from the origin.   3.  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   4.  Small pulmonary nodules measuring up to 2 mm. See follow-up guidelines below.   5.  Mild wall thickening is seen along the distal descending and proximal sigmoid colon. Findings may be due to underdistention. Focal colitis is also in the differential diagnosis but considered to be less likely given lack of surrounding inflammatory    changes.   6.  10 mm hyperdense lesion in the lower pole of the left kidney may reflect a proteinaceous or hemorrhagic cyst. Suggest follow-up renal ultrasound to exclude solid mass.      REFERENCE:   Guidelines for Management of Incidental Pulmonary Nodules Detected on CT Images: From the Fleischner Society 2017.    Guidelines apply to incidental nodules in patients who are 35 years or older.   Guidelines do not apply to lung cancer screening, patients with immunosuppression, or patients with known primary cancer.      MULTIPLE NODULES   Nodule size <6 mm   Low-risk patients: No follow-up needed.   High-risk patients: Optional follow-up at 12 months.      Nodule size 6 mm or larger   Low-risk patients: Follow-up CT at 3-6 months, then consider CT at 18-24 months.   High-risk patients: Follow-up CT at 3-6 months, then at 18-24 months if no change.   -Use most suspicious nodule as guide to management.      REFERENCE:   Management of Incidental Adrenal Masses: A White Paper of the ACR Incidental Findings Committee. J Am Mela Radiol 2017;14:0012-0745.      <1 cm in short axis: No follow-up.      REFERENCE:   SVS practice guidelines on the care of patients with an abdominal aortic aneurysm. Bradley GUEVARA. J Vasc Surg, 2018. PMID:[id number].      Aorta size: 4.0 cm to 4.9 cm: Surveillance imaging at 12-month intervals.          EKG     ECG results from 01/09/25    EKG 12-lead, tracing only     Value    Systolic Blood Pressure     Diastolic Blood Pressure     Ventricular Rate 106    Atrial Rate 106    MS Interval 146    QRS Duration 86        QTc 454    P Axis 59    R AXIS 64    T Axis 74    Interpretation ECG      Sinus tachycardia  Possible Left atrial enlargement  Minimal voltage criteria for LVH, may be normal variant ( [place] product )  Nonspecific ST abnormality  Abnormal ECG  I interpreted this EKG at 0910      Independent Interpretation   None    ED Course      Medications Administered   Medications   nitroGLYcerin (NITROSTAT) sublingual tablet 0.4 mg (0.4 mg Sublingual $Given 1/9/25 1006)   HYDROmorphone (DILAUDID) injection 0.2 mg (0.2 mg Intravenous $Given 1/9/25 1018)   esmolol (BREVIBLOC) infusion 100 mL (100 mcg/kg/min × 59.9 kg Intravenous Rate/Dose Change 1/9/25 1121)   iopamidol (ISOVUE-370) solution 500 mL (72 mLs Intravenous $Given 1/9/25 0927)   CT scan flush use (60 mLs As instructed $Given 1/9/25 0927)       Procedures   Procedures     Discussion of Management   Admitting Hospitalist, Dr. Baldemar Marcial     ED Course   ED Course as of 01/09/25 1146   Thu Jan 09, 2025   0911 I obtained history and examined the patient as noted above    1130 I rechecked the patient and explained findings.    1145 1143 I spoke with , hospitalist, who accepts the patient.        Additional Documentation  None    Medical Decision Making / Diagnosis     CMS Diagnoses: None    MIPS   None      MDM   Kavita Merlos is a 89 year old female with history of AAA, TAVR on [facility] presenting to the ER for evaluation of chest pain.  Please see above for the details in HPI and exam.  Patient initially hypertensive and tachycardic on arrival.  She is not hypoxic or in respiratory distress.  Description of pain radiating to her back concerning for possible aortic dissection and she was sent to CT stat for aortic survey.  Fortunately, no evidence of aortic dissection or  apparent worsening of patient's known AAA.  Incidental findings of mesenteric artery and celiac trunk stenosis noted as well as noted pulmonary nodules and left renal mass discussed with patient and family at bedside.  Otherwise no acute findings on imaging.  Screening EKG shows no acute ischemic appearing changes or signs of malignant dysrhythmia.  High-sensitivity troponin mildly detectable and 2-hour troponin remains relatively flat.  The rest of her basic lab studies above are largely unremarkable.  Very low suspicion for ACS as cause for her troponinemia and I have higher suspicion for hypertensive emergency causing demand ischemia.  She did have some relief and improvement of her symptoms with nitroglycerin but this caused worsening tachycardia.  She has improved symptoms after starting esmolol for tight blood pressure control.  She otherwise denies any headaches worrisome for intracranial catastrophe.  Patient will be admitted to the ICU for tight blood pressure control and close monitoring.    Total critical care Emergency physician time in direct patient evaluation and management of this patient, exclusive of procedures:  35 minutes     Disposition   The patient was admitted to the hospital.     Diagnosis     ICD-10-CM    1. Hypertensive emergency  I16.1       2. Chest pain, unspecified type  R07.9       3. Infrarenal abdominal aortic aneurysm (AAA) without rupture  I71.43            Discharge Medications   New Prescriptions    No medications on file         Scribe Disclosure:  Emi ROGEL, am serving as a scribe at 9:42 AM on 1/9/2025 to document services personally performed by Nilay Ramos MD based on my observations and the provider's statements to me.        Nilay Ramos MD  01/09/25 8905

## 2025-01-09 NOTE — CONSULTS
Cardiology Consultation 75 minutes in chart review, discussion with nursing staff. Discussing treatment options with patient and family      Kavita Merlos MRN# [id number]   YOB: 1935 Age: 89 year old   Date of Admission: 1/9/2025     Reason for consult:            Assessment and Plan:     Assessment : The patient's chest discomfort has resolved.  She is currently normotensive off all blood pressure medications.  Her ECG is unremarkable and CT scan showed no evidence of aortic dissection or pulmonary embolus.  She has reiterated her unwillingness to take any blood pressure medications although she is willing to remain on apixaban which was started for documented atrial fibrillation.  An echocardiogram is pending to follow-up her aortic valve, but I am not suspicious of valve dysfunction and it is unlikely to change our management.  Unless the patient changes her mind ( please see her recent cardiology visit note with , her usual cardiologist where she refused any blood pressure medications) I am afraid we have nothing to add to her care at this time.    Recommendations   1) continue apixaban as previously prescribed by her managing cardiologist.( No need for IV heparin as previously ordered by admitting physician)  2) if she changes her mind about taking other medications, I would advise metoprolol XL beginning 50 mg daily and atorvastatin 20 mg daily.  3) await results of transthoracic echo  4) follow-up with Dr. Duarte and Dr. Garay as previously scheduled.    We will sign off as we have nothing more to add at this point.      High complexity medical decision making  Chronic illness with severe exacerbation, progression:  Acute illness that poses a threat to life or bodily function:     High complexity data review  Unique tests ordered:   Unique results reviewed: Chest XR from  .Echo reported   Independent interpretation of a test performed by another physician: ECG from   External  documents reviewed:              Chief Complaint:   Chest Pain (Chest presure) and Hypertension           History of Present Illness:   Kavita Merlos, an 89-year-old woman with a history of aortic stenosis ( sp TAVR), atherosclerotic peripheral vascular disease ( Abdominal Aortic Aneurysm/ 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 Rader for chest pain/hypertension    The patient is followed by my colleagues Dr. Garay and Dr. Duarte status post successful TAVR for management of critical aortic stenosis in February 2024.  Her pre-TAVR coronary angiogram showed mild atheromatous change with no significant focal coronary narrowing.  The patient has a known abdominal aortic aneurysm which is currently undergoing observation and followed by  and given her advanced age, there are no immediate plans for intervention in the absence of disabling symptoms or significant change in the size of the aneurysm..  During her last visit with Dr. Garay she reiterated her longstanding refusal to take any antihypertensive medications.      This morning, she awoke with sharp chest pain, worse with deep breathing.  Her ECG showed no acute changes.  Serial troponin levels are flat and low at 20.  An echocardiogram is scheduled for tomorrow.  CT imaging showed no evidence of aortic dissection, pulmonary embolus, or new infiltrate.  The patient denies fever hemoptysis purulent sputum or generalized malaise.  Initially, her blood pressure was very elevated, but it came down to the point where she is able to come off of intravenous esmolol with a current blood pressure of 115/56.    PAST MEDICAL HISTORY  1) paroxysmal atrial fibrillation: On apixaban.  2) atherosclerotic peripheral vascular disease: Abdominal aortic aneurysm measuring 4.6 x 5.0 cm on ultrasound.  Seen by vascular specialist with  "plans to continue to observe without intervention in view of age and risks.  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  Currently not on statin therapy.  3.  History of hypertension: Has adamantly refused any and all blood pressure medications.  Still refuses these medications this evening.  4) severe macular degeneration  5 ) Severe Aortic stenosis : 26 mm Ross Adelso 3 Valve implantation 2024. No significant CAD on pre procedural coronary angiogram         Physical Exam:   Vitals were reviewed  Blood pressure 104/55, pulse 98, temperature 99.2  F (37.3  C), temperature source Oral, resp. rate 16, height 1.626 m (5' 4\"), weight 59.2 kg (130 lb 9.6 oz), SpO2 94%, not currently breastfeeding.  Temperatures:  Current - Temp: 99.2  F (37.3  C); Max - Temp  Av.7  F (37.1  C)  Min: 98.1  F (36.7  C)  Max: 99.2  F (37.3  C)  Respiration range: Resp  Av.4  Min: 12  Max: 32  Pulse range: Pulse  Av.5  Min: 77  Max: 141  Blood pressure range: Systolic (24hrs), Av , Min:87 , Max:223   ; Diastolic (24hrs), Av, Min:43, Max:141    Pulse oximetry range: SpO2  Av.1 %  Min: 90 %  Max: 96 %  No intake or output data in the 24 hours ending 25 4244  Constitutional:   awake, alert, cooperative, no apparent distress, and appears stated age     Eyes:   Lids and lashes normal, pupils equal, round and reactive to light, extra ocular muscles intact, sclera clear, conjunctiva normal     Neck:   supple, symmetrical, trachea midline,      Back:   symmetric     Lungs:   Clear to P and A     Cardiovascular:   Normal S1 and S2 no murmur rub or click     Abdomen:   non-tender     Musculoskeletal:   motor strength is 5 out of 5 all extremities bilaterally     Neurologic:   Grossly nonfocal     Skin:   normal skin color, texture, turgor     Additional findings:                    Past Medical History:   I have reviewed this patient's past medical history  Past Medical History: "   Diagnosis Date    AAA (abdominal aortic aneurysm)     Aortic stenosis     Benign essential hypertension with BP difference between arms     Hyperlipidemia LDL goal <70              Past Surgical History:   I have reviewed this patient's past surgical history  Past Surgical History:   Procedure Laterality Date    CV CORONARY ANGIOGRAM N/A 2023    Procedure: Coronary Angiogram;  Surgeon: Seth Duarte MD;  [facility]    CV PERIPHERAL VASCULAR LITHOTRIPSY N/A 2024    Procedure: Peripheral Vascular Lithotripsy;  Surgeon: Seth Duarte MD;  Location:  [facility]    CV RIGHT HEART CATH MEASUREMENTS RECORDED N/A 2023    Procedure: Right Heart Catheterization;  Surgeon: Seth Duarte MD;  Location:  [facility]    CV TRANSCATHETER AORTIC VALVE REPLACEMENT-FEMORAL APPROACH N/A 2024    Procedure: Transcatheter Aortic Valve Replacement-Femoral Approach;  Surgeon: Seth Duarte MD;  Location:  [facility]               Social History:   I have reviewed this patient's social history  very supportive family at bedside had 3 children, lost one. 8 grandchildren and 6 great great grandchildren. NO alcohol or tobacco abuse. [facility] in [facility] facility. Difficult to socialize as she prefers due to visual impairment  Social History     Tobacco Use    Smoking status: Former     Current packs/day: 0.00     Types: Cigarettes     Quit date: 10/24/2006     Years since quittin.2     Passive exposure: Never    Smokeless tobacco: Never    Tobacco comments:     I quit when I was 71 years old, off/on prior to that starting when I was 17   Substance Use Topics    Alcohol use: Not Currently             Family History:   I have reviewed this patient's family history  No family history on file.          Allergies:     Allergies   Allergen Reactions    Albuterol     Amlodipine     Bimatoprost Itching    Brimonidine  Itching    Clotrimazole Itching    Dorzolamide Hcl-Timolol Mal Itching    Latanoprost Itching    Lisinopril     Losartan      depression    Neomycin-Polymyxin-Gramicidin Swelling    Neosporin [Neomycin-Polymyxin-Gramicidin]     Penicillins     Tape [Adhesive Tape]     Timolol Itching    Travoprost Itching    Vicodin [Hydrocodone-Acetaminophen]              Medications:   I have reviewed this patient's current medications  Medications Prior to Admission   Medication Sig Dispense Refill Last Dose/Taking    apixaban ANTICOAGULANT (ELIQUIS) 5 MG tablet Take 1 tablet (5 mg) by mouth 2 times daily. 180 tablet 3 1/9/2025 Morning    co-enzyme Q-10 100 MG CAPS capsule Take 100 mg by mouth daily   Taking    cyanocobalamin (VITAMIN B-12) 100 MCG tablet Take 100 mcg by mouth Every other day   Taking    Ferrous Sulfate (SLOW RELEASE IRON) 47.5 MG TBCR Take 1 tablet by mouth 2 times daily.   Taking    Multiple Vitamins-Minerals (PRESERVISION AREDS 2 PO) Take by mouth daily   Taking    multivitamin, therapeutic (THERA-VIT) TABS tablet Take 1 tablet by mouth daily   Taking    omega 3 1000 MG CAPS Take 1 capsule by mouth every other day   Taking    tafluprost (ZIOPTAN) 0.0015 % SOLN ophthalmic solution Place 1 drop into both eyes at bedtime   1/8/2025 Bedtime    Timolol Maleate (TIMOPTIC OCUDOSE) 0.5 % ophthalmic solution Place 1 drop into both eyes 2 times daily   1/9/2025 Morning    vitamin C (ASCORBIC ACID) 1000 MG TABS Take 1,000 mg by mouth 2 times daily.   Taking    Vitamin D3 (CHOLECALCIFEROL) 25 mcg (1000 units) tablet Take by mouth daily Daily in the winter   Taking    vitamin E (TOCOPHEROL) 400 units (180 mg) capsule Take 400 Units by mouth every other day   Taking    zinc gluconate 50 MG tablet Take 50 mg by mouth every other day   Taking     Current Facility-Administered Medications   Medication Dose Route Frequency Provider Last Rate Last Admin    acetaminophen (TYLENOL) tablet 650 mg  650 mg Oral Q4H PRN Baldemar Marcial  A, DO        Or    acetaminophen (TYLENOL) oral liquid 650 mg  650 mg Per NG tube Q4H PRN Aniket, Baldemar A, DO        apixaban ANTICOAGULANT (ELIQUIS) tablet 5 mg  5 mg Oral BID Aniket, Baldemar A, DO        glucose gel 15-30 g  15-30 g Oral Q15 Min PRN Aniket, Baldemar A, DO        Or    dextrose 50 % injection 25-50 mL  25-50 mL Intravenous Q15 Min PRN Aniket, Baldemar A, DO        Or    glucagon injection 1 mg  1 mg Subcutaneous Q15 Min PRN Aniket, Baldemar A, DO        esmolol (BREVIBLOC) infusion 100 mL   mcg/kg/min Intravenous Continuous Nilay Ramos MD   Paused at 01/09/25 1436    HYDROmorphone (DILAUDID) injection 0.2 mg  0.2 mg Intravenous Q15 Min PRN Nilay Ramos MD   0.2 mg at 01/09/25 1018    [START ON 1/10/2025] multivitamin, therapeutic (THERA-VIT) tablet 1 tablet  1 tablet Oral Daily Aniket, Baldemar A, DO        nitroGLYcerin (NITROSTAT) sublingual tablet 0.4 mg  0.4 mg Sublingual Q5 Min PRN Nilay Ramos MD   0.4 mg at 01/09/25 1006    ondansetron (ZOFRAN ODT) ODT tab 4 mg  4 mg Oral Q6H PRN Aniket, Baldemar A, DO        Or    ondansetron (ZOFRAN) injection 4 mg  4 mg Intravenous Q6H PRN Aniket, Baldemar A, DO        Patient is already receiving anticoagulation with heparin, enoxaparin (LOVENOX), warfarin (COUMADIN)  or other anticoagulant medication   Does not apply Continuous PRN Aniket, Baldemar A, DO        tafluprost (ZIOPTAN) ophthalmic solution 1 drop  1 drop Both Eyes At Bedtime Aniket, Baldemar A, DO        Timoptic Ocudose 0.5 % ophthalmic solution 1 drop  1 drop Both Eyes BID Aniket, Baldemar A, DO        [START ON 1/10/2025] zinc sulfate (ZINCATE) capsule 220 mg  220 mg Oral Every Other Day Aniket, Baldemar A, DO                 Review of Systems:     The 10 point Review of Systems is negative other than noted in the HPI            Data:   All laboratory data reviewed  Results for orders placed or performed during the hospital encounter of 01/09/25 (from the past 24 hours)   EKG  12-lead, tracing only   Result Value Ref Range    Systolic Blood Pressure  mmHg    Diastolic Blood Pressure  mmHg    Ventricular Rate 106 BPM    Atrial Rate 106 BPM    ID Interval 146 ms    QRS Duration 86 ms     ms    QTc 454 ms    P Axis 59 degrees    R AXIS 64 degrees    T Axis 74 degrees    Interpretation ECG       Sinus tachycardia  Possible Left atrial enlargement  Minimal voltage criteria for LVH, may be normal variant ( [place] product )  Nonspecific ST abnormality  Abnormal ECG  When compared with ECG of 05-Aug-2024 13:12,  No significant change was found  Unconfirmed report - interpretation of this ECG is computer generated - see medical record for final interpretation  Confirmed by - EMERGENCY ROOM, PHYSICIAN ([id number]),  Fausto Knott ([id number]) on 1/9/2025 9:24:48 AM     CT Aortic Survey w Contrast    Addendum: 1/9/2025    CLINICAL ADDENDUM:   Clinical information in this report has been modified from the previous version as follows: Mild to moderate bilateral hydroureteronephrosis.    END ADDENDUM      Narrative    EXAM: CT AORTIC SURVEY W CONTRAST  LOCATION: [facility]  DATE: 1/9/2025    INDICATION: Chest pain, Hypertension  COMPARISON: CT chest performed on 4/17/2020  TECHNIQUE: CT angiogram chest abdomen pelvis during arterial phase of injection of IV contrast. 2D and 3D MIP reconstructions were performed by the CT technologist. Dose reduction techniques were used.   CONTRAST: 72mL Isovue 370    FINDINGS:   CT ANGIOGRAM CHEST, ABDOMEN, AND PELVIS: No evidence of aortic dissection is present. Severe vascular calcifications are seen within the thoracoabdominal aorta. 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 Reconstitution is seen along the mid segment of the superior  mesenteric artery. Infrarenal abdominal   aortic aneurysm is present measuring up to 4.8 x 4.5 cm and extends for approximately 8.4 cm in length. Right common iliac artery measures 1.2 cm while the left common iliac artery measures 1.1 cm. Moderate to severe vascular calcifications are seen   within the iliac branches. Intramural thrombus is seen within the left common femoral vein measuring up to 4 mm (series 6, image 277).    LUNGS AND PLEURA: Subsegmental atelectasis is seen in the lung bases. Emphysematous changes are seen in the lungs with upper lobe predominance. Stable small pulmonary nodules measuring up to 2 mm in the left lower lobe (series 7, image 130).    MEDIASTINUM/AXILLAE: Stable 9 mm precarinal lymph node in the mediastinum (series 5, image 26). Findings are nonspecific and may be reactive. Heart size is mildly enlarged. Aortic valve replacement is present.    CORONARY ARTERY CALCIFICATION: Mild to moderate    HEPATOBILIARY: Calcified granuloma seen in the right hepatic dome. Cholelithiasis is present within the gallbladder measuring up to 3.5 cm. Cholelithiasis is also seen within the gallbladder.    PANCREAS: No significant mass, duct dilatation, or inflammatory change.    SPLEEN: Normal size.    ADRENAL GLANDS: Bilateral thickening of the adrenal and small adrenal nodules are unchanged measuring up to 8 mm on the left and 5 mm on the right.    KIDNEYS/BLADDER: Mild to moderate bilateral hydronephrosis and hydroureter. Bilateral renal cysts are present measuring up to 2.9 cm along the inferior pole of the right kidney. 10 mm hyperdense lesion along the lower pole of the left kidney may reflect   a proteinaceous or hemorrhagic cyst (series 5, image 69).    BOWEL: Small to moderate hiatal hernia is present. Colonic diverticulosis without evidence of diverticulitis. Mild wall thickening is seen along the distal descending and proximal sigmoid colon. No evidence of bowel obstruction.    LYMPH NODES:  No lymphadenopathy.    PELVIC ORGANS: No pelvic masses.    MUSCULOSKELETAL: Diffuse osteopenia is present. Multilevel degenerative changes are present in the spine.      Impression    IMPRESSION:  1.  No evidence of aortic dissection. Infrarenal abdominal aortic aneurysm is present measuring up to 4.8 cm. Severe atherosclerosis is present within the aorta. Recommend surveillance imaging at 12 months per guidelines below.  2.  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 Reconstitution is seen within the mid segment, approximately 1.4 cm from the origin.  3.  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  4.  Small pulmonary nodules measuring up to 2 mm. See follow-up guidelines below.  5.  Mild wall thickening is seen along the distal descending and proximal sigmoid colon. Findings may be due to underdistention. Focal colitis is also in the differential diagnosis but considered to be less likely given lack of surrounding inflammatory   changes.  6.  10 mm hyperdense lesion in the lower pole of the left kidney may reflect a proteinaceous or hemorrhagic cyst. Suggest follow-up renal ultrasound to exclude solid mass.    REFERENCE:  Guidelines for Management of Incidental Pulmonary Nodules Detected on CT Images: From the Fleischner Society 2017.   Guidelines apply to incidental nodules in patients who are 35 years or older.  Guidelines do not apply to lung cancer screening, patients with immunosuppression, or patients with known primary cancer.    MULTIPLE NODULES  Nodule size <6 mm  Low-risk patients: No follow-up needed.  High-risk patients: Optional follow-up at 12 months.    Nodule size 6 mm or larger  Low-risk patients: Follow-up CT at 3-6 months, then consider CT at 18-24 months.  High-risk patients: Follow-up CT at 3-6 months, then at 18-24 months if no change.  -Use most suspicious nodule as guide to  management.    REFERENCE:  Management of Incidental Adrenal Masses: A White Paper of the ACR Incidental Findings Committee. J Am Mela Radiol 2017;14:9592-7162.    <1 cm in short axis: No follow-up.    REFERENCE:  [facility]S practice guidelines on the care of patients with an abdominal aortic aneurysm. Bradley GUEVARA. J Vasc Surg, 2018. PMID:[id number].    Aorta size: 4.0 cm to 4.9 cm: Surveillance imaging at 12-month intervals.   CBC + differential    Narrative    The following orders were created for panel order CBC + differential.  Procedure                               Abnormality         Status                     ---------                               -----------         ------                     CBC with platelets and d...[[id number]]  Abnormal            Final result                 Please view results for these tests on the individual orders.   Basic metabolic panel (BMP)   Result Value Ref Range    Sodium 137 135 - 145 mmol/L    Potassium 4.0 3.4 - 5.3 mmol/L    Chloride 100 98 - 107 mmol/L    Carbon Dioxide (CO2) 24 22 - 29 mmol/L    Anion Gap 13 7 - 15 mmol/L    Urea Nitrogen 16.5 8.0 - 23.0 mg/dL    Creatinine 0.55 0.51 - 0.95 mg/dL    GFR Estimate 88 >60 mL/min/1.73m2    Calcium 9.4 8.8 - 10.4 mg/dL    Glucose 109 (H) 70 - 99 mg/dL   Troponin T, High Sensitivity   Result Value Ref Range    Troponin T, High Sensitivity 15 (H) <=14 ng/L   INR   Result Value Ref Range    INR 1.51 (H) 0.85 - 1.15   CBC with platelets and differential   Result Value Ref Range    WBC Count 9.7 4.0 - 11.0 10e3/uL    RBC Count 3.74 (L) 3.80 - 5.20 10e6/uL    Hemoglobin 11.4 (L) 11.7 - 15.7 g/dL    Hematocrit 34.9 (L) 35.0 - 47.0 %    MCV 93 78 - 100 fL    MCH 30.5 26.5 - 33.0 pg    MCHC 32.7 31.5 - 36.5 g/dL    RDW 13.7 10.0 - 15.0 %    Platelet Count 198 150 - 450 10e3/uL    % Neutrophils 81 %    % Lymphocytes 9 %    % Monocytes 9 %    % Eosinophils 1 %    % Basophils 0 %    % Immature Granulocytes 0 %    NRBCs per 100 WBC 0 <1 /100     "Absolute Neutrophils 7.8 1.6 - 8.3 10e3/uL    Absolute Lymphocytes 0.8 0.8 - 5.3 10e3/uL    Absolute Monocytes 0.9 0.0 - 1.3 10e3/uL    Absolute Eosinophils 0.1 0.0 - 0.7 10e3/uL    Absolute Basophils 0.0 0.0 - 0.2 10e3/uL    Absolute Immature Granulocytes 0.0 <=0.4 10e3/uL    Absolute NRBCs 0.0 10e3/uL   Troponin T, High Sensitivity   Result Value Ref Range    Troponin T, High Sensitivity 20 (H) <=14 ng/L   UA with Microscopic reflex to Culture    Specimen: Urine, [place] Catheter   Result Value Ref Range    Color Urine Straw Colorless, Straw, Light Yellow, Yellow    Appearance Urine Clear Clear    Glucose Urine Negative Negative mg/dL    Bilirubin Urine Negative Negative    Ketones Urine Negative Negative mg/dL    Specific Gravity Urine 1.018 1.003 - 1.035    Blood Urine Moderate (A) Negative    pH Urine 8.0 (H) 5.0 - 7.0    Protein Albumin Urine Negative Negative mg/dL    Urobilinogen Urine Normal Normal, 2.0 mg/dL    Nitrite Urine Negative Negative    Leukocyte Esterase Urine Negative Negative    Mucus Urine Present (A) None Seen /LPF    RBC Urine 4 (H) <=2 /HPF    WBC Urine 9 (H) <=5 /HPF    Squamous Epithelials Urine <1 <=1 /HPF    Narrative    Urine Culture not indicated   Glucose by meter   Result Value Ref Range    GLUCOSE BY METER POCT 147 (H) 70 - 99 mg/dL   CBC with platelets   Result Value Ref Range    WBC Count 10.5 4.0 - 11.0 10e3/uL    RBC Count 3.71 (L) 3.80 - 5.20 10e6/uL    Hemoglobin 11.0 (L) 11.7 - 15.7 g/dL    Hematocrit 34.7 (L) 35.0 - 47.0 %    MCV 94 78 - 100 fL    MCH 29.6 26.5 - 33.0 pg    MCHC 31.7 31.5 - 36.5 g/dL    RDW 13.7 10.0 - 15.0 %    Platelet Count         Lab Results   Component Value Date    CHOL 153 10/24/2024     Lab Results   Component Value Date    HDL 64 10/24/2024     Lab Results   Component Value Date    LDL 78 10/24/2024     Lab Results   Component Value Date    TRIG 56 10/24/2024     No results found for: \"CHOLHDLRATIO\"  TSH   Date Value Ref Range Status   10/24/2024 " 1.85 0.30 - 4.20 uIU/mL Final         EKG results: Personally reviewed.  Sinus rhythm with no ST segment changes.    Echocardiology: Pending.    Cardiac stress testing:    Cardiac angiography: Minimal atheromatous change with no focal narrowing during preprocedural angiogram     Clinically Significant Risk Factors Present on Admission                # Drug Induced Coagulation Defect: home medication list includes an anticoagulant medication    # Hypertension: Noted on problem list                           Jared Park MD on 1/9/2025 at 5:44 PM

## 2025-01-10 ENCOUNTER — APPOINTMENT (OUTPATIENT)
Dept: CARDIOLOGY | Facility: CLINIC | Age: 89
End: 2025-01-10
Attending: HOSPITALIST
Payer: MEDICARE

## 2025-01-10 VITALS
HEART RATE: 74 BPM | RESPIRATION RATE: 13 BRPM | SYSTOLIC BLOOD PRESSURE: 164 MMHG | DIASTOLIC BLOOD PRESSURE: 77 MMHG | WEIGHT: 130.6 LBS | OXYGEN SATURATION: 90 % | BODY MASS INDEX: 22.3 KG/M2 | HEIGHT: 64 IN | TEMPERATURE: 97 F

## 2025-01-10 PROBLEM — R91.8 PULMONARY NODULES: Status: ACTIVE | Noted: 2025-01-10

## 2025-01-10 PROBLEM — N28.9 LESION OF LEFT NATIVE KIDNEY: Status: ACTIVE | Noted: 2025-01-10

## 2025-01-10 PROBLEM — N13.30 HYDROURETERONEPHROSIS: Status: ACTIVE | Noted: 2025-01-10

## 2025-01-10 LAB
ANION GAP SERPL CALCULATED.3IONS-SCNC: 11 MMOL/L (ref 7–15)
BUN SERPL-MCNC: 18.1 MG/DL (ref 8–23)
CALCIUM SERPL-MCNC: 8.9 MG/DL (ref 8.8–10.4)
CHLORIDE SERPL-SCNC: 98 MMOL/L (ref 98–107)
CREAT SERPL-MCNC: 0.66 MG/DL (ref 0.51–0.95)
EGFRCR SERPLBLD CKD-EPI 2021: 84 ML/MIN/1.73M2
GLUCOSE BLDC GLUCOMTR-MCNC: 93 MG/DL (ref 70–99)
GLUCOSE BLDC GLUCOMTR-MCNC: 97 MG/DL (ref 70–99)
GLUCOSE SERPL-MCNC: 99 MG/DL (ref 70–99)
HCO3 SERPL-SCNC: 24 MMOL/L (ref 22–29)
LVEF ECHO: NORMAL
POTASSIUM SERPL-SCNC: 4.6 MMOL/L (ref 3.4–5.3)
SODIUM SERPL-SCNC: 133 MMOL/L (ref 135–145)

## 2025-01-10 PROCEDURE — 250N000013 HC RX MED GY IP 250 OP 250 PS 637: Performed by: HOSPITALIST

## 2025-01-10 PROCEDURE — G0378 HOSPITAL OBSERVATION PER HR: HCPCS

## 2025-01-10 PROCEDURE — 99239 HOSP IP/OBS DSCHRG MGMT >30: CPT | Performed by: HOSPITALIST

## 2025-01-10 PROCEDURE — 82565 ASSAY OF CREATININE: CPT | Performed by: HOSPITALIST

## 2025-01-10 PROCEDURE — 80048 BASIC METABOLIC PNL TOTAL CA: CPT | Performed by: HOSPITALIST

## 2025-01-10 PROCEDURE — 93306 TTE W/DOPPLER COMPLETE: CPT

## 2025-01-10 PROCEDURE — 93306 TTE W/DOPPLER COMPLETE: CPT | Mod: 26 | Performed by: INTERNAL MEDICINE

## 2025-01-10 PROCEDURE — 36415 COLL VENOUS BLD VENIPUNCTURE: CPT | Performed by: HOSPITALIST

## 2025-01-10 PROCEDURE — 82435 ASSAY OF BLOOD CHLORIDE: CPT | Performed by: HOSPITALIST

## 2025-01-10 RX ORDER — METOPROLOL SUCCINATE 25 MG/1
25 TABLET, EXTENDED RELEASE ORAL DAILY
Status: DISCONTINUED | OUTPATIENT
Start: 2025-01-10 | End: 2025-01-10 | Stop reason: HOSPADM

## 2025-01-10 RX ORDER — METOPROLOL SUCCINATE 25 MG/1
25 TABLET, EXTENDED RELEASE ORAL DAILY
Qty: 30 TABLET | Refills: 0 | Status: SHIPPED | OUTPATIENT
Start: 2025-01-11 | End: 2025-02-10

## 2025-01-10 RX ADMIN — ACETAMINOPHEN 650 MG: 325 TABLET, FILM COATED ORAL at 12:42

## 2025-01-10 RX ADMIN — METOPROLOL SUCCINATE 25 MG: 25 TABLET, EXTENDED RELEASE ORAL at 10:07

## 2025-01-10 RX ADMIN — ACETAMINOPHEN 650 MG: 325 TABLET, FILM COATED ORAL at 05:46

## 2025-01-10 RX ADMIN — APIXABAN 5 MG: 5 TABLET, FILM COATED ORAL at 08:28

## 2025-01-10 RX ADMIN — TIMOLOL MALEATE 1 DROP: 6.8 SOLUTION OPHTHALMIC at 15:53

## 2025-01-10 RX ADMIN — TIMOLOL MALEATE 1 DROP: 6.8 SOLUTION OPHTHALMIC at 08:28

## 2025-01-10 ASSESSMENT — ACTIVITIES OF DAILY LIVING (ADL)
ADLS_ACUITY_SCORE: 44
ADLS_ACUITY_SCORE: 49
ADLS_ACUITY_SCORE: 46
ADLS_ACUITY_SCORE: 48
ADLS_ACUITY_SCORE: 44
ADLS_ACUITY_SCORE: 49
ADLS_ACUITY_SCORE: 48
ADLS_ACUITY_SCORE: 46
ADLS_ACUITY_SCORE: 48
ADLS_ACUITY_SCORE: 48
ADLS_ACUITY_SCORE: 44
ADLS_ACUITY_SCORE: 46
ADLS_ACUITY_SCORE: 46
ADLS_ACUITY_SCORE: 49

## 2025-01-10 NOTE — UTILIZATION REVIEW
"Admission Status; Secondary Review Determination         Under the authority of the Utilization Management Committee, the utilization review process indicated a secondary review on the above patient.  The review outcome is based on review of the medical records, discussions with staff, and applying clinical experience noted on the date of the review.          (x) Observation Status Appropriate - This patient does not meet hospital inpatient criteria and is placed in observation status. If this patient's primary payer is Medicare and was admitted as an inpatient, Condition Code 44 should be used and patient status changed to \"observation\".     RATIONALE FOR DETERMINATION:  89 year old female with a PMHX of AAA (4.6x5cm), HTN, hyperlipidemia and aortic stenosis (TAVR Feb '24) admitted on 1/9/2025 after presenting with chest pain radiating to the back, numbness in bilateral arms, and HTN found to be 220/120 in the ED. Patient on Eliquis which she has been taking. Given 1 SL nitroglycerin and started on esmolol drip with quick improvement.  Now moved to oral agents and BP controlled and patient expected to likely discharge later today if continues doing well.     The severity of illness, intensity of service provided, expected LOS and risk for adverse outcome make the care appropriate for further observation; however, doesn't meet criteria for hospital inpatient admission. Dr. Marcial  notified of this determination.    The information on this document is developed by the utilization review team in order for the business office to ensure compliance.  This only denotes the appropriateness of proper admission status and does not reflect the quality of care rendered.         The definitions of Inpatient Status and Observation Status used in making the determination above are those provided in the CMS Coverage Manual, Chapter 1 and Chapter 6, section 70.4.      Sincerely,    Zhou Castaneda DO, [facility]  Utilization " Review  Physician Advisor

## 2025-01-10 NOTE — PLAN OF CARE
"Goal Outcome Evaluation:      Plan of Care Reviewed With: patient    Overall Patient Progress: improvingOverall Patient Progress: improving    Outcome Evaluation: pt is a/o, vss. tele sr. lungs clear. tolerating clears and wants to eat breakfast, tyelonol given for mild headache. lungs clear . christopher patent, plan to have echo today. pt wants to go home today.      Problem: Adult Inpatient Plan of Care  Goal: Plan of Care Review  Description: The Plan of Care Review/Shift note should be completed every shift.  The Outcome Evaluation is a brief statement about your assessment that the patient is improving, declining, or no change.  This information will be displayed automatically on your shift  note.  Outcome: Progressing  Flowsheets (Taken 1/10/2025 0637)  Outcome Evaluation: pt is a/o, vss. tele sr. lungs clear. tolerating clears and wants to eat breakfast, tyelonol given for mild headache. lungs clear . christopher patent, plan to have echo today. pt wants to go home today.  Plan of Care Reviewed With: patient  Overall Patient Progress: improving  Goal: Patient-Specific Goal (Individualized)  Description: You can add care plan individualizations to a care plan. Examples of Individualization might be:  \"Parent requests to be called daily at 9am for status\", \"I have a hard time hearing out of my right ear\", or \"Do not touch me to wake me up as it startles  me\".  Outcome: Progressing  Goal: Absence of Hospital-Acquired Illness or Injury  Outcome: Progressing  Intervention: Identify and Manage Fall Risk  Recent Flowsheet Documentation  Taken 1/10/2025 0400 by Alix Lomeli, RN  Safety Promotion/Fall Prevention:   activity supervised   nonskid shoes/slippers when out of bed   increase visualization of patient   room door open  Taken 1/10/2025 0030 by Alix Lomeli, RN  Safety Promotion/Fall Prevention:   activity supervised   nonskid shoes/slippers when out of bed   increase visualization of patient   room door open  Taken " 1/9/2025 2130 by Alix Lomeli RN  Safety Promotion/Fall Prevention:   activity supervised   nonskid shoes/slippers when out of bed   increase visualization of patient   room door open  Intervention: Prevent Skin Injury  Recent Flowsheet Documentation  Taken 1/10/2025 0400 by Alix Lomeli RN  Body Position: position changed independently  Taken 1/10/2025 0200 by Alix Lomeli RN  Body Position: position changed independently  Taken 1/10/2025 0100 by Alix Lomeli RN  Body Position: upper extremity elevated  Taken 1/10/2025 0030 by Alix Lomeli RN  Body Position: position changed independently  Taken 1/10/2025 0000 by Alix Lomeli RN  Body Position: weight shifting  Taken 1/9/2025 2300 by Alix Lomeli RN  Body Position: weight shifting  Taken 1/9/2025 2200 by Alix Lomeli RN  Body Position: position changed independently  Taken 1/9/2025 2130 by Alix Lomeli RN  Body Position: position changed independently  Intervention: Prevent and Manage VTE (Venous Thromboembolism) Risk  Recent Flowsheet Documentation  Taken 1/10/2025 0400 by Alix Lomeli RN  VTE Prevention/Management: SCDs off (sequential compression devices)  Taken 1/10/2025 0030 by Alix Lomeli RN  VTE Prevention/Management: SCDs off (sequential compression devices)  Taken 1/9/2025 2130 by Alix Lomeli RN  VTE Prevention/Management: SCDs off (sequential compression devices)  Goal: Optimal Comfort and Wellbeing  Outcome: Progressing  Intervention: Provide Person-Centered Care  Recent Flowsheet Documentation  Taken 1/10/2025 0400 by Alix Lomeli RN  Trust Relationship/Rapport:   care explained   emotional support provided   questions answered   choices provided   empathic listening provided   questions encouraged   reassurance provided   thoughts/feelings acknowledged  Taken 1/10/2025 0030 by Alix Lomeli RN  Trust Relationship/Rapport:   care explained   emotional support provided   questions answered   choices provided    empathic listening provided   questions encouraged   reassurance provided   thoughts/feelings acknowledged  Taken 1/9/2025 2130 by Alix Lomeli, RN  Trust Relationship/Rapport:   care explained   emotional support provided   questions answered   choices provided   empathic listening provided   questions encouraged   reassurance provided   thoughts/feelings acknowledged  Goal: Readiness for Transition of Care  Outcome: Progressing

## 2025-01-10 NOTE — PLAN OF CARE
"Major shift events: Patient discharged home with daughter. All discharge medications reviewed. All discharge instructions reviewed. All questions answered. PIV x2 removed.     Neuro: A&Ox4.   Cardiac: SR. VSS.   Respiratory: Sating % on RA.  GI/: Christopher removed. Post void residuals WDL. Voiding often. No BM.   Diet/appetite: Tolerating current diet. Eating well.  Activity:  Assist of 1, up to chair and to bathroom  Pain: At acceptable level on current regimen.   Skin: No new deficits noted.  LDA's: christopher removed.     Plan: Continue with POC. Notify primary team with changes.    Goal Outcome Evaluation:      Plan of Care Reviewed With: patient, child    Overall Patient Progress: improvingOverall Patient Progress: improving    Outcome Evaluation: Echo completed. Patient tolerated metoprolol well. Christopher removed per orders. Patient urinating frequently.      Problem: Adult Inpatient Plan of Care  Goal: Plan of Care Review  Description: The Plan of Care Review/Shift note should be completed every shift.  The Outcome Evaluation is a brief statement about your assessment that the patient is improving, declining, or no change.  This information will be displayed automatically on your shift  note.  Outcome: Adequate for Care Transition  Flowsheets (Taken 1/10/2025 1510)  Outcome Evaluation: Echo completed. Patient tolerated metoprolol well. Christopher removed per orders. Patient urinating frequently.  Plan of Care Reviewed With:   patient   child  Overall Patient Progress: improving  Goal: Patient-Specific Goal (Individualized)  Description: You can add care plan individualizations to a care plan. Examples of Individualization might be:  \"Parent requests to be called daily at 9am for status\", \"I have a hard time hearing out of my right ear\", or \"Do not touch me to wake me up as it startles  me\".  Outcome: Adequate for Care Transition  Flowsheets (Taken 1/10/2025 0800)  Individualized Care Needs: Pt is legally blind  Goal: " Absence of Hospital-Acquired Illness or Injury  Outcome: Adequate for Care Transition  Intervention: Identify and Manage Fall Risk  Recent Flowsheet Documentation  Taken 1/10/2025 1200 by Shaheen Green RN  Safety Promotion/Fall Prevention:   activity supervised   nonskid shoes/slippers when out of bed   increase visualization of patient   room door open  Taken 1/10/2025 0800 by Shaheen Green RN  Safety Promotion/Fall Prevention:   activity supervised   nonskid shoes/slippers when out of bed   increase visualization of patient   room door open  Intervention: Prevent Skin Injury  Recent Flowsheet Documentation  Taken 1/10/2025 1200 by Shaheen Green RN  Body Position: position changed independently  Taken 1/10/2025 1000 by Shaheen Green RN  Body Position: position changed independently  Taken 1/10/2025 0800 by Shaheen Green RN  Body Position: position changed independently  Intervention: Prevent and Manage VTE (Venous Thromboembolism) Risk  Recent Flowsheet Documentation  Taken 1/10/2025 1200 by Shaheen Green RN  VTE Prevention/Management: (on eliquis) SCDs off (sequential compression devices)  Taken 1/10/2025 0800 by Shaheen Green RN  VTE Prevention/Management: (on eliquis) SCDs off (sequential compression devices)  Goal: Optimal Comfort and Wellbeing  Outcome: Adequate for Care Transition  Intervention: Provide Person-Centered Care  Recent Flowsheet Documentation  Taken 1/10/2025 1200 by Shaheen Green RN  Trust Relationship/Rapport:   care explained   choices provided   emotional support provided   empathic listening provided   questions answered   questions encouraged   reassurance provided   thoughts/feelings acknowledged  Taken 1/10/2025 0800 by Shaheen Green RN  Trust Relationship/Rapport:   care explained   choices provided   emotional support provided   empathic listening provided   questions answered   questions encouraged   reassurance provided   thoughts/feelings  acknowledged  Goal: Readiness for Transition of Care  Outcome: Adequate for Care Transition     Problem: Skin Injury Risk Increased  Goal: Skin Health and Integrity  Outcome: Adequate for Care Transition  Intervention: Plan: Nurse Driven Intervention: Moisture Management  Recent Flowsheet Documentation  Taken 1/10/2025 1200 by Shaheen Green RN  Moisture Interventions:   Encourage regular toileting   No brief in bed   Incontinence pad   Urinary collection device   Perineal cleanser  Taken 1/10/2025 0800 by Shaheen Green RN  Moisture Interventions:   Encourage regular toileting   No brief in bed   Incontinence pad   Urinary collection device   Perineal cleanser  Intervention: Plan: Nurse Driven Intervention: Friction and Shear  Recent Flowsheet Documentation  Taken 1/10/2025 1200 by Shaheen Green RN  Friction/Shear Interventions:   HOB 30 degrees or less   Repositioning device (TAP system, etc.)  Taken 1/10/2025 0800 by Shaheen Green RN  Friction/Shear Interventions:   HOB 30 degrees or less   Repositioning device (TAP system, etc.)  Intervention: Optimize Skin Protection  Recent Flowsheet Documentation  Taken 1/10/2025 1200 by Shaheen Green RN  Activity Management: activity adjusted per tolerance  Head of Bed (HOB) Positioning: HOB at 30 degrees  Taken 1/10/2025 1000 by Shaheen Green RN  Head of Bed (HOB) Positioning: HOB at 30 degrees  Taken 1/10/2025 0800 by Shaheen Green RN  Activity Management: activity adjusted per tolerance  Head of Bed (HOB) Positioning: HOB at 30 degrees     Problem: Chest Pain  Goal: Resolution of Chest Pain Symptoms  Outcome: Adequate for Care Transition   Notified provider about indwelling christopher catheter discussed removal or continued need.    Did provider choose to remove indwelling christopher catheter? YES    Provider's christopher indication for keeping indwelling christopher catheter: Indication for continued use: Obstruction    Is there an order for indwelling christopher catheter?  YES

## 2025-01-10 NOTE — CONSULTS
[facility] Consultation by [facility]    Kavita Merlos MRN# [id number]   Age: 89 year old YOB: 1935     Date of Admission:  1/9/2025    Reason for consult: Urinary retention, acute on chronic. Mendieta on discharge?        Requesting PA/MD: Dr. Marcial       Level of consult: Consult, follow and place orders           Assessment and Plan:   Assessment:   Urinary retention, likely acute on chronic  Hypertensive emergency with CP  AAA without rupture  History of TAVR and question of atrial fibrillation on DOAC  Likely complex renal cyst, left kidney  Macular degeneration, with blindness      Plan:   -Discussed discharging with Mendieta catheter and trial of void at urology visit that is scheduled on 01/21/25.  Patient and her daughter note concern about this, as patient is essentially blind due to macular degeneration and lives by herself.  In discussion with nursing, she is at an assisted living, but unclear family service if she has.  She would not be able to perform clean remittent catheterization by herself.  -Okay to discontinue Mendieta catheter.  Ensure she can urinate.  If PVR 1L or greater, replace Mendieta and discharge with in place.  -Given constipation and how this can contribute to urinary retention, have recommended patient's start MiraLAX 17 g once daily to try to improve her bowel movements in the interim.  -If continued difficulties with emptying at her follow-up visit, we will need to this explore further workup such as cystoscopy urodynamic study.  -If patient is emptying adequately at her follow-up visit after improving constipation, can consider possible pelvic floor physical therapy for urgency and frequency or consideration of a different anticholinergic medication.  She did not tolerate oxybutynin and Gemtesa was too expensive.  Patient does not have prescription coverage, so the Myrbetriq would also likely not be financially feasible.  Additionally, would avoid that given she  came in for hypertensive emergency and was, side effect with this medication is elevated blood pressure.  -Renal lesion appears to be most consistent with proteinaceous or hemorrhagic cyst.  These are benign and do not need any definitive follow-up.  -If patient continues to have large urinary retention, which may be a possibility given survey of the aorta showed retention and concern for hydronephrosis, will need likely to have indwelling catheter and additional services to manage this.  -Okay to discharge from urology perspective and have follow-up as scheduled with PRITI Gutierrez, CNP.    Soco Dias PA-C   [facility]  [phone]               Chief Complaint:   Chest Pain (Chest presure) and Hypertension      History is obtained from the patient and EMR.         History of Present Illness:   This patient is a 89 year old female who presented to the ER yesterday with chest pain and pressure as well as hypertension.  Medical history is significant for being on Eliquis with history of AAA, hypertension, dyslipidemia, aortic stenosis, and hypertension.  She did have nausea en route to the ER.    Patient was found to have urinary retention.  No recorded volumes were noted about her bladder scan or what volume was returned with placement of Mendieta catheter.  CT imaging for aortic survey showed evidence of urinary retention with mild to moderate bilateral hydronephrosis and hydroureter.  Patient also had findings of simple bilateral renal cyst as well as likely proteinaceous or hemorrhagic cyst within the left kidney.  Mendieta catheter is currently in place draining scant anshul urine.    She has very low urine output recorded since Mendieta catheter placed.  We were counseled about discharging with Mendieta catheter.  Patient has been having difficulties with urination since October.  She does endorse quite a bit of urgency and frequency of urination, which has been going on for a year.  She does feel like she  is typically voiding small volumes.  At 1 point, they did trial her on oxybutynin, but she stopped this after several days due to severe dry mouth.  Patient was previously prescribed Gemtesa, but it was too expensive as she does not have prescription coverage.    Patient notes occasional urge incontinence.  She also endorses discomfort within the pelvis.  She does feel like she empties her bladder completely but does occasionally double void.  She is currently scheduled to see my colleague, PRITI Gutierrez, CNP.  Creatinine is stable at 0.66 EGFR of 84.  She denies any hematuria or dysuria.    Patient is somewhat concerned about discharging with Mendieta catheter as she is blind.  Appears that she lives in assisted living, but does not have many services.     Patient endorses issues with constipation.  She often has to take mag citrate to have a bowel movement.         Past Medical History:     Past Medical History:   Diagnosis Date    AAA (abdominal aortic aneurysm)     Aortic stenosis     Benign essential hypertension with BP difference between arms     Hyperlipidemia LDL goal <70              Past Surgical History:     Past Surgical History:   Procedure Laterality Date    CV CORONARY ANGIOGRAM N/A 12/19/2023    Procedure: Coronary Angiogram;  Surgeon: Seth Duarte MD;  Location: [facility] CARDIAC CATH LAB    CV PERIPHERAL VASCULAR LITHOTRIPSY N/A 2/20/2024    Procedure: Peripheral Vascular Lithotripsy;  Surgeon: Seth Duarte MD;  Location: [facility] CARDIAC CATH LAB    CV RIGHT HEART CATH MEASUREMENTS RECORDED N/A 12/19/2023    Procedure: Right Heart Catheterization;  Surgeon: Seth Duarte MD;  Location: [facility] CARDIAC CATH LAB    CV TRANSCATHETER AORTIC VALVE REPLACEMENT-FEMORAL APPROACH N/A 2/20/2024    Procedure: Transcatheter Aortic Valve Replacement-Femoral Approach;  Surgeon: Seth Duarte MD;  Location: [facility] CARDIAC CATH LAB             Social History:      Social History     Tobacco Use    Smoking status: Former     Current packs/day: 0.00     Types: Cigarettes     Quit date: 10/24/2006     Years since quittin.2     Passive exposure: Never    Smokeless tobacco: Never    Tobacco comments:     I quit when I was 71 years old, off/on prior to that starting when I was 17   Substance Use Topics    Alcohol use: Not Currently             Family History:   No family history on file.  Family history reviewed.             Allergies:     Allergies   Allergen Reactions    Albuterol     Amlodipine     Bimatoprost Itching    Brimonidine Itching    Clotrimazole Itching    Dorzolamide Hcl-Timolol Mal Itching    Latanoprost Itching    Lisinopril     Losartan      depression    Neomycin-Polymyxin-Gramicidin Swelling    Neosporin [Neomycin-Polymyxin-Gramicidin]     Penicillins     Tape [Adhesive Tape]     Timolol Itching    Travoprost Itching    Vicodin [Hydrocodone-Acetaminophen]              Medications:     Current Facility-Administered Medications   Medication Dose Route Frequency Provider Last Rate Last Admin    acetaminophen (TYLENOL) tablet 650 mg  650 mg Oral Q4H PRN Aniket, Baldemar A, DO   650 mg at 01/10/25 1242    Or    acetaminophen (TYLENOL) oral liquid 650 mg  650 mg Per NG tube Q4H PRN Aniket, Baldemar A, DO        apixaban ANTICOAGULANT (ELIQUIS) tablet 2.5 mg  2.5 mg Oral BID Aniket, Baldemar A, DO        glucose gel 15-30 g  15-30 g Oral Q15 Min PRN Aniket, Baldemar A, DO        Or    dextrose 50 % injection 25-50 mL  25-50 mL Intravenous Q15 Min PRN Aniket, Baldemar A, DO        Or    glucagon injection 1 mg  1 mg Subcutaneous Q15 Min PRN Aniket, Baldemar A, DO        esmolol (BREVIBLOC) infusion 100 mL   mcg/kg/min Intravenous Continuous Nilay Ramos MD   Paused at 25 1436    HYDROmorphone (DILAUDID) injection 0.2 mg  0.2 mg Intravenous Q15 Min PRN Nilay Ramos MD   0.2 mg at 25 1018    metoprolol succinate ER (TOPROL XL) 24 hr tablet 25  "mg  25 mg Oral Daily Aniket, Baldemar A, DO   25 mg at 01/10/25 1007    multivitamin, therapeutic (THERA-VIT) tablet 1 tablet  1 tablet Oral Daily Aniket, Baldemar A, DO        nitroGLYcerin (NITROSTAT) sublingual tablet 0.4 mg  0.4 mg Sublingual Q5 Min PRN Nilay Ramos MD   0.4 mg at 01/09/25 1006    ondansetron (ZOFRAN ODT) ODT tab 4 mg  4 mg Oral Q6H PRN Aniket, Baldemar A, DO        Or    ondansetron (ZOFRAN) injection 4 mg  4 mg Intravenous Q6H PRN Aniket, Baldemar A, DO        Patient is already receiving anticoagulation with heparin, enoxaparin (LOVENOX), warfarin (COUMADIN)  or other anticoagulant medication   Does not apply Continuous PRN Aniket, Baldemar A, DO        tafluprost (ZIOPTAN) ophthalmic solution 1 drop  1 drop Both Eyes At Bedtime Aniket, Baldemar A, DO   1 drop at 01/09/25 2225    Timoptic Ocudose 0.5 % ophthalmic solution 1 drop  1 drop Both Eyes BID Aniket, Baldemar A, DO   1 drop at 01/10/25 1553    zinc sulfate (ZINCATE) capsule 220 mg  220 mg Oral Every Other Day Aniket, Baldemar A, DO                 Review of Systems:   A comprehensive 10-point review of systems was performed and found to be negative except as described in the HPI.     BP (!) 164/77   Pulse 74   Temp 97  F (36.1  C)   Resp 13   Ht 1.626 m (5' 4\")   Wt 59.2 kg (130 lb 9.6 oz)   LMP  (LMP Unknown)   SpO2 90%   BMI 22.42 kg/m    PSYCH: NAD  EYES: EOMI  MOUTH: MMM  NECK: Supple, no notable adenopathy  RESP: Unlabored breathing  CARDIAC: Regular radial pulse  SKIN: Warm, no rashes  ABD: soft, Nontender  NEURO: AAO x3  URO: Mendieta catheter with scant anshul urine         Data:     Lab Results   Component Value Date    WBC 10.5 01/09/2025    HGB 11.0 (L) 01/09/2025    HCT 34.7 (L) 01/09/2025    MCV 94 01/09/2025     01/09/2025     Lab Results   Component Value Date    CR 0.66 01/10/2025    CR 0.55 01/09/2025     Recent Labs   Lab 01/09/25  1418   COLOR Straw   APPEARANCE Clear   URINEGLC Negative   URINEBILI Negative "   URINEKETONE Negative   SG 1.018   URINEPH 8.0*   PROTEIN Negative   NITRITE Negative   LEUKEST Negative   RBCU 4*   WBCU 9*     CT Aortic Survey w Contrast    Addendum Date: 1/9/2025    CLINICAL ADDENDUM: Clinical information in this report has been modified from the previous version as follows: Mild to moderate bilateral hydroureteronephrosis. END ADDENDUM    Result Date: 1/9/2025  EXAM: CT AORTIC SURVEY W CONTRAST LOCATION: [facility] DATE: 1/9/2025 INDICATION: Chest pain, Hypertension COMPARISON: CT chest performed on 4/17/2020 TECHNIQUE: CT angiogram chest abdomen pelvis during arterial phase of injection of IV contrast. 2D and 3D MIP reconstructions were performed by the CT technologist. Dose reduction techniques were used. CONTRAST: 72mL Isovue 370 FINDINGS: CT ANGIOGRAM CHEST, ABDOMEN, AND PELVIS: No evidence of aortic dissection is present. Severe vascular calcifications are seen within the thoracoabdominal aorta. 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 Reconstitution is seen along the mid segment of the superior mesenteric artery. Infrarenal abdominal  aortic aneurysm is present measuring up to 4.8 x 4.5 cm and extends for approximately 8.4 cm in length. Right common iliac artery measures 1.2 cm while the left common iliac artery measures 1.1 cm. Moderate to severe vascular calcifications are seen within the iliac branches. Intramural thrombus is seen within the left common femoral vein measuring up to 4 mm (series 6, image 277). LUNGS AND PLEURA: Subsegmental atelectasis is seen in the lung bases. Emphysematous changes are seen in the lungs with upper lobe predominance. Stable small pulmonary nodules measuring up to 2 mm in the left lower lobe (series 7, image 130).  MEDIASTINUM/AXILLAE: Stable 9 mm precarinal lymph node in the mediastinum (series 5, image 26). Findings are nonspecific and may be reactive. Heart size is mildly enlarged. Aortic valve replacement is present. CORONARY ARTERY CALCIFICATION: Mild to moderate HEPATOBILIARY: Calcified granuloma seen in the right hepatic dome. Cholelithiasis is present within the gallbladder measuring up to 3.5 cm. Cholelithiasis is also seen within the gallbladder. PANCREAS: No significant mass, duct dilatation, or inflammatory change. SPLEEN: Normal size. ADRENAL GLANDS: Bilateral thickening of the adrenal and small adrenal nodules are unchanged measuring up to 8 mm on the left and 5 mm on the right. KIDNEYS/BLADDER: Mild to moderate bilateral hydronephrosis and hydroureter. Bilateral renal cysts are present measuring up to 2.9 cm along the inferior pole of the right kidney. 10 mm hyperdense lesion along the lower pole of the left kidney may reflect a proteinaceous or hemorrhagic cyst (series 5, image 69). BOWEL: Small to moderate hiatal hernia is present. Colonic diverticulosis without evidence of diverticulitis. Mild wall thickening is seen along the distal descending and proximal sigmoid colon. No evidence of bowel obstruction. LYMPH NODES: No lymphadenopathy. PELVIC ORGANS: No pelvic masses. MUSCULOSKELETAL: Diffuse osteopenia is present. Multilevel degenerative changes are present in the spine.     IMPRESSION: 1.  No evidence of aortic dissection. Infrarenal abdominal aortic aneurysm is present measuring up to 4.8 cm. Severe atherosclerosis is present within the aorta. Recommend surveillance imaging at 12 months per guidelines below. 2.  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 Reconstitution is seen within the mid segment, approximately 1.4 cm from the origin. 3.  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 4.  Small  pulmonary nodules measuring up to 2 mm. See follow-up guidelines below. 5.  Mild wall thickening is seen along the distal descending and proximal sigmoid colon. Findings may be due to underdistention. Focal colitis is also in the differential diagnosis but considered to be less likely given lack of surrounding inflammatory changes. 6.  10 mm hyperdense lesion in the lower pole of the left kidney may reflect a proteinaceous or hemorrhagic cyst. Suggest follow-up renal ultrasound to exclude solid mass. REFERENCE: Guidelines for Management of Incidental Pulmonary Nodules Detected on CT Images: From the Fleischner Society 2017. Guidelines apply to incidental nodules in patients who are 35 years or older. Guidelines do not apply to lung cancer screening, patients with immunosuppression, or patients with known primary cancer. MULTIPLE NODULES Nodule size <6 mm Low-risk patients: No follow-up needed. High-risk patients: Optional follow-up at 12 months. Nodule size 6 mm or larger Low-risk patients: Follow-up CT at 3-6 months, then consider CT at 18-24 months. High-risk patients: Follow-up CT at 3-6 months, then at 18-24 months if no change. -Use most suspicious nodule as guide to management. REFERENCE: Management of Incidental Adrenal Masses: A White Paper of the ACR Incidental Findings Committee. J Am Mela Radiol 2017;14:6433-8343. <1 cm in short axis: No follow-up. REFERENCE: [facility] practice guidelines on the care of patients with an abdominal aortic aneurysm. Bradley GUEVARA. J Vasc Surg, 2018. PMID:[id number]. Aorta size: 4.0 cm to 4.9 cm: Surveillance imaging at 12-month intervals.

## 2025-01-10 NOTE — DISCHARGE SUMMARY
[facility]  Hospitalist Discharge Summary      Date of Admission:  1/9/2025  Date of Discharge:  1/10/2025  Discharging Provider: Baldemar Marcial DO  Discharge Service: Hospitalist Service    Discharge Diagnoses   Hypertensive emergency w/CP  AAA without rupture  Hx TAVR and ? Afib on DOAC  Urinary retention  10mm L renal lesion, incidental finding  Macular degeneration      Follow-ups Needed After Discharge   Follow-up Appointments       Follow-up and recommended labs and tests       Follow up with primary care provider, Ty Cordero, within 7 days for hospital follow- up.  No follow up labs or test are needed.    Follow up with cardiology in 1-2 weeks or as directed                Discharge Disposition   Discharged to home  Condition at discharge: Stable    Hospital Course   Kavita Merlos is a 89 year old female with a PMHX of AAA (4.6x5cm), HTN, hyperlipidemia and aortic stenosis (TAVR Feb '24) admitted on 1/9/2025 after presenting with chest pain radiating to the back, numbness in bilateral arms, and HTN found to be 220/120 in the ED. Patient on Eliquis which she took this morning. Given 1 SL nitroglycerin and started on esmolol with improvement in sxs and bp. CT showed no evidence of aortic dissection or worsening of known AAA and EKG showed no acute ischemic changes. Admitted to ICU for strict blood pressure control and monitoring.     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 CT at the time showed anuerysm measuring 4.2 x 4 cm in Dec '23. Recently had follow up with vascular surgery for possible surgical intervention in Dec '24 where an abdominal US showed growth to aneurysm measuring 4.6 x 5 cm but did not feel that the risk of repair was justifiable and felt it was amenable to endovascular therapy with plan to follow up in 4 months. Patient  arrived to the ED with chest pain radiating into her back and abdomen with a BP of 220/120 improved with 1 SL nitroglycerin and esmolol. CTA negative for aortic dissection or worsening AAA. EKG with no acute ischemic changes. Esmolol was discontinued in afternoon 1/9 and blood pressures have remained overall stable. Cardiology consulted, appreciate recs.  - Obtain ECHO 1/10, can follow-up with PCP/cardiology as outpt fo results  - PTA Eliquis resumed  - numerous med allergies but agreeable to starting metoprolol 25 mg PO     #Hx TAVR  ? PAF  -s/p TAVR February 2024, per cardiology note also had afib during that hospitalizatoin and has been on DOAC since  -recommend she discuss further discussion with cardiology and CV surgery risk/benefit for DOAC moving forward     #Urinary Retention  #Bladder Pain  Patient has been experiencing difficulty initiating a urine stream and emptying her bladder with associated bladder pain that has been occurring for several months. Previously has been medicated for possible bladder spasms but caused her to have issues with dry mouth. At annual wellness check up in Oct '24, a referral to Urology was made. CTA while in the ED for AAA workup showed incidental findings of a left renal mass. Labs are otherwise wnl.  - Urology consulted, remove christopher and do PVR w/plan to follow-up with patient in clinic on the 1/21     #10mm L renal lesion  -noted on imaging, likely cyst per Ct report  -recommend PCP follow up and imaging per them     #Macular Degeneration  Resume PTA Zioptan and Timolol Maleate    Consultations This Hospital Stay   CARDIOLOGY IP CONSULT  PHARMACY IP CONSULT  UROLOGY IP CONSULT    Code Status   Full Code    Time Spent on this Encounter   I, Baldemar Marcial DO, personally saw the patient today and spent greater than 30 minutes discharging this patient.       Baldemar Marcail DO  [facility]  [address] [place] [address]  Phone:  [phone]  Fax: [phone]  ______________________________________________________________________    Physical Exam   Vital Signs: Temp: 97  F (36.1  C) Temp src: Oral BP: (!) 164/77 Pulse: 74   Resp: 13 SpO2: 90 % O2 Device: None (Room air) Oxygen Delivery: 1/2 LPM  Weight: 130 lbs 9.6 oz  Face to face completed day of discharge       Primary Care Physician   Ty Cordero    Discharge Orders      Reason for your hospital stay    Admitted for HTN emergency, BP is better but start metoprolol on dishcarge and follow up closely with cardiology as outpt     Follow-up and recommended labs and tests     Follow up with primary care provider, Ty Cordero, within 7 days for hospital follow- up.  No follow up labs or test are needed.    Follow up with cardiology in 1-2 weeks or as directed     Activity    Your activity upon discharge: activity as tolerated     Diet    Follow this diet upon discharge: Current Diet:Orders Placed This Encounter      Low Saturated Fat Na <2400 mg       Significant Results and Procedures   Most Recent 3 CBC's:  Recent Labs   Lab Test 01/09/25  1634 01/09/25  1005 10/24/24  0950   WBC 10.5 9.7 5.6   HGB 11.0* 11.4* 11.1*   MCV 94 93 94    198 196     Most Recent 3 BMP's:  Recent Labs   Lab Test 01/10/25  0848 01/10/25  0755 01/10/25  0437 01/09/25  1625 01/09/25  1005 10/24/24  0950   *  --   --   --  137 138   POTASSIUM 4.6  --   --   --  4.0 4.6   CHLORIDE 98  --   --   --  100 102   CO2 24  --   --   --  24 25   BUN 18.1  --   --   --  16.5 17.4   CR 0.66  --   --   --  0.55 0.66   ANIONGAP 11  --   --   --  13 11   JERRY 8.9  --   --   --  9.4 10.0   GLC 99 97 93   < > 109* 99    < > = values in this interval not displayed.     Most Recent 2 LFT's:  Recent Labs   Lab Test 08/23/24  1408 08/05/24  1405   AST 97* 80*   ALT 49 39   ALKPHOS 98 85   BILITOTAL 1.8* 1.5*     Most Recent 3 INR's:  Recent Labs   Lab Test 01/09/25  1005 02/09/24  1440 12/19/23  0749    INR 1.51* 0.97 1.01     Most Recent 3 Hemoglobins:  Recent Labs   Lab Test 01/09/25  1634 01/09/25  1005 10/24/24  0950   HGB 11.0* 11.4* 11.1*     Most Recent 3 Troponin's:  Recent Labs   Lab Test 04/17/20  1655   TROPI <0.015     Most Recent 3 BNP's:  Recent Labs   Lab Test 02/09/24  1440   NTBNP 994   ,   Results for orders placed or performed during the hospital encounter of 01/09/25   CT Aortic Survey w Contrast    Addendum: 1/9/2025    CLINICAL ADDENDUM:   Clinical information in this report has been modified from the previous version as follows: Mild to moderate bilateral hydroureteronephrosis.    END ADDENDUM      Narrative    EXAM: CT AORTIC SURVEY W CONTRAST  LOCATION: [facility]  DATE: 1/9/2025    INDICATION: Chest pain, Hypertension  COMPARISON: CT chest performed on 4/17/2020  TECHNIQUE: CT angiogram chest abdomen pelvis during arterial phase of injection of IV contrast. 2D and 3D MIP reconstructions were performed by the CT technologist. Dose reduction techniques were used.   CONTRAST: 72mL Isovue 370    FINDINGS:   CT ANGIOGRAM CHEST, ABDOMEN, AND PELVIS: No evidence of aortic dissection is present. Severe vascular calcifications are seen within the thoracoabdominal aorta. 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 Reconstitution is seen along the mid segment of the superior mesenteric artery. Infrarenal abdominal   aortic aneurysm is present measuring up to 4.8 x 4.5 cm and extends for approximately 8.4 cm in length. Right common iliac artery measures 1.2 cm while the left common iliac artery measures 1.1 cm. Moderate to severe vascular calcifications are seen   within the iliac branches. Intramural thrombus is seen within the left common femoral vein measuring up to 4 mm  (series 6, image 277).    LUNGS AND PLEURA: Subsegmental atelectasis is seen in the lung bases. Emphysematous changes are seen in the lungs with upper lobe predominance. Stable small pulmonary nodules measuring up to 2 mm in the left lower lobe (series 7, image 130).    MEDIASTINUM/AXILLAE: Stable 9 mm precarinal lymph node in the mediastinum (series 5, image 26). Findings are nonspecific and may be reactive. Heart size is mildly enlarged. Aortic valve replacement is present.    CORONARY ARTERY CALCIFICATION: Mild to moderate    HEPATOBILIARY: Calcified granuloma seen in the right hepatic dome. Cholelithiasis is present within the gallbladder measuring up to 3.5 cm. Cholelithiasis is also seen within the gallbladder.    PANCREAS: No significant mass, duct dilatation, or inflammatory change.    SPLEEN: Normal size.    ADRENAL GLANDS: Bilateral thickening of the adrenal and small adrenal nodules are unchanged measuring up to 8 mm on the left and 5 mm on the right.    KIDNEYS/BLADDER: Mild to moderate bilateral hydronephrosis and hydroureter. Bilateral renal cysts are present measuring up to 2.9 cm along the inferior pole of the right kidney. 10 mm hyperdense lesion along the lower pole of the left kidney may reflect   a proteinaceous or hemorrhagic cyst (series 5, image 69).    BOWEL: Small to moderate hiatal hernia is present. Colonic diverticulosis without evidence of diverticulitis. Mild wall thickening is seen along the distal descending and proximal sigmoid colon. No evidence of bowel obstruction.    LYMPH NODES: No lymphadenopathy.    PELVIC ORGANS: No pelvic masses.    MUSCULOSKELETAL: Diffuse osteopenia is present. Multilevel degenerative changes are present in the spine.      Impression    IMPRESSION:  1.  No evidence of aortic dissection. Infrarenal abdominal aortic aneurysm is present measuring up to 4.8 cm. Severe atherosclerosis is present within the aorta. Recommend surveillance imaging at 12 months per  guidelines below.  2.  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 Reconstitution is seen within the mid segment, approximately 1.4 cm from the origin.  3.  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  4.  Small pulmonary nodules measuring up to 2 mm. See follow-up guidelines below.  5.  Mild wall thickening is seen along the distal descending and proximal sigmoid colon. Findings may be due to underdistention. Focal colitis is also in the differential diagnosis but considered to be less likely given lack of surrounding inflammatory   changes.  6.  10 mm hyperdense lesion in the lower pole of the left kidney may reflect a proteinaceous or hemorrhagic cyst. Suggest follow-up renal ultrasound to exclude solid mass.    REFERENCE:  Guidelines for Management of Incidental Pulmonary Nodules Detected on CT Images: From the Fleischner Society 2017.   Guidelines apply to incidental nodules in patients who are 35 years or older.  Guidelines do not apply to lung cancer screening, patients with immunosuppression, or patients with known primary cancer.    MULTIPLE NODULES  Nodule size <6 mm  Low-risk patients: No follow-up needed.  High-risk patients: Optional follow-up at 12 months.    Nodule size 6 mm or larger  Low-risk patients: Follow-up CT at 3-6 months, then consider CT at 18-24 months.  High-risk patients: Follow-up CT at 3-6 months, then at 18-24 months if no change.  -Use most suspicious nodule as guide to management.    REFERENCE:  Management of Incidental Adrenal Masses: A White Paper of the ACR Incidental Findings Committee. J Am Mela Radiol 2017;14:6509-1362.    <1 cm in short axis: No follow-up.    REFERENCE:  SVS practice guidelines on the care of patients with an abdominal aortic aneurysm. Bradley GUEVARA. J Vasc Surg, 2018. PMID:[id number].    Aorta size: 4.0 cm to 4.9 cm: Surveillance imaging at 12-month intervals.        Discharge Medications   Current Discharge Medication List        START taking these medications    Details   metoprolol succinate ER (TOPROL XL) 25 MG 24 hr tablet Take 1 tablet (25 mg) by mouth daily.  Qty: 30 tablet, Refills: 0    Associated Diagnoses: Hypertensive emergency           CONTINUE these medications which have NOT CHANGED    Details   apixaban ANTICOAGULANT (ELIQUIS) 5 MG tablet Take 1 tablet (5 mg) by mouth 2 times daily.  Qty: 180 tablet, Refills: 3    Comments: Rx to accompany BMS re enrollment application for assistance.collaborating MD Lazaro Garay MD npi [phone]  Associated Diagnoses: Atrial fibrillation, unspecified type (H)      co-enzyme Q-10 100 MG CAPS capsule Take 100 mg by mouth daily      cyanocobalamin (VITAMIN B-12) 100 MCG tablet Take 100 mcg by mouth Every other day      Ferrous Sulfate (SLOW RELEASE IRON) 47.5 MG TBCR Take 1 tablet by mouth 2 times daily.      Multiple Vitamins-Minerals (PRESERVISION AREDS 2 PO) Take by mouth daily      multivitamin, therapeutic (THERA-VIT) TABS tablet Take 1 tablet by mouth daily      omega 3 1000 MG CAPS Take 1 capsule by mouth every other day      tafluprost (ZIOPTAN) 0.0015 % SOLN ophthalmic solution Place 1 drop into both eyes at bedtime      Timolol Maleate (TIMOPTIC OCUDOSE) 0.5 % ophthalmic solution Place 1 drop into both eyes 2 times daily      vitamin C (ASCORBIC ACID) 1000 MG TABS Take 1,000 mg by mouth 2 times daily.      Vitamin D3 (CHOLECALCIFEROL) 25 mcg (1000 units) tablet Take by mouth daily Daily in the winter      vitamin E (TOCOPHEROL) 400 units (180 mg) capsule Take 400 Units by mouth every other day      zinc gluconate 50 MG tablet Take 50 mg by mouth every other day           Allergies   Allergies   Allergen Reactions    Albuterol     Amlodipine     Bimatoprost Itching    Brimonidine Itching    Clotrimazole Itching    Dorzolamide Hcl-Timolol Mal Itching    Latanoprost Itching    Lisinopril     Losartan       depression    Neomycin-Polymyxin-Gramicidin Swelling    Neosporin [Neomycin-Polymyxin-Gramicidin]     Penicillins     Tape [Adhesive Tape]     Timolol Itching    Travoprost Itching    Vicodin [Hydrocodone-Acetaminophen]

## 2025-01-10 NOTE — PROGRESS NOTES
[facility]    Medicine Progress Note - Hospitalist Service    Date of Admission:  1/9/2025    Assessment & Plan   Kavita Merlos is a 89 year old female with a PMHX of AAA (4.6x5cm), HTN, hyperlipidemia and aortic stenosis (TAVR Feb '24) admitted on 1/9/2025 after presenting with chest pain radiating to the back, numbness in bilateral arms, and HTN found to be 220/120 in the ED. Patient on Eliquis which she took this morning. Given 1 SL nitroglycerin and started on esmolol with improvement in sxs and bp. CT showed no evidence of aortic dissection or worsening of known AAA and EKG showed no acute ischemic changes. Admitted to ICU for strict blood pressure control and monitoring.     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 CT at the time showed anuerysm measuring 4.2 x 4 cm in Dec '23. Recently had follow up with vascular surgery for possible surgical intervention in Dec '24 where an abdominal US showed growth to aneurysm measuring 4.6 x 5 cm but did not feel that the risk of repair was justifiable and felt it was amenable to endovascular therapy with plan to follow up in 4 months. Patient arrived to the ED with chest pain radiating into her back and abdomen with a BP of 220/120 improved with 1 SL nitroglycerin and esmolol. CTA negative for aortic dissection or worsening AAA. EKG with no acute ischemic changes. Esmolol was discontinued in afternoon 1/9 and blood pressures have remained overall stable. Cardiology consulted, appreciate recs.  - Obtain ECHO 1/10  - Dilaudid 0.2 mg PRN for pain  - Heparin discharge and patients PTA Eliquis resumed  - Administered metoprolol 25 mg PO     #Hx TAVR  ? PAF  -s/p TAVR February 2024, per cardiology note also had afib during that hospitalizatoin and has been on DOAC since  -recommend she discuss further discussion with  cardiology and CV surgery risk/benefit for DOAC moving forward     #Urinary Retention  #Bladder Pain  Patient has been experiencing difficulty initiating a urine stream and emptying her bladder with associated bladder pain that has been occurring for several months. Previously has been medicated for possible bladder spasms but caused her to have issues with dry mouth. At annual wellness check up in Oct '24, a referral to Urology was made. CTA while in the ED for AAA workup showed incidental findings of a left renal mass. Labs are otherwise wnl.  - Urology consulted with plans to remove christopher and do series of post void residuals with plan to follow-up with patient in clinic on the 1/21     #10mm L renal lesion  -noted on imaging, likely cyst per Ct report  -recommend PCP follow up and imaging per them     #Macular Degeneration  Resume PTA Zioptan and Timolol Maleate          Diet: Low Saturated Fat Na <2400 mg    DVT Prophylaxis:  DOAC  Christopher Catheter: PRESENT, indication: Acute retention or obstruction  Lines: None     Cardiac Monitoring: ACTIVE order. Indication: ICU  Code Status: Full Code      Clinically Significant Risk Factors Present on Admission                # Drug Induced Coagulation Defect: home medication list includes an anticoagulant medication    # Hypertension: Noted on problem list     # Acute Hypoxic Respiratory Failure: Documented O2 saturation < 90%. Continue supplemental oxygen as needed                   Social Drivers of Health    Tobacco Use: Medium Risk (12/11/2024)    Patient History     Smoking Tobacco Use: Former     Smokeless Tobacco Use: Never     Passive Exposure: Never   Physical Activity: Patient Declined (10/21/2024)    Exercise Vital Sign     Days of Exercise per Week: Patient declined     Minutes of Exercise per Session: Patient declined   Social Connections: Unknown (10/21/2024)    Social Connection and Isolation Panel [NHANES]     Frequency of Social Gatherings with Friends and  Family: Twice a week          Disposition Plan     Medically Ready for Discharge: Anticipated Today         The patient's care was discussed with the Attending Physician, Dr. Marcial .    BHAVANA BOND  Hospitalist Service  [facility]  Securely message with [employer] (more info)  Text page via [employer] Paging/Directory   ______________________________________________________________________    Interval History   No acute overnight events. Esmolol was stopped and patient has remained hemodynamically stable. States she feels overall better and has been advancing her diet and states that she would like to leave.     Physical Exam   Vital Signs: Temp: 98.2  F (36.8  C) Temp src: Oral BP: 135/56 Pulse: 72   Resp: 16 SpO2: 91 % O2 Device: None (Room air) Oxygen Delivery: 1/2 LPM  Weight: 130 lbs 9.6 oz    Constitutional: awake, alert, cooperative, no apparent distress  Respiratory: No increased work of breathing, good air exchange, clear to auscultation bilaterally, no crackles or wheezing  Cardiovascular: Normal apical pulses , normal S1 and S2, and no edema  GI: Normal bowel sounds, soft, non-distended, non-tender  Genitounirinary: Mendieta in place    Medical Decision Making       45 MINUTES SPENT BY ME on the date of service doing chart review, history, exam, documentation & further activities per the note.      Data     I have personally reviewed the following data over the past 24 hrs:    10.5  \   11.0 (L)   / 150     137 100 16.5 /  97   4.0 24 0.55 \     Trop: 20 (H) BNP: N/A     INR:  1.51 (H) PTT:  N/A   D-dimer:  N/A Fibrinogen:  N/A       Imaging results reviewed over the past 24 hrs:   Recent Results (from the past 24 hours)   CT Aortic Survey w Contrast    Addendum: 1/9/2025    CLINICAL ADDENDUM:   Clinical information in this report has been modified from the previous version as follows: Mild to moderate bilateral hydroureteronephrosis.    END ADDENDUM      Narrative    EXAM: CT AORTIC SURVEY  W CONTRAST  LOCATION: [facility]  DATE: 1/9/2025    INDICATION: Chest pain, Hypertension  COMPARISON: CT chest performed on 4/17/2020  TECHNIQUE: CT angiogram chest abdomen pelvis during arterial phase of injection of IV contrast. 2D and 3D MIP reconstructions were performed by the CT technologist. Dose reduction techniques were used.   CONTRAST: 72mL Isovue 370    FINDINGS:   CT ANGIOGRAM CHEST, ABDOMEN, AND PELVIS: No evidence of aortic dissection is present. Severe vascular calcifications are seen within the thoracoabdominal aorta. 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 Reconstitution is seen along the mid segment of the superior mesenteric artery. Infrarenal abdominal   aortic aneurysm is present measuring up to 4.8 x 4.5 cm and extends for approximately 8.4 cm in length. Right common iliac artery measures 1.2 cm while the left common iliac artery measures 1.1 cm. Moderate to severe vascular calcifications are seen   within the iliac branches. Intramural thrombus is seen within the left common femoral vein measuring up to 4 mm (series 6, image 277).    LUNGS AND PLEURA: Subsegmental atelectasis is seen in the lung bases. Emphysematous changes are seen in the lungs with upper lobe predominance. Stable small pulmonary nodules measuring up to 2 mm in the left lower lobe (series 7, image 130).    MEDIASTINUM/AXILLAE: Stable 9 mm precarinal lymph node in the mediastinum (series 5, image 26). Findings are nonspecific and may be reactive. Heart size is mildly enlarged. Aortic valve replacement is present.    CORONARY ARTERY CALCIFICATION: Mild to moderate    HEPATOBILIARY: Calcified granuloma seen in the right hepatic dome. Cholelithiasis is present within the gallbladder measuring up to 3.5  cm. Cholelithiasis is also seen within the gallbladder.    PANCREAS: No significant mass, duct dilatation, or inflammatory change.    SPLEEN: Normal size.    ADRENAL GLANDS: Bilateral thickening of the adrenal and small adrenal nodules are unchanged measuring up to 8 mm on the left and 5 mm on the right.    KIDNEYS/BLADDER: Mild to moderate bilateral hydronephrosis and hydroureter. Bilateral renal cysts are present measuring up to 2.9 cm along the inferior pole of the right kidney. 10 mm hyperdense lesion along the lower pole of the left kidney may reflect   a proteinaceous or hemorrhagic cyst (series 5, image 69).    BOWEL: Small to moderate hiatal hernia is present. Colonic diverticulosis without evidence of diverticulitis. Mild wall thickening is seen along the distal descending and proximal sigmoid colon. No evidence of bowel obstruction.    LYMPH NODES: No lymphadenopathy.    PELVIC ORGANS: No pelvic masses.    MUSCULOSKELETAL: Diffuse osteopenia is present. Multilevel degenerative changes are present in the spine.      Impression    IMPRESSION:  1.  No evidence of aortic dissection. Infrarenal abdominal aortic aneurysm is present measuring up to 4.8 cm. Severe atherosclerosis is present within the aorta. Recommend surveillance imaging at 12 months per guidelines below.  2.  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 Reconstitution is seen within the mid segment, approximately 1.4 cm from the origin.  3.  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  4.  Small pulmonary nodules measuring up to 2 mm. See follow-up guidelines below.  5.  Mild wall thickening is seen along the distal descending and proximal sigmoid colon. Findings may be due to underdistention. Focal colitis is also in the differential diagnosis but considered to be less likely given lack of surrounding inflammatory   changes.  6.  10 mm  hyperdense lesion in the lower pole of the left kidney may reflect a proteinaceous or hemorrhagic cyst. Suggest follow-up renal ultrasound to exclude solid mass.    REFERENCE:  Guidelines for Management of Incidental Pulmonary Nodules Detected on CT Images: From the Fleischner Society 2017.   Guidelines apply to incidental nodules in patients who are 35 years or older.  Guidelines do not apply to lung cancer screening, patients with immunosuppression, or patients with known primary cancer.    MULTIPLE NODULES  Nodule size <6 mm  Low-risk patients: No follow-up needed.  High-risk patients: Optional follow-up at 12 months.    Nodule size 6 mm or larger  Low-risk patients: Follow-up CT at 3-6 months, then consider CT at 18-24 months.  High-risk patients: Follow-up CT at 3-6 months, then at 18-24 months if no change.  -Use most suspicious nodule as guide to management.    REFERENCE:  Management of Incidental Adrenal Masses: A White Paper of the ACR Incidental Findings Committee. J Am Mela Radiol 2017;14:7500-5167.    <1 cm in short axis: No follow-up.    REFERENCE:  SVS practice guidelines on the care of patients with an abdominal aortic aneurysm. Bradley GUEVARA. J Vasc Surg, 2018. PMID:[id number].    Aorta size: 4.0 cm to 4.9 cm: Surveillance imaging at 12-month intervals.

## 2025-01-13 ENCOUNTER — PATIENT OUTREACH (OUTPATIENT)
Dept: INTERNAL MEDICINE | Facility: CLINIC | Age: 89
End: 2025-01-13
Payer: MEDICARE

## 2025-01-13 ENCOUNTER — TELEPHONE (OUTPATIENT)
Dept: CARDIOLOGY | Facility: CLINIC | Age: 89
End: 2025-01-13
Payer: MEDICARE

## 2025-01-13 NOTE — TELEPHONE ENCOUNTER
"  Transitions of Care Outreach  Chief Complaint   Patient presents with    Hospital F/U     Chest pain       Most Recent Admission Date: 1/9/2025   Most Recent Admission Diagnosis: Hydroureteronephrosis - N13.30  Pulmonary nodules - R91.8  Lesion of left native kidney - N28.9  Hypertensive emergency - I16.1  Chest pain, unspecified type - R07.9  Infrarenal abdominal aortic aneurysm (AAA) without rupture - I71.43     Most Recent Discharge Date: 1/10/2025   Most Recent Discharge Diagnosis: Hypertensive emergency - I16.1  Chest pain, unspecified type - R07.9  Infrarenal abdominal aortic aneurysm (AAA) without rupture - I71.43  Hydroureteronephrosis - N13.30  Pulmonary nodules - R91.8  Lesion of left native kidney - N28.9     Transitions of Care Assessment    Discharge Assessment  How are you doing now that you are home?: doing \"ok\" per daughter.  Having some incontinence issue.  How are your symptoms? (Red Flag symptoms escalate to triage hotline per guidelines): Improved  Do you know how to contact your clinic care team if you have future questions or changes to your health status? : Yes  Does the patient have their discharge instructions? : Yes  Does the patient have questions regarding their discharge instructions? : No  Were you started on any new medications or were there changes to any of your previous medications? : No  Does the patient have all of their medications?: Yes  Do you have questions regarding any of your medications? : No  Do you have all of your needed medical supplies or equipment (DME)?  (i.e. oxygen tank, CPAP, cane, etc.): Yes    Follow up Plan     Discharge Follow-Up  Discharge follow up appointment scheduled in alignment with recommended follow up timeframe or Transitions of Risk Category? (Low = within 30 days; Moderate= within 14 days; High= within 7 days): Yes  Discharge Follow Up Appointment Date: 01/21/25  Discharge Follow Up Appointment Scheduled with?: Specialty Care Provider " (Urology.)    Future Appointments   Date Time Provider Department Center   1/21/2025  8:30 AM Haider Arroyo APRN CNP [facility]   2/26/2025  9:45 AM RSCCECHO2 [facility]   2/26/2025 10:45 AM RU [facility]   2/26/2025 12:40 PM Bryan Ferrari PA-C [facility]       Outpatient Plan as outlined on AVS reviewed with patient.    For any urgent concerns, please contact our 24 hour nurse triage line: [phone] ([phone])       Racquel Butler RN

## 2025-01-13 NOTE — TELEPHONE ENCOUNTER
[facility] Call Center    Phone Message    May a detailed message be left on voicemail: yes     Reason for Call: Other: Pt's daughter calling to inform that pt was seen in the ER over the weekend and was told to ask if pt should be seen sooner than 2/26/25.  Please call Debbie to advise     Action Taken: Other: cardio    Travel Screening: Not Applicable    Thank you!  Specialty Access Center       Date of Service:

## 2025-01-13 NOTE — TELEPHONE ENCOUNTER
Called patient daughter, Monik back. Monik stated patient is fatigued from the hospitalization, but overall feeling well. Instructed Monik to check patient blood pressure 1-2 hours after taking metoprolol and let heart team know if BP is consistently greater than 140/80 or if patient has symptoms of chest pain, shortness of breath, dizziness and we can see patient sooner for follow up. Monik verbalized understanding and in agreement with plan.

## 2025-01-16 NOTE — TELEPHONE ENCOUNTER
Wt Readings from Last 4 Encounters:   01/09/25 59.2 kg (130 lb 9.6 oz)   12/11/24 59.9 kg (132 lb)   10/24/24 59.9 kg (132 lb 1.6 oz)   08/23/24 61.7 kg (136 lb)     ANTICOAGULATION DIRECT ORAL ANTICOAGULANT MONITORING    OBJECTIVE     Age: 89 year old    Wt Readings from Last 2 Encounters:   01/09/25 59.2 kg (130 lb 9.6 oz)   12/11/24 59.9 kg (132 lb)      Lab Results   Component Value Date    CR 0.66 01/10/2025    CR 0.55 01/09/2025    CR 0.66 10/24/2024     Creatinine Clearance (using actual bodyweight, mL/min):   ASSESSMENT/PLAN     A chart review for Direct Oral Anticoagulant (DOAC) Stewardship has been completed for:     Dosing: recommend adjustment to Apixaban 2.5 mg TWICE daily for age >= 80 years and weight <= 60 kg (consistent with package insert dosing)  3rd weight <60 kg, appropriate for dose adjustment    Plan made per [facility] anticoagulation protocol    Gena Darby, RN  Anticoagulation Clinic

## 2025-01-20 NOTE — TELEPHONE ENCOUNTER
"\"TAVR pt.    CT picked up a 42.3mm X 40.9 mm infrarenal abdominal aortic aneurysm\"   11/16/23 CTA TAVR in [facility].     Pt already scheduled with Dr. Mendoza for 4.23cm x 4.09cm infrarenal AAA.    Staff message sent to verify whether pt needs to be seen by Dr. Barkley for TAVR discussion.     Update: No OV needed with Dr. Barkley per HARITHA Cortez.     PABLO MatthewN, RN  [facility]  " -EMG  -Referral to Orthopedics Spine

## 2025-01-21 ENCOUNTER — TELEPHONE (OUTPATIENT)
Dept: CARDIOLOGY | Facility: CLINIC | Age: 89
End: 2025-01-21

## 2025-01-21 ENCOUNTER — ALLIED HEALTH/NURSE VISIT (OUTPATIENT)
Dept: UROLOGY | Facility: CLINIC | Age: 89
End: 2025-01-21
Attending: INTERNAL MEDICINE
Payer: MEDICARE

## 2025-01-21 DIAGNOSIS — I48.91 ATRIAL FIBRILLATION, UNSPECIFIED TYPE (H): Primary | ICD-10-CM

## 2025-01-21 DIAGNOSIS — R33.9 INCOMPLETE BLADDER EMPTYING: ICD-10-CM

## 2025-01-21 LAB — RESIDUAL VOLUME (RV) (EXTERNAL): 62

## 2025-01-21 PROCEDURE — 51798 US URINE CAPACITY MEASURE: CPT

## 2025-01-21 NOTE — TELEPHONE ENCOUNTER
ANTICOAGULATION  STEWARDSHIP    Spoke with  daughter Debbie  to review advised dosage change for Apixaban (Eliquis).    Education provided: how to take apixaban (Eilquis) safely: take doses as close to every 12 hours as possible; at the same time each day and may split tablets in order to start new dose & finish current supply of medication.    They verbalized understanding of new Apixaban (Eliquis) dose/tablet strength  They were notified Rx for new dosage would be sent to preferred pharmacy    Patient assistance will need a new,signed prescription, routing to Vilma's office for follow through. Prescription t'd up.    Gena Darby RN  [facility]

## 2025-01-21 NOTE — PROGRESS NOTES
Kavita Merlos comes into clinic today at the request of Soco Dias PA-C Ordering Provider for PVR.    Pt's post void residual today was 62 mL by bladder scan.  Pt has ongoing concerns regarding urinary frequency and nocturia and requested to be seen by provider.  Pt is scheduled to be seen by Soco for follow up next week.    This service provided today was under the supervising provider of the day Dr. Wheeler, who was available if needed.    Ginny Aragon, CMA

## 2025-01-27 ENCOUNTER — MYC MEDICAL ADVICE (OUTPATIENT)
Dept: INTERNAL MEDICINE | Facility: CLINIC | Age: 89
End: 2025-01-27
Payer: MEDICARE

## 2025-01-27 DIAGNOSIS — N63.20 MASS OF LEFT BREAST, UNSPECIFIED QUADRANT: Primary | ICD-10-CM

## 2025-01-27 DIAGNOSIS — N64.59 OTHER SIGNS AND SYMPTOMS IN BREAST: ICD-10-CM

## 2025-01-29 ENCOUNTER — OFFICE VISIT (OUTPATIENT)
Dept: UROLOGY | Facility: CLINIC | Age: 89
End: 2025-01-29
Payer: MEDICARE

## 2025-01-29 VITALS — DIASTOLIC BLOOD PRESSURE: 90 MMHG | SYSTOLIC BLOOD PRESSURE: 160 MMHG

## 2025-01-29 DIAGNOSIS — R39.198 DIFFICULTY URINATING: ICD-10-CM

## 2025-01-29 DIAGNOSIS — N30.00 ACUTE CYSTITIS WITHOUT HEMATURIA: ICD-10-CM

## 2025-01-29 DIAGNOSIS — R39.9 UTI SYMPTOMS: Primary | ICD-10-CM

## 2025-01-29 DIAGNOSIS — N39.0 URINARY TRACT INFECTION: ICD-10-CM

## 2025-01-29 DIAGNOSIS — R39.15 URINARY URGENCY: ICD-10-CM

## 2025-01-29 LAB
ALBUMIN UR-MCNC: 100 MG/DL
APPEARANCE UR: ABNORMAL
BILIRUB UR QL STRIP: NEGATIVE
COLOR UR AUTO: YELLOW
GLUCOSE UR STRIP-MCNC: NEGATIVE MG/DL
HGB UR QL STRIP: ABNORMAL
KETONES UR STRIP-MCNC: NEGATIVE MG/DL
LEUKOCYTE ESTERASE UR QL STRIP: ABNORMAL
NITRATE UR QL: POSITIVE
PH UR STRIP: 7.5 [PH] (ref 5–7)
SP GR UR STRIP: 1.02 (ref 1–1.03)
UROBILINOGEN UR STRIP-ACNC: 0.2 E.U./DL

## 2025-01-29 PROCEDURE — 87186 SC STD MICRODIL/AGAR DIL: CPT | Performed by: PHYSICIAN ASSISTANT

## 2025-01-29 PROCEDURE — 99214 OFFICE O/P EST MOD 30 MIN: CPT | Performed by: PHYSICIAN ASSISTANT

## 2025-01-29 PROCEDURE — 87088 URINE BACTERIA CULTURE: CPT | Performed by: PHYSICIAN ASSISTANT

## 2025-01-29 PROCEDURE — 81003 URINALYSIS AUTO W/O SCOPE: CPT | Mod: QW | Performed by: PHYSICIAN ASSISTANT

## 2025-01-29 PROCEDURE — 87086 URINE CULTURE/COLONY COUNT: CPT | Performed by: PHYSICIAN ASSISTANT

## 2025-01-29 RX ORDER — CEFDINIR 300 MG/1
300 CAPSULE ORAL 2 TIMES DAILY
Qty: 14 CAPSULE | Refills: 0 | Status: SHIPPED | OUTPATIENT
Start: 2025-01-29

## 2025-01-29 NOTE — NURSING NOTE
Chief Complaint   Patient presents with    Urinary Retention     Since having catheter removed,   pt having burning with urination, strong urges to urinate with not much volume with voiding, and having incontinence which has improved some.      Dorina Romero, Clinic Assistant

## 2025-01-29 NOTE — PATIENT INSTRUCTIONS
Your urine looks like you have a UTI.  Will plan on starting cefdinir 300 mg twice daily for 7 days.  Will follow up on culture.  Will try to stay away from  due to aneurysm.      After infection is treated, would then see how you are doing.  If still having issues, would recommend trial of pelvic floor PT to see if this will help with urgency.  Referral provided.    **This order will print in the [facility] Scheduling Office**    Physical Therapy available through:    *[facility]    Call one number to schedule at any of the above locations: [phone].    Your provider has referred you to: Physical Therapy at [facility] or [facility]    Indication/Reason for Referral: Women's Health Special Programs: None and Women's Health: Pelvic Dysfunction:   Pelvic Floor Weakness: and  Biofeedback, E-Stim/TENS/TENS Rental if deemed appropriate by therapist, Exercise/HEP and Myofascial Release/Massage (internal)  Special Request: Exercise: Home Exercise Program  Manual Therapy: Myofascial Release/Massage (internal)  Modalities: As Indicated E-Stim/TENS    Additional Comments for the Therapist : Core strengthening    Please be aware that coverage of these services is subject to the terms and limitations of your health insurance plan.  Call member services at your health plan with any benefit or coverage questions.      Please bring the following to your appointment:    *Your personal calendar for scheduling future appointments  *Comfortable clothing     Continue with MiraLAX as needed for bowels.    You are emptying better than in the hospital.    If PT does not help and you continue to have urgency and frequency, would trial Detrol 4 mg daily.    If continued issues, would consider urodynamics study.    Below is a list of things that can irritate the bladder and should try to remove to see if it improves your symptoms:     Caffeinated soft drinks.  Coffee.  Tea.  Chocolate.  Tomato-based foods.  Acidic juices and fruits.  (includes cranberry juice)  Alcohol.  Carbonated drinks.  Aspartame/Nutrasweet (artificial sweeteners)  Vitamin C supplements and citrus fruit  Spicy food     Contact us in the interim with questions, concerns, or changes in symptomatology.  [phone]

## 2025-01-29 NOTE — PROGRESS NOTES
Subjective      CHIEF COMPLAINT/REASON FOR VISIT   Follow up on urinary retention, nocturia, and incontinence    HISTORY OF PRESENT ILLNESS   Ms. Merlos is very pleasant 89 year old year old female, who presents today ***     The following portions of the patient's history were reviewed and updated as appropriate: allergies, current medications, past family history, past medical history, past social history, past surgical history, and problem list.     REVIEW OF SYSTEMS   Review of Systems   Per HPI.     Patient Active Problem List   Diagnosis    Macular degeneration disease    Glaucoma    Abnormal findings diagnostic imaging of heart and coronary circulation    Status post coronary angiogram    Aortic stenosis, severe    PAD (peripheral artery disease)    Hypertensive emergency    Chest pain, unspecified type    Infrarenal abdominal aortic aneurysm (AAA) without rupture    Hydroureteronephrosis    Pulmonary nodules    Lesion of left native kidney      Past Medical History:   Diagnosis Date    AAA (abdominal aortic aneurysm)     Aortic stenosis     Benign essential hypertension with BP difference between arms     Hyperlipidemia LDL goal <70         Objective      PHYSICAL EXAM   BP (!) 160/90   LMP  (LMP Unknown)    Physical Exam    LABORATORY     Recent Labs   Lab Test 01/29/25  1436 01/09/25  1418   COLOR Yellow Straw   APPEARANCE Cloudy* Clear   URINEGLC Negative Negative   URINEBILI Negative Negative   URINEKETONE Negative Negative   SG 1.020 1.018   UBLD Trace* Moderate*   URINEPH 7.5* 8.0*   PROTEIN 100* Negative   UROBILINOGEN 0.2  --    NITRITE Positive* Negative   LEUKEST Large* Negative   RBCU  --  4*   WBCU  --  9*      IMAGING     CT Aortic Survey w Contrast    Addendum Date: 1/9/2025    CLINICAL ADDENDUM: Clinical information in this report has been modified from the previous version as follows: Mild to moderate bilateral hydroureteronephrosis. END ADDENDUM    Result Date: 1/9/2025  EXAM: CT AORTIC  SURVEY W CONTRAST LOCATION: [facility] DATE: 1/9/2025 INDICATION: Chest pain, Hypertension COMPARISON: CT chest performed on 4/17/2020 TECHNIQUE: CT angiogram chest abdomen pelvis during arterial phase of injection of IV contrast. 2D and 3D MIP reconstructions were performed by the CT technologist. Dose reduction techniques were used. CONTRAST: 72mL Isovue 370 FINDINGS: CT ANGIOGRAM CHEST, ABDOMEN, AND PELVIS: No evidence of aortic dissection is present. Severe vascular calcifications are seen within the thoracoabdominal aorta. 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 Reconstitution is seen along the mid segment of the superior mesenteric artery. Infrarenal abdominal  aortic aneurysm is present measuring up to 4.8 x 4.5 cm and extends for approximately 8.4 cm in length. Right common iliac artery measures 1.2 cm while the left common iliac artery measures 1.1 cm. Moderate to severe vascular calcifications are seen within the iliac branches. Intramural thrombus is seen within the left common femoral vein measuring up to 4 mm (series 6, image 277). LUNGS AND PLEURA: Subsegmental atelectasis is seen in the lung bases. Emphysematous changes are seen in the lungs with upper lobe predominance. Stable small pulmonary nodules measuring up to 2 mm in the left lower lobe (series 7, image 130). MEDIASTINUM/AXILLAE: Stable 9 mm precarinal lymph node in the mediastinum (series 5, image 26). Findings are nonspecific and may be reactive. Heart size is mildly enlarged. Aortic valve replacement is present. CORONARY ARTERY CALCIFICATION: Mild to moderate HEPATOBILIARY: Calcified granuloma seen in the right hepatic dome. Cholelithiasis is present within the gallbladder measuring up to 3.5 cm. Cholelithiasis is  also seen within the gallbladder. PANCREAS: No significant mass, duct dilatation, or inflammatory change. SPLEEN: Normal size. ADRENAL GLANDS: Bilateral thickening of the adrenal and small adrenal nodules are unchanged measuring up to 8 mm on the left and 5 mm on the right. KIDNEYS/BLADDER: Mild to moderate bilateral hydronephrosis and hydroureter. Bilateral renal cysts are present measuring up to 2.9 cm along the inferior pole of the right kidney. 10 mm hyperdense lesion along the lower pole of the left kidney may reflect a proteinaceous or hemorrhagic cyst (series 5, image 69). BOWEL: Small to moderate hiatal hernia is present. Colonic diverticulosis without evidence of diverticulitis. Mild wall thickening is seen along the distal descending and proximal sigmoid colon. No evidence of bowel obstruction. LYMPH NODES: No lymphadenopathy. PELVIC ORGANS: No pelvic masses. MUSCULOSKELETAL: Diffuse osteopenia is present. Multilevel degenerative changes are present in the spine.     IMPRESSION: 1.  No evidence of aortic dissection. Infrarenal abdominal aortic aneurysm is present measuring up to 4.8 cm. Severe atherosclerosis is present within the aorta. Recommend surveillance imaging at 12 months per guidelines below. 2.  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 Reconstitution is seen within the mid segment, approximately 1.4 cm from the origin. 3.  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 4.  Small pulmonary nodules measuring up to 2 mm. See follow-up guidelines below. 5.  Mild wall thickening is seen along the distal descending and proximal sigmoid colon. Findings may be due to underdistention. Focal colitis is also in the differential diagnosis but considered to be less likely given lack of surrounding inflammatory changes. 6.  10 mm hyperdense lesion in the lower pole of the left kidney may reflect a  proteinaceous or hemorrhagic cyst. Suggest follow-up renal ultrasound to exclude solid mass. REFERENCE: Guidelines for Management of Incidental Pulmonary Nodules Detected on CT Images: From the Fleischner Society 2017. Guidelines apply to incidental nodules in patients who are 35 years or older. Guidelines do not apply to lung cancer screening, patients with immunosuppression, or patients with known primary cancer. MULTIPLE NODULES Nodule size <6 mm Low-risk patients: No follow-up needed. High-risk patients: Optional follow-up at 12 months. Nodule size 6 mm or larger Low-risk patients: Follow-up CT at 3-6 months, then consider CT at 18-24 months. High-risk patients: Follow-up CT at 3-6 months, then at 18-24 months if no change. -Use most suspicious nodule as guide to management. REFERENCE: Management of Incidental Adrenal Masses: A White Paper of the ACR Incidental Findings Committee. J Am Mela Radiol 2017;14:5217-1455. <1 cm in short axis: No follow-up. REFERENCE: SVS practice guidelines on the care of patients with an abdominal aortic aneurysm. Bradley GUEVARA. J Vasc Surg, 2018. PMID:23020206. Aorta size: 4.0 cm to 4.9 cm: Surveillance imaging at 12-month intervals.     TESTING    PVR: 62 mL    Assessment & Plan    1. UTI symptoms    2. Urinary tract infection        I had the pleasure today of meeting with Ms. Merlos to discuss her ***       Signed by:       Soco Dias PA-C 1/29/2025 2:50 PM      needed. High-risk patients: Optional follow-up at 12 months. Nodule size 6 mm or larger Low-risk patients: Follow-up CT at 3-6 months, then consider CT at 18-24 months. High-risk patients: Follow-up CT at 3-6 months, then at 18-24 months if no change. -Use most suspicious nodule as guide to management. REFERENCE: Management of Incidental Adrenal Masses: A White Paper of the ACR Incidental Findings Committee. J Am Mela Radiol 2017;14:3038-3368. <1 cm in short axis: No follow-up. REFERENCE: SVS practice guidelines on the care of patients with an abdominal aortic aneurysm. Bradley GUEVARA. J Vasc Surg, 2018. PMID:[id number]. Aorta size: 4.0 cm to 4.9 cm: Surveillance imaging at 12-month intervals.     TESTING    PVR: 62 mL    Assessment & Plan    1. UTI symptoms    2. Urinary tract infection    3. Urinary urgency    4. Difficulty urinating        I had the pleasure today of meeting with Ms. Merlos and her daughter to discuss her continued issues with urination.  She is having small dribbling incontinence, urgency, frequency, bladder pressure, and dysuria.  Her urinalysis is very convincing for urinary tract infection.  I did discuss with her that it would be very reasonable to start her on antibiotic.  She does note that she had quite a bit of trouble sleeping last night due to the discomfort.  We did discuss that if we start her on antibiotic, may need to change this based upon final culture and sensitivities.    She is emptying much better than when she was in the hospital.  This is likely due to moving better as well as improvement in her constipation.    We did discuss that we could consider pelvic floor physical therapy if she is still having issues after treatment of infection.  Patient is hesitant to add on more medications, so we will hold off on an overactive bladder medication at this time.  We briefly discussed anticholinergic/intermuscular medications versus beta 3 agonist.  The big concern was that she does not  have drug coverage, so the beta 3 agonist will likely not be an option.    She has already identified bladder irritants for herself.  We will provide her with a complete list.    Will plan on the following:    -Your urine looks like you have a UTI.  Will plan on starting cefdinir 300 mg twice daily for 7 days.  Will follow up on culture.  Will try to stay away from  due to aneurysm.      -After infection is treated, would then see how you are doing.  If still having issues, would recommend trial of pelvic floor PT to see if this will help with urgency.  Referral provided.    -Continue with MiraLAX as needed for bowels.    -You are emptying better than in the hospital.    -If PT does not help and you continue to have urgency and frequency, would trial Detrol 4 mg daily.    If continued issues, would consider urodynamics study.    -List of bladder irritants provided as a guideline.    Contact us in the interim with questions, concerns, or changes in symptomatology.    Signed by:       Soco Dias PA-C 1/29/2025 2:50 PM

## 2025-01-29 NOTE — LETTER
1/29/2025       RE: Kavita Merlos  [id number]  [address]  [facility] [address]     Dear Colleague,    Thank you for referring your patient, Kavita Merlos, to the [facility]. Please see a copy of my visit note below.    Subjective     CHIEF COMPLAINT/REASON FOR VISIT   Follow up on urinary retention, nocturia, and incontinence    HISTORY OF PRESENT ILLNESS   Ms. Merlos is very pleasant 89 year old year old female, who presents today for follow-up regarding urinary retention, nocturia, and urinary incontinence.  I saw her in the hospital on 01/10/2025.  At that time, she had a Mendieta catheter in place due to urinary retention.  We had also recommended she start MiraLAX to try to improve her bowel movements, as she had a fairly large stool burden.    Patient has been having issues with her nighttime urination and difficulties with emptying for approximately a year.  She had a postvoid residual checked last week which was down to 62 mL.  Patient continues to pelvic pressure or burning, urge, and small amounts of incontinence.  This is somewhat better.  She does note dribbling incontinence into the bladder as well as urgency and frequency.    She has identified oranges and vinegar as bladder irritants.  She does note that her bowels are getting better and more regular with the MiraLAX, which appears to be helping.    Urinalysis today is concerning for urinary tract infection with blood, leukocyte esterase, and nitrite positive.    The following portions of the patient's history were reviewed and updated as appropriate: allergies, current medications, past family history, past medical history, past social history, past surgical history, and problem list.     REVIEW OF SYSTEMS   Review of Systems   Per HPI.     Patient Active Problem List   Diagnosis     Macular degeneration disease     Glaucoma     Abnormal findings  diagnostic imaging of heart and coronary circulation     Status post coronary angiogram     Aortic stenosis, severe     PAD (peripheral artery disease)     Hypertensive emergency     Chest pain, unspecified type     Infrarenal abdominal aortic aneurysm (AAA) without rupture     Hydroureteronephrosis     Pulmonary nodules     Lesion of left native kidney      Past Medical History:   Diagnosis Date     AAA (abdominal aortic aneurysm)      Aortic stenosis      Benign essential hypertension with BP difference between arms      Hyperlipidemia LDL goal <70         Objective     PHYSICAL EXAM   BP (!) 160/90   LMP  (LMP Unknown)    Physical Exam  Constitutional:       Appearance: Normal appearance.   HENT:      Head: Normocephalic.   Eyes:      General: No scleral icterus.  Pulmonary:      Effort: Pulmonary effort is normal.   Musculoskeletal:      Comments: Ambulates with a walker.   Neurological:      General: No focal deficit present.      Mental Status: She is alert and oriented to person, place, and time.   Psychiatric:         Mood and Affect: Mood normal.         Behavior: Behavior normal.       LABORATORY     Recent Labs   Lab Test 01/29/25  1436 01/09/25  1418   COLOR Yellow Straw   APPEARANCE Cloudy* Clear   URINEGLC Negative Negative   URINEBILI Negative Negative   URINEKETONE Negative Negative   SG 1.020 1.018   UBLD Trace* Moderate*   URINEPH 7.5* 8.0*   PROTEIN 100* Negative   UROBILINOGEN 0.2  --    NITRITE Positive* Negative   LEUKEST Large* Negative   RBCU  --  4*   WBCU  --  9*      IMAGING     CT Aortic Survey w Contrast    Addendum Date: 1/9/2025    CLINICAL ADDENDUM: Clinical information in this report has been modified from the previous version as follows: Mild to moderate bilateral hydroureteronephrosis. END ADDENDUM    Result Date: 1/9/2025  EXAM: CT AORTIC SURVEY W CONTRAST LOCATION: [facility] DATE: 1/9/2025 INDICATION: Chest pain, Hypertension COMPARISON: CT chest  performed on 4/17/2020 TECHNIQUE: CT angiogram chest abdomen pelvis during arterial phase of injection of IV contrast. 2D and 3D MIP reconstructions were performed by the CT technologist. Dose reduction techniques were used. CONTRAST: 72mL Isovue 370 FINDINGS: CT ANGIOGRAM CHEST, ABDOMEN, AND PELVIS: No evidence of aortic dissection is present. Severe vascular calcifications are seen within the thoracoabdominal aorta. 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 Reconstitution is seen along the mid segment of the superior mesenteric artery. Infrarenal abdominal  aortic aneurysm is present measuring up to 4.8 x 4.5 cm and extends for approximately 8.4 cm in length. Right common iliac artery measures 1.2 cm while the left common iliac artery measures 1.1 cm. Moderate to severe vascular calcifications are seen within the iliac branches. Intramural thrombus is seen within the left common femoral vein measuring up to 4 mm (series 6, image 277). LUNGS AND PLEURA: Subsegmental atelectasis is seen in the lung bases. Emphysematous changes are seen in the lungs with upper lobe predominance. Stable small pulmonary nodules measuring up to 2 mm in the left lower lobe (series 7, image 130). MEDIASTINUM/AXILLAE: Stable 9 mm precarinal lymph node in the mediastinum (series 5, image 26). Findings are nonspecific and may be reactive. Heart size is mildly enlarged. Aortic valve replacement is present. CORONARY ARTERY CALCIFICATION: Mild to moderate HEPATOBILIARY: Calcified granuloma seen in the right hepatic dome. Cholelithiasis is present within the gallbladder measuring up to 3.5 cm. Cholelithiasis is also seen within the gallbladder. PANCREAS: No significant mass, duct dilatation, or inflammatory change. SPLEEN: Normal size. ADRENAL  GLANDS: Bilateral thickening of the adrenal and small adrenal nodules are unchanged measuring up to 8 mm on the left and 5 mm on the right. KIDNEYS/BLADDER: Mild to moderate bilateral hydronephrosis and hydroureter. Bilateral renal cysts are present measuring up to 2.9 cm along the inferior pole of the right kidney. 10 mm hyperdense lesion along the lower pole of the left kidney may reflect a proteinaceous or hemorrhagic cyst (series 5, image 69). BOWEL: Small to moderate hiatal hernia is present. Colonic diverticulosis without evidence of diverticulitis. Mild wall thickening is seen along the distal descending and proximal sigmoid colon. No evidence of bowel obstruction. LYMPH NODES: No lymphadenopathy. PELVIC ORGANS: No pelvic masses. MUSCULOSKELETAL: Diffuse osteopenia is present. Multilevel degenerative changes are present in the spine.     IMPRESSION: 1.  No evidence of aortic dissection. Infrarenal abdominal aortic aneurysm is present measuring up to 4.8 cm. Severe atherosclerosis is present within the aorta. Recommend surveillance imaging at 12 months per guidelines below. 2.  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 Reconstitution is seen within the mid segment, approximately 1.4 cm from the origin. 3.  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 4.  Small pulmonary nodules measuring up to 2 mm. See follow-up guidelines below. 5.  Mild wall thickening is seen along the distal descending and proximal sigmoid colon. Findings may be due to underdistention. Focal colitis is also in the differential diagnosis but considered to be less likely given lack of surrounding inflammatory changes. 6.  10 mm hyperdense lesion in the lower pole of the left kidney may reflect a proteinaceous or hemorrhagic cyst. Suggest follow-up renal ultrasound to exclude solid mass. REFERENCE: Guidelines for Management of Incidental  Pulmonary Nodules Detected on CT Images: From the Fleischner Society 2017. Guidelines apply to incidental nodules in patients who are 35 years or older. Guidelines do not apply to lung cancer screening, patients with immunosuppression, or patients with known primary cancer. MULTIPLE NODULES Nodule size <6 mm Low-risk patients: No follow-up needed. High-risk patients: Optional follow-up at 12 months. Nodule size 6 mm or larger Low-risk patients: Follow-up CT at 3-6 months, then consider CT at 18-24 months. High-risk patients: Follow-up CT at 3-6 months, then at 18-24 months if no change. -Use most suspicious nodule as guide to management. REFERENCE: Management of Incidental Adrenal Masses: A White Paper of the ACR Incidental Findings Committee. J Am Mela Radiol 2017;14:0718-0251. <1 cm in short axis: No follow-up. REFERENCE: SVS practice guidelines on the care of patients with an abdominal aortic aneurysm. Bradley GUEVARA. J Vasc Surg, 2018. PMID:[id number]. Aorta size: 4.0 cm to 4.9 cm: Surveillance imaging at 12-month intervals.     TESTING    PVR: 62 mL    Assessment & Plan   1. UTI symptoms    2. Urinary tract infection    3. Urinary urgency    4. Difficulty urinating        I had the pleasure today of meeting with Ms. Merlos and her daughter to discuss her continued issues with urination.  She is having small dribbling incontinence, urgency, frequency, bladder pressure, and dysuria.  Her urinalysis is very convincing for urinary tract infection.  I did discuss with her that it would be very reasonable to start her on antibiotic.  She does note that she had quite a bit of trouble sleeping last night due to the discomfort.  We did discuss that if we start her on antibiotic, may need to change this based upon final culture and sensitivities.    She is emptying much better than when she was in the hospital.  This is likely due to moving better as well as improvement in her constipation.    We did discuss that we could  consider pelvic floor physical therapy if she is still having issues after treatment of infection.  Patient is hesitant to add on more medications, so we will hold off on an overactive bladder medication at this time.  We briefly discussed anticholinergic/intermuscular medications versus beta 3 agonist.  The big concern was that she does not have drug coverage, so the beta 3 agonist will likely not be an option.    She has already identified bladder irritants for herself.  We will provide her with a complete list.    Will plan on the following:    -Your urine looks like you have a UTI.  Will plan on starting cefdinir 300 mg twice daily for 7 days.  Will follow up on culture.  Will try to stay away from  due to aneurysm.      -After infection is treated, would then see how you are doing.  If still having issues, would recommend trial of pelvic floor PT to see if this will help with urgency.  Referral provided.    -Continue with MiraLAX as needed for bowels.    -You are emptying better than in the hospital.    -If PT does not help and you continue to have urgency and frequency, would trial Detrol 4 mg daily.    If continued issues, would consider urodynamics study.    -List of bladder irritants provided as a guideline.    Contact us in the interim with questions, concerns, or changes in symptomatology.    Signed by:       Soco Dias PA-C 1/29/2025 2:50 PM       Again, thank you for allowing me to participate in the care of your patient.      Sincerely,    Soco Dias PA-C

## 2025-01-30 ENCOUNTER — MYC MEDICAL ADVICE (OUTPATIENT)
Dept: CARDIOLOGY | Facility: CLINIC | Age: 89
End: 2025-01-30
Payer: MEDICARE

## 2025-01-30 DIAGNOSIS — I16.1 HYPERTENSIVE EMERGENCY: ICD-10-CM

## 2025-01-30 LAB — BACTERIA UR CULT: ABNORMAL

## 2025-01-30 RX ORDER — METOPROLOL SUCCINATE 25 MG/1
25 TABLET, EXTENDED RELEASE ORAL DAILY
Qty: 90 TABLET | Refills: 3 | Status: SHIPPED | OUTPATIENT
Start: 2025-01-30

## 2025-01-31 LAB — BACTERIA UR CULT: ABNORMAL

## 2025-02-04 ENCOUNTER — HOSPITAL ENCOUNTER (OUTPATIENT)
Dept: ULTRASOUND IMAGING | Facility: CLINIC | Age: 89
Discharge: HOME | End: 2025-02-04
Attending: INTERNAL MEDICINE
Payer: MEDICARE

## 2025-02-04 ENCOUNTER — HOSPITAL ENCOUNTER (OUTPATIENT)
Dept: MAMMOGRAPHY | Facility: CLINIC | Age: 89
Discharge: HOME | End: 2025-02-04
Attending: INTERNAL MEDICINE
Payer: MEDICARE

## 2025-02-04 DIAGNOSIS — N64.59 OTHER SIGNS AND SYMPTOMS IN BREAST: ICD-10-CM

## 2025-02-04 DIAGNOSIS — N63.20 MASS OF LEFT BREAST, UNSPECIFIED QUADRANT: ICD-10-CM

## 2025-02-04 PROCEDURE — 77066 DX MAMMO INCL CAD BI: CPT

## 2025-02-04 PROCEDURE — 76642 ULTRASOUND BREAST LIMITED: CPT | Mod: LT

## 2025-02-05 NOTE — TELEPHONE ENCOUNTER
Per chart review, new prescription for 2.5 mg twice daily dosing was approved on 1/21/25 and 5 mg eliquis is no longer on medication list.        Gena Darby RN  [facility]tico[facility] Clinic

## 2025-02-13 ENCOUNTER — HOSPITAL ENCOUNTER (OUTPATIENT)
Dept: ULTRASOUND IMAGING | Facility: CLINIC | Age: 89
Discharge: HOME | End: 2025-02-13
Attending: INTERNAL MEDICINE
Payer: MEDICARE

## 2025-02-13 ENCOUNTER — HOSPITAL ENCOUNTER (OUTPATIENT)
Dept: MAMMOGRAPHY | Facility: CLINIC | Age: 89
Discharge: HOME | End: 2025-02-13
Attending: INTERNAL MEDICINE
Payer: MEDICARE

## 2025-02-13 DIAGNOSIS — N63.20 MASS OF LEFT BREAST, UNSPECIFIED QUADRANT: ICD-10-CM

## 2025-02-13 DIAGNOSIS — N64.59 OTHER SIGNS AND SYMPTOMS IN BREAST: ICD-10-CM

## 2025-02-13 DIAGNOSIS — N63.21 MASS OF UPPER OUTER QUADRANT OF LEFT BREAST: ICD-10-CM

## 2025-02-13 PROCEDURE — A4648 IMPLANTABLE TISSUE MARKER: HCPCS

## 2025-02-13 PROCEDURE — 999N000065 MA POST PROCEDURE LEFT

## 2025-02-13 PROCEDURE — 250N000009 HC RX 250: Performed by: RADIOLOGY

## 2025-02-13 PROCEDURE — 272N000615 US BREAST BIOPSY CORE NEEDLE LEFT

## 2025-02-13 RX ADMIN — LIDOCAINE HYDROCHLORIDE 5 ML: 10 INJECTION, SOLUTION EPIDURAL; INFILTRATION; INTRACAUDAL; PERINEURAL at 10:50

## 2025-02-15 ENCOUNTER — APPOINTMENT (OUTPATIENT)
Dept: CT IMAGING | Facility: CLINIC | Age: 89
End: 2025-02-15
Attending: EMERGENCY MEDICINE
Payer: MEDICARE

## 2025-02-15 ENCOUNTER — HOSPITAL ENCOUNTER (EMERGENCY)
Facility: CLINIC | Age: 89
Discharge: OTHER INSTITUTION | End: 2025-02-15
Attending: EMERGENCY MEDICINE | Admitting: EMERGENCY MEDICINE
Payer: MEDICARE

## 2025-02-15 ENCOUNTER — HOSPITAL ENCOUNTER (OUTPATIENT)
Facility: CLINIC | Age: 89
Setting detail: OBSERVATION
Discharge: HOME | End: 2025-02-17
Attending: EMERGENCY MEDICINE | Admitting: HOSPITALIST
Payer: MEDICARE

## 2025-02-15 ENCOUNTER — NURSE TRIAGE (OUTPATIENT)
Dept: NURSING | Facility: CLINIC | Age: 89
End: 2025-02-15
Payer: MEDICARE

## 2025-02-15 VITALS
DIASTOLIC BLOOD PRESSURE: 62 MMHG | SYSTOLIC BLOOD PRESSURE: 145 MMHG | HEART RATE: 77 BPM | HEIGHT: 64 IN | OXYGEN SATURATION: 96 % | TEMPERATURE: 98.6 F | WEIGHT: 130 LBS | RESPIRATION RATE: 14 BRPM | BODY MASS INDEX: 22.2 KG/M2

## 2025-02-15 DIAGNOSIS — N28.9 RENAL LESION: ICD-10-CM

## 2025-02-15 DIAGNOSIS — K86.9 PANCREATIC LESION: ICD-10-CM

## 2025-02-15 DIAGNOSIS — R10.9 ABDOMINAL PAIN, UNSPECIFIED ABDOMINAL LOCATION: ICD-10-CM

## 2025-02-15 DIAGNOSIS — Z98.890 HISTORY OF AAA (ABDOMINAL AORTIC ANEURYSM) REPAIR: ICD-10-CM

## 2025-02-15 DIAGNOSIS — I71.43 INFRARENAL ABDOMINAL AORTIC ANEURYSM (AAA) WITHOUT RUPTURE: ICD-10-CM

## 2025-02-15 DIAGNOSIS — R11.0 NAUSEA: Primary | ICD-10-CM

## 2025-02-15 DIAGNOSIS — R51.9 NONINTRACTABLE HEADACHE, UNSPECIFIED CHRONICITY PATTERN, UNSPECIFIED HEADACHE TYPE: ICD-10-CM

## 2025-02-15 LAB
ABO + RH BLD: NORMAL
ALBUMIN SERPL BCG-MCNC: 3.6 G/DL (ref 3.5–5.2)
ALP SERPL-CCNC: 81 U/L (ref 40–150)
ALT SERPL W P-5'-P-CCNC: 21 U/L (ref 0–50)
ANION GAP SERPL CALCULATED.3IONS-SCNC: 12 MMOL/L (ref 7–15)
AST SERPL W P-5'-P-CCNC: 55 U/L (ref 0–45)
BASE EXCESS BLDV CALC-SCNC: 0 MMOL/L (ref -3–3)
BASOPHILS # BLD AUTO: 0 10E3/UL (ref 0–0.2)
BASOPHILS NFR BLD AUTO: 0 %
BILIRUB SERPL-MCNC: 1.2 MG/DL
BLD GP AB SCN SERPL QL: NEGATIVE
BUN SERPL-MCNC: 16.2 MG/DL (ref 8–23)
CALCIUM SERPL-MCNC: 8.9 MG/DL (ref 8.8–10.4)
CHLORIDE SERPL-SCNC: 99 MMOL/L (ref 98–107)
CREAT SERPL-MCNC: 0.61 MG/DL (ref 0.51–0.95)
EGFRCR SERPLBLD CKD-EPI 2021: 86 ML/MIN/1.73M2
EOSINOPHIL # BLD AUTO: 0.1 10E3/UL (ref 0–0.7)
EOSINOPHIL NFR BLD AUTO: 1 %
ERYTHROCYTE [DISTWIDTH] IN BLOOD BY AUTOMATED COUNT: 14 % (ref 10–15)
FLUAV RNA SPEC QL NAA+PROBE: NEGATIVE
FLUBV RNA RESP QL NAA+PROBE: NEGATIVE
GLUCOSE SERPL-MCNC: 102 MG/DL (ref 70–99)
HCO3 BLDV-SCNC: 25 MMOL/L (ref 21–28)
HCO3 SERPL-SCNC: 23 MMOL/L (ref 22–29)
HCT VFR BLD AUTO: 32.7 % (ref 35–47)
HGB BLD-MCNC: 11 G/DL (ref 11.7–15.7)
HOLD SPECIMEN: NORMAL
HOLD SPECIMEN: NORMAL
IMM GRANULOCYTES # BLD: 0 10E3/UL
IMM GRANULOCYTES NFR BLD: 0 %
LACTATE BLD-SCNC: 0.4 MMOL/L
LACTATE SERPL-SCNC: 0.9 MMOL/L (ref 0.7–2)
LYMPHOCYTES # BLD AUTO: 0.3 10E3/UL (ref 0.8–5.3)
LYMPHOCYTES NFR BLD AUTO: 6 %
MCH RBC QN AUTO: 30.8 PG (ref 26.5–33)
MCHC RBC AUTO-ENTMCNC: 33.6 G/DL (ref 31.5–36.5)
MCV RBC AUTO: 92 FL (ref 78–100)
MONOCYTES # BLD AUTO: 0.2 10E3/UL (ref 0–1.3)
MONOCYTES NFR BLD AUTO: 3 %
NEUTROPHILS # BLD AUTO: 5 10E3/UL (ref 1.6–8.3)
NEUTROPHILS NFR BLD AUTO: 89 %
NRBC # BLD AUTO: 0 10E3/UL
NRBC BLD AUTO-RTO: 0 /100
PCO2 BLDV: 39 MM HG (ref 40–50)
PH BLDV: 7.41 [PH] (ref 7.32–7.43)
PLATELET # BLD AUTO: 207 10E3/UL (ref 150–450)
PO2 BLDV: 43 MM HG (ref 25–47)
POTASSIUM SERPL-SCNC: 4.5 MMOL/L (ref 3.4–5.3)
PROT SERPL-MCNC: 6.6 G/DL (ref 6.4–8.3)
RBC # BLD AUTO: 3.57 10E6/UL (ref 3.8–5.2)
RSV RNA SPEC NAA+PROBE: NEGATIVE
SAO2 % BLDV: 79 % (ref 70–75)
SARS-COV-2 RNA RESP QL NAA+PROBE: NEGATIVE
SODIUM SERPL-SCNC: 134 MMOL/L (ref 135–145)
SPECIMEN EXP DATE BLD: NORMAL
TROPONIN T SERPL HS-MCNC: 18 NG/L
TROPONIN T SERPL HS-MCNC: 29 NG/L
WBC # BLD AUTO: 5.6 10E3/UL (ref 4–11)

## 2025-02-15 PROCEDURE — 85025 COMPLETE CBC W/AUTO DIFF WBC: CPT | Performed by: EMERGENCY MEDICINE

## 2025-02-15 PROCEDURE — 84484 ASSAY OF TROPONIN QUANT: CPT | Performed by: EMERGENCY MEDICINE

## 2025-02-15 PROCEDURE — 99285 EMERGENCY DEPT VISIT HI MDM: CPT | Mod: 25 | Performed by: EMERGENCY MEDICINE

## 2025-02-15 PROCEDURE — 86901 BLOOD TYPING SEROLOGIC RH(D): CPT | Performed by: EMERGENCY MEDICINE

## 2025-02-15 PROCEDURE — 82803 BLOOD GASES ANY COMBINATION: CPT

## 2025-02-15 PROCEDURE — 99284 EMERGENCY DEPT VISIT MOD MDM: CPT | Performed by: EMERGENCY MEDICINE

## 2025-02-15 PROCEDURE — 36415 COLL VENOUS BLD VENIPUNCTURE: CPT | Performed by: EMERGENCY MEDICINE

## 2025-02-15 PROCEDURE — 80053 COMPREHEN METABOLIC PANEL: CPT | Performed by: EMERGENCY MEDICINE

## 2025-02-15 PROCEDURE — 99285 EMERGENCY DEPT VISIT HI MDM: CPT | Performed by: EMERGENCY MEDICINE

## 2025-02-15 PROCEDURE — 96376 TX/PRO/DX INJ SAME DRUG ADON: CPT

## 2025-02-15 PROCEDURE — 250N000013 HC RX MED GY IP 250 OP 250 PS 637: Performed by: STUDENT IN AN ORGANIZED HEALTH CARE EDUCATION/TRAINING PROGRAM

## 2025-02-15 PROCEDURE — 250N000011 HC RX IP 250 OP 636: Performed by: EMERGENCY MEDICINE

## 2025-02-15 PROCEDURE — 70450 CT HEAD/BRAIN W/O DYE: CPT

## 2025-02-15 PROCEDURE — 96374 THER/PROPH/DIAG INJ IV PUSH: CPT | Mod: 59

## 2025-02-15 PROCEDURE — 93005 ELECTROCARDIOGRAM TRACING: CPT

## 2025-02-15 PROCEDURE — 250N000009 HC RX 250: Performed by: EMERGENCY MEDICINE

## 2025-02-15 PROCEDURE — 83605 ASSAY OF LACTIC ACID: CPT | Performed by: EMERGENCY MEDICINE

## 2025-02-15 PROCEDURE — 93005 ELECTROCARDIOGRAM TRACING: CPT | Performed by: EMERGENCY MEDICINE

## 2025-02-15 PROCEDURE — 71260 CT THORAX DX C+: CPT

## 2025-02-15 PROCEDURE — 99291 CRITICAL CARE FIRST HOUR: CPT | Mod: 25

## 2025-02-15 PROCEDURE — 250N000013 HC RX MED GY IP 250 OP 250 PS 637: Performed by: EMERGENCY MEDICINE

## 2025-02-15 PROCEDURE — 93010 ELECTROCARDIOGRAM REPORT: CPT | Mod: 76 | Performed by: EMERGENCY MEDICINE

## 2025-02-15 PROCEDURE — 87637 SARSCOV2&INF A&B&RSV AMP PRB: CPT | Performed by: EMERGENCY MEDICINE

## 2025-02-15 PROCEDURE — 250N000011 HC RX IP 250 OP 636: Performed by: STUDENT IN AN ORGANIZED HEALTH CARE EDUCATION/TRAINING PROGRAM

## 2025-02-15 PROCEDURE — 83605 ASSAY OF LACTIC ACID: CPT

## 2025-02-15 RX ORDER — ONDANSETRON 2 MG/ML
4 INJECTION INTRAMUSCULAR; INTRAVENOUS ONCE
Status: COMPLETED | OUTPATIENT
Start: 2025-02-15 | End: 2025-02-15

## 2025-02-15 RX ORDER — ONDANSETRON 2 MG/ML
4 INJECTION INTRAMUSCULAR; INTRAVENOUS
Status: COMPLETED | OUTPATIENT
Start: 2025-02-15 | End: 2025-02-15

## 2025-02-15 RX ORDER — ACETAMINOPHEN 500 MG
1000 TABLET ORAL ONCE
Status: COMPLETED | OUTPATIENT
Start: 2025-02-15 | End: 2025-02-15

## 2025-02-15 RX ORDER — HYDROMORPHONE HCL IN WATER/PF 6 MG/30 ML
0.2 PATIENT CONTROLLED ANALGESIA SYRINGE INTRAVENOUS ONCE
Status: COMPLETED | OUTPATIENT
Start: 2025-02-15 | End: 2025-02-15

## 2025-02-15 RX ORDER — ACETAMINOPHEN 325 MG/1
975 TABLET ORAL ONCE
Status: COMPLETED | OUTPATIENT
Start: 2025-02-15 | End: 2025-02-15

## 2025-02-15 RX ORDER — IOPAMIDOL 755 MG/ML
500 INJECTION, SOLUTION INTRAVASCULAR ONCE
Status: COMPLETED | OUTPATIENT
Start: 2025-02-15 | End: 2025-02-15

## 2025-02-15 RX ADMIN — ACETAMINOPHEN 1000 MG: 500 TABLET ORAL at 16:38

## 2025-02-15 RX ADMIN — IOPAMIDOL 72 ML: 755 INJECTION, SOLUTION INTRAVENOUS at 12:01

## 2025-02-15 RX ADMIN — ONDANSETRON 4 MG: 2 INJECTION INTRAMUSCULAR; INTRAVENOUS at 12:27

## 2025-02-15 RX ADMIN — SODIUM CHLORIDE 65 ML: 9 INJECTION, SOLUTION INTRAVENOUS at 12:01

## 2025-02-15 RX ADMIN — ACETAMINOPHEN 975 MG: 325 TABLET, FILM COATED ORAL at 21:08

## 2025-02-15 RX ADMIN — ONDANSETRON 4 MG: 2 INJECTION, SOLUTION INTRAMUSCULAR; INTRAVENOUS at 21:09

## 2025-02-15 ASSESSMENT — ACTIVITIES OF DAILY LIVING (ADL)
ADLS_ACUITY_SCORE: 57

## 2025-02-15 ASSESSMENT — COLUMBIA-SUICIDE SEVERITY RATING SCALE - C-SSRS
2. HAVE YOU ACTUALLY HAD ANY THOUGHTS OF KILLING YOURSELF IN THE PAST MONTH?: NO
6. HAVE YOU EVER DONE ANYTHING, STARTED TO DO ANYTHING, OR PREPARED TO DO ANYTHING TO END YOUR LIFE?: NO
2. HAVE YOU ACTUALLY HAD ANY THOUGHTS OF KILLING YOURSELF IN THE PAST MONTH?: NO
6. HAVE YOU EVER DONE ANYTHING, STARTED TO DO ANYTHING, OR PREPARED TO DO ANYTHING TO END YOUR LIFE?: NO
1. IN THE PAST MONTH, HAVE YOU WISHED YOU WERE DEAD OR WISHED YOU COULD GO TO SLEEP AND NOT WAKE UP?: NO
1. IN THE PAST MONTH, HAVE YOU WISHED YOU WERE DEAD OR WISHED YOU COULD GO TO SLEEP AND NOT WAKE UP?: NO

## 2025-02-15 NOTE — PHARMACY-ADMISSION MEDICATION HISTORY
Pharmacy Intern Admission Medication History    Admission medication history is complete. The information provided in this note is only as accurate as the sources available at the time of the update.    Information Source(s): Patient, Family member, and CareEverywhere/SureScripts via in-person    Pertinent Information: None     Changes made to PTA medication list:  Added: None  Deleted: Cefdinir, B-12, Fish oil, Vitamin E   Changed: None    Allergies reviewed with patient and updates made in EHR: no    Medication History Completed By: Shantel Sanchez RPH 2/15/2025 3:40 PM    PTA Med List   Medication Sig Last Dose/Taking    apixaban ANTICOAGULANT (ELIQUIS) 2.5 MG tablet Take 1 tablet (2.5 mg) by mouth 2 times daily. 2/15/2025 Morning    co-enzyme Q-10 100 MG CAPS capsule Take 100 mg by mouth daily Past Week    Ferrous Sulfate (SLOW RELEASE IRON) 47.5 MG TBCR Take 1 tablet by mouth 2 times daily. Past Week    metoprolol succinate ER (TOPROL XL) 25 MG 24 hr tablet Take 1 tablet (25 mg) by mouth daily. 2/15/2025 Morning    Multiple Vitamins-Minerals (PRESERVISION AREDS 2 PO) Take by mouth daily Past Week    multivitamin, therapeutic (THERA-VIT) TABS tablet Take 1 tablet by mouth daily Past Week    tafluprost (ZIOPTAN) 0.0015 % SOLN ophthalmic solution Place 1 drop into both eyes at bedtime 2/14/2025 Bedtime    Timolol Maleate (TIMOPTIC OCUDOSE) 0.5 % ophthalmic solution Place 1 drop into both eyes 2 times daily 2/15/2025 Morning    vitamin C (ASCORBIC ACID) 1000 MG TABS Take 1,000 mg by mouth 2 times daily. Past Week    Vitamin D3 (CHOLECALCIFEROL) 25 mcg (1000 units) tablet Take by mouth daily Daily in the winter Past Week    zinc gluconate 50 MG tablet Take 50 mg by mouth every other day Past Week

## 2025-02-15 NOTE — TELEPHONE ENCOUNTER
"Nurse Triage SBAR    Is this a 2nd Level Triage? NO    Situation and Background: Daughter calling for triage - Pt experiencing multiple symptoms. Pt is on the line also, gave consent to communicate and triage.     Daughter states pt called her this morning feeling very sick. Daughter is with pt now and is worried about her symptoms.       Assessment:     Abdominal pain started last night, located in the \"whole stomach\". Started in the upper abdomen and now it's all over. Pain is constant, varies in severity. Pain is severe. Pt feels like she \"wants to die\" because the pain is so severe.     Nausea+  Headache+  Palpitations+  Nausea+  Weakness+    Pt denies SOB, chest pain.     She is able to stand and walk.     Protocol Recommended Disposition:   ED now. She verbalized understanding and had no further questions.     Beena Stoddard RN  [facility] Nurse Advisor  2/15/2025 9:34 AM       Reason for Disposition    [1] SEVERE pain (e.g., excruciating) AND [2] present > 1 hour    Additional Information    Negative: Shock suspected (e.g., cold/pale/clammy skin, too weak to stand, low BP, rapid pulse)    Negative: Difficult to awaken or acting confused (e.g., disoriented, slurred speech)    Negative: Passed out (i.e., lost consciousness, collapsed and was not responding)    Negative: Sounds like a life-threatening emergency to the triager    Negative: Followed an abdomen (stomach) injury    Negative: Chest pain    Negative: [1] Abdominal pain AND [2] pregnant < 20 weeks    Negative: [1] Abdominal pain AND [2] pregnant 20 or more weeks    Negative: [1] Abdominal pain AND [2] postpartum (from 0 to 6 weeks after delivery)    Negative: Pain is mainly in upper abdomen  (if needed ask: \"is it mainly above the belly button?\")    Negative: Abdomen bloating or swelling are main symptoms    Protocols used: Abdominal Pain - Female-A-AH    "

## 2025-02-15 NOTE — ED TRIAGE NOTES
Pt arrives via EMS w/ complaints of abd pain, nausea that started last night. Painful on palpation. Pain started in epigastric area and has since travel lower. Pt has a AAA that is being closely followed. EMS gave 4 mg zofran.   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

## 2025-02-15 NOTE — ED PROVIDER NOTES
Emergency Department Note      History of Present Illness     Chief Complaint   Abdominal Pain      HPI   Kavita Merlos is a 89 year old female on Eliquis with a history of hypertension, abdominal aortic aneurysm who presents with abdominal pain.  History obtained from patient and supplemented by daughter present at bedside.  Per report, around 10 PM last night, the patient noted primary symptoms of severe abdominal pain.  She describes pain as being more intense than she is ever experienced in the past.  It began in the upper abdomen though had since radiated to the lower abdomen.  She denies any associated chest pain, shortness of breath or vomiting.  She noted it can current headache with that as well.  She also notes associated nausea.  She feels as though she has been dealing with a flulike illness.  She does have a history of abdominal aortic aneurysm, followed by vascular surgery.  She scheduled for an upcoming appointment in April of this year.  She also has a history of bioprosthetic aortic valve for which she is on anticoagulation.    Independent Historian   None    Review of External Notes   I reviewed a urology note from 1/29 where the patient was diagnosed with a UTI.    Past Medical History     Medical History and Problem List   Abdominal aortic aneurysm   Aortic stenosis   Hypertension   Hyperlipidemia   PAD  Pulmonary nodules   Hydroureteronephrosis     Medications   Eliquis   Toprol     Surgical History   Coronary angiogram   Peripheral vascular lithotripsy   Right heart cath   Transcatheter aortic valve replacement     Physical Exam     Patient Vitals for the past 24 hrs:   BP Temp Temp src Pulse Resp SpO2 Height Weight   02/15/25 1747 -- -- -- 84 16 95 % -- --   02/15/25 1545 (!) 149/68 -- -- 92 16 100 % -- --   02/15/25 1530 112/85 -- -- 89 21 99 % -- --   02/15/25 1515 139/59 -- -- 81 16 100 % -- --   02/15/25 1505 (!) 146/58 -- -- 86 13 100 % -- --   02/15/25 1445 137/63 -- -- 78 -- 99 % --  "--   02/15/25 1430 118/56 -- -- 79 -- 100 % -- --   02/15/25 1415 135/59 -- -- 79 -- 99 % -- --   02/15/25 1400 138/58 -- -- 80 -- 99 % -- --   02/15/25 1359 (!) 144/65 -- -- 82 -- 99 % -- --   02/15/25 1345 134/63 -- -- 80 -- 100 % -- --   02/15/25 1330 129/56 -- -- 73 -- 100 % -- --   02/15/25 1315 133/56 -- -- 73 -- 100 % -- --   02/15/25 1300 126/54 -- -- 71 -- 100 % -- --   02/15/25 1245 131/58 -- -- 75 -- 99 % -- --   02/15/25 1230 125/51 -- -- 78 -- 100 % -- --   02/15/25 1025 118/65 98.6  F (37  C) Temporal 95 17 96 % 1.626 m (5' 4\") 59 kg (130 lb)     Physical Exam    General:              Well-nourished              Speaking in full sentences   Normal mentation              Appears uncomfortable in abdomen  Eyes:              Conjunctiva without injection or scleral icterus  ENT:              Moist mucous membranes              Nares patent              Pinnae normal  Neck:              Full ROM              No stiffness appreciated  Resp:              Lungs CTAB              No crackles, wheezing or audible rubs              Good air movement  CV:                    Normal rate, regular rhythm              S1 and S2 present              No murmur, gallop or rub  GI:              BS present              Abdomen soft without distention              Non-tender to light and deep palpation              No palpable pulsatile mass              No guarding or rebound tenderness  Skin:              Warm, dry, well perfused              No rashes or open wounds on exposed skin  MSK:              Moves all extremities              No focal deformities or swelling  Neuro:              Alert              Answers questions appropriately              Moves all extremities equally  Psych:              Normal affect, normal mood    Diagnostics     Lab Results   Labs Ordered and Resulted from Time of ED Arrival to Time of ED Departure   COMPREHENSIVE METABOLIC PANEL - Abnormal       Result Value    Sodium 134 (*)     " Potassium 4.5      Carbon Dioxide (CO2) 23      Anion Gap 12      Urea Nitrogen 16.2      Creatinine 0.61      GFR Estimate 86      Calcium 8.9      Chloride 99      Glucose 102 (*)     Alkaline Phosphatase 81      AST 55 (*)     ALT 21      Protein Total 6.6      Albumin 3.6      Bilirubin Total 1.2     CBC WITH PLATELETS AND DIFFERENTIAL - Abnormal    WBC Count 5.6      RBC Count 3.57 (*)     Hemoglobin 11.0 (*)     Hematocrit 32.7 (*)     MCV 92      MCH 30.8      MCHC 33.6      RDW 14.0      Platelet Count 207      % Neutrophils 89      % Lymphocytes 6      % Monocytes 3      % Eosinophils 1      % Basophils 0      % Immature Granulocytes 0      NRBCs per 100 WBC 0      Absolute Neutrophils 5.0      Absolute Lymphocytes 0.3 (*)     Absolute Monocytes 0.2      Absolute Eosinophils 0.1      Absolute Basophils 0.0      Absolute Immature Granulocytes 0.0      Absolute NRBCs 0.0     TROPONIN T, HIGH SENSITIVITY - Abnormal    Troponin T, High Sensitivity 29 (*)    TROPONIN T, HIGH SENSITIVITY - Abnormal    Troponin T, High Sensitivity 18 (*)    LACTIC ACID WHOLE BLOOD WITH 1X REPEAT IN 2 HR WHEN >2 - Normal    Lactic Acid, Initial 0.9     INFLUENZA A/B, RSV AND SARS-COV2 PCR - Normal    Influenza A PCR Negative      Influenza B PCR Negative      RSV PCR Negative      SARS CoV2 PCR Negative         Imaging   CT Aortic Survey w Contrast   Final Result   IMPRESSION:      1.  Stable 4.5 cm infrarenal abdominal aortic aneurysm. No aortic wall hematoma or aortic dissection.      2.  Stable severe narrowing in the proximal right subclavian, celiac and superior mesenteric arteries.      3.  Mild nodular thickening/enhancement of the left renal pelvis is stable to slightly increased, nonspecific for an inflammatory/infectious process versus neoplastic. Recommend short-term follow-up CT urogram in 3 months.      4.  New multifocal groundglass opacities in the bilateral lung, likely mild multifocal pneumonia.      5.   Indeterminate 1.3 cm lesion in the lower pole of the left kidney. Attention on follow-up CT urogram recommended.      6.  Stable moderate emphysema. A 2 mm nodule in the superior segment of the left lower lobe is stable. Follow-up chest CT in one year is recommended per Fleischner Society 2017 guideline.      7.  Stable 9 mm cystic lesion in the pancreatic body. Recommend follow-up pancreatic protocol CT or MRI in 18 months.      8.  Stable mild cardiomegaly and mild colonic diverticulosis.         Head CT w/o contrast   Final Result   IMPRESSION:   1.  No CT evidence for acute intracranial process.   2.  Brain atrophy and presumed chronic microvascular ischemic changes as above.          EKG   ECG results from 02/15/25   EKG 12 lead     Value    Systolic Blood Pressure     Diastolic Blood Pressure     Ventricular Rate 84    Atrial Rate 84    MS Interval 134    QRS Duration 86        QTc 439    P Axis 67    R AXIS 74    T Axis 99    Interpretation ECG      Sinus rhythm  Possible Left atrial enlargement  Nonspecific ST and T wave abnormality  Abnormal ECG  When compared with ECG of 09-Jan-2025 08:58,  T wave inversion now evident in Lateral leads compared with 1/9/25 though seen prior on 8/5/24           Independent Interpretation   None    ED Course      Medications Administered   Medications   HYDROmorphone (DILAUDID) injection 0.2 mg (0.2 mg Intravenous Not Given 2/15/25 1350)   ondansetron (ZOFRAN) injection 4 mg (4 mg Intravenous $Given 2/15/25 1227)   sodium chloride for CT scan flush use (65 mLs Intravenous $Given 2/15/25 1201)   iopamidol (ISOVUE-370) solution 500 mL (72 mLs Intravenous $Given 2/15/25 1201)   acetaminophen (TYLENOL) tablet 1,000 mg (1,000 mg Oral $Given 2/15/25 1638)       Procedures   Procedures     Discussion of Management   See below    ED Course   ED Course as of 02/15/25 1833   Sat Feb 15, 2025   1139 I initially assessed the patient and obtained the above history and physical  exam.    1304 I rechecked the patient    1415 I rechecked the patient    1440 I rechecked the patient    1514 Spoke with vascular surgery   1526 Patient and daughter updated   1606 Spoke with Dr. Lima, accepting for transfer   1625 Patient re-evaluated     Additional Documentation  None    Medical Decision Making / Diagnosis     CMS Diagnoses: None    MIPS       None    MDM   Kavita Merlos is a 89 year old female with a complex past medical history presenting to the ED for evaluation of abdominal pain.  VS on presentation reveal no acute abnormalities.  Patient on my initial exam appeared unwell, noting abdominal pain that was more severe than what she is experienced previously.  Given history of known AAA, care expedited to obtain emergent imaging.  CT at this time demonstrates stable 4.5 cm infrarenal abdominal aortic aneurysm without aortic wall hematoma or aortic dissection.  She also has again severe narrowing of the proximal right subclavian, celiac, and SMA arteries.  I discussed incidental findings with patient and daughter at bedside including the left renal lesion, pulmonary nodule, as well as pancreatic cyst.  I discussed these incidental findings as well as recommendations for outpatient monitoring and follow-up, though I have low suspicion for these to be contributing to her presenting symptoms.  Of note, there is also findings of multifocal groundglass opacities to the bilateral lungs, suggestive of possible pneumonia though clinically, patient denies any respiratory symptoms of cough, fevers, chills, and has a normal WBC count and lactate. Following discussion with accepting facility, will hold on empiric antibiotics at this time and continue to monitor symptoms clinically.  Given patient's known vascular history as well as severe abdominal pain I did consult vascular surgery.  Although radiographic imaging does not demonstrate significant change from previous, they did recommend transfer to [facility]  ED for further vascular surgery evaluation.  Given patient's concurrent headache I did also obtain CT of the head.  No evidence of space-occupying lesion or intracranial hemorrhage is noted.  Following Tylenol, she is noting improvements in symptoms.  No focal neurologic deficits.  I considered subarachnoid hemorrhage though at present, feel this to be less likely given her primary symptoms of abdominal pain.  She did undergo MRA imaging in August of last year, though there was not mention of aneurysmal disease.  An EKG was obtained demonstrating sinus rhythm, concave up ST elevation in V3 (similar to previous), and isolated T wave inversion in lead aVL, (also noted previously in August 2024).  She denies symptoms of chest pain or chest pressure that would raise suspicion for ACS.  Initial troponin did return mildly elevated at 29, though delta troponin improved to 18. Results and clinical impression were discussed with patient and daughter present at bedside.  At this time, arrangements have been made for EMS transfer to [facility] ED for further evaluation and care.  Case discussed with Dr. Lima who has graciously accepted patient in transfer.      Disposition   The patient was transferred to [facility] ED via EMS. Dr. Lima accepted the patient for transfer.     Diagnosis     ICD-10-CM    1. Abdominal pain, unspecified abdominal location  R10.9       2. Nonintractable headache, unspecified chronicity pattern, unspecified headache type  R51.9       3. Pancreatic lesion  K86.9       4. Renal lesion  N28.9       5. Infrarenal abdominal aortic aneurysm (AAA) without rupture  I71.43            Scribe Disclosure:  I, Lazaro Landaverde, am serving as a scribe at 11:36 AM on 2/15/2025 to document services personally performed by Ronaldo Peralta MD based on my observations and the provider's statements to me.          Ronaldo Peralta MD  02/15/25 1917

## 2025-02-16 PROBLEM — R10.9 ABDOMINAL PAIN, UNSPECIFIED ABDOMINAL LOCATION: Status: ACTIVE | Noted: 2025-02-16

## 2025-02-16 LAB
ALBUMIN SERPL BCG-MCNC: 3.3 G/DL (ref 3.5–5.2)
ALP SERPL-CCNC: 68 U/L (ref 40–150)
ALT SERPL W P-5'-P-CCNC: 17 U/L (ref 0–50)
ANION GAP SERPL CALCULATED.3IONS-SCNC: 8 MMOL/L (ref 7–15)
AST SERPL W P-5'-P-CCNC: 51 U/L (ref 0–45)
ATRIAL RATE - MUSE: 77 BPM
BASOPHILS # BLD AUTO: 0 10E3/UL (ref 0–0.2)
BASOPHILS NFR BLD AUTO: 0 %
BILIRUB SERPL-MCNC: 0.9 MG/DL
BUN SERPL-MCNC: 19.2 MG/DL (ref 8–23)
CALCIUM SERPL-MCNC: 8.7 MG/DL (ref 8.8–10.4)
CHLORIDE SERPL-SCNC: 100 MMOL/L (ref 98–107)
CREAT SERPL-MCNC: 0.69 MG/DL (ref 0.51–0.95)
DIASTOLIC BLOOD PRESSURE - MUSE: NORMAL MMHG
EGFRCR SERPLBLD CKD-EPI 2021: 83 ML/MIN/1.73M2
EOSINOPHIL # BLD AUTO: 0 10E3/UL (ref 0–0.7)
EOSINOPHIL NFR BLD AUTO: 1 %
ERYTHROCYTE [DISTWIDTH] IN BLOOD BY AUTOMATED COUNT: 13.9 % (ref 10–15)
GLUCOSE SERPL-MCNC: 90 MG/DL (ref 70–99)
HCO3 SERPL-SCNC: 25 MMOL/L (ref 22–29)
HCT VFR BLD AUTO: 32.3 % (ref 35–47)
HGB BLD-MCNC: 10.3 G/DL (ref 11.7–15.7)
HGB BLD-MCNC: 9.9 G/DL (ref 11.7–15.7)
IMM GRANULOCYTES # BLD: 0 10E3/UL
IMM GRANULOCYTES NFR BLD: 0 %
INTERPRETATION ECG - MUSE: NORMAL
LACTATE SERPL-SCNC: 0.8 MMOL/L (ref 0.7–2)
LIPASE SERPL-CCNC: 34 U/L (ref 13–60)
LYMPHOCYTES # BLD AUTO: 0.5 10E3/UL (ref 0.8–5.3)
LYMPHOCYTES NFR BLD AUTO: 14 %
MCH RBC QN AUTO: 29.6 PG (ref 26.5–33)
MCHC RBC AUTO-ENTMCNC: 30.7 G/DL (ref 31.5–36.5)
MCV RBC AUTO: 96 FL (ref 78–100)
MONOCYTES # BLD AUTO: 0.3 10E3/UL (ref 0–1.3)
MONOCYTES NFR BLD AUTO: 9 %
NEUTROPHILS # BLD AUTO: 2.7 10E3/UL (ref 1.6–8.3)
NEUTROPHILS NFR BLD AUTO: 75 %
NRBC # BLD AUTO: 0 10E3/UL
NRBC BLD AUTO-RTO: 0 /100
P AXIS - MUSE: 63 DEGREES
PLATELET # BLD AUTO: 165 10E3/UL (ref 150–450)
POTASSIUM SERPL-SCNC: 4.7 MMOL/L (ref 3.4–5.3)
PR INTERVAL - MUSE: 130 MS
PROT SERPL-MCNC: 6.1 G/DL (ref 6.4–8.3)
QRS DURATION - MUSE: 86 MS
QT - MUSE: 374 MS
QTC - MUSE: 423 MS
R AXIS - MUSE: 59 DEGREES
RBC # BLD AUTO: 3.35 10E6/UL (ref 3.8–5.2)
SODIUM SERPL-SCNC: 133 MMOL/L (ref 135–145)
SYSTOLIC BLOOD PRESSURE - MUSE: NORMAL MMHG
T AXIS - MUSE: 98 DEGREES
VENTRICULAR RATE- MUSE: 77 BPM
WBC # BLD AUTO: 3.5 10E3/UL (ref 4–11)

## 2025-02-16 PROCEDURE — G0378 HOSPITAL OBSERVATION PER HR: HCPCS

## 2025-02-16 PROCEDURE — 83690 ASSAY OF LIPASE: CPT

## 2025-02-16 PROCEDURE — 99207 PR APP CREDIT; MD BILLING SHARED VISIT: CPT | Mod: FS

## 2025-02-16 PROCEDURE — 85014 HEMATOCRIT: CPT

## 2025-02-16 PROCEDURE — 999N000147 HC STATISTIC PT IP EVAL DEFER

## 2025-02-16 PROCEDURE — 85004 AUTOMATED DIFF WBC COUNT: CPT

## 2025-02-16 PROCEDURE — 96374 THER/PROPH/DIAG INJ IV PUSH: CPT

## 2025-02-16 PROCEDURE — 83605 ASSAY OF LACTIC ACID: CPT

## 2025-02-16 PROCEDURE — 250N000011 HC RX IP 250 OP 636: Performed by: STUDENT IN AN ORGANIZED HEALTH CARE EDUCATION/TRAINING PROGRAM

## 2025-02-16 PROCEDURE — 80053 COMPREHEN METABOLIC PANEL: CPT

## 2025-02-16 PROCEDURE — 999N000111 HC STATISTIC OT IP EVAL DEFER

## 2025-02-16 PROCEDURE — 250N000013 HC RX MED GY IP 250 OP 250 PS 637

## 2025-02-16 PROCEDURE — 85018 HEMOGLOBIN: CPT

## 2025-02-16 PROCEDURE — 36415 COLL VENOUS BLD VENIPUNCTURE: CPT

## 2025-02-16 PROCEDURE — 99222 1ST HOSP IP/OBS MODERATE 55: CPT | Mod: AI | Performed by: STUDENT IN AN ORGANIZED HEALTH CARE EDUCATION/TRAINING PROGRAM

## 2025-02-16 PROCEDURE — 99207 PR NO BILLABLE SERVICE THIS VISIT: CPT

## 2025-02-16 RX ORDER — ZINC SULFATE 50(220)MG
220 CAPSULE ORAL EVERY OTHER DAY
Status: DISCONTINUED | OUTPATIENT
Start: 2025-02-16 | End: 2025-02-16

## 2025-02-16 RX ORDER — THERA TABS 400 MCG
1 TAB ORAL DAILY
Status: DISCONTINUED | OUTPATIENT
Start: 2025-02-16 | End: 2025-02-16

## 2025-02-16 RX ORDER — AMOXICILLIN 250 MG
2 CAPSULE ORAL 2 TIMES DAILY PRN
Status: DISCONTINUED | OUTPATIENT
Start: 2025-02-16 | End: 2025-02-17 | Stop reason: HOSPADM

## 2025-02-16 RX ORDER — VITAMIN B COMPLEX
25 TABLET ORAL DAILY
Status: DISCONTINUED | OUTPATIENT
Start: 2025-02-16 | End: 2025-02-16

## 2025-02-16 RX ORDER — PROCHLORPERAZINE MALEATE 5 MG/1
5 TABLET ORAL EVERY 6 HOURS PRN
Status: DISCONTINUED | OUTPATIENT
Start: 2025-02-16 | End: 2025-02-17 | Stop reason: HOSPADM

## 2025-02-16 RX ORDER — TAFLUPROST OPTHALMIC 0 MG/.3ML
1 SOLUTION/ DROPS OPHTHALMIC AT BEDTIME
Status: DISCONTINUED | OUTPATIENT
Start: 2025-02-16 | End: 2025-02-17 | Stop reason: HOSPADM

## 2025-02-16 RX ORDER — ONDANSETRON 4 MG/1
4 TABLET, ORALLY DISINTEGRATING ORAL EVERY 6 HOURS PRN
Status: DISCONTINUED | OUTPATIENT
Start: 2025-02-16 | End: 2025-02-17 | Stop reason: HOSPADM

## 2025-02-16 RX ORDER — TIMOLOL 5 MG/ML
1 SOLUTION/ DROPS OPHTHALMIC 2 TIMES DAILY
Status: DISCONTINUED | OUTPATIENT
Start: 2025-02-16 | End: 2025-02-17 | Stop reason: HOSPADM

## 2025-02-16 RX ORDER — ACETAMINOPHEN 650 MG/1
650 SUPPOSITORY RECTAL EVERY 4 HOURS PRN
Status: DISCONTINUED | OUTPATIENT
Start: 2025-02-16 | End: 2025-02-17 | Stop reason: HOSPADM

## 2025-02-16 RX ORDER — METOPROLOL SUCCINATE 25 MG/1
25 TABLET, EXTENDED RELEASE ORAL DAILY
Status: DISCONTINUED | OUTPATIENT
Start: 2025-02-16 | End: 2025-02-17 | Stop reason: HOSPADM

## 2025-02-16 RX ORDER — ASCORBIC ACID 500 MG
1000 TABLET ORAL 2 TIMES DAILY
Status: DISCONTINUED | OUTPATIENT
Start: 2025-02-16 | End: 2025-02-16

## 2025-02-16 RX ORDER — VIT C/E/ZN/COPPR/LUTEIN/ZEAXAN 60 MG-6 MG
1 CAPSULE ORAL DAILY
Status: DISCONTINUED | OUTPATIENT
Start: 2025-02-16 | End: 2025-02-16

## 2025-02-16 RX ORDER — FERROUS SULFATE 325(65) MG
325 TABLET ORAL DAILY
Status: DISCONTINUED | OUTPATIENT
Start: 2025-02-16 | End: 2025-02-16

## 2025-02-16 RX ORDER — ONDANSETRON 2 MG/ML
4 INJECTION INTRAMUSCULAR; INTRAVENOUS ONCE
Status: COMPLETED | OUTPATIENT
Start: 2025-02-16 | End: 2025-02-16

## 2025-02-16 RX ORDER — TIMOLOL MALEATE 5 MG/ML
1 SOLUTION/ DROPS OPHTHALMIC 2 TIMES DAILY
Status: DISCONTINUED | OUTPATIENT
Start: 2025-02-16 | End: 2025-02-16

## 2025-02-16 RX ORDER — AMOXICILLIN 250 MG
1 CAPSULE ORAL 2 TIMES DAILY PRN
Status: DISCONTINUED | OUTPATIENT
Start: 2025-02-16 | End: 2025-02-17 | Stop reason: HOSPADM

## 2025-02-16 RX ORDER — ACETAMINOPHEN 325 MG/1
650 TABLET ORAL EVERY 4 HOURS PRN
Status: DISCONTINUED | OUTPATIENT
Start: 2025-02-16 | End: 2025-02-17 | Stop reason: HOSPADM

## 2025-02-16 RX ORDER — ONDANSETRON 2 MG/ML
4 INJECTION INTRAMUSCULAR; INTRAVENOUS EVERY 6 HOURS PRN
Status: DISCONTINUED | OUTPATIENT
Start: 2025-02-16 | End: 2025-02-17 | Stop reason: HOSPADM

## 2025-02-16 RX ADMIN — TAFLUPROST OPTHALMIC 1 DROP: 0 SOLUTION/ DROPS OPHTHALMIC at 21:40

## 2025-02-16 RX ADMIN — ONDANSETRON 4 MG: 2 INJECTION, SOLUTION INTRAMUSCULAR; INTRAVENOUS at 06:53

## 2025-02-16 RX ADMIN — TIMOLOL 1 DROP: 5 SOLUTION/ DROPS OPHTHALMIC at 17:04

## 2025-02-16 RX ADMIN — ACETAMINOPHEN 650 MG: 325 TABLET, FILM COATED ORAL at 10:15

## 2025-02-16 ASSESSMENT — ACTIVITIES OF DAILY LIVING (ADL)
ADLS_ACUITY_SCORE: 42
ADLS_ACUITY_SCORE: 57
ADLS_ACUITY_SCORE: 42
ADLS_ACUITY_SCORE: 42
ADLS_ACUITY_SCORE: 57
ADLS_ACUITY_SCORE: 42
DEPENDENT_IADLS:: CLEANING;LAUNDRY;SHOPPING;MEDICATION MANAGEMENT;MONEY MANAGEMENT;TRANSPORTATION
ADLS_ACUITY_SCORE: 57
ADLS_ACUITY_SCORE: 42
ADLS_ACUITY_SCORE: 57
ADLS_ACUITY_SCORE: 57
ADLS_ACUITY_SCORE: 59
ADLS_ACUITY_SCORE: 57
ADLS_ACUITY_SCORE: 57
ADLS_ACUITY_SCORE: 59
ADLS_ACUITY_SCORE: 57

## 2025-02-16 NOTE — CONSULTS
"Vascular Surgery Consult  February 15, 2025    Date of Service: 2/15/2025 11:12 PM    Assessment and Plan:  Kavita Merlos is a 89 year old female with PMH significant for incidental abdominal aortic aneurysm (measuring 4.2 x 4 cm in December 2023, is now 4.5 cm) and aortic valve stenosis s/p TAVR in 2024 (on eliquis) who presents as a transfer today for further vascular evaluation of abdominal pain in the setting of known abdominal aortic aneurysm.  Given her clinical exam, stable imaging and reassuring labs (normal lactate, stable hemoglobin, grossly normal VBG) it is unlikely that her abdominal pain is caused by her AAA.    - okay to discharge from ED  - given return precautions.     Discussed with Fellow    Markus \"Nahid\" Sree JENKINS  General Surgery PGY-2  Please page with a 10 digit call back number on call resident through [facility].  Surgical team members do not have reliable access to [employer] and paging is the only reliable way of contacting a surgical team member.    History of Present Illness:    Kavita Merlos is a 89 year old female that presents with acute onset of 20/10 abdominal pain that resolved with tylenol.  She initially presented to outside hospital with this abdominal pain where CT demonstrated largely unchanged aortic aneurysm.  Out of concern for vascular disease the ED had consulted vascular surgery here.  Because we cannot rule out symptomatic aortic aneurysm we requested transfer to this facility for vascular surgical evaluation.  At the time of evaluation the patient is pain-free.  She reports that she had very mild back pain when she had this acute episode of abdominal pain that has now resolved.  She states that Tylenol has helped control this 20 out of 10 abdominal pain.     I personally evaluated this patient 3 times over several hours and in each instance she had no abdominal pain.  Furthermore, she denies chest pain, shortness of breath, nausea, dizziness, lightheadedness, changes in " stool.  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    Past Medical History:  Past Medical History:   Diagnosis Date    AAA (abdominal aortic aneurysm)     Aortic stenosis     Benign essential hypertension with BP difference between arms     Hyperlipidemia LDL goal <70         Past Surgical History  Past Surgical History:   Procedure Laterality Date    CV CORONARY ANGIOGRAM N/A 2023    Procedure: Coronary Angiogram;  Surgeon: Seth Duarte MD;  Location:  [facility]    CV PERIPHERAL VASCULAR LITHOTRIPSY N/A 2024    Procedure: Peripheral Vascular Lithotripsy;  Surgeon: Seth Duarte MD;  Location:  [facility]    CV RIGHT HEART CATH MEASUREMENTS RECORDED N/A 2023    Procedure: Right Heart Catheterization;  Surgeon: Seth Duarte MD;  [facility]    CV TRANSCATHETER AORTIC VALVE REPLACEMENT-FEMORAL APPROACH N/A 2024    Procedure: Transcatheter Aortic Valve Replacement-Femoral Approach;  Surgeon: Seth Duarte MD;  Location:  [facility]       Family History:  No family history on file.  No personal or family history of bleeding or clotting disorders    Social History:  Social History     Socioeconomic History    Marital status: Single     Spouse name: Not on file    Number of children: Not on file    Years of education: Not on file    Highest education level: Not on file   Occupational History    Not on file   Tobacco Use    Smoking status: Former     Current packs/day: 0.00     Types: Cigarettes     Quit date: 10/24/2006     Years since quittin.3     Passive exposure: Never    Smokeless tobacco: Never    Tobacco comments:     I quit when I was 71 years old, off/on prior to that starting when I was 17   Vaping Use    Vaping status: Never Used   Substance and Sexual Activity    Alcohol use: Not Currently    Drug use: Not Currently     Sexual activity: Not on file   Other Topics Concern    Not on file   Social History Narrative    Not on file     Social Drivers of Health     Financial Resource Strain: Low Risk  (1/9/2025)    Financial Resource Strain     Within the past 12 months, have you or your family members you live with been unable to get utilities (heat, electricity) when it was really needed?: No   Food Insecurity: Low Risk  (1/9/2025)    Food Insecurity     Within the past 12 months, did you worry that your food would run out before you got money to buy more?: No     Within the past 12 months, did the food you bought just not last and you didn t have money to get more?: No   Transportation Needs: Low Risk  (1/9/2025)    Transportation Needs     Within the past 12 months, has lack of transportation kept you from medical appointments, getting your medicines, non-medical meetings or appointments, work, or from getting things that you need?: No   Physical Activity: Patient Declined (10/21/2024)    Exercise Vital Sign     Days of Exercise per Week: Patient declined     Minutes of Exercise per Session: Patient declined   Stress: No Stress Concern Present (10/21/2024)    [employer] - Occupational Stress Questionnaire     Feeling of Stress : Only a little   Social Connections: Unknown (10/21/2024)    Social Connection and Isolation Panel [NHANES]     Frequency of Communication with Friends and Family: Not on file     Frequency of Social Gatherings with Friends and Family: Twice a week     Attends Gnosticism Services: Not on file     Active Member of Clubs or Organizations: Not on file     Attends Club or Organization Meetings: Not on file     Marital Status: Not on file   Interpersonal Safety: Low Risk  (1/9/2025)    Interpersonal Safety     Do you feel physically and emotionally safe where you currently live?: Yes     Within the past 12 months, have you been hit, slapped, kicked or otherwise physically hurt by  "someone?: No     Within the past 12 months, have you been humiliated or emotionally abused in other ways by your partner or ex-partner?: No   Housing Stability: Low Risk  (1/9/2025)    Housing Stability     Do you have housing? : Yes     Are you worried about losing your housing?: No       Allergies:     Allergies   Allergen Reactions    Albuterol     Amlodipine     Bimatoprost Itching    Brimonidine Itching    Clotrimazole Itching    Dorzolamide Hcl-Timolol Mal Itching    Latanoprost Itching    Lisinopril     Losartan      depression    Neomycin-Polymyxin-Gramicidin Swelling    Neosporin [Neomycin-Polymyxin-Gramicidin]     Penicillins     Tape [Adhesive Tape]     Timolol Itching    Travoprost Itching    Vicodin [Hydrocodone-Acetaminophen]        Review of Symptoms:  A 10 point review of symptoms has been conducted and is negative except for that mentioned in the above HPI.    Physical Exam:  Blood pressure 130/71, pulse 82, temperature 98.1  F (36.7  C), temperature source Oral, resp. rate 17, height 1.651 m (5' 5\"), weight 57.6 kg (127 lb), SpO2 92%, not currently breastfeeding.  General: alert, oriented to conversation, NAD, frail appearing  Cardiac: RRR to PP  Respiratory: NLB on RA  Abdomen: soft, non-tender, non-distended, and abdomen palpated to posterior abdominal wall without pain  Extremities: no obvious peripheral edema. Bilateral palpable PT, DP, and popliteal pulses.   Neuro: no obvious focal deficits    Data:    I have personally reviewed the following data over the past 24 hrs:    5.6  \   11.0 (L)   / 207     134 (L) 99 16.2 /  102 (H)   4.5 23 0.61 \     ALT: 21 AST: 55 (H) AP: 81 TBILI: 1.2   ALB: 3.6 TOT PROTEIN: 6.6 LIPASE: N/A     Trop: 18 (H) BNP: N/A     Procal: N/A CRP: N/A Lactic Acid: 0.4         Imaging results reviewed over the past 24 hrs:   Recent Results (from the past 24 hours)   Head CT w/o contrast    Narrative    EXAM: CT HEAD W/O CONTRAST  LOCATION: [facility]  DATE: 2/15/2025    INDICATION: headache, nausea, vomiting  COMPARISON: 8/5/2024 MRI.  TECHNIQUE: Routine CT Head without IV contrast. Multiplanar reformats. Dose reduction techniques were used.    FINDINGS:  INTRACRANIAL CONTENTS: No intracranial hemorrhage, extraaxial collection, or mass effect.  No CT evidence of acute infarct. Chronic left cerebellar infarct. Mild to moderate presumed chronic small vessel ischemic changes. Mild generalized volume loss. No   hydrocephalus. Partially empty sella.    VISUALIZED ORBITS/SINUSES/MASTOIDS: Prior bilateral cataract surgery. Visualized portions of the orbits are otherwise unremarkable. No paranasal sinus mucosal disease. No middle ear or mastoid effusion.    BONES/SOFT TISSUES: No acute abnormality.      Impression    IMPRESSION:  1.  No CT evidence for acute intracranial process.  2.  Brain atrophy and presumed chronic microvascular ischemic changes as above.   CT Aortic Survey w Contrast    Narrative    EXAM: CT AORTIC SURVEY W CONTRAST  LOCATION: [facility]  DATE: 2/15/2025    INDICATION: Abdominal pain, known AAA, concern for rupture or dissection  COMPARISON: 01/09/2025  TECHNIQUE: CT angiogram chest abdomen pelvis during arterial phase of injection of IV contrast. 2D and 3D MIP reconstructions were performed by the CT technologist. Dose reduction techniques were used.   CONTRAST: 72 mL Isovue 370    FINDINGS:   CT ANGIOGRAM CHEST, ABDOMEN, AND PELVIS: Moderate aortoiliac atherosclerotic calcifications. No thoracic aortic aneurysm. A 4.5 x 4.5 cm infrarenal abdominal aortic aneurysm is stable. No aortic wall hematoma or dissection. 3 vessel arch present. Arch   vessels are patent. Severe focal stenosis seen at the origin of the right subclavian artery, celiac artery and SMA with, similar to prior. Bilateral renal arteries are patent. MIHAI is occluded proximally with immediate reconstitution, also unchanged. No   incidental pulmonary  embolus identified.    LUNGS AND PLEURA: Stable moderate emphysema. Scattered ill-defined patchy groundglass opacities are present in the bilateral lung, largest and most confluent in the right upper lobe. A 2 mm nodule in the superior segment of the left lower lobe on series   7 image 112 is stable. A 2 mm endobronchial nodule in the right middle lobe on series 7 image 213 appears slightly increased. No pleural effusion or pneumothorax.    MEDIASTINUM/AXILLAE: Mildly prominent mediastinal and right hilar lymph nodes are present, for example a right paratracheal lymph node measures 13 x 14 mm on series 5 image 25, possibly reactive. Stable mild cardiomegaly without pericardial effusion.   Transarterial prosthetic aortic valvular repair redemonstrated. A small hiatal hernia is unchanged.    CORONARY ARTERY CALCIFICATION: Moderate.    HEPATOBILIARY: Normal liver. A stone is seen within the gallbladder which is otherwise normal.    PANCREAS: A 9 mm cystic lesion in the body of the pancreas on series 10 image 42 appears stable. No definite pancreatic ductal dilatation or peripancreatic fluid collections.    SPLEEN: Normal.    ADRENAL GLANDS: Normal.    KIDNEYS/BLADDER: A 13 mm isoattenuating lesion in the lateral lower pole of the left kidney is stable, indeterminate. Scattered too small to characterize cystic lesions in both kidneys are visually benign and do not require follow-up. 3 cm cyst in the   inferior pole of the right kidney also does not require follow-up. Bladder is normal. Nodular thickening/enhancement of the left renal pelvis (series 6 image 171 is stable to slightly increased.    BOWEL: Scattered colonic diverticula without acute diverticulitis. No inflammatory bowel thickening or bowel obstruction.    LYMPH NODES: Normal.    PELVIC ORGANS: Normal.    MUSCULOSKELETAL: Scattered mild to moderate degenerative disc space narrowing in the lower thoracic and lower lumbar spine most advanced at L4/5, with  minimal degenerative anterolisthesis of L4 and L5, stable. Mild degenerative height loss of T4   vertebral body is also unchanged. No suspicious osseous lesions or acute fractures.        Impression    IMPRESSION:    1.  Stable 4.5 cm infrarenal abdominal aortic aneurysm. No aortic wall hematoma or aortic dissection.    2.  Stable severe narrowing in the proximal right subclavian, celiac and superior mesenteric arteries.    3.  Mild nodular thickening/enhancement of the left renal pelvis is stable to slightly increased, nonspecific for an inflammatory/infectious process versus neoplastic. Recommend short-term follow-up CT urogram in 3 months.    4.  New multifocal groundglass opacities in the bilateral lung, likely mild multifocal pneumonia.    5.  Indeterminate 1.3 cm lesion in the lower pole of the left kidney. Attention on follow-up CT urogram recommended.    6.  Stable moderate emphysema. A 2 mm nodule in the superior segment of the left lower lobe is stable. Follow-up chest CT in one year is recommended per Fleischner Society 2017 guideline.    7.  Stable 9 mm cystic lesion in the pancreatic body. Recommend follow-up pancreatic protocol CT or MRI in 18 months.    8.  Stable mild cardiomegaly and mild colonic diverticulosis.

## 2025-02-16 NOTE — ED NOTES
Patient off of oxygen. Dr. Peralta by to ask about patient and oxygen needs. Requesting to trial off oxygen.

## 2025-02-16 NOTE — DISCHARGE INSTRUCTIONS
Return to the emergency department if you develop any new or worsening symptoms including but not limited to abdominal pain, fevers, chills, nausea, vomiting  Please call your primary care doctor for a follow-up visit in the next 1 to 2 days.

## 2025-02-16 NOTE — PROGRESS NOTES
Vascular surgery progress note    Patient still has abdominal pain.  This was probed a little bit more.  The patient states that her pain is migratory.  Currently, there is no pain in the center abdomen.  She has pain in the left flank.    On exam,  NAD  Abdomen somewhat tender on the left upper quadrant, but not significant.  Specifically, no tenderness on the aneurysm sac.  Palpable DP pulses bilaterally    Impression: 4.5 cm infrarenal AAA, asymptomatic.    Not sure of the origin of her pain.  However, does not seem to be coming from AAA given that this is migratory and also has no tenderness on the aneurysm sac.  However, recommend having GI or general surgery consult to probe morning to this abdominal pain.    The patient has appointment with Dr. Barkley for her AAA.  Will continue to follow while she is inpatient.    Juhi Maya MD  Vascular Surgery Fellow

## 2025-02-16 NOTE — ED PROVIDER NOTES
"    [place] EMERGENCY DEPARTMENT ([facility])    2/15/25       History     Chief Complaint   Patient presents with    Abdominal Pain     Tripple AAA     HPI  Kavita Merlos is a 89 year old female who with PMH of hypertension, abdominal aortic aneurysm who presents to the ED with abdominal pain.     Patient states that she is having abdominal pain. The pain started in her upper part of abdomen. She states that she never had that much pain before. Her abdomen had \"moved\" and the pain had started again. Patient was supposed to be seen by a vascular surgeon that's why she is here. Patient states that the pain is a 5/10. Patient states that she is having nausea. She has not eaten anything today. She states that her nose was swabbed recently, but no virus was found. Patient also states that some palpitations were  there that she does not normally have. Denies vomiting. Denies fevers or cough. Denies chest pain or shortness of breath.     Past Medical History  Past Medical History:   Diagnosis Date    AAA (abdominal aortic aneurysm)     Aortic stenosis     Benign essential hypertension with BP difference between arms     Hyperlipidemia LDL goal <70      Past Surgical History:   Procedure Laterality Date    CV CORONARY ANGIOGRAM N/A 12/19/2023    Procedure: Coronary Angiogram;  Surgeon: Seth Duarte MD;  Location: [facility] CARDIAC CATH LAB    CV PERIPHERAL VASCULAR LITHOTRIPSY N/A 2/20/2024    Procedure: Peripheral Vascular Lithotripsy;  Surgeon: Seth Duarte MD;  Location: [facility] CARDIAC CATH LAB    CV RIGHT HEART CATH MEASUREMENTS RECORDED N/A 12/19/2023    Procedure: Right Heart Catheterization;  Surgeon: Seth Duarte MD;  Location: [facility] CARDIAC CATH LAB    CV TRANSCATHETER AORTIC VALVE REPLACEMENT-FEMORAL APPROACH N/A 2/20/2024    Procedure: Transcatheter Aortic Valve Replacement-Femoral Approach;  Surgeon: Seth Duarte MD;  Location: [facility] CARDIAC CATH LAB "     apixaban ANTICOAGULANT (ELIQUIS) 2.5 MG tablet  co-enzyme Q-10 100 MG CAPS capsule  Ferrous Sulfate (SLOW RELEASE IRON) 47.5 MG TBCR  metoprolol succinate ER (TOPROL XL) 25 MG 24 hr tablet  Multiple Vitamins-Minerals (PRESERVISION AREDS 2 PO)  multivitamin, therapeutic (THERA-VIT) TABS tablet  tafluprost (ZIOPTAN) 0.0015 % SOLN ophthalmic solution  Timolol Maleate (TIMOPTIC OCUDOSE) 0.5 % ophthalmic solution  vitamin C (ASCORBIC ACID) 1000 MG TABS  Vitamin D3 (CHOLECALCIFEROL) 25 mcg (1000 units) tablet  zinc gluconate 50 MG tablet      Allergies   Allergen Reactions    Albuterol     Amlodipine     Bimatoprost Itching    Brimonidine Itching    Clotrimazole Itching    Dorzolamide Hcl-Timolol Mal Itching    Latanoprost Itching    Lisinopril     Losartan      depression    Neomycin-Polymyxin-Gramicidin Swelling    Neosporin [Neomycin-Polymyxin-Gramicidin]     Penicillins     Tape [Adhesive Tape]     Timolol Itching    Travoprost Itching    Vicodin [Hydrocodone-Acetaminophen]      Family History  No family history on file.  Social History   Social History     Tobacco Use    Smoking status: Former     Current packs/day: 0.00     Types: Cigarettes     Quit date: 10/24/2006     Years since quittin.3     Passive exposure: Never    Smokeless tobacco: Never    Tobacco comments:     I quit when I was 71 years old, off/on prior to that starting when I was 17   Vaping Use    Vaping status: Never Used   Substance Use Topics    Alcohol use: Not Currently    Drug use: Not Currently      Past medical history, past surgical history, medications, allergies, family history, and social history were reviewed with the patient. No additional pertinent items.   A complete review of systems was performed with pertinent positives and negatives noted in the HPI, and all other systems negative.    Physical Exam      Physical Exam  ***    ED Course, Procedures, & Data      Procedures       {ED Course Selections (Optional):860885}  {ED  "Sepsis CMS Documentation (Optional):190154::\" \"}       Results for orders placed or performed during the hospital encounter of 02/15/25   CT Aortic Survey w Contrast     Status: None    Narrative    EXAM: CT AORTIC SURVEY W CONTRAST  LOCATION: [facility]  DATE: 2/15/2025    INDICATION: Abdominal pain, known AAA, concern for rupture or dissection  COMPARISON: 01/09/2025  TECHNIQUE: CT angiogram chest abdomen pelvis during arterial phase of injection of IV contrast. 2D and 3D MIP reconstructions were performed by the CT technologist. Dose reduction techniques were used.   CONTRAST: 72 mL Isovue 370    FINDINGS:   CT ANGIOGRAM CHEST, ABDOMEN, AND PELVIS: Moderate aortoiliac atherosclerotic calcifications. No thoracic aortic aneurysm. A 4.5 x 4.5 cm infrarenal abdominal aortic aneurysm is stable. No aortic wall hematoma or dissection. 3 vessel arch present. Arch   vessels are patent. Severe focal stenosis seen at the origin of the right subclavian artery, celiac artery and SMA with, similar to prior. Bilateral renal arteries are patent. MIHAI is occluded proximally with immediate reconstitution, also unchanged. No   incidental pulmonary embolus identified.    LUNGS AND PLEURA: Stable moderate emphysema. Scattered ill-defined patchy groundglass opacities are present in the bilateral lung, largest and most confluent in the right upper lobe. A 2 mm nodule in the superior segment of the left lower lobe on series   7 image 112 is stable. A 2 mm endobronchial nodule in the right middle lobe on series 7 image 213 appears slightly increased. No pleural effusion or pneumothorax.    MEDIASTINUM/AXILLAE: Mildly prominent mediastinal and right hilar lymph nodes are present, for example a right paratracheal lymph node measures 13 x 14 mm on series 5 image 25, possibly reactive. Stable mild cardiomegaly without pericardial effusion.   Transarterial prosthetic aortic valvular repair redemonstrated. A small hiatal " hernia is unchanged.    CORONARY ARTERY CALCIFICATION: Moderate.    HEPATOBILIARY: Normal liver. A stone is seen within the gallbladder which is otherwise normal.    PANCREAS: A 9 mm cystic lesion in the body of the pancreas on series 10 image 42 appears stable. No definite pancreatic ductal dilatation or peripancreatic fluid collections.    SPLEEN: Normal.    ADRENAL GLANDS: Normal.    KIDNEYS/BLADDER: A 13 mm isoattenuating lesion in the lateral lower pole of the left kidney is stable, indeterminate. Scattered too small to characterize cystic lesions in both kidneys are visually benign and do not require follow-up. 3 cm cyst in the   inferior pole of the right kidney also does not require follow-up. Bladder is normal. Nodular thickening/enhancement of the left renal pelvis (series 6 image 171 is stable to slightly increased.    BOWEL: Scattered colonic diverticula without acute diverticulitis. No inflammatory bowel thickening or bowel obstruction.    LYMPH NODES: Normal.    PELVIC ORGANS: Normal.    MUSCULOSKELETAL: Scattered mild to moderate degenerative disc space narrowing in the lower thoracic and lower lumbar spine most advanced at L4/5, with minimal degenerative anterolisthesis of L4 and L5, stable. Mild degenerative height loss of T4   vertebral body is also unchanged. No suspicious osseous lesions or acute fractures.        Impression    IMPRESSION:    1.  Stable 4.5 cm infrarenal abdominal aortic aneurysm. No aortic wall hematoma or aortic dissection.    2.  Stable severe narrowing in the proximal right subclavian, celiac and superior mesenteric arteries.    3.  Mild nodular thickening/enhancement of the left renal pelvis is stable to slightly increased, nonspecific for an inflammatory/infectious process versus neoplastic. Recommend short-term follow-up CT urogram in 3 months.    4.  New multifocal groundglass opacities in the bilateral lung, likely mild multifocal pneumonia.    5.  Indeterminate 1.3 cm  lesion in the lower pole of the left kidney. Attention on follow-up CT urogram recommended.    6.  Stable moderate emphysema. A 2 mm nodule in the superior segment of the left lower lobe is stable. Follow-up chest CT in one year is recommended per Fleischner Society 2017 guideline.    7.  Stable 9 mm cystic lesion in the pancreatic body. Recommend follow-up pancreatic protocol CT or MRI in 18 months.    8.  Stable mild cardiomegaly and mild colonic diverticulosis.     Head CT w/o contrast     Status: None    Narrative    EXAM: CT HEAD W/O CONTRAST  LOCATION: [facility]  DATE: 2/15/2025    INDICATION: headache, nausea, vomiting  COMPARISON: 8/5/2024 MRI.  TECHNIQUE: Routine CT Head without IV contrast. Multiplanar reformats. Dose reduction techniques were used.    FINDINGS:  INTRACRANIAL CONTENTS: No intracranial hemorrhage, extraaxial collection, or mass effect.  No CT evidence of acute infarct. Chronic left cerebellar infarct. Mild to moderate presumed chronic small vessel ischemic changes. Mild generalized volume loss. No   hydrocephalus. Partially empty sella.    VISUALIZED ORBITS/SINUSES/MASTOIDS: Prior bilateral cataract surgery. Visualized portions of the orbits are otherwise unremarkable. No paranasal sinus mucosal disease. No middle ear or mastoid effusion.    BONES/SOFT TISSUES: No acute abnormality.      Impression    IMPRESSION:  1.  No CT evidence for acute intracranial process.  2.  Brain atrophy and presumed chronic microvascular ischemic changes as above.   Comprehensive metabolic panel     Status: Abnormal   Result Value Ref Range    Sodium 134 (L) 135 - 145 mmol/L    Potassium 4.5 3.4 - 5.3 mmol/L    Carbon Dioxide (CO2) 23 22 - 29 mmol/L    Anion Gap 12 7 - 15 mmol/L    Urea Nitrogen 16.2 8.0 - 23.0 mg/dL    Creatinine 0.61 0.51 - 0.95 mg/dL    GFR Estimate 86 >60 mL/min/1.73m2    Calcium 8.9 8.8 - 10.4 mg/dL    Chloride 99 98 - 107 mmol/L    Glucose 102 (H) 70 - 99 mg/dL     Alkaline Phosphatase 81 40 - 150 U/L    AST 55 (H) 0 - 45 U/L    ALT 21 0 - 50 U/L    Protein Total 6.6 6.4 - 8.3 g/dL    Albumin 3.6 3.5 - 5.2 g/dL    Bilirubin Total 1.2 <=1.2 mg/dL   Extra Tube ([place] Draw)     Status: None    Narrative    The following orders were created for panel order Extra Tube ([place] Draw).  Procedure                               Abnormality         Status                     ---------                               -----------         ------                     Extra Blue Top Tube[[id number]]                              Final result               Extra Red Top Tube[[id number]]                               Final result                 Please view results for these tests on the individual orders.   CBC with platelets and differential     Status: Abnormal   Result Value Ref Range    WBC Count 5.6 4.0 - 11.0 10e3/uL    RBC Count 3.57 (L) 3.80 - 5.20 10e6/uL    Hemoglobin 11.0 (L) 11.7 - 15.7 g/dL    Hematocrit 32.7 (L) 35.0 - 47.0 %    MCV 92 78 - 100 fL    MCH 30.8 26.5 - 33.0 pg    MCHC 33.6 31.5 - 36.5 g/dL    RDW 14.0 10.0 - 15.0 %    Platelet Count 207 150 - 450 10e3/uL    % Neutrophils 89 %    % Lymphocytes 6 %    % Monocytes 3 %    % Eosinophils 1 %    % Basophils 0 %    % Immature Granulocytes 0 %    NRBCs per 100 WBC 0 <1 /100    Absolute Neutrophils 5.0 1.6 - 8.3 10e3/uL    Absolute Lymphocytes 0.3 (L) 0.8 - 5.3 10e3/uL    Absolute Monocytes 0.2 0.0 - 1.3 10e3/uL    Absolute Eosinophils 0.1 0.0 - 0.7 10e3/uL    Absolute Basophils 0.0 0.0 - 0.2 10e3/uL    Absolute Immature Granulocytes 0.0 <=0.4 10e3/uL    Absolute NRBCs 0.0 10e3/uL   Extra Blue Top Tube     Status: None   Result Value Ref Range    Hold Specimen JIC    Extra Red Top Tube     Status: None   Result Value Ref Range    Hold Specimen JIC    Lactic Acid Whole Blood with 1X Repeat in 2 HR when >2     Status: Normal   Result Value Ref Range    Lactic Acid, Initial 0.9 0.7 - 2.0 mmol/L   Influenza A/B, RSV and SARS-CoV2  PCR (COVID-19) Nose     Status: Normal    Specimen: Nose; Swab   Result Value Ref Range    Influenza A PCR Negative Negative    Influenza B PCR Negative Negative    RSV PCR Negative Negative    SARS CoV2 PCR Negative Negative    Narrative    Testing was performed using the Xpert Xpress CoV2/Flu/RSV Assay on the Cepheid GeneXpert Instrument. This test should be ordered for the detection of SARS-CoV2, influenza, and RSV viruses in individuals with signs and symptoms of respiratory tract infection. This test is for in vitro diagnostic use under the [place] FDA for laboratories certified under CLIA to perform high or moderate complexity testing. This test has been [place] FDA cleared. A negative result does not rule out the presence of PCR inhibitors in the specimen or target RNA in concentration below the limit of detection for the assay. If only one viral target is positive but coinfection with multiple targets is suspected, the sample should be re-tested with another FDA cleared, approved, or authorized test, if coninfection would change clinical management. This test was validated by the [facility]uity [facility]. These laboratories are certified under the Clinical Laboratory Improvement Amendments of 1988 (CLIA-88) as qualified to perfom high complexity laboratory testing.   Troponin T, High Sensitivity     Status: Abnormal   Result Value Ref Range    Troponin T, High Sensitivity 29 (H) <=14 ng/L   Troponin T, High Sensitivity     Status: Abnormal   Result Value Ref Range    Troponin T, High Sensitivity 18 (H) <=14 ng/L   EKG 12 lead     Status: None (Preliminary result)   Result Value Ref Range    Systolic Blood Pressure  mmHg    Diastolic Blood Pressure  mmHg    Ventricular Rate 84 BPM    Atrial Rate 84 BPM    NM Interval 134 ms    QRS Duration 86 ms     ms    QTc 439 ms    P Axis 67 degrees    R AXIS 74 degrees    T Axis 99 degrees    Interpretation ECG       Sinus rhythm  Possible Left atrial  enlargement  Nonspecific ST and T wave abnormality  Abnormal ECG  When compared with ECG of 09-Jan-2025 08:58,  T wave inversion now evident in Lateral leads     Adult Type and Screen     Status: None   Result Value Ref Range    ABO/RH(D) O POS     Antibody Screen Negative Negative    SPECIMEN EXPIRATION DATE [id number]    CBC with Platelets & Differential     Status: Abnormal    Narrative    The following orders were created for panel order CBC with Platelets & Differential.  Procedure                               Abnormality         Status                     ---------                               -----------         ------                     CBC with platelets and d...[[id number]]  Abnormal            Final result                 Please view results for these tests on the individual orders.   ABO/Rh type and screen     Status: None    Narrative    The following orders were created for panel order ABO/Rh type and screen.  Procedure                               Abnormality         Status                     ---------                               -----------         ------                     Adult Type and Screen[[id number]]                            Final result                 Please view results for these tests on the individual orders.     Medications - No data to display  Labs Ordered and Resulted from Time of ED Arrival to Time of ED Departure - No data to display  No orders to display          {Critical Care Performed?:348413}    Assessment & Plan    ***    I have reviewed the nursing notes. I have reviewed the findings, diagnosis, plan and need for follow up with the patient.    New Prescriptions    No medications on file       Final diagnoses:   None       Hernan Lima MD.  [facility] EMERGENCY DEPARTMENT  2/15/2025

## 2025-02-16 NOTE — ED TRIAGE NOTES
Pt arrived by EMS from [facility] with report of Abdominal pain. Vitally stable per EMS. Pt has known triple AAA. Pt here for vascular surgery consult. 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

## 2025-02-16 NOTE — ED NOTES
Bed: ED17  Expected date:   Expected time:   Means of arrival:   Comments:  Davide CASTANEDA transfer

## 2025-02-16 NOTE — PLAN OF CARE
Occupational Therapy:     Orders received and chart reviewed, met with patient and daughter. Pt lives in an GREG, uses 4WW for mobility, and is mod IND with ADLs at baseline. Pt gets assist for IADLs, can increase assist in other areas as needed. Provided pt with 2WW for in house mobility, witnessed pt mobilizing with slight assist from daughter (only provided due to low vision and path-finding in unfamiliar environment). Pt and daughter express no concerns with safety or return to [facility], and no IP OT needs have been identified at this time.    Defer discharge recommendations to medical team, although anticipate pt is safe to return to [facility]REG with ongoing assist from facility.? Will complete orders.      Please feel free to re-consult if pt has change in status or new IP OT needs become apparent.

## 2025-02-16 NOTE — ED NOTES
"     Emergency Department Patient Sign-out       Brief HPI:  This is a 89 year old female signed out to me by Dr. Becerra .  See initial ED Provider note for details of the presentation.            Significant Events prior to my assuming care: n/a      Exam:   Patient Vitals for the past 24 hrs:   BP Temp Temp src Pulse Resp SpO2 Height Weight   02/15/25 2243 130/71 98.1  F (36.7  C) Oral 82 17 92 % 1.651 m (5' 5\") 57.6 kg (127 lb)     General: Patient is in no acute distress currently.  HEENT: Normocephalic atraumatic.    Neck: Supple  Cardiovascular: Heart rate normal  Abdomen soft nontender nondistended  Pulmonary: Patient is in no respiratory distress  Extremities: No signs of any significant or life-threatening trauma.  Neurologic: No new focal neurologic deficits.       ED RESULTS:   Results for orders placed or performed during the hospital encounter of 02/15/25 (from the past 24 hours)   iStat Gases (lactate) venous, POCT     Status: Abnormal    Collection Time: 02/15/25 10:53 PM   Result Value Ref Range    Lactic Acid POCT 0.4 <=2.0 mmol/L    Bicarbonate Venous POCT 25 21 - 28 mmol/L    O2 Sat, Venous POCT 79 (H) 70 - 75 %    pCO2 Venous POCT 39 (L) 40 - 50 mm Hg    pH Venous POCT 7.41 7.32 - 7.43    pO2 Venous POCT 43 25 - 47 mm Hg    Base Excess/Deficit (+/-) POCT 0.0 -3.0 - 3.0 mmol/L   EKG 12 lead     Status: None (Preliminary result)    Collection Time: 02/15/25 11:01 PM   Result Value Ref Range    Systolic Blood Pressure  mmHg    Diastolic Blood Pressure  mmHg    Ventricular Rate 77 BPM    Atrial Rate 77 BPM    FL Interval 130 ms    QRS Duration 86 ms     ms    QTc 423 ms    P Axis 63 degrees    R AXIS 59 degrees    T Axis 98 degrees    Interpretation ECG       Sinus rhythm  Nonspecific ST and T wave abnormality  Abnormal ECG         ED MEDICATIONS:   Medications - No data to display      Impression:    ICD-10-CM    1. Abdominal pain, unspecified abdominal location  R10.9           Plan:  "   Pending studies include surgery recommendations  Spoke with vascular surgeon who said that as the patient does not have any pain or tenderness to palpation he reviewed the case with his attending and agree patient is safe to discharge home  I went and spoke with the patient and she indeed did not have any abdominal pain and her abdomen was soft nontender nondistended, so after chart review given reassuring workup and reassuring exam with vascular surgery recommendations to discharge home, I agree with this assessment and plan  Patient discharged home with strict return precautions as well as instructions to follow-up with her primary care doctor in the next 1 to 2 days..      Patient's pain came back in the emergency department so we will plan to admit her to medicine for observation for abdominal pain  Discussed case with internal medicine physician Dr. Wray who accepted patient for admission      MD Brandi Moser Collin, MD  02/16/25 0117       Booker Paz MD  02/16/25 0652

## 2025-02-16 NOTE — MEDICATION SCRIBE - ADMISSION MEDICATION HISTORY
Medication Scribe Admission Medication History    Admission medication history is complete. The information provided in this note is only as accurate as the sources available at the time of the update.    Information Source(s): Hospital records and CareEverywhere/SureScripts via N/A    Pertinent Information: Patient medication hx completed yesterday by Pharmacy. Please see note for more information. Changes made to medication list are in note from yesterday. No changes made by writer.     Changes made to PTA medication list:  Added: None  Deleted: None  Changed: None    Allergies reviewed with patient and updates made in EHR: no    Medication History Completed By: Rosibel Green 2/16/2025 9:53 AM    PTA Med List   Medication Sig Last Dose/Taking    apixaban ANTICOAGULANT (ELIQUIS) 2.5 MG tablet Take 1 tablet (2.5 mg) by mouth 2 times daily. 2/15/2025 Morning    co-enzyme Q-10 100 MG CAPS capsule Take 100 mg by mouth daily Past Week    Ferrous Sulfate (SLOW RELEASE IRON) 47.5 MG TBCR Take 1 tablet by mouth 2 times daily. Past Week    metoprolol succinate ER (TOPROL XL) 25 MG 24 hr tablet Take 1 tablet (25 mg) by mouth daily. 2/15/2025 Morning    Multiple Vitamins-Minerals (PRESERVISION AREDS 2 PO) Take by mouth daily Past Week    multivitamin, therapeutic (THERA-VIT) TABS tablet Take 1 tablet by mouth daily Past Week    tafluprost (ZIOPTAN) 0.0015 % SOLN ophthalmic solution Place 1 drop into both eyes at bedtime 2/14/2025 Bedtime    Timolol Maleate (TIMOPTIC OCUDOSE) 0.5 % ophthalmic solution Place 1 drop into both eyes 2 times daily 2/15/2025 Morning    vitamin C (ASCORBIC ACID) 1000 MG TABS Take 1,000 mg by mouth 2 times daily. Past Week    Vitamin D3 (CHOLECALCIFEROL) 25 mcg (1000 units) tablet Take by mouth daily Daily in the winter Past Week    zinc gluconate 50 MG tablet Take 50 mg by mouth every other day Past Week

## 2025-02-16 NOTE — CONSULTS
Care Management Initial Consult    General Information  Assessment completed with: Patient, Family, -chart review, Kavita and daughter Monik Mendieta  Type of CM/SW Visit: Initial Assessment    Primary Care Provider verified and updated as needed: Yes ([facility] [phone] [address], [facility] [address])   Readmission within the last 30 days: no previous admission in last 30 days      Reason for Consult: discharge planning  Advance Care Planning: Advance Care Planning Reviewed: no concerns identified        Communication Assessment  Patient's communication style: spoken language (English or Bilingual)    Hearing Difficulty or Deaf: no   Wear Glasses or Blind: yes    Cognitive  Cognitive/Neuro/Behavioral: WDL                      Living Environment:   People in home: alone     Current living Arrangements: independent living facility, apartment      Able to return to prior arrangements: yes  Living Arrangement Comments: [facility]    Family/Social Support:  Care provided by: self  Provides care for: no one  Marital Status: Single  Support system: Children, Facility resident(s)/Staff          Description of Support System: Supportive, Involved    Support Assessment: Adequate family and caregiver support    Current Resources:   Patient receiving home care services: No      Community Resources: DME  Equipment currently used at home: grab bar, tub/shower, grab bar, toilet, raised toilet seat, shower chair, other (see comments) (magnifier)  Supplies currently used at home: None    Employment/Financial:  Employment Status: retired        Financial Concerns: none   Referral to Financial Worker: No     Does the patient's insurance plan have a 3 day qualifying hospital stay waiver?  No    Lifestyle & Psychosocial Needs:  Social Drivers of Health     Food Insecurity: Low Risk  (2/16/2025)    Food Insecurity     Within the past 12 months, did you worry that your food would  run out before you got money to buy more?: No     Within the past 12 months, did the food you bought just not last and you didn t have money to get more?: No   Depression: Not at risk (10/24/2024)    PHQ-2     PHQ-2 Score: 0   Housing Stability: Low Risk  (2/16/2025)    Housing Stability     Do you have housing? : Yes     Are you worried about losing your housing?: No   Tobacco Use: Medium Risk (12/11/2024)    Patient History     Smoking Tobacco Use: Former     Smokeless Tobacco Use: Never     Passive Exposure: Never   Financial Resource Strain: Low Risk  (2/16/2025)    Financial Resource Strain     Within the past 12 months, have you or your family members you live with been unable to get utilities (heat, electricity) when it was really needed?: No   Alcohol Use: Not on file   Transportation Needs: Low Risk  (2/16/2025)    Transportation Needs     Within the past 12 months, has lack of transportation kept you from medical appointments, getting your medicines, non-medical meetings or appointments, work, or from getting things that you need?: No   Physical Activity: Patient Declined (10/21/2024)    Exercise Vital Sign     Days of Exercise per Week: Patient declined     Minutes of Exercise per Session: Patient declined   Interpersonal Safety: Low Risk  (1/9/2025)    Interpersonal Safety     Do you feel physically and emotionally safe where you currently live?: Yes     Within the past 12 months, have you been hit, slapped, kicked or otherwise physically hurt by someone?: No     Within the past 12 months, have you been humiliated or emotionally abused in other ways by your partner or ex-partner?: No   Stress: No Stress Concern Present (10/21/2024)    [employer] - Occupational Stress Questionnaire     Feeling of Stress : Only a little   Social Connections: Unknown (10/21/2024)    Social Connection and Isolation Panel [NHANES]     Frequency of Communication with Friends and Family: Not on  "file     Frequency of Social Gatherings with Friends and Family: Twice a week     Attends Sabianist Services: Not on file     Active Member of Clubs or Organizations: Not on file     Attends Club or Organization Meetings: Not on file     Marital Status: Not on file   Health Literacy: Not on file     Functional Status:  Prior to admission patient needed assistance:   Dependent ADLs:: Independent  Dependent IADLs:: Cleaning, Laundry, Shopping, Medication Management, Money Management, Transportation     Mental Health Status:  Mental Health Status: No Current Concerns       Chemical Dependency Status:  Chemical Dependency Status: No Current Concerns         Values/Beliefs:  Spiritual, Cultural Beliefs, Sabianist Practices, Values that affect care: no             Discussed  Partnership in Safe Discharge Planning  document with patient/family: No    Additional Information:      Per chart review: \"Kavita Merlos is a 89 year old female admitted on 2/15/2025. She has a past medical history of infrarenal AAA, aortic valve stenosis (S/P TAVR 02/2024), pAF (on AC), macular degeneration c/b legal blindness, HTN, HLD, renal lesion, who was admitted after being transferred from the [facility] ED where she had presented the day of admission originally for abdominal pain. Given her abdominal pain and hx of AAA, was transferred to [facility] for Vascular Surgery involvement. Ultimately, her abdominal pain resolved w/o intervention, but then spontaneously returned so remains admitted under Observation status for further monitoring and management.\" (Matthew Wisdom PA-C; 2/16/2025)    Care Management/Social Work Consult placed for discharge planning. SW performed chart review to begin assessment.    1520 SW met with Kavita and her daughter Monik Mendieta at bedside to introduce self, explain SW role, explain the Medicare Outpatient Observation Notice (LOCKWOOD), why Kavita was receiving it, and to perform initial assessment. After introductions " were made, JUAN provided unsigned MOON document; explained it, then addressed questions and concerns.    1527 Monik signed LOCKWOOD document acknowledging its receipt.  JUAN offered to make copy of signed document for pt records. Pt declined offer.      Kavita lives alone in an independent living apartment at [facility]. Though she can distinguish light and dark, she is otherwise almost completely blind, but still independent in her ADLs. She is able to prepare simple meals and facility provides light housekeeping. Her family assists her with the balance of her IADLs.    Monik reported that the medical team expects that Kavita will be able to discharge on Monday. She said that she is able to transport her mother at discharge but expressed concern because she would be unable to pick her up until late afternoon/early evening. SW assured her that there would be no issues with Kavita discharging at that time.    SW provided emotional support via active and reflective listening and responding to feelings; normalized and validated feelings and experiences; and provided a supportive non-anxious presence. No additional questions or concerns were reported. JUAN provided availability and contact information and excused self from Kavita's bedside.    1527 JUAN faxed LOCKWOOD to [facility] ([phone]) and placed LOCKWOOD in Kavita's chart.            Next Steps:    No needs identified. Care Management will sign off    SW/RNCC is no longer following. Please place a new consult should new needs arise.    MAXIM Lemos, [facility]  ED/OBS   [facility]  Phone: [phone]  Pager: [phone]  Fax: [phone]     After hours [employer] and After Hours [employer] [place]     On-call pager, [phone], 4:00 pm to 8:30 pm

## 2025-02-16 NOTE — PLAN OF CARE
"Goal Outcome Evaluation:      Plan of Care Reviewed With: patient, child    Overall Patient Progress: improvingOverall Patient Progress: improving     BP (!) 155/76 (BP Location: Right arm, Patient Position: Semi-Kim's, Cuff Size: Adult Regular)   Pulse 82   Temp 97.7  F (36.5  C) (Oral)   Resp 16   Ht 1.651 m (5' 5\")   Wt 57.6 kg (127 lb)   LMP  (LMP Unknown)   SpO2 94%   BMI 21.13 kg/m            Neuro: AOx4   Cardiac:  NS. No reports of chest pain.            Respiratory: Non labored on RA.   GI/: non distended. Denies pain. Voids spontaneously   Diet/appetite: Regular diet   Activity:  SB w/ wheeled walker   Pain: denies pain   Skin: no deficits     Plan: Transfer to Obs unit for overnight stay. Gave report to HARITHA Álvarez.       "

## 2025-02-16 NOTE — PLAN OF CARE
Physical Therapy defer - Orders received, chart reviewed, discussed with care team. No IP PT needs indicated at this time. Per OT, patient at/nearing functional baseline, denies any concerns with mobility or acute rehab needs. Plans to return back to their skilled nursing with continued supports when medically ready. Will complete consult and defer evaluation, please reconsult as appropriate if patient has decline in functional mobility requiring further skilled inpatient PT needs. Defer Discharge recommendations to medical team.

## 2025-02-16 NOTE — H&P
[facility]    History and Physical - Hospitalist Service, GOLD TEAM        Date of Admission:  2/15/2025    Assessment & Plan      Kavita Merlos is a 89 year old female admitted on 2/15/2025. She has a past medical history of infrarenal AAA, aortic valve stenosis (S/P TAVR 02/2024), pAF (on AC), macular degeneration c/b legal blindness, HTN, HLD, renal lesion, who was admitted after being transferred from the [facility] ED where she had presented the day of admission originally for abdominal pain. Given her abdominal pain and hx of AAA, was transferred to [facility] for Vascular Surgery involvement. Ultimately, her abdominal pain resolved w/o intervention, but then spontaneously returned so remains admitted under Observation status for further monitoring and management.    Acute Abdominal Pain, improving  Nausea  Hx of AAA   Approximately 12 hours PTA, pt developed bilateral upper abdominal pain which progressively radiated into the lower abdomen. Presented to the [facility] ED where imaging w/ CT Aortic Survey showed that her known infrarenal AAA was stable and w/o e/o hematoma or dissection (measured 4.5cm in diameter). Imaging also showed stable yet severe stenosis of proximal R subclavian, celiac, and SMA arteries w/o russ occlusion. Remainder of workup less suggestive for any infectious process or primary cardiac process. LA WNL, so less likely ischemia or severe infection. Hgb stable (see below). Transferred to [facility] for Vascular Surgery evaluation given her hx of AAA, but they felt her current presentation was not r/t AAA. She was going to discharge from the [facility] ED, but d/t return of abdominal pain, admitted under observation status for further monitoring and management. Has associated nausea w/o vomiting. Unclear etiology of abdominal pain at this time, but give findings of severe stenosis at proximal SMA, I would be concerned that perhaps her abdominal pain was r/t  transient ischemic process (now resolved). No diarrhea or vomiting. In any case, on exam there is a pulsatile mass in the LUQ w/ mild tenderness, but no guarding, no rebound, no peritoneal signs. Remains HDS.   - Vascular Surgery consulted as above, now signed off, no surgical intervention required here   - Recommend follow-up w/ Vascular Surgery as an OP for future surveillance of AAA  - Consider repeat imaging STAT if precipitous drop in Hgb or unexplained changes in hemodynamics   - Tylenol PRN for now for pain  - Zofran PRN for nausea  - Continue to monitor hemodynamics as ordered   - Call RRT immediately if hypotension, AMS  - Trend CMP, CBC in AM    - Hgb repeat at 1500 today (dropped from 11.0-->9.9 this AM)   - Type & Screen updated   - Transfuse to maintain Hgb >7  - PT to assist w/ eval prior to dispo  - SW consult for anticipated dispo in the next day or so (back to her GREG)  - Adding on Lipase this AM to r/o pancreatitis     Headache  Pt notes that she has had a headache since just prior to the onset of abd pain on Friday. No neurologic deficits aside from her chronic blindness (see below). She feels this may be related to the intraocular injections she had last Tuesday. Head imaging in the ED was negative for acute findings. Tylenol is working for her pain.   - Tylenol PRN for pain    Hx of Aortic Stenosis (S/P TAVR 02/2024)  Hx of pAF  Pt underwent the procedures as above about one year ago. In the post-op period, she was noted to have an episode of pAF. However, no further recurrences and EKG here for workup as above showed NSR. Remains HDS.  - Continue PTA Apixaban 2.5mg BID  - Continue PTA Metoprolol 25mg daily w/ hold parameters    Macular Degeneration  Legally Blind  Pt w/ the above. At baseline is legally blind; complete loss of vision in R eye, maintains photoreception in L eye, though to lesser degree. PTA takes PreserVision PO 2 tabs daily, Tafluprost ophthalmic solution 0.5% 1 drop at  bedtime, Timoptic Ocudose 0.5% ophthalmic solution 1 drop BID.   - Falls precautions   - Continue PTA PO tabs and eye drops as above     HTN  PTA Metoprolol w/ hx of Afib as above.  - Continue PTA Metoprolol w/ hold parameters    Renal Lesion  Has been seen on prior imaging, and has been suspected to be a cyst. On imaging in 01/2025 during admission to [facility] for hypertensive urgency, imaging showed 10mm cyst in L kidney. Here, is 13mm. However, has not had follow-up CT urogram. Has established care w/  Urology recently for urinary retention.  - Follow-up w/ Urology as an OP  - Will need OP Urology team to obtain urogram study in ~3 months as recommended by Radiology team for interval assessment of renal lesion    Pulmonary Nodule   Bibasilar Groundglass Opacities  A 2mm nodule in the superior segment of the LLL was noted on imaging, but stable compared to prior. VBG not showing hypoxia on RA.  - Follow-up w/ chest CT in one year as recommended by Radiology   - Follow-up w/ PCP to discuss above recommendations  - Continuous pulse ox for now  - Consider adding on abx should she fever or have more persistent hypoxia    Pancreatic Cystic Lesion  Stable lesion measuring 9mm noted in the pancreatic body. Appears cystic in nature per Rads.  - Follow-up w/ pancreatic protocol CT or MRI in 18 months (sometime around August 2026)  - Follow-up w/ PCP to discuss above recommendations    Chronic Normocytic Anemia  Hx of MARGARET  Pt takes Ferrous sulfate BID. Baseline Hgb ~11. Stable in ED, now down to 9.9 this AM, but remains HDS.  - Recheck Hgb @ 1500 then in AM if stable  - Hold PTA ferrous sulfate while here, can also exacerbate abdominal pain  - Trend Hgb in AM    Hx of HLD  Has had high LDL in the past, but lipid panel entirely WNL in 10/2024. Not on any statin therapy.  - Follow-up w/ PCP       Observation Goals: -diagnostic tests and consults completed and resulted, -vital signs normal or at patient baseline,  "-tolerating oral intake to maintain hydration, -adequate pain control on oral analgesics, -safe disposition plan has been identified, Nurse to notify provider when observation goals have been met and patient is ready for discharge.  Diet: Advance Diet as Tolerated: Clear Liquid Diet; Regular Diet Adult    DVT Prophylaxis: DOAC  Mendieta Catheter: Not present  Lines: None     Cardiac Monitoring: None  Code Status: Full Code    Clinically Significant Risk Factors Present on Admission         # Hyponatremia: Lowest Na = 133 mmol/L in last 2 days, will monitor as appropriate       # Hypoalbuminemia: Lowest albumin = 3.3 g/dL at 2/16/2025  9:02 AM, will monitor as appropriate    # Drug Induced Coagulation Defect: home medication list includes an anticoagulant medication    # Hypertension: Noted on problem list      # Anemia: based on hgb <11                  Disposition Plan     Medically Ready for Discharge: Anticipated Tomorrow         The patient's care was discussed with the Attending Physician, Dr. Jason Martino, Bedside Nurse, Patient, and Patient's Family.    Matthew Wisdom PA-C  Hospitalist Service, [facility]  Securely message with Vocera (more info)  Text page via [facility] Paging/Directory   See signed in provider for up to date coverage information    ______________________________________________________________________    Chief Complaint   \"I feel ok, but still have 'some' pain\"    History is obtained from the patient and patient's daughter    History of Present Illness   Kavita Merlos is a 89 year old female who is seen in the ED this morning.  Patient reports onset of upper abdominal pain on Friday evening, which gradually radiated into the lower abdomen.  At its worst, the pain rated a \"20/10\" in severity, but was alleviated with Tylenol.  She did not notice any particular patterns or eating habits associated with the pain.  Currently, the pain is rated as " "a mild discomfort, and is \"much better\" than it was at its onset.  She has associated nausea, but no vomiting or diarrhea.  The pain currently is in the upper abdomen and to a lesser extent diffusely.  She has never had pain to this degree in her abdomen.  She denies heartburn.    Patient also reports a headache just prior to the onset of the abdominal pain.  The pain is present in her sinuses.  She otherwise denies acute vision changes (does note that she is blind at baseline, can see \"some\" light from her left eye), numbness/tingling, speech changes.  Further denies chest pain, dyspnea, acute cough, fevers.    Patient's daughter is present at bedside and corroborates the above story.  She also has brought in her PTA medications.    Past Medical History    Past Medical History:   Diagnosis Date    AAA (abdominal aortic aneurysm)     Aortic stenosis     Benign essential hypertension with BP difference between arms     Hyperlipidemia LDL goal <70        Past Surgical History   Past Surgical History:   Procedure Laterality Date    CV CORONARY ANGIOGRAM N/A 12/19/2023    Procedure: Coronary Angiogram;  Surgeon: Seth Duarte MD;  Location: [facility] CARDIAC CATH LAB    CV PERIPHERAL VASCULAR LITHOTRIPSY N/A 2/20/2024    Procedure: Peripheral Vascular Lithotripsy;  Surgeon: Seth Duarte MD;  Location: [facility] CARDIAC CATH LAB    CV RIGHT HEART CATH MEASUREMENTS RECORDED N/A 12/19/2023    Procedure: Right Heart Catheterization;  Surgeon: Seth Duarte MD;  Location: [facility] CARDIAC CATH LAB    CV TRANSCATHETER AORTIC VALVE REPLACEMENT-FEMORAL APPROACH N/A 2/20/2024    Procedure: Transcatheter Aortic Valve Replacement-Femoral Approach;  Surgeon: Seth Duarte MD;  Location: [facility] CARDIAC CATH LAB       Prior to Admission Medications   Prior to Admission Medications   Prescriptions Last Dose Informant Patient Reported? Taking?   Ferrous Sulfate (SLOW RELEASE IRON) 47.5 MG TBCR " Past Week  Yes Yes   Sig: Take 1 tablet by mouth 2 times daily.   Multiple Vitamins-Minerals (PRESERVISION AREDS 2 PO) Past Week Self Yes Yes   Sig: Take by mouth daily   Timolol Maleate (TIMOPTIC OCUDOSE) 0.5 % ophthalmic solution 2/15/2025 Morning Self Yes Yes   Sig: Place 1 drop into both eyes 2 times daily   Vitamin D3 (CHOLECALCIFEROL) 25 mcg (1000 units) tablet Past Week Self Yes Yes   Sig: Take by mouth daily Daily in the winter   apixaban ANTICOAGULANT (ELIQUIS) 2.5 MG tablet 2/15/2025 Morning  No Yes   Sig: Take 1 tablet (2.5 mg) by mouth 2 times daily.   co-enzyme Q-10 100 MG CAPS capsule Past Week Self Yes Yes   Sig: Take 100 mg by mouth daily   metoprolol succinate ER (TOPROL XL) 25 MG 24 hr tablet 2/15/2025 Morning  No Yes   Sig: Take 1 tablet (25 mg) by mouth daily.   multivitamin, therapeutic (THERA-VIT) TABS tablet Past Week Self Yes Yes   Sig: Take 1 tablet by mouth daily   tafluprost (ZIOPTAN) 0.0015 % SOLN ophthalmic solution 2025 Bedtime Self Yes Yes   Sig: Place 1 drop into both eyes at bedtime   vitamin C (ASCORBIC ACID) 1000 MG TABS Past Week Self Yes Yes   Sig: Take 1,000 mg by mouth 2 times daily.   zinc gluconate 50 MG tablet Past Week Self Yes Yes   Sig: Take 50 mg by mouth every other day      Facility-Administered Medications: None        Review of Systems    The 10 point Review of Systems is negative other than noted in the HPI or here.     Social History   I have reviewed this patient's social history and updated it with pertinent information if needed.  Social History     Tobacco Use    Smoking status: Former     Current packs/day: 0.00     Types: Cigarettes     Quit date: 10/24/2006     Years since quittin.3     Passive exposure: Never    Smokeless tobacco: Never    Tobacco comments:     I quit when I was 71 years old, off/on prior to that starting when I was 17   Vaping Use    Vaping status: Never Used   Substance Use Topics    Alcohol use: Not Currently    Drug use: Not  Currently        Physical Exam   Vital Signs: Temp: 97.7  F (36.5  C) Temp src: Oral BP: (!) 150/61 Pulse: 77   Resp: 17 SpO2: 100 % O2 Device: Nasal cannula Oxygen Delivery: 1 LPM  Weight: 127 lbs 0 oz    Constitutional: awake, alert, cooperative, no apparent distress, and appears stated age  Eyes: lids and lashes normal, sclera clear, and conjunctiva normal  ENT: normocephalic, without obvious abnormality  Respiratory: No increased work of breathing, good air exchange, clear to auscultation bilaterally, no crackles or wheezing. Weaned to RA from 2L of O2 and satting WNL on RA.   Cardiovascular: regular rate and rhythm, normal S1 and S2, no S3, no S4, and grade 2/6 holosystolic murmur. Pulsatile abdominal mass present in the L sided abdomen.   GI: normal bowel sounds, soft, non-distended, and mild tenderness overlying the mid-epigastric and LUQ regions, and the L lateral abdominal wall. No rebound, guarding, or peritoneal signs.  Skin: no bruising or bleeding, no redness, warmth, or swelling, no rashes, and no lesions on visualized skin.   Musculoskeletal: no lower extremity pitting edema present, no deformities.  Neurologic: Moving all extremities equally and spontaneously. No obvious focal neuro deficits.  Neuropsychiatric: General: normal, calm, and normal eye contact  Level of consciousness: alert / normal  Affect: normal and pleasant  Memory and insight: normal, memory for past and recent events intact, and thought process normal    Medical Decision Making       100 MINUTES SPENT BY ME on the date of service doing chart review, history, exam, documentation & further activities per the note.      Data     I have personally reviewed the following data over the past 24 hrs:    3.5 (L)  \   9.9 (L)   / 165     133 (L) 100 19.2 /  90   4.7 25 0.69 \     ALT: 17 AST: 51 (H) AP: 68 TBILI: 0.9   ALB: 3.3 (L) TOT PROTEIN: 6.1 (L) LIPASE: 34     Trop: 18 (H) BNP: N/A     Procal: N/A CRP: N/A Lactic Acid: 0.8          Imaging results reviewed over the past 24 hrs:   Recent Results (from the past 24 hours)   Head CT w/o contrast    Narrative    EXAM: CT HEAD W/O CONTRAST  LOCATION: [facility]  DATE: 2/15/2025    INDICATION: headache, nausea, vomiting  COMPARISON: 8/5/2024 MRI.  TECHNIQUE: Routine CT Head without IV contrast. Multiplanar reformats. Dose reduction techniques were used.    FINDINGS:  INTRACRANIAL CONTENTS: No intracranial hemorrhage, extraaxial collection, or mass effect.  No CT evidence of acute infarct. Chronic left cerebellar infarct. Mild to moderate presumed chronic small vessel ischemic changes. Mild generalized volume loss. No   hydrocephalus. Partially empty sella.    VISUALIZED ORBITS/SINUSES/MASTOIDS: Prior bilateral cataract surgery. Visualized portions of the orbits are otherwise unremarkable. No paranasal sinus mucosal disease. No middle ear or mastoid effusion.    BONES/SOFT TISSUES: No acute abnormality.      Impression    IMPRESSION:  1.  No CT evidence for acute intracranial process.  2.  Brain atrophy and presumed chronic microvascular ischemic changes as above.   CT Aortic Survey w Contrast    Narrative    EXAM: CT AORTIC SURVEY W CONTRAST  LOCATION: [facility]  DATE: 2/15/2025    INDICATION: Abdominal pain, known AAA, concern for rupture or dissection  COMPARISON: 01/09/2025  TECHNIQUE: CT angiogram chest abdomen pelvis during arterial phase of injection of IV contrast. 2D and 3D MIP reconstructions were performed by the CT technologist. Dose reduction techniques were used.   CONTRAST: 72 mL Isovue 370    FINDINGS:   CT ANGIOGRAM CHEST, ABDOMEN, AND PELVIS: Moderate aortoiliac atherosclerotic calcifications. No thoracic aortic aneurysm. A 4.5 x 4.5 cm infrarenal abdominal aortic aneurysm is stable. No aortic wall hematoma or dissection. 3 vessel arch present. Arch   vessels are patent. Severe focal stenosis seen at the origin of the right subclavian  artery, celiac artery and SMA with, similar to prior. Bilateral renal arteries are patent. MIHAI is occluded proximally with immediate reconstitution, also unchanged. No   incidental pulmonary embolus identified.    LUNGS AND PLEURA: Stable moderate emphysema. Scattered ill-defined patchy groundglass opacities are present in the bilateral lung, largest and most confluent in the right upper lobe. A 2 mm nodule in the superior segment of the left lower lobe on series   7 image 112 is stable. A 2 mm endobronchial nodule in the right middle lobe on series 7 image 213 appears slightly increased. No pleural effusion or pneumothorax.    MEDIASTINUM/AXILLAE: Mildly prominent mediastinal and right hilar lymph nodes are present, for example a right paratracheal lymph node measures 13 x 14 mm on series 5 image 25, possibly reactive. Stable mild cardiomegaly without pericardial effusion.   Transarterial prosthetic aortic valvular repair redemonstrated. A small hiatal hernia is unchanged.    CORONARY ARTERY CALCIFICATION: Moderate.    HEPATOBILIARY: Normal liver. A stone is seen within the gallbladder which is otherwise normal.    PANCREAS: A 9 mm cystic lesion in the body of the pancreas on series 10 image 42 appears stable. No definite pancreatic ductal dilatation or peripancreatic fluid collections.    SPLEEN: Normal.    ADRENAL GLANDS: Normal.    KIDNEYS/BLADDER: A 13 mm isoattenuating lesion in the lateral lower pole of the left kidney is stable, indeterminate. Scattered too small to characterize cystic lesions in both kidneys are visually benign and do not require follow-up. 3 cm cyst in the   inferior pole of the right kidney also does not require follow-up. Bladder is normal. Nodular thickening/enhancement of the left renal pelvis (series 6 image 171 is stable to slightly increased.    BOWEL: Scattered colonic diverticula without acute diverticulitis. No inflammatory bowel thickening or bowel obstruction.    LYMPH NODES:  Normal.    PELVIC ORGANS: Normal.    MUSCULOSKELETAL: Scattered mild to moderate degenerative disc space narrowing in the lower thoracic and lower lumbar spine most advanced at L4/5, with minimal degenerative anterolisthesis of L4 and L5, stable. Mild degenerative height loss of T4   vertebral body is also unchanged. No suspicious osseous lesions or acute fractures.        Impression    IMPRESSION:    1.  Stable 4.5 cm infrarenal abdominal aortic aneurysm. No aortic wall hematoma or aortic dissection.    2.  Stable severe narrowing in the proximal right subclavian, celiac and superior mesenteric arteries.    3.  Mild nodular thickening/enhancement of the left renal pelvis is stable to slightly increased, nonspecific for an inflammatory/infectious process versus neoplastic. Recommend short-term follow-up CT urogram in 3 months.    4.  New multifocal groundglass opacities in the bilateral lung, likely mild multifocal pneumonia.    5.  Indeterminate 1.3 cm lesion in the lower pole of the left kidney. Attention on follow-up CT urogram recommended.    6.  Stable moderate emphysema. A 2 mm nodule in the superior segment of the left lower lobe is stable. Follow-up chest CT in one year is recommended per Fleischner Society 2017 guideline.    7.  Stable 9 mm cystic lesion in the pancreatic body. Recommend follow-up pancreatic protocol CT or MRI in 18 months.    8.  Stable mild cardiomegaly and mild colonic diverticulosis.       Recent Labs   Lab 02/16/25  0902 02/15/25  1026   WBC 3.5* 5.6   HGB 9.9* 11.0*   MCV 96 92    207   * 134*   POTASSIUM 4.7 4.5   CHLORIDE 100 99   CO2 25 23   BUN 19.2 16.2   CR 0.69 0.61   ANIONGAP 8 12   JERRY 8.7* 8.9   GLC 90 102*   ALBUMIN 3.3* 3.6   PROTTOTAL 6.1* 6.6   BILITOTAL 0.9 1.2   ALKPHOS 68 81   ALT 17 21   AST 51* 55*   LIPASE 34  --

## 2025-02-17 VITALS
HEIGHT: 65 IN | TEMPERATURE: 98.5 F | SYSTOLIC BLOOD PRESSURE: 155 MMHG | WEIGHT: 127 LBS | BODY MASS INDEX: 21.16 KG/M2 | RESPIRATION RATE: 20 BRPM | DIASTOLIC BLOOD PRESSURE: 81 MMHG | OXYGEN SATURATION: 92 % | HEART RATE: 95 BPM

## 2025-02-17 LAB
ALBUMIN SERPL BCG-MCNC: 3.6 G/DL (ref 3.5–5.2)
ALP SERPL-CCNC: 74 U/L (ref 40–150)
ALT SERPL W P-5'-P-CCNC: 18 U/L (ref 0–50)
ANION GAP SERPL CALCULATED.3IONS-SCNC: 10 MMOL/L (ref 7–15)
AST SERPL W P-5'-P-CCNC: 62 U/L (ref 0–45)
ATRIAL RATE - MUSE: 84 BPM
BASOPHILS # BLD AUTO: 0 10E3/UL (ref 0–0.2)
BASOPHILS NFR BLD AUTO: 0 %
BILIRUB SERPL-MCNC: 0.9 MG/DL
BUN SERPL-MCNC: 15.9 MG/DL (ref 8–23)
CALCIUM SERPL-MCNC: 9 MG/DL (ref 8.8–10.4)
CHLORIDE SERPL-SCNC: 99 MMOL/L (ref 98–107)
CREAT SERPL-MCNC: 0.56 MG/DL (ref 0.51–0.95)
DIASTOLIC BLOOD PRESSURE - MUSE: NORMAL MMHG
EGFRCR SERPLBLD CKD-EPI 2021: 87 ML/MIN/1.73M2
EOSINOPHIL # BLD AUTO: 0.1 10E3/UL (ref 0–0.7)
EOSINOPHIL NFR BLD AUTO: 2 %
ERYTHROCYTE [DISTWIDTH] IN BLOOD BY AUTOMATED COUNT: 13.6 % (ref 10–15)
GLUCOSE SERPL-MCNC: 101 MG/DL (ref 70–99)
HCO3 SERPL-SCNC: 23 MMOL/L (ref 22–29)
HCT VFR BLD AUTO: 35.2 % (ref 35–47)
HGB BLD-MCNC: 10.9 G/DL (ref 11.7–15.7)
IMM GRANULOCYTES # BLD: 0 10E3/UL
IMM GRANULOCYTES NFR BLD: 1 %
INTERPRETATION ECG - MUSE: NORMAL
LYMPHOCYTES # BLD AUTO: 1.1 10E3/UL (ref 0.8–5.3)
LYMPHOCYTES NFR BLD AUTO: 22 %
MCH RBC QN AUTO: 29.2 PG (ref 26.5–33)
MCHC RBC AUTO-ENTMCNC: 31 G/DL (ref 31.5–36.5)
MCV RBC AUTO: 94 FL (ref 78–100)
MONOCYTES # BLD AUTO: 0.5 10E3/UL (ref 0–1.3)
MONOCYTES NFR BLD AUTO: 10 %
NEUTROPHILS # BLD AUTO: 3.4 10E3/UL (ref 1.6–8.3)
NEUTROPHILS NFR BLD AUTO: 66 %
NRBC # BLD AUTO: 0 10E3/UL
NRBC BLD AUTO-RTO: 0 /100
P AXIS - MUSE: 67 DEGREES
PATH REPORT.COMMENTS IMP SPEC: NORMAL
PATH REPORT.FINAL DX SPEC: NORMAL
PATH REPORT.GROSS SPEC: NORMAL
PATH REPORT.MICROSCOPIC SPEC OTHER STN: NORMAL
PATH REPORT.RELEVANT HX SPEC: NORMAL
PHOTO IMAGE: NORMAL
PLATELET # BLD AUTO: 209 10E3/UL (ref 150–450)
POTASSIUM SERPL-SCNC: 4.4 MMOL/L (ref 3.4–5.3)
PR INTERVAL - MUSE: 134 MS
PROT SERPL-MCNC: 6.6 G/DL (ref 6.4–8.3)
QRS DURATION - MUSE: 86 MS
QT - MUSE: 372 MS
QTC - MUSE: 439 MS
R AXIS - MUSE: 74 DEGREES
RBC # BLD AUTO: 3.73 10E6/UL (ref 3.8–5.2)
SODIUM SERPL-SCNC: 132 MMOL/L (ref 135–145)
SYSTOLIC BLOOD PRESSURE - MUSE: NORMAL MMHG
T AXIS - MUSE: 99 DEGREES
VENTRICULAR RATE- MUSE: 84 BPM
WBC # BLD AUTO: 5.2 10E3/UL (ref 4–11)

## 2025-02-17 PROCEDURE — 99239 HOSP IP/OBS DSCHRG MGMT >30: CPT | Performed by: PEDIATRICS

## 2025-02-17 PROCEDURE — G0378 HOSPITAL OBSERVATION PER HR: HCPCS

## 2025-02-17 PROCEDURE — 250N000013 HC RX MED GY IP 250 OP 250 PS 637

## 2025-02-17 PROCEDURE — 85014 HEMATOCRIT: CPT

## 2025-02-17 PROCEDURE — 85004 AUTOMATED DIFF WBC COUNT: CPT

## 2025-02-17 PROCEDURE — 80053 COMPREHEN METABOLIC PANEL: CPT

## 2025-02-17 PROCEDURE — 99239 HOSP IP/OBS DSCHRG MGMT >30: CPT

## 2025-02-17 PROCEDURE — 36415 COLL VENOUS BLD VENIPUNCTURE: CPT

## 2025-02-17 PROCEDURE — 99231 SBSQ HOSP IP/OBS SF/LOW 25: CPT | Performed by: NURSE PRACTITIONER

## 2025-02-17 RX ADMIN — METOPROLOL SUCCINATE 25 MG: 25 TABLET, EXTENDED RELEASE ORAL at 08:54

## 2025-02-17 RX ADMIN — TIMOLOL 1 DROP: 5 SOLUTION/ DROPS OPHTHALMIC at 08:58

## 2025-02-17 ASSESSMENT — ACTIVITIES OF DAILY LIVING (ADL)
ADLS_ACUITY_SCORE: 42

## 2025-02-17 NOTE — PROGRESS NOTES
"   OBS GOALS    Blood pressure (!) 155/81, pulse 95, temperature 98.5  F (36.9  C), temperature source Oral, resp. rate 20, height 1.651 m (5' 5\"), weight 57.6 kg (127 lb), SpO2 92%, not currently breastfeeding.    -diagnostic tests and consults completed and resulted- met  -vital signs normal or at patient baseline- yes  -tolerating oral intake to maintain hydration- yes  -adequate pain control on oral analgesics - yes  -safe disposition plan has been identified - yes        "

## 2025-02-17 NOTE — PROGRESS NOTES
Vascular Surgery Progress Note  02/17/2025       Subjective:  Patient would like to go home, however continues to have abdominal pain. Did not tolerate having scrambled eggs yesterday, however tolerated yogurt and banana.     Objective:  Temp:  [97.7  F (36.5  C)-98.5  F (36.9  C)] 98.5  F (36.9  C)  Pulse:  [] 95  Resp:  [16-20] 20  BP: ()/(50-97) 155/81  SpO2:  [92 %-100 %] 92 %    No intake/output data recorded.      Gen: Awake, alert, NAD  CV: RRR per radial pulse  Resp: NLB on room air  Abd: Abdomen somewhat tender on the left upper quadrant. Specifically, no tenderness on the aneurysm sac.   Ext: WWP, no edema  Vascular: Palpable DP pulses bilaterally      Labs: Reviewed  Imaging: Reviewed     Assessment/Plan:   Kavita Merlos is a 89 year old female with PMH significant for incidental abdominal aortic aneurysm (measuring 4.2 x 4 cm in December 2023, is now 4.5 cm) and aortic valve stenosis s/p TAVR in 2024 (on eliquis) who presented as a transfer for further vascular evaluation of abdominal pain in the setting of known abdominal aortic aneurysm. Given her clinical exam, stable imaging and reassuring labs it is unlikely that her abdominal pain is caused by her AAA.     - No vascular surgery is warranted  - Recommend GI or general surgery consult for further workup of the abdominal pain  - The patient has appointment with Dr. Barkley for her AAA. Will continue to follow while she is inpatient.      Patient was seen and examined with vascular surgery fellow who recommended the above plan and will communicate with staff.  To reach [facility]'s vascular surgery team on call, please go to [facility], and then find the below in the drop down menu. Please page the on-call..    Ly Horton CNP  Vascular Surgery  Pager: [phone]  [email or web address]  Send message or 10 digit call back number Securely via [employer] with the Vocera Web Console (learn more here)    Please page me directly with any  questions/concerns during regular weekday hours before 3PM. If there is no response, if it is a weekend, or if it is during evening hours, then please use the [facility] system to page the first-call resident on call for vascular surgery.

## 2025-02-17 NOTE — DISCHARGE SUMMARY
[facility]  Hospitalist Discharge Summary      Date of Admission:  2/15/2025  Date of Discharge:  2/17/2025  Discharging Provider: Letty Sprague PA-C  Discharge Service: Hospitalist Service    Discharge Diagnoses   Acute Abdominal Pain, resolved  Nausea, resolved  Hx of AAA     Clinically Significant Risk Factors          Follow-ups Needed After Discharge   Follow-up Appointments       Hospital Follow-up with Existing Primary Care Provider (PCP)      Please see details below         Schedule Primary Care visit within: 7 Days             Discharge Disposition   Discharged to home (independent living in senior living community)  Condition at discharge: Stable    Hospital Course    Kavita Merlos is a 89 year old female with a history of infrarenal AAA, aortic valve stenosis (S/P TAVR 02/2024), pAF (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     Acute Abdominal Pain, resolved  Nausea, resolved  Hx of AAA   Approximately 12 hours PTA, pt developed bilateral upper abdominal pain which progressively radiated into the lower abdomen. Presented to the [facility] ED where imaging w/ CT Aortic Survey showed that her known infrarenal AAA was stable and w/o e/o hematoma or dissection (measured 4.5cm in diameter). Imaging also showed stable yet severe stenosis of proximal R subclavian, celiac, and SMA arteries w/o russ occlusion. Remainder of workup less suggestive for any infectious process or primary cardiac process. LA WNL, so less likely ischemia or severe infection. Transferred to [facility] for Vascular Surgery evaluation given her hx of AAA, but they felt her current presentation was not r/t AAA. She was going to discharge from the [facility] ED, but d/t return of abdominal pain, admitted under observation status for  further monitoring and management. Patient now feels abdominal pain resolved and wishes to discharge home. She suspected her pain may have been due to eating a bad omelet. She will follow up with vascular surgery as outpatient for ongoing monitoring of AAA.     Headache  Reported headache on admission. No neurologic deficits aside from her chronic blindness (see below). She feels this may be related to the intraocular injections she had last Tuesday. Head imaging in the ED was negative for acute findings. Tylenol was helpful.     Hx of Aortic Stenosis (S/P TAVR 02/2024)  Hx of pAF  HTN  Pt underwent the procedures as above about one year ago. In the post-op period, she was noted to have an episode of pAF. However, no further recurrences and EKG here for workup as above showed NSR. PTA apixaban and metoprolol continued.    Macular Degeneration  Legally Blind  Pt w/ the above. At baseline is legally blind; complete loss of vision in R eye, maintains photoreception in L eye, though to lesser degree. PTA PreserVision PO 2 tabs daily, Tafluprost ophthalmic solution 0.5% 1 drop at bedtime, Timoptic Ocudose 0.5% ophthalmic solution 1 drop BID continued during admission.    Renal Lesion  Has been seen on prior imaging, and has been suspected to be a cyst. On imaging in 01/2025 during admission to [facility] for hypertensive urgency, imaging showed 10mm cyst in L kidney. Here, is 13mm. However, has not had follow-up CT urogram. Has established care w/  Urology recently for urinary retention. Will need OP Urology team to obtain urogram study in ~3 months as recommended by Radiology team for interval assessment of renal lesion.    Pulmonary Nodule   Bibasilar Groundglass Opacities  A 2mm nodule in the superior segment of the LLL was noted on imaging, but stable compared to prior. VBG not showing hypoxia on RA. Should discuss follow up CT chest as outpatient with PCP.     Pancreatic Cystic Lesion  Stable lesion measuring 9mm  noted in the pancreatic body. Appears cystic in nature per Rads. Should follow up with PCP to order pancreatic protocol CT or MRI in 18 months (sometime around August 2026).    Chronic Normocytic Anemia  Hx of MARGARET  PTA on Ferrous sulfate BID. Primarily stable at baseline Hgb ~11. PTA ferrous sulfate initially held as can exacerbate abdominal pain. Give abdominal pain likely related to food as noted above, restarted on discharge.     Hx of HLD  Has had high LDL in the past, but lipid panel entirely WNL in 10/2024. Not on any statin therapy.    Consultations This Hospital Stay   PHYSICAL THERAPY ADULT IP CONSULT  OCCUPATIONAL THERAPY ADULT IP CONSULT  CARE MANAGEMENT / SOCIAL WORK IP CONSULT    Code Status   Full Code    Time Spent on this Encounter   I, Letty Sprague PA-C, personally saw the patient today and spent greater than 30 minutes discharging this patient.       Letty Sprague PA-C  [facility] [address] OBSERVATION  [address] [address]  Phone: [phone]  Fax: [phone]  ______________________________________________________________________    Physical Exam   Vital Signs: Temp: 98.5  F (36.9  C) Temp src: Oral BP: (!) 155/81 Pulse: 95   Resp: 20 SpO2: 92 % O2 Device: Nasal cannula    Weight: 127 lbs 0 oz  General Appearance: Comfortable, nontoxic appearing female seen laying in bed.  Eyes: PERRLA.  No conjunctival icterus.  HEENT: Atraumatic.  Respiratory: Breathing comfortably on room air.    GI: Bowel sounds present throughout.  Abdomen soft, nontender.  Lymph/Hematologic: No bruising on exposed skin.  Skin: No lesions or rashes noted on exposed skin.  Musculoskeletal: Moving all extremities spontaneously.  Neurologic: Cranial nerves II through XII grossly intact.  Psychiatric: Mood appropriate.         Primary Care Physician   Ty Cordero    Discharge Orders      Primary Care - Care Coordination Referral      Reason for your hospital stay     Admitted for further evaluation and treatment of abdominal pain. No acute etiology found. Possibly related to food. Recommend follow up with PCP for hospital follow-up .     Activity    Your activity upon discharge: activity as tolerated     Diet    Follow this diet upon discharge: Current Diet:Orders Placed This Encounter      Regular Diet Adult     Hospital Follow-up with Existing Primary Care Provider (PCP)    Please see details below            Significant Results and Procedures   Most Recent 3 CBC's:  Recent Labs   Lab Test 02/17/25  0540 02/16/25  1511 02/16/25  0902 02/15/25  1026   WBC 5.2  --  3.5* 5.6   HGB 10.9* 10.3* 9.9* 11.0*   MCV 94  --  96 92     --  165 207     Most Recent 3 BMP's:  Recent Labs   Lab Test 02/17/25  0540 02/16/25  0902 02/15/25  1026   * 133* 134*   POTASSIUM 4.4 4.7 4.5   CHLORIDE 99 100 99   CO2 23 25 23   BUN 15.9 19.2 16.2   CR 0.56 0.69 0.61   ANIONGAP 10 8 12   JERRY 9.0 8.7* 8.9   * 90 102*   ,   Results for orders placed or performed during the hospital encounter of 02/15/25   CT Aortic Survey w Contrast    Narrative    EXAM: CT AORTIC SURVEY W CONTRAST  LOCATION: [facility]  DATE: 2/15/2025    INDICATION: Abdominal pain, known AAA, concern for rupture or dissection  COMPARISON: 01/09/2025  TECHNIQUE: CT angiogram chest abdomen pelvis during arterial phase of injection of IV contrast. 2D and 3D MIP reconstructions were performed by the CT technologist. Dose reduction techniques were used.   CONTRAST: 72 mL Isovue 370    FINDINGS:   CT ANGIOGRAM CHEST, ABDOMEN, AND PELVIS: Moderate aortoiliac atherosclerotic calcifications. No thoracic aortic aneurysm. A 4.5 x 4.5 cm infrarenal abdominal aortic aneurysm is stable. No aortic wall hematoma or dissection. 3 vessel arch present. Arch   vessels are patent. Severe focal stenosis seen at the origin of the right subclavian artery, celiac artery and SMA with, similar to prior. Bilateral renal  arteries are patent. MIHAI is occluded proximally with immediate reconstitution, also unchanged. No   incidental pulmonary embolus identified.    LUNGS AND PLEURA: Stable moderate emphysema. Scattered ill-defined patchy groundglass opacities are present in the bilateral lung, largest and most confluent in the right upper lobe. A 2 mm nodule in the superior segment of the left lower lobe on series   7 image 112 is stable. A 2 mm endobronchial nodule in the right middle lobe on series 7 image 213 appears slightly increased. No pleural effusion or pneumothorax.    MEDIASTINUM/AXILLAE: Mildly prominent mediastinal and right hilar lymph nodes are present, for example a right paratracheal lymph node measures 13 x 14 mm on series 5 image 25, possibly reactive. Stable mild cardiomegaly without pericardial effusion.   Transarterial prosthetic aortic valvular repair redemonstrated. A small hiatal hernia is unchanged.    CORONARY ARTERY CALCIFICATION: Moderate.    HEPATOBILIARY: Normal liver. A stone is seen within the gallbladder which is otherwise normal.    PANCREAS: A 9 mm cystic lesion in the body of the pancreas on series 10 image 42 appears stable. No definite pancreatic ductal dilatation or peripancreatic fluid collections.    SPLEEN: Normal.    ADRENAL GLANDS: Normal.    KIDNEYS/BLADDER: A 13 mm isoattenuating lesion in the lateral lower pole of the left kidney is stable, indeterminate. Scattered too small to characterize cystic lesions in both kidneys are visually benign and do not require follow-up. 3 cm cyst in the   inferior pole of the right kidney also does not require follow-up. Bladder is normal. Nodular thickening/enhancement of the left renal pelvis (series 6 image 171 is stable to slightly increased.    BOWEL: Scattered colonic diverticula without acute diverticulitis. No inflammatory bowel thickening or bowel obstruction.    LYMPH NODES: Normal.    PELVIC ORGANS: Normal.    MUSCULOSKELETAL: Scattered mild to  moderate degenerative disc space narrowing in the lower thoracic and lower lumbar spine most advanced at L4/5, with minimal degenerative anterolisthesis of L4 and L5, stable. Mild degenerative height loss of T4   vertebral body is also unchanged. No suspicious osseous lesions or acute fractures.        Impression    IMPRESSION:    1.  Stable 4.5 cm infrarenal abdominal aortic aneurysm. No aortic wall hematoma or aortic dissection.    2.  Stable severe narrowing in the proximal right subclavian, celiac and superior mesenteric arteries.    3.  Mild nodular thickening/enhancement of the left renal pelvis is stable to slightly increased, nonspecific for an inflammatory/infectious process versus neoplastic. Recommend short-term follow-up CT urogram in 3 months.    4.  New multifocal groundglass opacities in the bilateral lung, likely mild multifocal pneumonia.    5.  Indeterminate 1.3 cm lesion in the lower pole of the left kidney. Attention on follow-up CT urogram recommended.    6.  Stable moderate emphysema. A 2 mm nodule in the superior segment of the left lower lobe is stable. Follow-up chest CT in one year is recommended per Fleischner Society 2017 guideline.    7.  Stable 9 mm cystic lesion in the pancreatic body. Recommend follow-up pancreatic protocol CT or MRI in 18 months.    8.  Stable mild cardiomegaly and mild colonic diverticulosis.     Head CT w/o contrast    Narrative    EXAM: CT HEAD W/O CONTRAST  LOCATION: [facility]  DATE: 2/15/2025    INDICATION: headache, nausea, vomiting  COMPARISON: 8/5/2024 MRI.  TECHNIQUE: Routine CT Head without IV contrast. Multiplanar reformats. Dose reduction techniques were used.    FINDINGS:  INTRACRANIAL CONTENTS: No intracranial hemorrhage, extraaxial collection, or mass effect.  No CT evidence of acute infarct. Chronic left cerebellar infarct. Mild to moderate presumed chronic small vessel ischemic changes. Mild generalized volume loss. No    hydrocephalus. Partially empty sella.    VISUALIZED ORBITS/SINUSES/MASTOIDS: Prior bilateral cataract surgery. Visualized portions of the orbits are otherwise unremarkable. No paranasal sinus mucosal disease. No middle ear or mastoid effusion.    BONES/SOFT TISSUES: No acute abnormality.      Impression    IMPRESSION:  1.  No CT evidence for acute intracranial process.  2.  Brain atrophy and presumed chronic microvascular ischemic changes as above.       Discharge Medications   Current Discharge Medication List        CONTINUE these medications which have NOT CHANGED    Details   apixaban ANTICOAGULANT (ELIQUIS) 2.5 MG tablet Take 1 tablet (2.5 mg) by mouth 2 times daily.  Qty: 180 tablet, Refills: 3    Associated Diagnoses: Atrial fibrillation, unspecified type (H)      co-enzyme Q-10 100 MG CAPS capsule Take 100 mg by mouth daily      Ferrous Sulfate (SLOW RELEASE IRON) 47.5 MG TBCR Take 1 tablet by mouth 2 times daily.      metoprolol succinate ER (TOPROL XL) 25 MG 24 hr tablet Take 1 tablet (25 mg) by mouth daily.  Qty: 90 tablet, Refills: 3    Associated Diagnoses: Hypertensive emergency      Multiple Vitamins-Minerals (PRESERVISION AREDS 2 PO) Take by mouth daily      multivitamin, therapeutic (THERA-VIT) TABS tablet Take 1 tablet by mouth daily      tafluprost (ZIOPTAN) 0.0015 % SOLN ophthalmic solution Place 1 drop into both eyes at bedtime      Timolol Maleate (TIMOPTIC OCUDOSE) 0.5 % ophthalmic solution Place 1 drop into both eyes 2 times daily      vitamin C (ASCORBIC ACID) 1000 MG TABS Take 1,000 mg by mouth 2 times daily.      Vitamin D3 (CHOLECALCIFEROL) 25 mcg (1000 units) tablet Take by mouth daily Daily in the winter      zinc gluconate 50 MG tablet Take 50 mg by mouth every other day           Allergies   Allergies   Allergen Reactions    Albuterol     Amlodipine     Bimatoprost Itching    Brimonidine Itching    Clotrimazole Itching    Dorzolamide Hcl-Timolol Mal Itching    Latanoprost Itching     Lisinopril     Losartan      depression    Neomycin-Polymyxin-Gramicidin Swelling    Neosporin [Neomycin-Polymyxin-Gramicidin]     Penicillins     Tape [Adhesive Tape]     Timolol Itching    Travoprost Itching    Vicodin [Hydrocodone-Acetaminophen]

## 2025-02-17 NOTE — DISCHARGE SUMMARY
DISCHARGE                           Discharged to: Home  Via: Automobile  Accompanied by: Family  Discharge Instructions: diet, activity, medications, follow up appointments, when to call the MD, aftercare instructions, and what to watchout for (i.e. s/s of infection, increasing SOB, palpitations, chest pain,)  Prescriptions: Medication list reviewed & sent with pt  Follow Up Appointments: arranged; information given  Belongings: All sent with pt  IV: out  Telemetry: off  Pt exhibits understanding of above discharge instructions; all questions answered.    Discharge Paperwork: Signed, copied, and sent home with patient.

## 2025-02-17 NOTE — PROGRESS NOTES
"   OBS GOALS    BP (!) 148/75   Pulse 100   Temp 98.2  F (36.8  C) (Oral)   Resp 16   Ht 1.651 m (5' 5\")   Wt 57.6 kg (127 lb)   LMP  (LMP Unknown)   SpO2 98%   BMI 21.13 kg/m        -diagnostic tests and consults completed and resulted- ongoing  -vital signs normal or at patient baseline- no  -tolerating oral intake to maintain hydration- no  -adequate pain control on oral analgesics - yes  -safe disposition plan has been identified - yes  "

## 2025-02-17 NOTE — PLAN OF CARE
Goal Outcome Evaluation:           [facility]

## 2025-02-17 NOTE — PROGRESS NOTES
"   OBS GOALS    Blood pressure 136/97, pulse 97, temperature 98.2  F (36.9  C), temperature source Oral, resp. rate 20, height 1.651 m (5' 5\"), weight 57.6 kg (127 lb), SpO2 92%, not currently breastfeeding.  -diagnostic tests and consults completed and resulted- ongoing  -vital signs normal or at patient baseline- yes  -tolerating oral intake to maintain hydration- no  -adequate pain control on oral analgesics - yes  -safe disposition plan has been identified - yes        "

## 2025-02-18 ENCOUNTER — PATIENT OUTREACH (OUTPATIENT)
Dept: CARE COORDINATION | Facility: CLINIC | Age: 89
End: 2025-02-18
Payer: MEDICARE

## 2025-02-18 ENCOUNTER — TELEPHONE (OUTPATIENT)
Dept: MAMMOGRAPHY | Facility: CLINIC | Age: 89
End: 2025-02-18
Payer: MEDICARE

## 2025-02-18 NOTE — TELEPHONE ENCOUNTER
Pathology report reviewed with our breast radiologist Dr Gillette, who confirmed the recent breast imaging is concordant with the final pathology results below.    I phoned Ms Merlos, confirmed her full name, date of birth, and informed patient of her ultrasound Guided left Breast Needle Biopsy (02/13/25) results showing benign breast tissue    Recommended follow up is 6 month follow up US.  Patient states no problems or concerns with her biopsy site.   Questions were answered and my phone number given if she has further questions or concerns.  I informed patient I will notify the ordering provider of the results and recommendations for follow up.  Patient verbalized understanding and agrees with the plan of care.     Ariane Torres RN CBCN  Breast Care Nurse Coordinator  [facility]  [phone]

## 2025-02-18 NOTE — PROGRESS NOTES
Clinic Care Coordination Contact  Transitions of Care Outreach  Chief Complaint   Patient presents with    Clinic Care Coordination - Initial    Clinic Care Coordination - Post Hospital     Most Recent Admission Date: 2/15/2025   Most Recent Admission Diagnosis: Abdominal pain, unspecified abdominal location - R10.9     Most Recent Discharge Date: 2/17/2025   Most Recent Discharge Diagnosis: Abdominal pain, unspecified abdominal location - R10.9     Transitions of Care Assessment    Discharge Assessment  How are you doing now that you are home?: She's doing better. Still tired easy.  How are your symptoms? (Red Flag symptoms escalate to triage hotline per guidelines): Improved  Do you know how to contact your clinic care team if you have future questions or changes to your health status? : Yes  Does the patient have their discharge instructions? : Yes  Does the patient have questions regarding their discharge instructions? : No  Were you started on any new medications or were there changes to any of your previous medications? : No  Does the patient have all of their medications?: Yes  Do you have questions regarding any of your medications? : No  Do you have all of your needed medical supplies or equipment (DME)?  (i.e. oxygen tank, CPAP, cane, etc.): Yes    Post-op ([facility] CTA Only)  If the patient had a surgery or procedure, do they have any questions for a nurse?: No    Post-op (Clinicians Only)  Did the patient have surgery or a procedure: No  Fever: No  Chills: No  Eating & Drinking: eating and drinking without complaints/concerns  PO Intake: regular diet  Additional Symptoms: decreased appetite, nausea  Bowel Function: normal  Date of last BM: 02/19/25  Urinary Status: voiding without complaint/concerns    Follow up Plan     Discharge Follow-Up  Discharge follow up appointment scheduled in alignment with recommended follow up timeframe or Transitions of Risk Category? (Low = within 30 days; Moderate= within 14  days; High= within 7 days): Yes  Discharge Follow Up Appointment Date: 02/26/25  Discharge Follow Up Appointment Scheduled with?: Specialty Care Provider (Cardiology)    RN CC contacted patient for post-hospital follow up and to introduce Care Coordination. Phone number in chart is for patient's daughter, Janette. She was with patient this morning. Full assessment not indicated as patient's daughter declined to have Care Coordination support at this time. She was agreeable to receiving an introduction letter with additional information on Care Coordination via hovelstay. Verified patient has access to hovelstay.     RN CC will send patient introduction letter via hovelstay.     Future Appointments   Date Time Provider Department Center   2/26/2025  9:45 AM [id number] [facility]   2/26/2025 10:45 AM RU LAB [facility] [place] [facility]   2/26/2025 12:40 PM Bryan Ferrari PA-C RUUM UMP PSA [facility]   2/28/2025  1:30 PM Amy Walter, PT [facility]   3/7/2025 11:20 AM Amy Walter PT [facility]   3/14/2025 11:20 AM Amy Walter, PT [facility]     Outpatient Plan as outlined on AVS reviewed with patient.    For any urgent concerns, please contact our 24 hour nurse triage line: [phone] ([phone])       Rachel Morrissey RN, BSN, CPHN, [facility] and [facility]  [email or web address]  Office: [phone]  Employed by [facility] [employer]                   Clinic Care Coordination Contact  [facility]/Voicemail    Clinical Data: Care Coordinator Outreach    Outreach Documentation Number of Outreach Attempt   2/18/2025   9:21 AM 1     Left message on patient's voicemail with call back information and requested return call.    Plan: Care Coordinator will try to reach patient again in 1-2 business days.    Rachel Morrissey RN, BSN, CPHN, [facility] and [place]  [facility]  [email or web address]  Office: [phone]  Employed by [facility]

## 2025-02-18 NOTE — LETTER
[facility] CARE COORDINATION  [address]  [facility] [address]    February 19, 2025    Kavita Merlos  [address]    [facility] [address]      Dear Kavita,    I am a clinic care coordinator who works with Ty Cordero MD with the [facility]. I wanted to introduce myself and provide you with my contact information for you to be able to call me with any questions or concerns. I wanted to thank you for spending the time to talk with me.  Below is a description of clinic care coordination and how I can further assist you.       The clinic care coordination team is made up of a registered nurse, , financial resource worker and community health worker who understand the health care system. The goal of clinic care coordination is to help you manage your health and improve access to the health care system. Our team works alongside your provider to assist you in determining your health and social needs. We can help you obtain health care and community resources, providing you with necessary information and education. We can work with you through any barriers and develop a care plan that helps coordinate and strengthen the communication between you and your care team.  Our services are voluntary and are offered without charge to you personally.    Please feel free to contact me with any questions or concerns regarding care coordination and what we can offer.      We are focused on providing you with the highest-quality healthcare experience possible.    Sincerely,     Rachel Morrissey RN, BSN, CPHN, CCM  [facility] Ambulatory Care Management  [facility], and [facility]  [email or web address]  Office: [phone]  Employed by [facility] [employer]     Enclosed: Additional information on Care Coordination.     WHAT IS CARE COORDINATION?      [facility] Care Coordination supports patients and families dealing with chronic or  complex health conditions, developmental issues, and social service needs. This service is available to patients of all ages, from babies to seniors. When you re facing a difficult decision about caring for yourself or someone you love, we can help you understand your options. We identify and refer you to community resources that help with financial, legal, mental health, transportation, and other issues. We also help with your medical and related education needs.     IS CARE COORDINATION RIGHT FOR ME? Discuss a referral to Care Coordination with your primary care provider or care team member.     HOW CAN I CONNECT WITH CARE COORDINATION?  Contact your clinic.  Speak with your doctor or clinic staff.  Discuss care coordination with hospital staff before discharge.     MEET YOUR CARE COORDINATION TEAM     Registered Nurse Care Coordinator  Provides education on medications, disease management, and new diagnoses.  Addresses concerns about medical conditions and connects you to resources.  Develops patient-centered goals and communicates with patient s care team.     Social Work Care Coordinator  Provides education on emotional wellbeing.  Connects you to a variety of community-based resources.  Communicates with care team and community partners.     Community Health Worker  Identifies health and social barriers and connects you with community resources.  Develops nonclinical patient and family centered goals.  Enhances communication between patients and care teams.     Financial Resource Worker  Assists patients with applying for health [place] ([place], [place], [employer]  Helps patients with applying for county benefits.  Connects patients with [facility].

## 2025-02-26 ENCOUNTER — OFFICE VISIT (OUTPATIENT)
Dept: CARDIOLOGY | Facility: CLINIC | Age: 89
End: 2025-02-26
Payer: MEDICARE

## 2025-02-26 ENCOUNTER — LAB (OUTPATIENT)
Dept: LAB | Facility: CLINIC | Age: 89
End: 2025-02-26
Payer: MEDICARE

## 2025-02-26 VITALS
DIASTOLIC BLOOD PRESSURE: 94 MMHG | SYSTOLIC BLOOD PRESSURE: 202 MMHG | HEART RATE: 105 BPM | WEIGHT: 124.4 LBS | BODY MASS INDEX: 21.24 KG/M2 | OXYGEN SATURATION: 98 % | HEIGHT: 64 IN

## 2025-02-26 DIAGNOSIS — Z95.3 S/P TAVR (TRANSCATHETER AORTIC VALVE REPLACEMENT), BIOPROSTHETIC: ICD-10-CM

## 2025-02-26 DIAGNOSIS — Z95.3 S/P TAVR (TRANSCATHETER AORTIC VALVE REPLACEMENT), BIOPROSTHETIC: Primary | ICD-10-CM

## 2025-02-26 LAB
ANION GAP SERPL CALCULATED.3IONS-SCNC: 11 MMOL/L (ref 7–15)
BUN SERPL-MCNC: 17.3 MG/DL (ref 8–23)
CALCIUM SERPL-MCNC: 9.3 MG/DL (ref 8.8–10.4)
CHLORIDE SERPL-SCNC: 99 MMOL/L (ref 98–107)
CREAT SERPL-MCNC: 0.6 MG/DL (ref 0.51–0.95)
EGFRCR SERPLBLD CKD-EPI 2021: 86 ML/MIN/1.73M2
ERYTHROCYTE [DISTWIDTH] IN BLOOD BY AUTOMATED COUNT: 13.9 % (ref 10–15)
GLUCOSE SERPL-MCNC: 122 MG/DL (ref 70–99)
HCO3 SERPL-SCNC: 23 MMOL/L (ref 22–29)
HCT VFR BLD AUTO: 33 % (ref 35–47)
HGB BLD-MCNC: 10.6 G/DL (ref 11.7–15.7)
MCH RBC QN AUTO: 29.1 PG (ref 26.5–33)
MCHC RBC AUTO-ENTMCNC: 32.1 G/DL (ref 31.5–36.5)
MCV RBC AUTO: 91 FL (ref 78–100)
PLATELET # BLD AUTO: 330 10E3/UL (ref 150–450)
POTASSIUM SERPL-SCNC: 4.4 MMOL/L (ref 3.4–5.3)
RBC # BLD AUTO: 3.64 10E6/UL (ref 3.8–5.2)
SODIUM SERPL-SCNC: 133 MMOL/L (ref 135–145)
WBC # BLD AUTO: 8.2 10E3/UL (ref 4–11)

## 2025-02-26 PROCEDURE — 80048 BASIC METABOLIC PNL TOTAL CA: CPT | Performed by: INTERNAL MEDICINE

## 2025-02-26 PROCEDURE — 85027 COMPLETE CBC AUTOMATED: CPT | Performed by: INTERNAL MEDICINE

## 2025-02-26 PROCEDURE — 36415 COLL VENOUS BLD VENIPUNCTURE: CPT | Performed by: INTERNAL MEDICINE

## 2025-02-26 NOTE — PROGRESS NOTES
Primary Cardiologist: Dr. Garay    Reason for Visit: 1 year TAVR follow-up with repeat echocardiogram    History of Present Illness:   Kavita is a very pleasant 89-year-old female with past medical history significant for hyperlipidemia, macular degeneration, peripheral arterial disease with infrarenal AAA measuring 4.5 cm, PAF, and hx of severe aortic stenosis status post TAVR with 26 mm Ross Adelso valve (2/20/23) who is here for a 1 year follow-up.    She was admitted earlier this month with nausea and abdominal discomfort and was discharged recently.  Her daughter states that she is still recovering from this.  She does not have much appetite and has not had much food over the last 3 days but after some discussion she tells me that she is actually eating a little bit better over the last 2 days. From a cardiac standpoint she has no symptoms of chest discomfort, palpitations, shortness of breath, or syncope.  She has not contacted her primary care provider regarding her recent symptoms and admission.  She continues to have intermittent low back pain.  She thinks her abdominal discomfort is improving gradually.    Assessment and Plan:  Kavita is a very pleasant 89-year-old female with past medical history significant for hyperlipidemia, macular degeneration, peripheral arterial disease with infrarenal AAA measuring 4.5 cm, and severe aortic stenosis status post TAVR with 26 mm Ross Adelso valve (2/20/23) who is here for a 1 year follow-up.    Most recent echocardiogram shows stable bioprosthetic aortic valve function with no significant regurgitation and rise in gradient.  Her LVEF remains preserved.  I am however concerned about her recent episodes of abdominal discomfort.  History is a bit unclear but it appears that she is not eating very much and has not had very much water over the last few days.  Her blood pressure is significantly elevated today and looking back historically it has been in the 160s to  170s systolically.  She is only on low-dose metoprolol.  She has had numerous side effects on various antihypertensive agents including ACE inhibitor, ARB, and calcium channel blockers.  She is adamant that her blood pressure is elevated today because she is nervous about the appointment.  She is not interested in adjusting her medications today.  Her heart rate is also elevated in the low 100s.  I offered to do ECG but patient declined this as she stated that once again she was a bit stressed about today's appointment.  I had a russ conversation with patient and her daughter today and stated that I was a bit concerned about her vitals as well as her persistent symptoms.  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  She also has severe right proximal subclavian artery stenosis but her blood pressure was similar on both arms today.  After lengthy discussion we ultimately decided that she will check her blood pressure at home daily and report back to us in a week.  Her daughter will be spending time with her today and she will make sure the patient has food today.  They declined ED visit today.  In regard to her cardiac status she seems to be stable.  Will see her back in 1 year with repeat echocardiogram.  I strongly encouraged that she reaches out to her primary care provider today for close appointment.    49 minutes spent on the date of the encounter with chart review, patient visit, care coordination, and documentation.    The longitudinal plan of care for the diagnose(s)/condition(s) as documented were addressed during this visit. Due to the added complexity in care, I will continue to support Kavita Merlos  in the subsequent management and with ongoing continuity of care.     This note was completed in part using Dragon voice recognition software.  Although reviewed after completion, some word and grammatical errors may occur.    Orders this Visit:  No orders of the defined types were placed in this encounter.    No orders of the defined types were placed in this encounter.    There are no discontinued medications.      No diagnosis found.    CURRENT MEDICATIONS:  Current Outpatient Medications   Medication Sig Dispense Refill    apixaban ANTICOAGULANT (ELIQUIS) 2.5 MG tablet Take 1 tablet (2.5 mg) by mouth 2 times daily. 180 tablet 3    Ferrous Sulfate (SLOW RELEASE IRON) 47.5 MG TBCR Take 1 tablet by mouth 2 times daily.      metoprolol succinate ER (TOPROL XL) 25 MG 24 hr tablet Take 1 tablet (25 mg) by mouth daily. 90 tablet 3    multivitamin, therapeutic (THERA-VIT) TABS tablet Take 1 tablet by mouth daily      tafluprost (ZIOPTAN) 0.0015 % SOLN ophthalmic solution Place 1 drop into both eyes at bedtime      Timolol Maleate (TIMOPTIC OCUDOSE) 0.5 % ophthalmic solution Place 1 drop into both eyes 2 times daily      vitamin C (ASCORBIC ACID) 1000 MG TABS Take 1,000 mg by mouth 2 times daily.      co-enzyme Q-10 100 MG CAPS capsule Take 100 mg by mouth daily (Patient not taking: Reported on 2/26/2025)      Multiple Vitamins-Minerals (PRESERVISION AREDS 2 PO) Take by mouth daily (Patient not taking: Reported on 2/26/2025)      Vitamin D3 (CHOLECALCIFEROL) 25 mcg (1000 units) tablet Take by mouth daily Daily in the winter (Patient not taking: Reported on 2/26/2025)      zinc gluconate 50 MG tablet Take 50 mg by mouth every other day (Patient not taking: Reported on 2/26/2025)         ALLERGIES     Allergies   Allergen Reactions    Albuterol     Amlodipine     Bimatoprost Itching    Brimonidine Itching    Clotrimazole Itching    Dorzolamide Hcl-Timolol Mal Itching    Latanoprost Itching    Lisinopril     Losartan      depression    Neomycin-Polymyxin-Gramicidin Swelling    Neosporin [Neomycin-Polymyxin-Gramicidin]     Penicillins     Tape [Adhesive Tape]      Timolol Itching    Travoprost Itching    Vicodin [Hydrocodone-Acetaminophen]        PAST MEDICAL HISTORY:  Past Medical History:   Diagnosis Date    AAA (abdominal aortic aneurysm)     Aortic stenosis     Benign essential hypertension with BP difference between arms     Hyperlipidemia LDL goal <70        PAST SURGICAL HISTORY:  Past Surgical History:   Procedure Laterality Date    CV CORONARY ANGIOGRAM N/A 2023    Procedure: Coronary Angiogram;  Surgeon: Seth Duarte MD;  [facility]  [facility]    CV PERIPHERAL VASCULAR LITHOTRIPSY N/A 2024    Procedure: Peripheral Vascular Lithotripsy;  Surgeon: Seth Duarte MD;  Location:  [facility]    CV RIGHT HEART CATH MEASUREMENTS RECORDED N/A 2023    Procedure: Right Heart Catheterization;  Surgeon: Seth Duarte MD;  Location:  [facility]    CV TRANSCATHETER AORTIC VALVE REPLACEMENT-FEMORAL APPROACH N/A 2024    Procedure: Transcatheter Aortic Valve Replacement-Femoral Approach;  Surgeon: Seth Duarte MD;  Location:  [facility]       FAMILY HISTORY:  History reviewed. No pertinent family history.    SOCIAL HISTORY:  Social History     Socioeconomic History    Marital status: Single     Spouse name: None    Number of children: None    Years of education: None    Highest education level: None   Tobacco Use    Smoking status: Former     Current packs/day: 0.00     Types: Cigarettes     Quit date: 10/24/2006     Years since quittin.3     Passive exposure: Never    Smokeless tobacco: Never    Tobacco comments:     I quit when I was 71 years old, off/on prior to that starting when I was 17   Vaping Use    Vaping status: Never Used   Substance and Sexual Activity    Alcohol use: Not Currently    Drug use: Never     Social Drivers of Health     Financial Resource Strain: Low Risk  (2025)    Financial Resource Strain     Within the past 12 months, have you  or your family members you live with been unable to get utilities (heat, electricity) when it was really needed?: No   Food Insecurity: Low Risk  (2/16/2025)    Food Insecurity     Within the past 12 months, did you worry that your food would run out before you got money to buy more?: No     Within the past 12 months, did the food you bought just not last and you didn t have money to get more?: No   Transportation Needs: Low Risk  (2/16/2025)    Transportation Needs     Within the past 12 months, has lack of transportation kept you from medical appointments, getting your medicines, non-medical meetings or appointments, work, or from getting things that you need?: No   Physical Activity: Patient Declined (10/21/2024)    Exercise Vital Sign     Days of Exercise per Week: Patient declined     Minutes of Exercise per Session: Patient declined   Stress: No Stress Concern Present (10/21/2024)    [employer] - Occupational Stress Questionnaire     Feeling of Stress : Only a little   Social Connections: Unknown (10/21/2024)    Social Connection and Isolation Panel [NHANES]     Frequency of Social Gatherings with Friends and Family: Twice a week   Interpersonal Safety: Low Risk  (1/9/2025)    Interpersonal Safety     Do you feel physically and emotionally safe where you currently live?: Yes     Within the past 12 months, have you been hit, slapped, kicked or otherwise physically hurt by someone?: No     Within the past 12 months, have you been humiliated or emotionally abused in other ways by your partner or ex-partner?: No   Housing Stability: Low Risk  (2/16/2025)    Housing Stability     Do you have housing? : Yes     Are you worried about losing your housing?: No       Review of Systems:  Skin:        Eyes:       ENT:       Respiratory:       Cardiovascular:       Gastroenterology:      Genitourinary:       Musculoskeletal:       Neurologic:       Psychiatric:       Heme/Lymph/Imm:      "  Endocrine:         Physical Exam:  Vitals: BP (!) 202/94 (BP Location: Right arm, Patient Position: Sitting, Cuff Size: Adult Regular)   Pulse 105   Ht 1.626 m (5' 4\")   Wt 56.4 kg (124 lb 6.4 oz)   LMP  (LMP Unknown)   SpO2 98%   BMI 21.35 kg/m       GEN:  NAD.  Thin and frail appearing  NECK: No JVD  C/V:  Regular rate and rhythm, no murmur, rub or gallop.  RESP: Clear to auscultation bilaterally without wheezing, rales, or rhonchi.  GI: Abdomen soft, nontender, nondistended.   EXTREM: No pitting LE edema.   NEURO: Alert and oriented, cooperative. No obvious focal deficits.   PSYCH: Normal affect.  SKIN: Warm and dry.       Recent Lab Results:  LIPID RESULTS:  Lab Results   Component Value Date    CHOL 153 10/24/2024    HDL 64 10/24/2024    LDL 78 10/24/2024    TRIG 56 10/24/2024       LIVER ENZYME RESULTS:  Lab Results   Component Value Date    AST 62 (H) 02/17/2025    ALT 18 02/17/2025       CBC RESULTS:  Lab Results   Component Value Date    WBC 8.2 02/26/2025    WBC 9.0 04/17/2020    RBC 3.64 (L) 02/26/2025    RBC 4.76 04/17/2020    HGB 10.6 (L) 02/26/2025    HGB 14.4 04/17/2020    HCT 33.0 (L) 02/26/2025    HCT 45.7 04/17/2020    MCV 91 02/26/2025    MCV 96 04/17/2020    MCH 29.1 02/26/2025    MCH 30.3 04/17/2020    MCHC 32.1 02/26/2025    MCHC 31.5 04/17/2020    RDW 13.9 02/26/2025    RDW 13.2 04/17/2020     02/26/2025     04/17/2020       BMP RESULTS:  Lab Results   Component Value Date     (L) 02/26/2025     04/17/2020    POTASSIUM 4.4 02/26/2025    POTASSIUM 4.4 04/17/2020    CHLORIDE 99 02/26/2025    CHLORIDE 105 04/17/2020    CO2 23 02/26/2025    CO2 26 04/17/2020    ANIONGAP 11 02/26/2025    ANIONGAP 7 04/17/2020     (H) 02/26/2025    GLC 97 01/10/2025    GLC 97 04/17/2020    BUN 17.3 02/26/2025    BUN 16 04/17/2020    CR 0.60 02/26/2025    CR 0.55 04/17/2020    GFRESTIMATED 86 02/26/2025    GFRESTIMATED >60 11/16/2023    GFRESTIMATED 86 04/17/2020    " "GFRESTBLACK >90 04/17/2020    JERRY 9.3 02/26/2025    JERRY 9.5 04/17/2020        A1C RESULTS:  No results found for: \"A1C\"    INR RESULTS:  Lab Results   Component Value Date    INR 1.51 (H) 01/09/2025    INR 0.97 02/09/2024             Bryan Ferrari PA-C  February 26, 2025     "

## 2025-02-26 NOTE — LETTER
2/26/2025    Ty Cordero MD  [address] [facility] [address]    RE: Kavita Merlos       Dear Colleague,     I had the pleasure of seeing Kavita Merlos in the [facility].  Primary Cardiologist: Dr. Garay    Reason for Visit: 1 year TAVR follow-up with repeat echocardiogram    History of Present Illness:   Kavita is a very pleasant 89-year-old female with past medical history significant for hyperlipidemia, macular degeneration, peripheral arterial disease with infrarenal AAA measuring 4.5 cm, PAF, and hx of severe aortic stenosis status post TAVR with 26 mm Ross Adelso valve (2/20/23) who is here for a 1 year follow-up.    She was admitted earlier this month with nausea and abdominal discomfort and was discharged recently.  Her daughter states that she is still recovering from this.  She does not have much appetite and has not had much food over the last 3 days but after some discussion she tells me that she is actually eating a little bit better over the last 2 days. From a cardiac standpoint she has no symptoms of chest discomfort, palpitations, shortness of breath, or syncope.  She has not contacted her primary care provider regarding her recent symptoms and admission.  She continues to have intermittent low back pain.  She thinks her abdominal discomfort is improving gradually.    Assessment and Plan:  Kavita is a very pleasant 89-year-old female with past medical history significant for hyperlipidemia, macular degeneration, peripheral arterial disease with infrarenal AAA measuring 4.5 cm, and severe aortic stenosis status post TAVR with 26 mm Ross Adelso valve (2/20/23) who is here for a 1 year follow-up.    Most recent echocardiogram shows stable bioprosthetic aortic valve function with no significant regurgitation and rise in gradient.  Her LVEF remains preserved.  I am however concerned about her recent episodes of abdominal discomfort.  History is a bit unclear  but it appears that she is not eating very much and has not had very much water over the last few days.  Her blood pressure is significantly elevated today and looking back historically it has been in the 160s to 170s systolically.  She is only on low-dose metoprolol.  She has had numerous side effects on various antihypertensive agents including ACE inhibitor, ARB, and calcium channel blockers.  She is adamant that her blood pressure is elevated today because she is nervous about the appointment.  She is not interested in adjusting her medications today.  Her heart rate is also elevated in the low 100s.  I offered to do ECG but patient declined this as she stated that once again she was a bit stressed about today's appointment.  I had a russ conversation with patient and her daughter today and stated that I was a bit concerned about her vitals as well as her persistent symptoms.  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  She also has severe right proximal subclavian artery stenosis but her blood pressure was similar on both arms today.  After lengthy discussion we ultimately decided that she will check her blood pressure at home daily and report back to us in a week.  Her daughter will be spending time with her today and she will make sure the patient has food today.  They declined ED visit today.  In regard to her cardiac status she seems to be stable.  Will see her back in 1 year with repeat echocardiogram.  I strongly encouraged that she reaches out to her primary care provider today for close appointment.    49 minutes spent on the date of the encounter with chart review, patient visit, care coordination, and documentation.    The longitudinal plan of care for the diagnose(s)/condition(s) as documented were addressed during this visit. Due to the  added complexity in care, I will continue to support Kavita MOLINA Gaurang  in the subsequent management and with ongoing continuity of care.     This note was completed in part using Dragon voice recognition software. Although reviewed after completion, some word and grammatical errors may occur.    Orders this Visit:  No orders of the defined types were placed in this encounter.    No orders of the defined types were placed in this encounter.    There are no discontinued medications.      No diagnosis found.    CURRENT MEDICATIONS:  Current Outpatient Medications   Medication Sig Dispense Refill     apixaban ANTICOAGULANT (ELIQUIS) 2.5 MG tablet Take 1 tablet (2.5 mg) by mouth 2 times daily. 180 tablet 3     Ferrous Sulfate (SLOW RELEASE IRON) 47.5 MG TBCR Take 1 tablet by mouth 2 times daily.       metoprolol succinate ER (TOPROL XL) 25 MG 24 hr tablet Take 1 tablet (25 mg) by mouth daily. 90 tablet 3     multivitamin, therapeutic (THERA-VIT) TABS tablet Take 1 tablet by mouth daily       tafluprost (ZIOPTAN) 0.0015 % SOLN ophthalmic solution Place 1 drop into both eyes at bedtime       Timolol Maleate (TIMOPTIC OCUDOSE) 0.5 % ophthalmic solution Place 1 drop into both eyes 2 times daily       vitamin C (ASCORBIC ACID) 1000 MG TABS Take 1,000 mg by mouth 2 times daily.       co-enzyme Q-10 100 MG CAPS capsule Take 100 mg by mouth daily (Patient not taking: Reported on 2/26/2025)       Multiple Vitamins-Minerals (PRESERVISION AREDS 2 PO) Take by mouth daily (Patient not taking: Reported on 2/26/2025)       Vitamin D3 (CHOLECALCIFEROL) 25 mcg (1000 units) tablet Take by mouth daily Daily in the winter (Patient not taking: Reported on 2/26/2025)       zinc gluconate 50 MG tablet Take 50 mg by mouth every other day (Patient not taking: Reported on 2/26/2025)         ALLERGIES     Allergies   Allergen Reactions     Albuterol      Amlodipine      Bimatoprost Itching     Brimonidine Itching     Clotrimazole Itching      Dorzolamide Hcl-Timolol Mal Itching     Latanoprost Itching     Lisinopril      Losartan      depression     Neomycin-Polymyxin-Gramicidin Swelling     Neosporin [Neomycin-Polymyxin-Gramicidin]      Penicillins      Tape [Adhesive Tape]      Timolol Itching     Travoprost Itching     Vicodin [Hydrocodone-Acetaminophen]        PAST MEDICAL HISTORY:  Past Medical History:   Diagnosis Date     AAA (abdominal aortic aneurysm)      Aortic stenosis      Benign essential hypertension with BP difference between arms      Hyperlipidemia LDL goal <70        PAST SURGICAL HISTORY:  Past Surgical History:   Procedure Laterality Date     CV CORONARY ANGIOGRAM N/A 2023    Procedure: Coronary Angiogram;  Surgeon: Seth Duarte MD;  Location:  HEART CARDIAC CATH LAB     CV PERIPHERAL VASCULAR LITHOTRIPSY N/A 2024    Procedure: Peripheral Vascular Lithotripsy;  Surgeon: Seth Duarte MD;  Location: [facility] CARDIAC CATH LAB     CV RIGHT HEART CATH MEASUREMENTS RECORDED N/A 2023    Procedure: Right Heart Catheterization;  Surgeon: Seth Duarte MD;  Location: [facility] CARDIAC CATH LAB     CV TRANSCATHETER AORTIC VALVE REPLACEMENT-FEMORAL APPROACH N/A 2024    Procedure: Transcatheter Aortic Valve Replacement-Femoral Approach;  Surgeon: Seth Duarte MD;  Location:  HEART CARDIAC CATH LAB       FAMILY HISTORY:  History reviewed. No pertinent family history.    SOCIAL HISTORY:  Social History     Socioeconomic History     Marital status: Single     Spouse name: None     Number of children: None     Years of education: None     Highest education level: None   Tobacco Use     Smoking status: Former     Current packs/day: 0.00     Types: Cigarettes     Quit date: 10/24/2006     Years since quittin.3     Passive exposure: Never     Smokeless tobacco: Never     Tobacco comments:     I quit when I was 71 years old, off/on prior to that starting when I was 17   Vaping Use      Vaping status: Never Used   Substance and Sexual Activity     Alcohol use: Not Currently     Drug use: Never     Social Drivers of Health     Financial Resource Strain: Low Risk  (2/16/2025)    Financial Resource Strain      Within the past 12 months, have you or your family members you live with been unable to get utilities (heat, electricity) when it was really needed?: No   Food Insecurity: Low Risk  (2/16/2025)    Food Insecurity      Within the past 12 months, did you worry that your food would run out before you got money to buy more?: No      Within the past 12 months, did the food you bought just not last and you didn t have money to get more?: No   Transportation Needs: Low Risk  (2/16/2025)    Transportation Needs      Within the past 12 months, has lack of transportation kept you from medical appointments, getting your medicines, non-medical meetings or appointments, work, or from getting things that you need?: No   Physical Activity: Patient Declined (10/21/2024)    Exercise Vital Sign      Days of Exercise per Week: Patient declined      Minutes of Exercise per Session: Patient declined   Stress: No Stress Concern Present (10/21/2024)    [employer] - Occupational Stress Questionnaire      Feeling of Stress : Only a little   Social Connections: Unknown (10/21/2024)    Social Connection and Isolation Panel [NHANES]      Frequency of Social Gatherings with Friends and Family: Twice a week   Interpersonal Safety: Low Risk  (1/9/2025)    Interpersonal Safety      Do you feel physically and emotionally safe where you currently live?: Yes      Within the past 12 months, have you been hit, slapped, kicked or otherwise physically hurt by someone?: No      Within the past 12 months, have you been humiliated or emotionally abused in other ways by your partner or ex-partner?: No   Housing Stability: Low Risk  (2/16/2025)    Housing Stability      Do you have housing? : Yes      Are  "you worried about losing your housing?: No       Review of Systems:  Skin:        Eyes:       ENT:       Respiratory:       Cardiovascular:       Gastroenterology:      Genitourinary:       Musculoskeletal:       Neurologic:       Psychiatric:       Heme/Lymph/Imm:       Endocrine:         Physical Exam:  Vitals: BP (!) 202/94 (BP Location: Right arm, Patient Position: Sitting, Cuff Size: Adult Regular)   Pulse 105   Ht 1.626 m (5' 4\")   Wt 56.4 kg (124 lb 6.4 oz)   LMP  (LMP Unknown)   SpO2 98%   BMI 21.35 kg/m       GEN:  NAD.  Thin and frail appearing  NECK: No JVD  C/V:  Regular rate and rhythm, no murmur, rub or gallop.  RESP: Clear to auscultation bilaterally without wheezing, rales, or rhonchi.  GI: Abdomen soft, nontender, nondistended.   EXTREM: No pitting LE edema.   NEURO: Alert and oriented, cooperative. No obvious focal deficits.   PSYCH: Normal affect.  SKIN: Warm and dry.       Recent Lab Results:  LIPID RESULTS:  Lab Results   Component Value Date    CHOL 153 10/24/2024    HDL 64 10/24/2024    LDL 78 10/24/2024    TRIG 56 10/24/2024       LIVER ENZYME RESULTS:  Lab Results   Component Value Date    AST 62 (H) 02/17/2025    ALT 18 02/17/2025       CBC RESULTS:  Lab Results   Component Value Date    WBC 8.2 02/26/2025    WBC 9.0 04/17/2020    RBC 3.64 (L) 02/26/2025    RBC 4.76 04/17/2020    HGB 10.6 (L) 02/26/2025    HGB 14.4 04/17/2020    HCT 33.0 (L) 02/26/2025    HCT 45.7 04/17/2020    MCV 91 02/26/2025    MCV 96 04/17/2020    MCH 29.1 02/26/2025    MCH 30.3 04/17/2020    MCHC 32.1 02/26/2025    MCHC 31.5 04/17/2020    RDW 13.9 02/26/2025    RDW 13.2 04/17/2020     02/26/2025     04/17/2020       BMP RESULTS:  Lab Results   Component Value Date     (L) 02/26/2025     04/17/2020    POTASSIUM 4.4 02/26/2025    POTASSIUM 4.4 04/17/2020    CHLORIDE 99 02/26/2025    CHLORIDE 105 04/17/2020    CO2 23 02/26/2025    CO2 26 04/17/2020    ANIONGAP 11 02/26/2025    ANIONGAP 7 " "04/17/2020     (H) 02/26/2025    GLC 97 01/10/2025    GLC 97 04/17/2020    BUN 17.3 02/26/2025    BUN 16 04/17/2020    CR 0.60 02/26/2025    CR 0.55 04/17/2020    GFRESTIMATED 86 02/26/2025    GFRESTIMATED >60 11/16/2023    GFRESTIMATED 86 04/17/2020    GFRESTBLACK >90 04/17/2020    JERRY 9.3 02/26/2025    JERRY 9.5 04/17/2020        A1C RESULTS:  No results found for: \"A1C\"    INR RESULTS:  Lab Results   Component Value Date    INR 1.51 (H) 01/09/2025    INR 0.97 02/09/2024             Bryan Ferrari PA-C  February 26, 2025       Thank you for allowing me to participate in the care of your patient.      Sincerely,     Bryan Ferrari PA-C     [facility]  cc:   Seth Duarte MD  [address]  [place] [id number]      "

## 2025-03-02 ENCOUNTER — NURSE TRIAGE (OUTPATIENT)
Dept: INTERNAL MEDICINE | Facility: CLINIC | Age: 89
End: 2025-03-02
Payer: MEDICARE

## 2025-03-03 ENCOUNTER — ANCILLARY PROCEDURE (OUTPATIENT)
Dept: GENERAL RADIOLOGY | Facility: CLINIC | Age: 89
End: 2025-03-03
Attending: INTERNAL MEDICINE
Payer: MEDICARE

## 2025-03-03 ENCOUNTER — OFFICE VISIT (OUTPATIENT)
Dept: INTERNAL MEDICINE | Facility: CLINIC | Age: 89
End: 2025-03-03
Payer: MEDICARE

## 2025-03-03 VITALS
HEART RATE: 98 BPM | DIASTOLIC BLOOD PRESSURE: 81 MMHG | HEIGHT: 64 IN | TEMPERATURE: 98.3 F | OXYGEN SATURATION: 96 % | WEIGHT: 117.6 LBS | RESPIRATION RATE: 14 BRPM | SYSTOLIC BLOOD PRESSURE: 157 MMHG | BODY MASS INDEX: 20.08 KG/M2

## 2025-03-03 DIAGNOSIS — R05.9 COUGH, UNSPECIFIED TYPE: ICD-10-CM

## 2025-03-03 DIAGNOSIS — R10.9 FLANK PAIN: Primary | ICD-10-CM

## 2025-03-03 LAB
ALBUMIN UR-MCNC: 30 MG/DL
APPEARANCE UR: CLEAR
BACTERIA #/AREA URNS HPF: ABNORMAL /HPF
BILIRUB UR QL STRIP: NEGATIVE
COLOR UR AUTO: YELLOW
GLUCOSE UR STRIP-MCNC: NEGATIVE MG/DL
HGB UR QL STRIP: ABNORMAL
KETONES UR STRIP-MCNC: NEGATIVE MG/DL
LEUKOCYTE ESTERASE UR QL STRIP: NEGATIVE
MUCOUS THREADS #/AREA URNS LPF: PRESENT /LPF
NITRATE UR QL: NEGATIVE
PH UR STRIP: 6 [PH] (ref 5–7)
RBC #/AREA URNS AUTO: ABNORMAL /HPF
SP GR UR STRIP: 1.01 (ref 1–1.03)
SQUAMOUS #/AREA URNS AUTO: ABNORMAL /LPF
UROBILINOGEN UR STRIP-ACNC: 2 E.U./DL
WBC #/AREA URNS AUTO: ABNORMAL /HPF

## 2025-03-03 PROCEDURE — 71046 X-RAY EXAM CHEST 2 VIEWS: CPT | Mod: TC | Performed by: RADIOLOGY

## 2025-03-03 PROCEDURE — 3077F SYST BP >= 140 MM HG: CPT | Performed by: INTERNAL MEDICINE

## 2025-03-03 PROCEDURE — 3079F DIAST BP 80-89 MM HG: CPT | Performed by: INTERNAL MEDICINE

## 2025-03-03 PROCEDURE — 81001 URINALYSIS AUTO W/SCOPE: CPT | Performed by: INTERNAL MEDICINE

## 2025-03-03 PROCEDURE — 99495 TRANSJ CARE MGMT MOD F2F 14D: CPT | Performed by: INTERNAL MEDICINE

## 2025-03-03 PROCEDURE — 1111F DSCHRG MED/CURRENT MED MERGE: CPT | Performed by: INTERNAL MEDICINE

## 2025-03-03 RX ORDER — AZITHROMYCIN 250 MG/1
TABLET, FILM COATED ORAL
Qty: 6 TABLET | Refills: 0 | Status: ON HOLD | OUTPATIENT
Start: 2025-03-03 | End: 2025-03-05

## 2025-03-03 RX ORDER — CEFPODOXIME PROXETIL 200 MG/1
200 TABLET, FILM COATED ORAL 2 TIMES DAILY
Qty: 10 TABLET | Refills: 0 | Status: ON HOLD | OUTPATIENT
Start: 2025-03-03

## 2025-03-03 NOTE — TELEPHONE ENCOUNTER
Call to Janette. See [employer] message.   Pt was in ED 2 weeks ago. ED did all these tests. Everything was negative.   Flu tests were negative.   For 2 weeks off and on. Pain is in her abdomen area. Uses Heating pad and this helps.     Janette talked with pt this AM.   The pain this AM is more on the side, and back. in the kidney area, left to right. Still not feeling well, somewhat nauseated. No vomiting. Feels weak, not much appetite.     Lives in Independent living, [facility].   Norovirus in building. No diarrhea. No vomiting.     Please advise if can be seen this afternoon, or if ADS appropriate, or ED?     Janette states symptoms have remained the same, but pt just isn't improving, getting weaker.     Reason for Disposition   MILD TO MODERATE constant pain lasting > 2 hours    Additional Information   Negative: Passed out (e.g., fainted, lost consciousness, blacked out and was not responding)   Negative: Shock suspected (e.g., cold/pale/clammy skin, too weak to stand, low BP, rapid pulse)   Negative: Sounds like a life-threatening emergency to the triager   Negative: Followed an abdomen (stomach) injury   Negative: Chest pain   Negative: Abdominal pain and pregnant < 20 weeks   Negative: Abdominal pain and pregnant 20 or more weeks   Negative: Pain is mainly in upper abdomen (if needed ask: 'is it mainly above the belly button?')   Negative: Abdomen bloating or swelling are main symptoms   Negative: SEVERE abdominal pain (e.g., excruciating)   Negative: Vomiting red blood or black (coffee ground) material   Negative: Blood in bowel movements  (Exception: Blood on surface of BM with constipation.)   Negative: Black or tarry bowel movements  (Exception: Chronic-unchanged black-grey BMs AND is taking iron pills or Pepto-Bismol.)    Protocols used: Abdominal Pain - Female-A-[place]

## 2025-03-03 NOTE — PROGRESS NOTES
Assessment & Plan     Flank pain and cough  After reviewing her medical record, the CT aortic survey report did make note of multifocal pneumonia.  Patient did have some scattered rhonchi noted on examination.  Patient and her daughter were agreeable to have a checks x-ray performed in the clinic to reevaluate her lungs.  She goes on reports that she has also had some issues with foul-smelling urine, and a repeat urinalysis is currently pending.  Patient does  look ill appearing, but she did decline further evaluation in the emergency department.  Patient will be contacted as soon as the results of her x-ray and urinalysis are available for review.  Further recommendations will be made at that time.  - UA Macroscopic with reflex to Microscopic and Culture - Lab Collect; Future  - UA Macroscopic with reflex to Microscopic and Culture - Lab Collect  - XR Chest 2 Views; Future        MED REC REQUIRED  Post Medication Reconciliation Status: discharge medications reconciled, continue medications without change    See Patient Instructions    Juice Walls is a 89 year old, presenting for the following health issues:  ER F/U (Patient's daughter states mother has been having abdominal pain, has concern about weight loss)    Patient is an 89-year-old  female with complicated past medical history who presents to the clinic for a hospital follow-up visit.  She was initially admitted to the [facility] on February 15, 2024 after experiencing persistent abdominal pain.  There was some concern about her abdominal aortic aneurysm and whether or not this could be causing her discomfort.  She did undergo a CT aortic survey and had her images reviewed by her vascular surgical team.  They did feel that her abdominal aortic aneurysm was stable, and it was not the cause of her symptoms.  Her pain did seem to improve and she was ultimately discharged home on February 17, 2025.  Patient did have  several blood test collected that did show a normal lipase, CMP, CBC, and CMP.  Patient has had some issues with anemia, but it did appear that her blood counts are at baseline.  Her influenza, RSV, and COVID test were negative.  After getting discharged from the hospital, patient reports that she seemed to be doing better up until approximately 2 to 3 days prior to presentation.  She did have a recurrence of pain in her abdominal region, but she states that it seems to be more in her flanks.  Patient has had no change in her  bowel habits.  She does report more viruses present in her living facility, but she has not had any issues with vomiting or diarrhea.  She does report that her urine is very foul-smelling.  Patient denies any issues with dysuria or increased frequency.  She does all report that she has had a persistent cough, but she has had no sputum production, fever, or chills.  The aortic survey did not make note of possible pneumonia in her lungs, but patient states that she was never placed on any antibiotic therapy.  She did have a follow-up visit with her cardiologist on February 26 at which time she did have a CBC collected that did show a white blood cell count of 8.2 with a hemoglobin of 10.6 and hematocrit of 33.  Patient states that her pain seems to be radiating from her left and right flanks.  She does get some relief with a heating pad.  Movement does not seem to exacerbate her discomfort.  Patient has also had an approximate 7 pound weight loss over the past 1 week as she has had no appetite.              2/23/2024 8/6/2024 1/13/2025 2/18/2025   Post Discharge Outreach   Admission Date 2/20/2024      Reason for Admission Aortic stenosis, severe      Discharge Date 2/22/2024      Discharge Diagnosis Aortic stenosis, severe      How are you doing now that you are home? Spoke with patient and daughter who shares that things are going well. They do have a few questions but have left a message for  "Dana at the heart clinic and are waiting for a call back. Patient's daughter shares that she lives down the road from patient so is close by if she is needed. Daughter says \"she seems to be better from the medication for UTI. Improved pain and able to pee. Will check BP later. Eating and doing daily routine as usual.\" doing \"ok\" per daughter.  Having some incontinence issue. She's doing better. Still tired easy.   How are your symptoms? (Red Flag symptoms escalate to triage hotline per guidelines) Improved Improved Improved Improved   Do you feel your condition is stable enough to be safe at home until your provider visit? Yes      Does the patient have their discharge instructions?  Yes Yes Yes Yes   Does the patient have questions regarding their discharge instructions?  No No No No   Were you started on any new medications or were there changes to any of your previous medications?  Yes Yes Yes No   Does the patient have all of their medications? Yes Yes Yes Yes   Do you have questions regarding any of your medications?  No No No No   Do you have all of your needed medical supplies or equipment (DME)?  (i.e. oxygen tank, CPAP, cane, etc.) Yes  Yes Yes   Discharge follow-up appointment scheduled within 14 calendar days?  Yes      Discharge Follow Up Appointment Date 2/29/2024 1/21/2025 2/26/2025   Discharge Follow Up Appointment Scheduled with? Specialty Care Provider  Specialty Care Provider Specialty Care Provider       Hospital Follow-up Visit:    Hospital/Nursing Home/IP Rehab Facility: [facility]  Date of Admission: 2/15/25  Date of Discharge: 2/17/25  Reason(s) for Admission: abdominal pain  Was the patient in the ICU or did the patient experience delirium during hospitalization?  No  Do you have any other stressors you would like to discuss with your provider? No    Problems taking medications regularly:  None  Medication changes since discharge: " "None  Problems adhering to non-medication therapy:  None    Summary of hospitalization:  [facility] discharge summary reviewed  Diagnostic Tests/Treatments reviewed.  Follow up needed: none  Other Healthcare Providers Involved in Patient s Care:         None  Update since discharge: worsened.       Plan of care communicated with patient and family         Review of Systems  CONSTITUTIONAL: NEGATIVE for fever, chills, change in weight  INTEGUMENTARY/SKIN: NEGATIVE for worrisome rashes, moles or lesions  EYES: NEGATIVE for vision changes or irritation  RESP: NEGATIVE for significant cough or SOB  CV: NEGATIVE for chest pain, palpitations or peripheral edema  GI: NEGATIVE for nausea, abdominal pain, heartburn, or change in bowel habits  : NEGATIVE for frequency, dysuria, or hematuria  MUSCULOSKELETAL: NEGATIVE for significant arthralgias or myalgia  NEURO: NEGATIVE for weakness, dizziness or paresthesias      Objective    BP (!) 157/81 (BP Location: Right arm, Patient Position: Sitting, Cuff Size: Adult Regular)   Pulse 98   Temp 98.3  F (36.8  C) (Oral)   Resp 14   Ht 1.626 m (5' 4\")   Wt 53.3 kg (117 lb 9.6 oz)   LMP  (LMP Unknown)   SpO2 96%   BMI 20.19 kg/m    Body mass index is 20.19 kg/m .  Physical Exam  Vitals reviewed.   Constitutional:       Appearance: She is ill-appearing.   HENT:      Head: Normocephalic and atraumatic.      Mouth/Throat:      Mouth: Mucous membranes are moist.      Pharynx: Oropharynx is clear.   Eyes:      Conjunctiva/sclera: Conjunctivae normal.      Pupils: Pupils are equal, round, and reactive to light.   Cardiovascular:      Rate and Rhythm: Normal rate and regular rhythm.      Pulses: Normal pulses.      Heart sounds: Normal heart sounds.   Pulmonary:      Effort: Pulmonary effort is normal.      Breath sounds: Rhonchi present.   Abdominal:      General: Bowel sounds are normal. There is no distension.      Palpations: Abdomen is soft.      Tenderness: " There is no abdominal tenderness. There is no right CVA tenderness, left CVA tenderness, guarding or rebound.      Hernia: No hernia is present.   Skin:     General: Skin is warm and dry.      Capillary Refill: Capillary refill takes less than 2 seconds.        Diagnostic testing: Chest xray, 2 views, is pending. UA is also pending.        Signed Electronically by: Ty Cordero MD

## 2025-03-05 ENCOUNTER — APPOINTMENT (OUTPATIENT)
Dept: CT IMAGING | Facility: CLINIC | Age: 89
End: 2025-03-05
Attending: STUDENT IN AN ORGANIZED HEALTH CARE EDUCATION/TRAINING PROGRAM
Payer: MEDICARE

## 2025-03-05 ENCOUNTER — APPOINTMENT (OUTPATIENT)
Dept: GENERAL RADIOLOGY | Facility: CLINIC | Age: 89
End: 2025-03-05
Attending: STUDENT IN AN ORGANIZED HEALTH CARE EDUCATION/TRAINING PROGRAM
Payer: MEDICARE

## 2025-03-05 ENCOUNTER — HOSPITAL ENCOUNTER (INPATIENT)
Facility: CLINIC | Age: 89
End: 2025-03-05
Attending: STUDENT IN AN ORGANIZED HEALTH CARE EDUCATION/TRAINING PROGRAM | Admitting: STUDENT IN AN ORGANIZED HEALTH CARE EDUCATION/TRAINING PROGRAM
Payer: MEDICARE

## 2025-03-05 DIAGNOSIS — I16.1 HYPERTENSIVE EMERGENCY: Primary | ICD-10-CM

## 2025-03-05 DIAGNOSIS — J81.0 FLASH PULMONARY EDEMA (H): ICD-10-CM

## 2025-03-05 DIAGNOSIS — K59.00 CONSTIPATION, UNSPECIFIED CONSTIPATION TYPE: ICD-10-CM

## 2025-03-05 DIAGNOSIS — J96.01 ACUTE RESPIRATORY FAILURE WITH HYPOXIA (H): ICD-10-CM

## 2025-03-05 DIAGNOSIS — H35.30 MACULAR DEGENERATION, UNSPECIFIED LATERALITY, UNSPECIFIED TYPE: ICD-10-CM

## 2025-03-05 DIAGNOSIS — J18.9 PNEUMONIA OF RIGHT UPPER LOBE DUE TO INFECTIOUS ORGANISM: ICD-10-CM

## 2025-03-05 DIAGNOSIS — H40.9 GLAUCOMA, UNSPECIFIED GLAUCOMA TYPE, UNSPECIFIED LATERALITY: ICD-10-CM

## 2025-03-05 DIAGNOSIS — A41.9 SEPSIS, DUE TO UNSPECIFIED ORGANISM, UNSPECIFIED WHETHER ACUTE ORGAN DYSFUNCTION PRESENT (H): ICD-10-CM

## 2025-03-05 LAB
ALBUMIN SERPL BCG-MCNC: 3.7 G/DL (ref 3.5–5.2)
ALBUMIN UR-MCNC: 100 MG/DL
ALP SERPL-CCNC: 91 U/L (ref 40–150)
ALT SERPL W P-5'-P-CCNC: 22 U/L (ref 0–50)
ANION GAP SERPL CALCULATED.3IONS-SCNC: 15 MMOL/L (ref 7–15)
APPEARANCE UR: CLEAR
AST SERPL W P-5'-P-CCNC: 74 U/L (ref 0–45)
BACTERIA #/AREA URNS HPF: ABNORMAL /HPF
BASOPHILS # BLD AUTO: 0.1 10E3/UL (ref 0–0.2)
BASOPHILS NFR BLD AUTO: 0 %
BILIRUB SERPL-MCNC: 2.6 MG/DL
BILIRUB UR QL STRIP: NEGATIVE
BUN SERPL-MCNC: 15.9 MG/DL (ref 8–23)
CALCIUM SERPL-MCNC: 9.1 MG/DL (ref 8.8–10.4)
CHLORIDE SERPL-SCNC: 96 MMOL/L (ref 98–107)
COLOR UR AUTO: YELLOW
CREAT SERPL-MCNC: 0.59 MG/DL (ref 0.51–0.95)
EGFRCR SERPLBLD CKD-EPI 2021: 86 ML/MIN/1.73M2
EOSINOPHIL # BLD AUTO: 0.1 10E3/UL (ref 0–0.7)
EOSINOPHIL NFR BLD AUTO: 0 %
ERYTHROCYTE [DISTWIDTH] IN BLOOD BY AUTOMATED COUNT: 14.8 % (ref 10–15)
GLUCOSE SERPL-MCNC: 197 MG/DL (ref 70–99)
GLUCOSE UR STRIP-MCNC: 150 MG/DL
HCO3 SERPL-SCNC: 20 MMOL/L (ref 22–29)
HCT VFR BLD AUTO: 31.8 % (ref 35–47)
HGB BLD-MCNC: 10.3 G/DL (ref 11.7–15.7)
HGB UR QL STRIP: ABNORMAL
HOLD SPECIMEN: NORMAL
IMM GRANULOCYTES # BLD: 0.1 10E3/UL
IMM GRANULOCYTES NFR BLD: 0 %
KETONES UR STRIP-MCNC: NEGATIVE MG/DL
LACTATE SERPL-SCNC: 1.1 MMOL/L (ref 0.7–2)
LACTATE SERPL-SCNC: 3 MMOL/L (ref 0.7–2)
LEUKOCYTE ESTERASE UR QL STRIP: NEGATIVE
LYMPHOCYTES # BLD AUTO: 0.6 10E3/UL (ref 0.8–5.3)
LYMPHOCYTES NFR BLD AUTO: 4 %
MCH RBC QN AUTO: 29.2 PG (ref 26.5–33)
MCHC RBC AUTO-ENTMCNC: 32.4 G/DL (ref 31.5–36.5)
MCV RBC AUTO: 90 FL (ref 78–100)
MONOCYTES # BLD AUTO: 0.5 10E3/UL (ref 0–1.3)
MONOCYTES NFR BLD AUTO: 3 %
MUCOUS THREADS #/AREA URNS LPF: PRESENT /LPF
NEUTROPHILS # BLD AUTO: 14.9 10E3/UL (ref 1.6–8.3)
NEUTROPHILS NFR BLD AUTO: 92 %
NITRATE UR QL: NEGATIVE
NRBC # BLD AUTO: 0 10E3/UL
NRBC BLD AUTO-RTO: 0 /100
PH UR STRIP: 7 [PH] (ref 5–7)
PLATELET # BLD AUTO: 427 10E3/UL (ref 150–450)
POTASSIUM SERPL-SCNC: 4.6 MMOL/L (ref 3.4–5.3)
PROT SERPL-MCNC: 7.4 G/DL (ref 6.4–8.3)
RBC # BLD AUTO: 3.53 10E6/UL (ref 3.8–5.2)
RBC URINE: 1 /HPF
SODIUM SERPL-SCNC: 131 MMOL/L (ref 135–145)
SP GR UR STRIP: 1.01 (ref 1–1.03)
SQUAMOUS EPITHELIAL: <1 /HPF
UROBILINOGEN UR STRIP-MCNC: NORMAL MG/DL
WBC # BLD AUTO: 16.2 10E3/UL (ref 4–11)
WBC URINE: 2 /HPF

## 2025-03-05 PROCEDURE — 250N000011 HC RX IP 250 OP 636: Performed by: STUDENT IN AN ORGANIZED HEALTH CARE EDUCATION/TRAINING PROGRAM

## 2025-03-05 PROCEDURE — 120N000001 HC R&B MED SURG/OB

## 2025-03-05 PROCEDURE — 87040 BLOOD CULTURE FOR BACTERIA: CPT | Performed by: STUDENT IN AN ORGANIZED HEALTH CARE EDUCATION/TRAINING PROGRAM

## 2025-03-05 PROCEDURE — 96374 THER/PROPH/DIAG INJ IV PUSH: CPT

## 2025-03-05 PROCEDURE — 85004 AUTOMATED DIFF WBC COUNT: CPT | Performed by: STUDENT IN AN ORGANIZED HEALTH CARE EDUCATION/TRAINING PROGRAM

## 2025-03-05 PROCEDURE — 99291 CRITICAL CARE FIRST HOUR: CPT | Mod: 25

## 2025-03-05 PROCEDURE — 36415 COLL VENOUS BLD VENIPUNCTURE: CPT | Performed by: STUDENT IN AN ORGANIZED HEALTH CARE EDUCATION/TRAINING PROGRAM

## 2025-03-05 PROCEDURE — 85014 HEMATOCRIT: CPT | Performed by: STUDENT IN AN ORGANIZED HEALTH CARE EDUCATION/TRAINING PROGRAM

## 2025-03-05 PROCEDURE — 71045 X-RAY EXAM CHEST 1 VIEW: CPT

## 2025-03-05 PROCEDURE — 258N000003 HC RX IP 258 OP 636: Performed by: STUDENT IN AN ORGANIZED HEALTH CARE EDUCATION/TRAINING PROGRAM

## 2025-03-05 PROCEDURE — 93005 ELECTROCARDIOGRAM TRACING: CPT

## 2025-03-05 PROCEDURE — 250N000013 HC RX MED GY IP 250 OP 250 PS 637: Performed by: STUDENT IN AN ORGANIZED HEALTH CARE EDUCATION/TRAINING PROGRAM

## 2025-03-05 PROCEDURE — 83605 ASSAY OF LACTIC ACID: CPT | Performed by: STUDENT IN AN ORGANIZED HEALTH CARE EDUCATION/TRAINING PROGRAM

## 2025-03-05 PROCEDURE — 80053 COMPREHEN METABOLIC PANEL: CPT | Performed by: STUDENT IN AN ORGANIZED HEALTH CARE EDUCATION/TRAINING PROGRAM

## 2025-03-05 PROCEDURE — 96361 HYDRATE IV INFUSION ADD-ON: CPT

## 2025-03-05 PROCEDURE — 99223 1ST HOSP IP/OBS HIGH 75: CPT | Mod: AI | Performed by: STUDENT IN AN ORGANIZED HEALTH CARE EDUCATION/TRAINING PROGRAM

## 2025-03-05 PROCEDURE — 81001 URINALYSIS AUTO W/SCOPE: CPT | Performed by: STUDENT IN AN ORGANIZED HEALTH CARE EDUCATION/TRAINING PROGRAM

## 2025-03-05 PROCEDURE — 71260 CT THORAX DX C+: CPT

## 2025-03-05 RX ORDER — CALCIUM CARBONATE 500 MG/1
1000 TABLET, CHEWABLE ORAL 4 TIMES DAILY PRN
Status: DISCONTINUED | OUTPATIENT
Start: 2025-03-05 | End: 2025-03-11 | Stop reason: HOSPADM

## 2025-03-05 RX ORDER — TIMOLOL MALEATE 5 MG/ML
1 SOLUTION/ DROPS OPHTHALMIC 2 TIMES DAILY
COMMUNITY

## 2025-03-05 RX ORDER — IOPAMIDOL 755 MG/ML
500 INJECTION, SOLUTION INTRAVASCULAR ONCE
Status: COMPLETED | OUTPATIENT
Start: 2025-03-05 | End: 2025-03-05

## 2025-03-05 RX ORDER — DOCUSATE SODIUM 100 MG/1
100 CAPSULE, LIQUID FILLED ORAL 2 TIMES DAILY
Status: DISCONTINUED | OUTPATIENT
Start: 2025-03-05 | End: 2025-03-11 | Stop reason: HOSPADM

## 2025-03-05 RX ORDER — ONDANSETRON 2 MG/ML
4 INJECTION INTRAMUSCULAR; INTRAVENOUS EVERY 6 HOURS PRN
Status: DISCONTINUED | OUTPATIENT
Start: 2025-03-05 | End: 2025-03-11 | Stop reason: HOSPADM

## 2025-03-05 RX ORDER — METOPROLOL SUCCINATE 25 MG/1
25 TABLET, EXTENDED RELEASE ORAL DAILY
Status: DISCONTINUED | OUTPATIENT
Start: 2025-03-06 | End: 2025-03-09

## 2025-03-05 RX ORDER — TIMOLOL MALEATE 5 MG/ML
1 SOLUTION/ DROPS OPHTHALMIC 2 TIMES DAILY
Status: DISCONTINUED | OUTPATIENT
Start: 2025-03-05 | End: 2025-03-11 | Stop reason: HOSPADM

## 2025-03-05 RX ORDER — POLYETHYLENE GLYCOL 3350 17 G/17G
17 POWDER, FOR SOLUTION ORAL 2 TIMES DAILY PRN
Status: DISCONTINUED | OUTPATIENT
Start: 2025-03-05 | End: 2025-03-11 | Stop reason: HOSPADM

## 2025-03-05 RX ORDER — GUAIFENESIN 200 MG/10ML
200 LIQUID ORAL EVERY 4 HOURS PRN
Status: DISCONTINUED | OUTPATIENT
Start: 2025-03-05 | End: 2025-03-11 | Stop reason: HOSPADM

## 2025-03-05 RX ORDER — CEFTRIAXONE 1 G/1
1 INJECTION, POWDER, FOR SOLUTION INTRAMUSCULAR; INTRAVENOUS ONCE
Status: COMPLETED | OUTPATIENT
Start: 2025-03-05 | End: 2025-03-05

## 2025-03-05 RX ORDER — ACETAMINOPHEN 325 MG/1
650 TABLET ORAL EVERY 4 HOURS PRN
Status: DISCONTINUED | OUTPATIENT
Start: 2025-03-05 | End: 2025-03-11 | Stop reason: HOSPADM

## 2025-03-05 RX ORDER — ACETAMINOPHEN 500 MG
1000 TABLET ORAL ONCE
Status: COMPLETED | OUTPATIENT
Start: 2025-03-05 | End: 2025-03-05

## 2025-03-05 RX ORDER — LIDOCAINE 40 MG/G
CREAM TOPICAL
Status: DISCONTINUED | OUTPATIENT
Start: 2025-03-05 | End: 2025-03-11 | Stop reason: HOSPADM

## 2025-03-05 RX ORDER — LATANOPROST 50 UG/ML
1 SOLUTION/ DROPS OPHTHALMIC AT BEDTIME
Status: DISCONTINUED | OUTPATIENT
Start: 2025-03-05 | End: 2025-03-06

## 2025-03-05 RX ORDER — AZITHROMYCIN 500 MG/5ML
500 INJECTION, POWDER, LYOPHILIZED, FOR SOLUTION INTRAVENOUS ONCE
Status: COMPLETED | OUTPATIENT
Start: 2025-03-05 | End: 2025-03-05

## 2025-03-05 RX ORDER — ONDANSETRON 4 MG/1
4 TABLET, ORALLY DISINTEGRATING ORAL EVERY 6 HOURS PRN
Status: DISCONTINUED | OUTPATIENT
Start: 2025-03-05 | End: 2025-03-11 | Stop reason: HOSPADM

## 2025-03-05 RX ORDER — ACETAMINOPHEN 650 MG/1
650 SUPPOSITORY RECTAL EVERY 4 HOURS PRN
Status: DISCONTINUED | OUTPATIENT
Start: 2025-03-05 | End: 2025-03-11 | Stop reason: HOSPADM

## 2025-03-05 RX ORDER — AZITHROMYCIN 250 MG/1
250 TABLET, FILM COATED ORAL DAILY
Status: ON HOLD | COMMUNITY
Start: 2025-03-03 | End: 2025-03-11

## 2025-03-05 RX ADMIN — CEFTRIAXONE 1 G: 1 INJECTION, POWDER, FOR SOLUTION INTRAMUSCULAR; INTRAVENOUS at 17:41

## 2025-03-05 RX ADMIN — LATANOPROST 1 DROP: 50 SOLUTION OPHTHALMIC at 22:17

## 2025-03-05 RX ADMIN — SODIUM CHLORIDE 1000 ML: 0.9 INJECTION, SOLUTION INTRAVENOUS at 17:40

## 2025-03-05 RX ADMIN — IOPAMIDOL 59 ML: 755 INJECTION, SOLUTION INTRAVENOUS at 18:47

## 2025-03-05 RX ADMIN — APIXABAN 2.5 MG: 2.5 TABLET, FILM COATED ORAL at 21:17

## 2025-03-05 RX ADMIN — ONDANSETRON 4 MG: 4 TABLET, ORALLY DISINTEGRATING ORAL at 21:11

## 2025-03-05 RX ADMIN — ACETAMINOPHEN 1000 MG: 500 TABLET ORAL at 17:41

## 2025-03-05 RX ADMIN — AZITHROMYCIN MONOHYDRATE 500 MG: 500 INJECTION, POWDER, LYOPHILIZED, FOR SOLUTION INTRAVENOUS at 18:25

## 2025-03-05 RX ADMIN — SODIUM CHLORIDE 1000 ML: 9 INJECTION, SOLUTION INTRAVENOUS at 16:52

## 2025-03-05 ASSESSMENT — ACTIVITIES OF DAILY LIVING (ADL)
ADLS_ACUITY_SCORE: 44
ADLS_ACUITY_SCORE: 61
ADLS_ACUITY_SCORE: 44
ADLS_ACUITY_SCORE: 44
ADLS_ACUITY_SCORE: 57

## 2025-03-05 NOTE — ED TRIAGE NOTES
BIBA  for sepsis work up from [facility]. Per ems patient has been sick for approx two weeks. En route pt was hypertensive and tachycardic, on 4L oxymask. 4mg zofran given en route, iv normal saline started en route,  PTA. Concerns for poss PNA per recent PCP visit.     Triage Assessment (Adult)       Row Name 03/05/25 [id number]          Triage Assessment    Airway WDL WDL        Respiratory WDL    Respiratory WDL X;all     Rhythm/Pattern, Respiratory shortness of breath;shallow     Nailbeds no discoloration     Cough Frequency no cough        Skin Circulation/Temperature WDL    Skin Circulation/Temperature WDL WDL        Cardiac WDL    Cardiac WDL X;rhythm     Cardiac Rhythm ST        Peripheral/Neurovascular WDL    Peripheral Neurovascular WDL WDL        Cognitive/Neuro/Behavioral WDL    Cognitive/Neuro/Behavioral WDL WDL        Javi Coma Scale    Best Eye Response 4-->(E4) spontaneous     Best Motor Response 6-->(M6) obeys commands     Best Verbal Response 5-->(V5) oriented     [place] Coma Scale Score 15

## 2025-03-05 NOTE — H&P
[facility]    History and Physical - Hospitalist Service       Date of Admission:  3/5/2025    Assessment & Plan      Kavita Merlos is a 89 year old female with a history of infrarenal AAA, aortic valve stenosis status post TAVR 2/2024, paroxysmal atrial fibrillation on anticoagulation, hypertension, admitted on 3/5/2025 with acute hypoxic respiratory failure and sepsis 2/2 RUL pneumonia.    RUL Pneumonia  Sepsis  Acute hypoxic respiratory failure  Several days of general malaise, nausea; seen 3/3 and started on cefpodoxime/azithromycin for presumed respiratory infection. Today she had acute onset of chills and shortness of breath hypoxic to 78% now improved on 4L02. She was febrile and tachycardic, with elevated WBC (16) and lactate (3.0) improved to 1.1 following fluids/abx. CT with moderate emphysema, consolidative process in RUL with small R pleural effusion.   -admit to inpatient  -continue rocephin/azithromycin; she was not on antibiotics last hospital stay and is clinically improving; if worsens would broaden.     Elevated bilirubin   AST elevation  Possibly related to sepsis/acute illness  -recheck in AM    Anemia  Hgb 10.3, stable  -monitor    Hyponatremia   131; baseline 132-134  -AM BMP    Hx of Aortic Stenosis (S/P TAVR 02/2024)  Hx of pAF on apixaban  HTN  Pt underwent the procedures as above about one year ago. In the post-op period, she was noted to have an episode of pAF. However, no further recurrences and here in NSR.   -PTA apixaban and metoprolol continued.    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 PTA PreserVision PO 2 tabs daily, Tafluprost ophthalmic solution 0.5% 1 drop at bedtime, Timoptic Ocudose 0.5% ophthalmic solution 1 drop BID continued during admission.    Incidental findings  Stable 4.5 cm infrarenal abdominal aortic aneurysm  Stable severe  narrowing in the proximal right subclavian, celiac, superior mesenteric arteries.  Renal Lesion  Has been seen on prior imaging, and has been suspected to be a cyst. On imaging in 01/2025 during admission to [facility] for hypertensive urgency, imaging showed 10mm cyst in L kidney, slightly increased to 13 mm on imaging in February.  Also noted some mild nodular thickening/enhancement of the left wrist renal pelvis which is stable/slightly increased.  However, has not had follow-up CT urogram. Has established care /  Urology recently for urinary retention. Recommend urogram study in ~3 months as recommended by Radiology team for interval assessment of renal lesion.  Pancreatic Cystic Lesion  Stable lesion measuring 9mm noted in the pancreatic body. Appears cystic in nature per Rads. Should follow up with PCP to order pancreatic protocol CT or MRI in 18 months if within goals of care (sometime around August 2026).  Lung nodules  2 mm nodule in the superior segment of the left lower lobe stable, recommended chest CT in 1 year per Sebastian.  2 mm endobronchial nodule in the right middle lobe slightly increased.  Stable cystic lesion in pancreatic body, per radiology recommended follow-up pancreatic protocol CT or MRI in 18 months if within goals of care.       Diet: Combination Diet Regular Diet Adult; Mechanical/Dental Soft Diet patient currently without dentures, daughter will bring in tomorrow.  DVT Prophylaxis: On DOAC  Mendieta Catheter: Not present  Lines: None     Cardiac Monitoring: None  Code Status:   DNI; would agree to compressions    Clinically Significant Risk Factors Present on Admission         # Hyponatremia: Lowest Na = 131 mmol/L in last 2 days, will monitor as appropriate  # Hypochloremia: Lowest Cl = 96 mmol/L in last 2 days, will monitor as appropriate         # Drug Induced Coagulation Defect: home medication list includes an anticoagulant medication    # Hypertension: Noted on problem list       # Anemia: based on hgb <11           # Financial/Environmental Concerns:           Disposition Plan      Expected Discharge Date: 03/07/2025                  Radha Miramontes DO  Hospitalist Service  [facility]  Securely message with [employer] (more info)  Text page via [employer] Paging/Directory     ______________________________________________________________________    Chief Complaint   Chills    History is obtained from the patient, electronic health record, and emergency department physician    History of Present Illness   Kavita Merlos is a 89 year old female who presented with several days of general malaise, nausea; seen 3/3 and started on cefpodoxime/azithromycin for presumed respiratory infection. Today she had acute onset of chills and shortness of breath hypoxic to 78% now improved on 4L 02. She was febrile and tachycardic, with elevated WBC (16) and lactate (3.0) improved to 1.1 following fluids/abx. CT with moderate emphysema, consolidative process in RUL with small R pleural effusion.     Past Medical History    Past Medical History:   Diagnosis Date    AAA (abdominal aortic aneurysm)     Aortic stenosis     Benign essential hypertension with BP difference between arms     Hyperlipidemia LDL goal <70        Past Surgical History   Past Surgical History:   Procedure Laterality Date    CV CORONARY ANGIOGRAM N/A 12/19/2023    Procedure: Coronary Angiogram;  Surgeon: Seth Duarte MD;  Location:  HEART CARDIAC CATH LAB    CV PERIPHERAL VASCULAR LITHOTRIPSY N/A 2/20/2024    Procedure: Peripheral Vascular Lithotripsy;  Surgeon: Seth Duarte MD;  Location: [facility] CARDIAC CATH LAB    CV RIGHT HEART CATH MEASUREMENTS RECORDED N/A 12/19/2023    Procedure: Right Heart Catheterization;  Surgeon: Seth Duarte MD;  Location: [facility] CARDIAC CATH LAB    CV TRANSCATHETER AORTIC VALVE REPLACEMENT-FEMORAL APPROACH N/A 2/20/2024    Procedure: Transcatheter Aortic  Valve Replacement-Femoral Approach;  Surgeon: Seth Duarte MD;  Location:  HEART CARDIAC CATH LAB       Prior to Admission Medications   Prior to Admission Medications   Prescriptions Last Dose Informant Patient Reported? Taking?   Timolol Maleate (TIMOPTIC OCUDOSE) 0.5 % ophthalmic solution  Self Yes No   Sig: Place 1 drop into both eyes 2 times daily   apixaban ANTICOAGULANT (ELIQUIS) 2.5 MG tablet   No No   Sig: Take 1 tablet (2.5 mg) by mouth 2 times daily.   azithromycin (ZITHROMAX) 250 MG tablet   No No   Sig: Take 2 tablets (500 mg) by mouth daily for 1 day, THEN 1 tablet (250 mg) daily for 4 days.   cefpodoxime (VANTIN) 200 MG tablet   No No   Sig: Take 1 tablet (200 mg) by mouth 2 times daily.   metoprolol succinate ER (TOPROL XL) 25 MG 24 hr tablet   No No   Sig: Take 1 tablet (25 mg) by mouth daily.   tafluprost (ZIOPTAN) 0.0015 % SOLN ophthalmic solution  Self Yes No   Sig: Place 1 drop into both eyes at bedtime      Facility-Administered Medications: None           Physical Exam   Vital Signs: Temp: 99.9  F (37.7  C) Temp src: Oral BP: 128/58 Pulse: 104   Resp: 20 SpO2: 97 % O2 Device: Oxymask Oxygen Delivery: 4 LPM  Weight: 117 lbs 0 oz    General: Very pleasant female , NAD  HEENT:  Mucus membranes dry  Cardiac: tachycardic, regular.  Respiratory: Normal work of breathing.  Clear to auscultation bilaterally without wheezing, rales, or rhonchi.  GI:  Abdomen soft, nontender, nondistended.  Musculoskeletal: Moving all extremities appropriately.  Skin: No rashes or abrasions on exposed skin.  Neurologic: Alert. Answers questions appropriately, follows commands    Medical Decision Making       78 MINUTES SPENT BY ME on the date of service doing chart review, history, exam, documentation & further activities per the note.      Data     I have personally reviewed the following data over the past 24 hrs:    16.2 (H)  \   10.3 (L)   / 427     131 (L) 96 (L) 15.9 /  197 (H)   4.6 20 (L) 0.59 \      ALT: 22 AST: 74 (H) AP: 91 TBILI: 2.6 (H)   ALB: 3.7 TOT PROTEIN: 7.4 LIPASE: N/A     Procal: N/A CRP: N/A Lactic Acid: 1.1         Imaging results reviewed over the past 24 hrs:   Recent Results (from the past 24 hours)   XR Chest Port 1 View    Narrative    EXAM: XR CHEST PORT 1 VIEW  LOCATION: [facility]  DATE: 3/5/2025    INDICATION: hypoxia  COMPARISON: Chest radiograph 3/3/2025, CT 2/15/2025.      Impression    IMPRESSION: Stable enlargement of the cardiac silhouette status post aortic valve replacement. Asymmetric interstitial and alveolar opacities throughout the right lung, increased in conspicuity in comparison to recent chest radiograph, favor   infectious/inflammatory process, less likely asymmetric edema. Possible trace right pleural effusion. Left lung is grossly clear. No pneumothorax. Bones are unchanged.   CT Chest w Contrast    Narrative    EXAM: CT CHEST W CONTRAST  LOCATION: [facility]  DATE: 3/5/2025    INDICATION: acute hypoxic respiratory failure  COMPARISON: Portable chest 3/5/2025. CT chest 4/17/2020.. CT 2/15/2025 chest abdomen pelvis.  TECHNIQUE: CT chest with IV contrast. Multiplanar reformats were obtained. Dose reduction techniques were used.    CONTRAST: 59 mL Isovue 370    FINDINGS:   LUNGS AND PLEURA: Moderate emphysema. Superimposed consolidative infiltrate in the right upper lobe posteriorly and to lesser degree the right lower lobe. Most likely pneumonia. Tiny right pleural effusion.    MEDIASTINUM/AXILLAE: Mildly prominent mediastinal nodes, favor reactive.    CORONARY ARTERY CALCIFICATION: Moderate.    UPPER ABDOMEN: Aortic aneurysm incompletely included.    MUSCULOSKELETAL: Normal.      Impression    IMPRESSION:   1.  Moderate emphysema.    2.  Superimposed consolidative infiltrate in the right lung most likely pneumonia.

## 2025-03-05 NOTE — ED PROVIDER NOTES
Emergency Department Note      History of Present Illness     Chief Complaint   Tachycardia and Nausea, Vomiting, & Diarrhea      HPI   Kavita Merlos is a 89 year old female history of infrarenal AAA, aortic valve stenosis status post TAVR February 2024, paroxysmal A-fib on Eliquis, legally blind, hypertension, hyperlipidemia, presents with feeling unwell.  She states she has had chills and been sick for 2 weeks.  She is on oral cephalosporin and azithromycin over the last few days but despite that she feels worse.  She feels short of breath.  She has had diarrhea.  She is feeling very fatigued.  No chest pain or abdominal pain.  En route per EMS, she was hypertensive and hypoxic and required nasal cannula.    Independent Historian   Daughter states that patient is very fatigued.    Review of External Notes   Discharge summary February 17, 2025 for abdominal pain.    Past Medical History     Medical History and Problem List   Past Medical History:   Diagnosis Date    AAA (abdominal aortic aneurysm)     Aortic stenosis     Benign essential hypertension with BP difference between arms     Hyperlipidemia LDL goal <70        Medications   No current outpatient medications on file.      Surgical History   Past Surgical History:   Procedure Laterality Date    CV CORONARY ANGIOGRAM N/A 12/19/2023    Procedure: Coronary Angiogram;  Surgeon: Seth Duarte MD;  Location: [facility] CARDIAC CATH LAB    CV PERIPHERAL VASCULAR LITHOTRIPSY N/A 2/20/2024    Procedure: Peripheral Vascular Lithotripsy;  Surgeon: Seth Duarte MD;  Location: [facility] CARDIAC CATH LAB    CV RIGHT HEART CATH MEASUREMENTS RECORDED N/A 12/19/2023    Procedure: Right Heart Catheterization;  Surgeon: Seth Duarte MD;  Location: [facility] CARDIAC CATH LAB    CV TRANSCATHETER AORTIC VALVE REPLACEMENT-FEMORAL APPROACH N/A 2/20/2024    Procedure: Transcatheter Aortic Valve Replacement-Femoral Approach;  Surgeon: Seth Duarte  "MD Cameron;  Location:  HEART CARDIAC CATH LAB       Physical Exam     Patient Vitals for the past 24 hrs:   BP Temp Temp src Pulse Resp SpO2 Height Weight   03/05/25 1940 128/58 99.9  F (37.7  C) Oral 104 20 97 % -- --   03/05/25 1915 -- -- -- -- -- 97 % -- --   03/05/25 1830 130/60 -- -- 108 24 98 % -- --   03/05/25 1800 139/66 -- -- 110 21 99 % -- --   03/05/25 1745 (!) 141/71 -- -- (!) 121 (!) 32 96 % -- --   03/05/25 1731 (!) 150/93 -- -- (!) 124 15 -- -- --   03/05/25 1721 (!) 153/75 -- -- (!) 133 30 98 % -- --   03/05/25 1718 -- (!) 101.4  F (38.6  C) Rectal -- -- -- -- --   03/05/25 1700 (!) 148/75 -- -- (!) 135 28 94 % -- --   03/05/25 1645 132/71 -- -- (!) 129 (!) 32 95 % -- --   03/05/25 1627 (!) 179/103 -- -- (!) 138 24 -- -- --   03/05/25 1612 -- 100  F (37.8  C) Temporal -- -- -- 1.626 m (5' 4\") 53.1 kg (117 lb)     Physical Exam  GENERAL: Patient frail, thin, pale, appears tired.  HEAD: Atraumatic.  NECK: No rigidity  CV: Tachycardic and regular, no murmurs, rubs or gallops  PULM: Rhonchi in right upper lung field.  Mild accessory muscle use.  ABD: Soft, nontender, nondistended, no guarding  DERM: Skin warm and dry  EXTREMITY: Moving all extremities without difficulty. No calf tenderness or peripheral edema  VASCULAR: Symmetric pulses bilaterally      Diagnostics     Lab Results   Labs Ordered and Resulted from Time of ED Arrival to Time of ED Departure   ROUTINE UA WITH MICROSCOPIC REFLEX TO CULTURE - Abnormal       Result Value    Color Urine Yellow      Appearance Urine Clear      Glucose Urine 150 (*)     Bilirubin Urine Negative      Ketones Urine Negative      Specific Gravity Urine 1.010      Blood Urine Large (*)     pH Urine 7.0      Protein Albumin Urine 100 (*)     Urobilinogen Urine Normal      Nitrite Urine Negative      Leukocyte Esterase Urine Negative      Bacteria Urine Few (*)     Mucus Urine Present (*)     RBC Urine 1      WBC Urine 2      Squamous Epithelials Urine <1   "   COMPREHENSIVE METABOLIC PANEL - Abnormal    Sodium 131 (*)     Potassium 4.6      Carbon Dioxide (CO2) 20 (*)     Anion Gap 15      Urea Nitrogen 15.9      Creatinine 0.59      GFR Estimate 86      Calcium 9.1      Chloride 96 (*)     Glucose 197 (*)     Alkaline Phosphatase 91      AST 74 (*)     ALT 22      Protein Total 7.4      Albumin 3.7      Bilirubin Total 2.6 (*)    LACTIC ACID WHOLE BLOOD WITH 1X REPEAT IN 2 HR WHEN >2 - Abnormal    Lactic Acid, Initial 3.0 (*)    CBC WITH PLATELETS AND DIFFERENTIAL - Abnormal    WBC Count 16.2 (*)     RBC Count 3.53 (*)     Hemoglobin 10.3 (*)     Hematocrit 31.8 (*)     MCV 90      MCH 29.2      MCHC 32.4      RDW 14.8      Platelet Count 427      % Neutrophils 92      % Lymphocytes 4      % Monocytes 3      % Eosinophils 0      % Basophils 0      % Immature Granulocytes 0      NRBCs per 100 WBC 0      Absolute Neutrophils 14.9 (*)     Absolute Lymphocytes 0.6 (*)     Absolute Monocytes 0.5      Absolute Eosinophils 0.1      Absolute Basophils 0.1      Absolute Immature Granulocytes 0.1      Absolute NRBCs 0.0     LACTIC ACID WHOLE BLOOD - Normal    Lactic Acid 1.1     BLOOD CULTURE       Imaging   CT Chest w Contrast   Final Result   IMPRESSION:    1.  Moderate emphysema.      2.  Superimposed consolidative infiltrate in the right lung most likely pneumonia.      XR Chest Port 1 View   Final Result   IMPRESSION: Stable enlargement of the cardiac silhouette status post aortic valve replacement. Asymmetric interstitial and alveolar opacities throughout the right lung, increased in conspicuity in comparison to recent chest radiograph, favor    infectious/inflammatory process, less likely asymmetric edema. Possible trace right pleural effusion. Left lung is grossly clear. No pneumothorax. Bones are unchanged.          EKG   ECG interpreted by me.  Time 1615  Sinus tachycardia rate 148.  Premature ventricular complexes.  Voltage criteria for LVH.  No ST elevation or  depression. Normal intervals. Normal axis.     Independent Interpretation   Chest x-ray showing right upper lobe pneumonia.    ED Course      Medications Administered   Medications   apixaban ANTICOAGULANT (ELIQUIS) tablet 2.5 mg (has no administration in time range)   metoprolol succinate ER (TOPROL XL) 24 hr tablet 25 mg (has no administration in time range)   tafluprost (ZIOPTAN) ophthalmic solution 1 drop (has no administration in time range)   Timoptic Ocudose 0.5 % ophthalmic solution 1 drop (has no administration in time range)   lidocaine 1 % 0.1-1 mL (has no administration in time range)   lidocaine (LMX4) cream (has no administration in time range)   sodium chloride (PF) 0.9% PF flush 3 mL (has no administration in time range)   sodium chloride (PF) 0.9% PF flush 3 mL (has no administration in time range)   calcium carbonate (TUMS) chewable tablet 1,000 mg (has no administration in time range)   Patient is already receiving anticoagulation with heparin, enoxaparin (LOVENOX), warfarin (COUMADIN)  or other anticoagulant medication (has no administration in time range)   acetaminophen (TYLENOL) tablet 650 mg (has no administration in time range)     Or   acetaminophen (TYLENOL) Suppository 650 mg (has no administration in time range)   melatonin tablet 1 mg (has no administration in time range)   docusate sodium (COLACE) capsule 100 mg (has no administration in time range)   polyethylene glycol (MIRALAX) Packet 17 g (has no administration in time range)   ondansetron (ZOFRAN ODT) ODT tab 4 mg (has no administration in time range)     Or   ondansetron (ZOFRAN) injection 4 mg (has no administration in time range)   guaiFENesin (ROBITUSSIN) 20 mg/mL solution 200 mg (has no administration in time range)   benzocaine-menthol (CHLORASEPTIC) 6-10 MG lozenge 1 lozenge (has no administration in time range)   cefTRIAXone (ROCEPHIN) 1 g vial to attach to  mL bag for ADULTS or NS 50 mL bag for PEDS (0 g Intravenous  Stopped 3/5/25 1825)   azithromycin (ZITHROMAX) 500 mg in  mL intermittent infusion (500 mg Intravenous $New Bag 3/5/25 1825)   sodium chloride 0.9% BOLUS 1,000 mL (0 mLs Intravenous Stopped 3/5/25 1739)   acetaminophen (TYLENOL) tablet 1,000 mg (1,000 mg Oral $Given 3/5/25 1741)   sodium chloride 0.9% BOLUS 1,000 mL (0 mLs Intravenous Stopped 3/5/25 1825)   sodium chloride (PF) 0.9% PF flush 100 mL (60 mLs Intravenous $Given 3/5/25 1847)   iopamidol (ISOVUE-370) solution 500 mL (59 mLs Intravenous $Given 3/5/25 1847)       Procedures   Procedures     Discussion of Management   I discussed admission and the plan of care with the Hospitalist Dr. Miramontes      ED Course   ED Course as of 03/05/25 2002   Wed Mar 05, 2025   1726 Antibiotic ordered at 1625 with concerns of infection.       Additional Documentation  None    Medical Decision Making / Diagnosis     CMS Diagnoses: IV Antibiotics given and/or elevated Lactate of 3 and no sepsis note found - Delete this reminder and enter the sepsis note or '.edcms' before signing chart.>>>The patient has signs of sepsis   Sepsis ED evaluation   The patient has signs of sepsis as evidenced by:  1. Presence of 2 SIRS criteria, suspected infection, AND  2. Organ dysfunction: Lactic Acidosis with value >2.0 due to sepsis    Sepsis Care Initiation: Starting at  1625 PM on 03/05/25, until specified. Prior to this documentation, sepsis, severe sepsis, or septic shock was NOT thought to be a significant cause of illness. This order represents the first time infection was seriously considered to be affecting the patient.    Lactic Acid Results:  Recent Labs   Lab Test 03/05/25 1825 03/05/25  1619 02/16/25  0902   LACT 1.1 3.0* 0.8       3 Hour Bundle 6 Hour Bundle (Reassessment)   Blood Cultures before IV Antibiotics: Yes  Antibiotics given: see below  Prehospital fluid volume (mL):   Prehospital IV Fluid Type: normal saline   Prehospital IV Intake: 100   Prehospital Fluid Start  Time: 1400         Total fluids given (ED +Pre-hospital):  2L   Repeat Lactic Acid Level: Ordered by reflex for 2 hours after initial lactic acid collection.  Vasopressors: none  Repeat perfusion exam: I attest to having performed a repeat sepsis exam and assessment of perfusion at  1700 PM.   BMI Readings from Last 1 Encounters:   03/05/25 20.08 kg/m        Anti-infectives (From admission through now)      Start     Dose/Rate Route Frequency Ordered Stop    03/05/25 1700  azithromycin (ZITHROMAX) 500 mg in  mL intermittent infusion         500 mg  over 1 Hours Intravenous ONCE 03/05/25 1643 03/05/25 1925    03/05/25 1625  cefTRIAXone (ROCEPHIN) 1 g vial to attach to  mL bag for ADULTS or NS 50 mL bag for PEDS         1 g  over 15-30 Minutes Intravenous ONCE 03/05/25 1622 03/05/25 1825                   [facility]   Kavita Merlos is a 89 year old female with complicated medical history, anticoagulated, presenting with fever and respiratory distress.  Off oxygen, her saturation dropped to the high 70s.  She was placed on a Venturi mask and oxygen saturations stalin appropriately and she was able to be titrated down.  Bedside echo showing hyperdynamic heart with some IVC collapsing.  She appears dehydrated.  She was given a liter of fluids.  Heart rate improved slightly.  I think she could use more fluids and was given additional liter with close observation.  Empirically started IV ceftriaxone and IV azithromycin.  Chest x-ray showing pneumonia.  Initial lactate elevated, but repeat improved.  Clinically she is improving on repeat assessment.  I discussed the workup with the patient and her daughter.  She will be admitted for further treatment.  Not requiring BiPAP or intubation at this time.    Disposition   The patient was admitted to the hospital.     Diagnosis     ICD-10-CM    1. Pneumonia of right upper lobe due to infectious organism  J18.9       2. Sepsis, due to unspecified organism, unspecified whether  acute organ dysfunction present (H)  A41.9       3. Acute respiratory failure with hypoxia (H)  J96.01            Discharge Medications   Current Discharge Medication List            MD Evan Ellis Kevin, MD  03/05/25 2002

## 2025-03-05 NOTE — ED NOTES
[facility]  ED Nurse Handoff Report    ED Chief complaint: Tachycardia and Nausea, Vomiting, & Diarrhea  . ED Diagnosis: Pneumonia of RUL, sepsis  Final diagnoses:   Pneumonia of right upper lobe due to infectious organism   Sepsis, due to unspecified organism, unspecified whether acute organ dysfunction present (H)       Allergies:   Allergies   Allergen Reactions    Albuterol     Amlodipine     Bimatoprost Itching    Brimonidine Itching    Clotrimazole Itching    Dorzolamide Hcl-Timolol Mal Itching    Latanoprost Itching    Lisinopril     Losartan      depression    Neomycin-Polymyxin-Gramicidin Swelling    Neosporin [Neomycin-Polymyxin-Gramicidin]     Tape [Adhesive Tape]     Timolol Itching    Travoprost Itching    Vicodin [Hydrocodone-Acetaminophen]     Penicillins Unknown     Extensive use and toleration of cephalosporins       Code Status: Full Code    Activity level - Baseline/Home:  independent and walker.  Activity Level - Current:   assist of 1.   Lift room needed: No.   Bariatric: No   Needed: No   Isolation: No.   Infection: Not Applicable.     Respiratory status: Oximask    Vital Signs (within 30 minutes):   Vitals:    03/05/25 1718 03/05/25 1721 03/05/25 1731 03/05/25 1745   BP:  (!) 153/75 (!) 150/93 (!) 141/71   Pulse:  (!) 133 (!) 124 (!) 121   Resp:  30 15 (!) 32   Temp: (!) 101.4  F (38.6  C)      TempSrc: Rectal      SpO2:  98%  96%   Weight:       Height:           Cardiac Rhythm:  ,   Cardiac  Cardiac Rhythm: Sinus tachycardia  Pain level:    Patient confused: No.   Patient Falls Risk: nonskid shoes/slippers when out of bed, arm band in place, patient and family education, assistive device/personal items within reach, activity supervised, mobility aid in reach, room door open, and toileting schedule implemented.   Elimination Status: Has voided     Patient Report - Initial Complaint: nausea/diarrhea/weakness.   Focused Assessment: Patient BIBA from [facility] in  [place] with c/o being sick x2 weeks. Daughter at bedside states pts PCP concerned for poss UTI. Running ST on cardiac monitoring -- Sepsis workup initiated, CXR obtained, IVF/IV abx started. Pt is A/Ox4, legally blind b/l eyes, baseline independent w/walker. Skin intact but scattered bruising. Baseline respiratory status is room air. Continent bowel/bladder, placed on purewick s/t weakness.     Abnormal Results: lactic acid 3.0, WBC 16  Labs Ordered and Resulted from Time of ED Arrival to Time of ED Departure   ROUTINE UA WITH MICROSCOPIC REFLEX TO CULTURE - Abnormal       Result Value    Color Urine Yellow      Appearance Urine Clear      Glucose Urine 150 (*)     Bilirubin Urine Negative      Ketones Urine Negative      Specific Gravity Urine 1.010      Blood Urine Large (*)     pH Urine 7.0      Protein Albumin Urine 100 (*)     Urobilinogen Urine Normal      Nitrite Urine Negative      Leukocyte Esterase Urine Negative      Bacteria Urine Few (*)     Mucus Urine Present (*)     RBC Urine 1      WBC Urine 2      Squamous Epithelials Urine <1     COMPREHENSIVE METABOLIC PANEL - Abnormal    Sodium 131 (*)     Potassium 4.6      Carbon Dioxide (CO2) 20 (*)     Anion Gap 15      Urea Nitrogen 15.9      Creatinine 0.59      GFR Estimate 86      Calcium 9.1      Chloride 96 (*)     Glucose 197 (*)     Alkaline Phosphatase 91      AST 74 (*)     ALT 22      Protein Total 7.4      Albumin 3.7      Bilirubin Total 2.6 (*)    LACTIC ACID WHOLE BLOOD WITH 1X REPEAT IN 2 HR WHEN >2 - Abnormal    Lactic Acid, Initial 3.0 (*)    CBC WITH PLATELETS AND DIFFERENTIAL - Abnormal    WBC Count 16.2 (*)     RBC Count 3.53 (*)     Hemoglobin 10.3 (*)     Hematocrit 31.8 (*)     MCV 90      MCH 29.2      MCHC 32.4      RDW 14.8      Platelet Count 427      % Neutrophils 92      % Lymphocytes 4      % Monocytes 3      % Eosinophils 0      % Basophils 0      % Immature Granulocytes 0      NRBCs per 100 WBC 0      Absolute  Neutrophils 14.9 (*)     Absolute Lymphocytes 0.6 (*)     Absolute Monocytes 0.5      Absolute Eosinophils 0.1      Absolute Basophils 0.1      Absolute Immature Granulocytes 0.1      Absolute NRBCs 0.0     LACTIC ACID WHOLE BLOOD   BLOOD CULTURE        XR Chest Port 1 View   Final Result   IMPRESSION: Stable enlargement of the cardiac silhouette status post aortic valve replacement. Asymmetric interstitial and alveolar opacities throughout the right lung, increased in conspicuity in comparison to recent chest radiograph, favor    infectious/inflammatory process, less likely asymmetric edema. Possible trace right pleural effusion. Left lung is grossly clear. No pneumothorax. Bones are unchanged.          Treatments provided: IV fluids, IV abx, oxygen, tylenol  Family Comments: daughter at bedside, very calming to patient  OBS brochure/video discussed/provided to patient:  N/A  ED Medications:   Medications   cefTRIAXone (ROCEPHIN) 1 g vial to attach to  mL bag for ADULTS or NS 50 mL bag for PEDS (1 g Intravenous $New Bag 3/5/25 1741)   azithromycin (ZITHROMAX) 500 mg in  mL intermittent infusion (has no administration in time range)   sodium chloride 0.9% BOLUS 1,000 mL (1,000 mLs Intravenous $New Bag 3/5/25 1740)   sodium chloride 0.9% BOLUS 1,000 mL (0 mLs Intravenous Stopped 3/5/25 1739)   acetaminophen (TYLENOL) tablet 1,000 mg (1,000 mg Oral $Given 3/5/25 1741)       Drips infusing:  No  For the majority of the shift this patient was Green.   Interventions performed were na.    Sepsis treatment initiated: Yes    Per the ED Provider, Time Zero for severe sepsis or septic shock is:  1619    3 Hour Severe Sepsis Bundle Completion:  1. Initial Lactic Acid Result: 3.0  Recent Labs   Lab Test 03/05/25  1619 02/16/25  0902 02/15/25  2253   LACT 3.0* 0.8 0.4     2. Blood Cultures before Antibiotics: Yes  3. Broad Spectrum Antibiotics Administered:     Anti-infectives (From now, onward)      Start      Dose/Rate Route Frequency Ordered Stop    03/05/25 1700  azithromycin (ZITHROMAX) 500 mg in  mL intermittent infusion         500 mg  over 1 Hours Intravenous ONCE 03/05/25 1643      03/05/25 1625  cefTRIAXone (ROCEPHIN) 1 g vial to attach to  mL bag for ADULTS or NS 50 mL bag for PEDS         1 g  over 15-30 Minutes Intravenous ONCE 03/05/25 1622            4. 2000 ml of IV fluids have been given so far      6 Hour Severe Sepsis Bundle Completion:    1. Repeat Lactic Acid Level: Not drawn  2. Patient currently on Vasopressors =  No    Cares/treatment/interventions/medications to be completed following ED care: see provider orders. Iv abx, iv fluids, PT/OT.    ED Nurse Name: Tracy Chin RN  5:58 PM    RECEIVING UNIT ED HANDOFF REVIEW    Above ED Nurse Handoff Report was reviewed: Yes  Reviewed by: Hernan Goodrich RN on March 5, 2025 at 7:56 PM   PEBBLES Brewer called the ED to inform them the note was read: Yes

## 2025-03-06 ENCOUNTER — MYC MEDICAL ADVICE (OUTPATIENT)
Dept: CARDIOLOGY | Facility: CLINIC | Age: 89
End: 2025-03-06

## 2025-03-06 VITALS
SYSTOLIC BLOOD PRESSURE: 143 MMHG | TEMPERATURE: 98.2 F | HEIGHT: 64 IN | OXYGEN SATURATION: 97 % | HEART RATE: 90 BPM | WEIGHT: 117 LBS | RESPIRATION RATE: 18 BRPM | DIASTOLIC BLOOD PRESSURE: 62 MMHG | BODY MASS INDEX: 19.97 KG/M2

## 2025-03-06 LAB
ABO + RH BLD: NORMAL
ALBUMIN SERPL BCG-MCNC: 2.7 G/DL (ref 3.5–5.2)
ALP SERPL-CCNC: 66 U/L (ref 40–150)
ALT SERPL W P-5'-P-CCNC: 16 U/L (ref 0–50)
ANION GAP SERPL CALCULATED.3IONS-SCNC: 10 MMOL/L (ref 7–15)
AST SERPL W P-5'-P-CCNC: 46 U/L (ref 0–45)
ATRIAL RATE - MUSE: 148 BPM
BACTERIA BLD CULT: NORMAL
BASOPHILS # BLD AUTO: 0.1 10E3/UL (ref 0–0.2)
BASOPHILS NFR BLD AUTO: 0 %
BILIRUB DIRECT SERPL-MCNC: 0.45 MG/DL (ref 0–0.3)
BILIRUB SERPL-MCNC: 0.9 MG/DL
BLD GP AB SCN SERPL QL: NEGATIVE
BLD PROD TYP BPU: NORMAL
BLOOD COMPONENT TYPE: NORMAL
BUN SERPL-MCNC: 17.3 MG/DL (ref 8–23)
CALCIUM SERPL-MCNC: 8.5 MG/DL (ref 8.8–10.4)
CHLORIDE SERPL-SCNC: 104 MMOL/L (ref 98–107)
CODING SYSTEM: NORMAL
CREAT SERPL-MCNC: 0.59 MG/DL (ref 0.51–0.95)
CROSSMATCH: NORMAL
DIASTOLIC BLOOD PRESSURE - MUSE: NORMAL MMHG
EGFRCR SERPLBLD CKD-EPI 2021: 86 ML/MIN/1.73M2
EOSINOPHIL # BLD AUTO: 0.2 10E3/UL (ref 0–0.7)
EOSINOPHIL NFR BLD AUTO: 1 %
ERYTHROCYTE [DISTWIDTH] IN BLOOD BY AUTOMATED COUNT: 14.9 % (ref 10–15)
ERYTHROCYTE [DISTWIDTH] IN BLOOD BY AUTOMATED COUNT: 15 % (ref 10–15)
GLUCOSE SERPL-MCNC: 97 MG/DL (ref 70–99)
HAPTOGLOB SERPL-MCNC: <10 MG/DL (ref 30–200)
HCO3 SERPL-SCNC: 22 MMOL/L (ref 22–29)
HCT VFR BLD AUTO: 23.7 % (ref 35–47)
HCT VFR BLD AUTO: 24.8 % (ref 35–47)
HGB BLD-MCNC: 6.8 G/DL (ref 11.7–15.7)
HGB BLD-MCNC: 7.2 G/DL (ref 11.7–15.7)
HGB BLD-MCNC: 7.8 G/DL (ref 11.7–15.7)
HGB BLD-MCNC: 7.8 G/DL (ref 11.7–15.7)
IMM GRANULOCYTES # BLD: 0.1 10E3/UL
IMM GRANULOCYTES NFR BLD: 1 %
INTERPRETATION ECG - MUSE: NORMAL
ISSUE DATE AND TIME: NORMAL
LDH SERPL L TO P-CCNC: 962 U/L (ref 0–250)
LYMPHOCYTES # BLD AUTO: 0.9 10E3/UL (ref 0.8–5.3)
LYMPHOCYTES NFR BLD AUTO: 6 %
MCH RBC QN AUTO: 28.8 PG (ref 26.5–33)
MCH RBC QN AUTO: 29.4 PG (ref 26.5–33)
MCHC RBC AUTO-ENTMCNC: 31.5 G/DL (ref 31.5–36.5)
MCHC RBC AUTO-ENTMCNC: 32.9 G/DL (ref 31.5–36.5)
MCV RBC AUTO: 89 FL (ref 78–100)
MCV RBC AUTO: 92 FL (ref 78–100)
MONOCYTES # BLD AUTO: 0.8 10E3/UL (ref 0–1.3)
MONOCYTES NFR BLD AUTO: 6 %
NEUTROPHILS # BLD AUTO: 11.5 10E3/UL (ref 1.6–8.3)
NEUTROPHILS NFR BLD AUTO: 85 %
NRBC # BLD AUTO: 0 10E3/UL
NRBC BLD AUTO-RTO: 0 /100
P AXIS - MUSE: 73 DEGREES
PLATELET # BLD AUTO: 298 10E3/UL (ref 150–450)
PLATELET # BLD AUTO: 303 10E3/UL (ref 150–450)
POTASSIUM SERPL-SCNC: 4.2 MMOL/L (ref 3.4–5.3)
PR INTERVAL - MUSE: 120 MS
PROT SERPL-MCNC: 6 G/DL (ref 6.4–8.3)
QRS DURATION - MUSE: 78 MS
QT - MUSE: 282 MS
QTC - MUSE: 442 MS
R AXIS - MUSE: 86 DEGREES
RBC # BLD AUTO: 2.65 10E6/UL (ref 3.8–5.2)
RBC # BLD AUTO: 2.71 10E6/UL (ref 3.8–5.2)
RETICS # AUTO: 0.1 10E6/UL (ref 0.03–0.1)
RETICS/RBC NFR AUTO: 4 % (ref 0.5–2)
SODIUM SERPL-SCNC: 136 MMOL/L (ref 135–145)
SPECIMEN EXP DATE BLD: NORMAL
SYSTOLIC BLOOD PRESSURE - MUSE: NORMAL MMHG
T AXIS - MUSE: 75 DEGREES
UNIT ABO/RH: NORMAL
UNIT NUMBER: NORMAL
UNIT STATUS: NORMAL
UNIT TYPE ISBT: 5100
VENTRICULAR RATE- MUSE: 148 BPM
WBC # BLD AUTO: 13.5 10E3/UL (ref 4–11)
WBC # BLD AUTO: 14.5 10E3/UL (ref 4–11)

## 2025-03-06 PROCEDURE — 250N000013 HC RX MED GY IP 250 OP 250 PS 637: Performed by: STUDENT IN AN ORGANIZED HEALTH CARE EDUCATION/TRAINING PROGRAM

## 2025-03-06 PROCEDURE — 36415 COLL VENOUS BLD VENIPUNCTURE: CPT | Performed by: STUDENT IN AN ORGANIZED HEALTH CARE EDUCATION/TRAINING PROGRAM

## 2025-03-06 PROCEDURE — 83010 ASSAY OF HAPTOGLOBIN QUANT: CPT | Performed by: STUDENT IN AN ORGANIZED HEALTH CARE EDUCATION/TRAINING PROGRAM

## 2025-03-06 PROCEDURE — 85025 COMPLETE CBC W/AUTO DIFF WBC: CPT | Performed by: STUDENT IN AN ORGANIZED HEALTH CARE EDUCATION/TRAINING PROGRAM

## 2025-03-06 PROCEDURE — 85045 AUTOMATED RETICULOCYTE COUNT: CPT | Performed by: STUDENT IN AN ORGANIZED HEALTH CARE EDUCATION/TRAINING PROGRAM

## 2025-03-06 PROCEDURE — 85027 COMPLETE CBC AUTOMATED: CPT | Performed by: STUDENT IN AN ORGANIZED HEALTH CARE EDUCATION/TRAINING PROGRAM

## 2025-03-06 PROCEDURE — 120N000001 HC R&B MED SURG/OB

## 2025-03-06 PROCEDURE — 99233 SBSQ HOSP IP/OBS HIGH 50: CPT | Performed by: STUDENT IN AN ORGANIZED HEALTH CARE EDUCATION/TRAINING PROGRAM

## 2025-03-06 PROCEDURE — 86900 BLOOD TYPING SEROLOGIC ABO: CPT | Performed by: STUDENT IN AN ORGANIZED HEALTH CARE EDUCATION/TRAINING PROGRAM

## 2025-03-06 PROCEDURE — 85018 HEMOGLOBIN: CPT | Performed by: STUDENT IN AN ORGANIZED HEALTH CARE EDUCATION/TRAINING PROGRAM

## 2025-03-06 PROCEDURE — 86923 COMPATIBILITY TEST ELECTRIC: CPT | Performed by: STUDENT IN AN ORGANIZED HEALTH CARE EDUCATION/TRAINING PROGRAM

## 2025-03-06 PROCEDURE — 86880 COOMBS TEST DIRECT: CPT | Performed by: INTERNAL MEDICINE

## 2025-03-06 PROCEDURE — P9016 RBC LEUKOCYTES REDUCED: HCPCS | Performed by: STUDENT IN AN ORGANIZED HEALTH CARE EDUCATION/TRAINING PROGRAM

## 2025-03-06 PROCEDURE — 83615 LACTATE (LD) (LDH) ENZYME: CPT | Performed by: STUDENT IN AN ORGANIZED HEALTH CARE EDUCATION/TRAINING PROGRAM

## 2025-03-06 PROCEDURE — 86850 RBC ANTIBODY SCREEN: CPT | Performed by: STUDENT IN AN ORGANIZED HEALTH CARE EDUCATION/TRAINING PROGRAM

## 2025-03-06 PROCEDURE — 82040 ASSAY OF SERUM ALBUMIN: CPT | Performed by: STUDENT IN AN ORGANIZED HEALTH CARE EDUCATION/TRAINING PROGRAM

## 2025-03-06 PROCEDURE — 258N000003 HC RX IP 258 OP 636: Performed by: STUDENT IN AN ORGANIZED HEALTH CARE EDUCATION/TRAINING PROGRAM

## 2025-03-06 PROCEDURE — 250N000011 HC RX IP 250 OP 636: Performed by: STUDENT IN AN ORGANIZED HEALTH CARE EDUCATION/TRAINING PROGRAM

## 2025-03-06 RX ORDER — TAFLUPROST OPTHALMIC 0 MG/.3ML
1 SOLUTION/ DROPS OPHTHALMIC AT BEDTIME
Status: DISCONTINUED | OUTPATIENT
Start: 2025-03-06 | End: 2025-03-11 | Stop reason: HOSPADM

## 2025-03-06 RX ORDER — CEFTRIAXONE 1 G/1
1 INJECTION, POWDER, FOR SOLUTION INTRAMUSCULAR; INTRAVENOUS EVERY 24 HOURS
Status: DISCONTINUED | OUTPATIENT
Start: 2025-03-06 | End: 2025-03-07

## 2025-03-06 RX ORDER — AZITHROMYCIN 500 MG/5ML
500 INJECTION, POWDER, LYOPHILIZED, FOR SOLUTION INTRAVENOUS EVERY 24 HOURS
Status: DISCONTINUED | OUTPATIENT
Start: 2025-03-06 | End: 2025-03-07

## 2025-03-06 RX ADMIN — METOPROLOL SUCCINATE 25 MG: 25 TABLET, EXTENDED RELEASE ORAL at 09:12

## 2025-03-06 RX ADMIN — DOCUSATE SODIUM 100 MG: 100 CAPSULE, LIQUID FILLED ORAL at 09:13

## 2025-03-06 RX ADMIN — DOCUSATE SODIUM 100 MG: 100 CAPSULE, LIQUID FILLED ORAL at 21:12

## 2025-03-06 RX ADMIN — CEFTRIAXONE 1 G: 1 INJECTION, POWDER, FOR SOLUTION INTRAMUSCULAR; INTRAVENOUS at 12:33

## 2025-03-06 RX ADMIN — TAFLUPROST OPTHALMIC 1 DROP: 0 SOLUTION/ DROPS OPHTHALMIC at 21:12

## 2025-03-06 RX ADMIN — ACETAMINOPHEN 650 MG: 325 TABLET, FILM COATED ORAL at 11:33

## 2025-03-06 RX ADMIN — AZITHROMYCIN MONOHYDRATE 500 MG: 500 INJECTION, POWDER, LYOPHILIZED, FOR SOLUTION INTRAVENOUS at 13:13

## 2025-03-06 RX ADMIN — TIMOLOL MALEATE 1 DROP: 5 SOLUTION/ DROPS OPHTHALMIC at 16:30

## 2025-03-06 RX ADMIN — APIXABAN 2.5 MG: 2.5 TABLET, FILM COATED ORAL at 09:13

## 2025-03-06 RX ADMIN — APIXABAN 2.5 MG: 2.5 TABLET, FILM COATED ORAL at 21:12

## 2025-03-06 RX ADMIN — TIMOLOL MALEATE 1 DROP: 5 SOLUTION/ DROPS OPHTHALMIC at 11:06

## 2025-03-06 RX ADMIN — ACETAMINOPHEN 650 MG: 325 TABLET, FILM COATED ORAL at 19:11

## 2025-03-06 ASSESSMENT — ACTIVITIES OF DAILY LIVING (ADL)
ADLS_ACUITY_SCORE: 56
ADLS_ACUITY_SCORE: 45
ADLS_ACUITY_SCORE: 44
ADLS_ACUITY_SCORE: 44
ADLS_ACUITY_SCORE: 56
ADLS_ACUITY_SCORE: 44
ADLS_ACUITY_SCORE: 44
ADLS_ACUITY_SCORE: 45
ADLS_ACUITY_SCORE: 54
ADLS_ACUITY_SCORE: 44
ADLS_ACUITY_SCORE: 56
ADLS_ACUITY_SCORE: 56
ADLS_ACUITY_SCORE: 44
ADLS_ACUITY_SCORE: 45
ADLS_ACUITY_SCORE: 56
ADLS_ACUITY_SCORE: 44
ADLS_ACUITY_SCORE: 45
ADLS_ACUITY_SCORE: 56
ADLS_ACUITY_SCORE: 45
ADLS_ACUITY_SCORE: 44
ADLS_ACUITY_SCORE: 56
ADLS_ACUITY_SCORE: 44
ADLS_ACUITY_SCORE: 45

## 2025-03-06 NOTE — PLAN OF CARE
"Pt A&Ox4, forgetful at times. VSS, on 1L NC, O2 sats 98% and above. She drops to 87% on RA at this time. She is blind in both eyes, hx of macular degeneration. Mid morning, complained of achiness \"behind eyes,\" tylenol given x1. Per notes, pt has pancreatic and renal lesions, will f/u outpatient. Dtr brought dentures to patient, diet order switched from mechanical soft to regular. Pt's dtr expresses that pt has had poor appetite for past few weeks and is lactose intolerant.  Monitoring hgb, recent is 7.8. Pt & pt's dtr report no signs of bleeding in urine or stool. Next hgb check at 1400.  On rocephin and Azithro IV. Purewick in place. Bed alarm on for safety. Assist x1 w/gb and walker to chair.    1437: Paged MD as SAGE, recent hgb is 7.2. MD at bedside. Blood consent signed. Conditional order for 1 rbc to be released if hgb is less than 7.0. next recheck of hgb is 2100.    Problem: Adult Inpatient Plan of Care  Goal: Plan of Care Review  Description: The Plan of Care Review/Shift note should be completed every shift.  The Outcome Evaluation is a brief statement about your assessment that the patient is improving, declining, or no change.  This information will be displayed automatically on your shift  note.  Outcome: Progressing  Flowsheets (Taken 3/6/2025 1044)  Outcome Evaluation: Pt A&Ox4, VSS, pt weaned from 4L to 1L NC. no complaints of pain.  Plan of Care Reviewed With: (dtr, Janette)   patient   other (see comments)  Overall Patient Progress: improving  Goal: Patient-Specific Goal (Individualized)  Description: You can add care plan individualizations to a care plan. Examples of Individualization might be:  \"Parent requests to be called daily at 9am for status\", \"I have a hard time hearing out of my right ear\", or \"Do not touch me to wake me up as it startles  me\".  Outcome: Progressing  Goal: Absence of Hospital-Acquired Illness or Injury  Outcome: Progressing  Intervention: Identify and Manage Fall " Risk  Recent Flowsheet Documentation  Taken 3/6/2025 0929 by Jacklyn Michelle RN  Safety Promotion/Fall Prevention: (dtr at bedside)   activity supervised   lighting adjusted   increase visualization of patient   clutter free environment maintained   room door open   room near nurse's station   room organization consistent   safety round/check completed   other (see comments)  Intervention: Prevent Skin Injury  Recent Flowsheet Documentation  Taken 3/6/2025 0929 by Jacklyn Michelle RN  Body Position: position changed independently  Skin Protection: adhesive use limited  Intervention: Prevent and Manage VTE (Venous Thromboembolism) Risk  Recent Flowsheet Documentation  Taken 3/6/2025 0929 by Jacklyn Michelle RN  VTE Prevention/Management: SCDs off (sequential compression devices)  Intervention: Prevent Infection  Recent Flowsheet Documentation  Taken 3/6/2025 0929 by Jacklyn Michelle RN  Infection Prevention:   personal protective equipment utilized   rest/sleep promoted   single patient room provided  Goal: Optimal Comfort and Wellbeing  Outcome: Progressing  Goal: Readiness for Transition of Care  Outcome: Progressing     Problem: Infection  Goal: Absence of Infection Signs and Symptoms  Outcome: Progressing   Goal Outcome Evaluation:      Plan of Care Reviewed With: patient, other (see comments) (dtr, Janette)    Overall Patient Progress: improvingOverall Patient Progress: improving    Outcome Evaluation: Pt A&Ox4, VSS, pt weaned from 4L to 1L NC. no complaints of pain.

## 2025-03-06 NOTE — PLAN OF CARE
Pt reports ongoing nausea; zofran given by EMS not helpful. Pt still tachycardic in 100s; pt reports heart palpitations over last two nights but currently denies them. Purewick in place. Pt A&Ox4. Denies pain. Azithromycin currently infusing. Pt also reports chills have subsided; temp not yet rechecked. Pt on 4L oxymask with O2 sats mid to high 90s. Transfer to floor.

## 2025-03-06 NOTE — PLAN OF CARE
"Pt A&Ox4, forgetful at times. VSS, on 1L NC. Denies pain. Legally blind in both eyes, hx of macular degeneration. Tolerating regular diet. Denies shortness of breath. Lung sounds clear. Monitoring hgb, recent is 7.2. Consent for blood completed - will recheck at 2100 and if below 7 there is a conditional order to transfuse. On rocephin and Azithro IV. Purewick in place. Bed alarm on for safety. Assist x1 w/gb and walker to chair.    Goal Outcome Evaluation:      Plan of Care Reviewed With: patient    Overall Patient Progress: improvingOverall Patient Progress: improving    Outcome Evaluation: Pt A&Ox4, VSS, 1L NC. no complaints of pain.    Problem: Adult Inpatient Plan of Care  Goal: Plan of Care Review  Description: The Plan of Care Review/Shift note should be completed every shift.  The Outcome Evaluation is a brief statement about your assessment that the patient is improving, declining, or no change.  This information will be displayed automatically on your shift  note.  Outcome: Progressing  Flowsheets (Taken 3/6/2025 2472)  Outcome Evaluation: Pt A&Ox4, VSS, 1L NC. no complaints of pain.  Plan of Care Reviewed With: patient  Overall Patient Progress: improving  Goal: Patient-Specific Goal (Individualized)  Description: You can add care plan individualizations to a care plan. Examples of Individualization might be:  \"Parent requests to be called daily at 9am for status\", \"I have a hard time hearing out of my right ear\", or \"Do not touch me to wake me up as it startles  me\".  Outcome: Progressing  Goal: Absence of Hospital-Acquired Illness or Injury  Outcome: Progressing  Intervention: Prevent Infection  Recent Flowsheet Documentation  Taken 3/6/2025 1600 by Ale Alexander RN  Infection Prevention:   personal protective equipment utilized   rest/sleep promoted   single patient room provided  Goal: Optimal Comfort and Wellbeing  Outcome: Progressing  Goal: Readiness for Transition of Care  Outcome: " Progressing     Problem: Infection  Goal: Absence of Infection Signs and Symptoms  Outcome: Progressing     Problem: Delirium  Goal: Optimal Coping  Outcome: Progressing  Goal: Improved Behavioral Control  Outcome: Progressing  Goal: Improved Attention and Thought Clarity  Outcome: Progressing  Goal: Improved Sleep  Outcome: Progressing

## 2025-03-06 NOTE — PROGRESS NOTES
[facility]    Medicine Progress Note - Hospitalist Service    Date of Admission:  3/5/2025    Assessment & Plan   Kavita Merlos is a 89 year old female with a history of infrarenal AAA, aortic valve stenosis status post TAVR 2/2024, paroxysmal atrial fibrillation on anticoagulation, hypertension, admitted on 3/5/2025 with acute hypoxic respiratory failure and sepsis 2/2 RUL pneumonia.       RUL Pneumonia  Sepsis  Acute hypoxic respiratory failure:  Several days of general malaise, nausea; seen 3/3 and started on cefpodoxime/azithromycin for presumed respiratory infection. Acute onset of chills and shortness of breath hypoxic to 78% that improved on 4L o2. She was febrile and tachycardic, with elevated WBC (16) and lactate (3.0) improved to 1.1 following fluids/abx. CT with moderate emphysema, consolidative process in RUL with small R pleural effusion.   -Continue rocephin/azithromycin  -Continuous oximetry, wean o2 as able  -Clinically improving     Acute on chronic anemia:  Hgb 10.3 on arrival which is her baseline.  Dropped to 7.8 on day 2 of hospitalization.  Denies black or bloody stools and no other evidence of bleeding.  Bilirubin was increased on admission so need to consider hemolysis.  Yet, this has normalized overnight.   -Haptoglobin, LDH  -Peripheral blood morphology  -Repeat Hgb at 1400  -Transfuse for Hgb <7  -Ok to continue anticoagulation for now given no evidence of bleeding    Elevated bilirubin   AST elevation:  Possibly related to sepsis/acute illness.  Also ruling out hemolysis as above.  Improving spontaneously.   -Trend LFT     Hyponatremia, resolved:   131; baseline 132-134  -AM BMP     Hx of Aortic Stenosis (S/P TAVR 02/2024)  Hx of pAF on apixaban  HTN  Pt underwent the procedures as above about one year ago. In the post-op period, she was noted to have an episode of pAF. However, no further recurrences and here in NSR.   -PTA apixaban and metoprolol continued      Macular Degeneration  Legally Blind  At baseline is legally blind; complete loss of vision in R eye, maintains photoreception in L eye, though to lesser degree. PTA PreserVision PO 2 tabs daily, Tafluprost ophthalmic solution 0.5% 1 drop at bedtime, Timoptic Ocudose 0.5% ophthalmic solution 1 drop BID continued during admission.     Incidental findings  Stable 4.5 cm infrarenal abdominal aortic aneurysm  Stable severe narrowing in the proximal right subclavian, celiac, superior mesenteric arteries.  Renal Lesion  Has been seen on prior imaging, and has been suspected to be a cyst. On imaging in 01/2025 during admission to [facility] for hypertensive urgency, imaging showed 10mm cyst in L kidney, slightly increased to 13 mm on imaging in February.  Also noted some mild nodular thickening/enhancement of the left wrist renal pelvis which is stable/slightly increased.  However, has not had follow-up CT urogram. Has established care /  Urology recently for urinary retention. Recommend urogram study in ~3 months as recommended by Radiology team for interval assessment of renal lesion.  Pancreatic Cystic Lesion  Stable lesion measuring 9mm noted in the pancreatic body. Appears cystic in nature per [facility]. Should follow up with PCP to order pancreatic protocol CT or MRI in 18 months if within goals of care (sometime around August 2026).  Lung nodules  2 mm nodule in the superior segment of the left lower lobe stable, recommended chest CT in 1 year per [place].  2 mm endobronchial nodule in the right middle lobe slightly increased.  Stable cystic lesion in pancreatic body, per radiology recommended follow-up pancreatic protocol CT or MRI in 18 months if within goals of care.          Diet: Regular Diet Adult    DVT Prophylaxis: DOAC  Mendieta Catheter: Not present  Lines: None     Cardiac Monitoring: None  Code Status:  Ok for CPR but no intubation    Clinically Significant Risk Factors Present on Admission         #  Hyponatremia: Lowest Na = 131 mmol/L in last 2 days, will monitor as appropriate  # Hypochloremia: Lowest Cl = 96 mmol/L in last 2 days, will monitor as appropriate      # Hypoalbuminemia: Lowest albumin = 2.7 g/dL at 3/6/2025  7:19 AM, will monitor as appropriate  # Drug Induced Coagulation Defect: home medication list includes an anticoagulant medication    # Hypertension: Noted on problem list      # Anemia: based on hgb <11           # Financial/Environmental Concerns:           Social Drivers of Health    Tobacco Use: Medium Risk (3/3/2025)    Patient History     Smoking Tobacco Use: Former     Smokeless Tobacco Use: Never     Passive Exposure: Never   Physical Activity: Patient Declined (10/21/2024)    Exercise Vital Sign     Days of Exercise per Week: Patient declined     Minutes of Exercise per Session: Patient declined   Social Connections: Unknown (10/21/2024)    Social Connection and Isolation Panel [NHANES]     Frequency of Social Gatherings with Friends and Family: Twice a week          Disposition Plan     Medically Ready for Discharge: Anticipated Tomorrow    Vamshi Ivey DO  Hospitalist Service  [facility]  Securely message with [employer] (more info)  Text page via [employer] Paging/Directory   ______________________________________________________________________    Interval History   No acute events overnight.  Patient reports feeling improved but is not quite back to her baseline.  She denies any significant shortness of breath or abdominal pain.  Having some loose stools since starting abx.  Daughter at bedside and updated.     Physical Exam   Vital Signs: Temp: 97.7  F (36.5  C) Temp src: Oral BP: 135/56 Pulse: 118   Resp: 18 SpO2: 92 % O2 Device: None (Room air) Oxygen Delivery: 1 LPM  Weight: 117 lbs 0 oz    General Appearance: Patient awake & alert.  No apparent distress.   Respiratory: Lungs are CTAB.  Work of breathing appears normal on room air.  Cardiovascular: Regular  rate and rhythm.  No murmurs rubs or gallops.  There is no edema present.  GI: Benign.  Soft.  Non-tender.  Bowel sounds active.   Skin: Pale.  No rashes or lesions exposed skin.  Neuro:  The patient is moving all extremities.  No obvious focal asymmetries.   Other: Patient is appropriate and oriented x3.     Medical Decision Making       50 MINUTES SPENT BY ME on the date of service doing chart review, history, exam, documentation & further activities per the note.      Data   ------------------------- PAST 24 HR DATA REVIEWED -----------------------------------------------    I have personally reviewed the following data over the past 24 hrs:    14.5 (H)  \   7.8 (L)   / 303     136 104 17.3 /  97   4.2 22 0.59 \     ALT: 16 AST: 46 (H) AP: 66 TBILI: 0.9   ALB: 2.7 (L) TOT PROTEIN: 6.0 (L) LIPASE: N/A     Procal: N/A CRP: N/A Lactic Acid: 1.1         Imaging results reviewed over the past 24 hrs:   Recent Results (from the past 24 hours)   XR Chest Port 1 View    Narrative    EXAM: XR CHEST PORT 1 VIEW  LOCATION: [facility]  DATE: 3/5/2025    INDICATION: hypoxia  COMPARISON: Chest radiograph 3/3/2025, CT 2/15/2025.      Impression    IMPRESSION: Stable enlargement of the cardiac silhouette status post aortic valve replacement. Asymmetric interstitial and alveolar opacities throughout the right lung, increased in conspicuity in comparison to recent chest radiograph, favor   infectious/inflammatory process, less likely asymmetric edema. Possible trace right pleural effusion. Left lung is grossly clear. No pneumothorax. Bones are unchanged.   CT Chest w Contrast    Narrative    EXAM: CT CHEST W CONTRAST  LOCATION: [facility]  DATE: 3/5/2025    INDICATION: acute hypoxic respiratory failure  COMPARISON: Portable chest 3/5/2025. CT chest 4/17/2020.. CT 2/15/2025 chest abdomen pelvis.  TECHNIQUE: CT chest with IV contrast. Multiplanar reformats were obtained. Dose reduction  techniques were used.    CONTRAST: 59 mL Isovue 370    FINDINGS:   LUNGS AND PLEURA: Moderate emphysema. Superimposed consolidative infiltrate in the right upper lobe posteriorly and to lesser degree the right lower lobe. Most likely pneumonia. Tiny right pleural effusion.    MEDIASTINUM/AXILLAE: Mildly prominent mediastinal nodes, favor reactive.    CORONARY ARTERY CALCIFICATION: Moderate.    UPPER ABDOMEN: Aortic aneurysm incompletely included.    MUSCULOSKELETAL: Normal.      Impression    IMPRESSION:   1.  Moderate emphysema.    2.  Superimposed consolidative infiltrate in the right lung most likely pneumonia.

## 2025-03-06 NOTE — PLAN OF CARE
"Shift Note [id number]:  Pt AxO x4. Forgetful/intermittent confusion.  Pt stated pain as 0/10.   Activity Not oob for me. Per pt uses rolling walker at home.  Respiratory: LS diminished, O2 device: oxymask 4L.     Pertinent Tests/Labs: lactic 1.1.   Other: Legally blind. 3 month urogram recommended for renal lesion per note. 18 month follow up w PCP recommended for pancreatic lesion per note.  Plan of Care: continue rocephin/azithromycin. BMP in Am.    Goal Outcome Evaluation:      Plan of Care Reviewed With: patient, family    Overall Patient Progress: improvingOverall Patient Progress: improving    Outcome Evaluation: VSS. Night meds given.      Problem: Adult Inpatient Plan of Care  Goal: Plan of Care Review  Description: The Plan of Care Review/Shift note should be completed every shift.  The Outcome Evaluation is a brief statement about your assessment that the patient is improving, declining, or no change.  This information will be displayed automatically on your shift  note.  Outcome: Progressing  Flowsheets (Taken 3/6/2025 0129)  Outcome Evaluation: VSS. Night meds given.  Plan of Care Reviewed With:   patient   family  Overall Patient Progress: improving  Goal: Patient-Specific Goal (Individualized)  Description: You can add care plan individualizations to a care plan. Examples of Individualization might be:  \"Parent requests to be called daily at 9am for status\", \"I have a hard time hearing out of my right ear\", or \"Do not touch me to wake me up as it startles  me\".  Outcome: Progressing  Goal: Absence of Hospital-Acquired Illness or Injury  Outcome: Progressing  Intervention: Identify and Manage Fall Risk  Recent Flowsheet Documentation  Taken 3/5/2025 2222 by Hernan Goodrich, RN  Safety Promotion/Fall Prevention:   activity supervised   assistive device/personal items within reach   clutter free environment maintained   increased rounding and observation   lighting adjusted   nonskid shoes/slippers when " out of bed   patient and family education   room near nurse's station   room organization consistent   mobility aid in reach  Intervention: Prevent Skin Injury  Recent Flowsheet Documentation  Taken 3/5/2025 2222 by Hernan Goodrich RN  Body Position: position changed independently  Intervention: Prevent and Manage VTE (Venous Thromboembolism) Risk  Recent Flowsheet Documentation  Taken 3/5/2025 2222 by Hernan Goodrich RN  VTE Prevention/Management: SCDs off (sequential compression devices)  Intervention: Prevent Infection  Recent Flowsheet Documentation  Taken 3/5/2025 2222 by Hernan Goodrich RN  Infection Prevention:   hand hygiene promoted   personal protective equipment utilized   rest/sleep promoted   single patient room provided  Goal: Optimal Comfort and Wellbeing  Outcome: Progressing  Goal: Readiness for Transition of Care  Outcome: Progressing  Intervention: Mutually Develop Transition Plan  Recent Flowsheet Documentation  Taken 3/5/2025 2000 by Hernan Goodrich RN  Equipment Currently Used at Home: walker, rolling     Problem: Infection  Goal: Absence of Infection Signs and Symptoms  Outcome: Progressing

## 2025-03-06 NOTE — PHARMACY-ADMISSION MEDICATION HISTORY
Pharmacy Intern Admission Medication History    Admission medication history is complete. The information provided in this note is only as accurate as the sources available at the time of the update.    Information Source(s): Patient and CareEverywhere/SureScripts via in-person    Pertinent Information: Patient is from independent living. She manages her own meds. Pt started azithromycin and VANTIN on Monday the 3rd for 5 days    Changes made to PTA medication list:  Added: None  Deleted: None  Changed: None    Allergies reviewed with patient and updates made in EHR: yes    Medication History Completed By: Rashawn Krishnan 3/5/2025 8:49 PM    PTA Med List   Medication Sig Last Dose/Taking    apixaban ANTICOAGULANT (ELIQUIS) 2.5 MG tablet Take 1 tablet (2.5 mg) by mouth 2 times daily. 3/5/2025 Morning    azithromycin (ZITHROMAX) 250 MG tablet Take 250 mg by mouth daily. 3/5/2025 Morning    cefpodoxime (VANTIN) 200 MG tablet Take 1 tablet (200 mg) by mouth 2 times daily. 3/5/2025 Morning    metoprolol succinate ER (TOPROL XL) 25 MG 24 hr tablet Take 1 tablet (25 mg) by mouth daily. 3/5/2025 Morning    tafluprost (ZIOPTAN) 0.0015 % SOLN ophthalmic solution Place 1 drop into both eyes at bedtime 3/4/2025 Bedtime    Timolol Maleate (TIMOPTIC OCUDOSE) 0.5 % ophthalmic solution Place 1 drop into both eyes 2 times daily 3/5/2025 Morning

## 2025-03-07 ENCOUNTER — APPOINTMENT (OUTPATIENT)
Dept: GENERAL RADIOLOGY | Facility: CLINIC | Age: 89
End: 2025-03-07
Attending: INTERNAL MEDICINE
Payer: MEDICARE

## 2025-03-07 ENCOUNTER — APPOINTMENT (OUTPATIENT)
Dept: CARDIOLOGY | Facility: CLINIC | Age: 89
End: 2025-03-07
Attending: INTERNAL MEDICINE
Payer: MEDICARE

## 2025-03-07 LAB
ALBUMIN SERPL BCG-MCNC: 3 G/DL (ref 3.5–5.2)
ALP SERPL-CCNC: 75 U/L (ref 40–150)
ALT SERPL W P-5'-P-CCNC: 17 U/L (ref 0–50)
ANION GAP SERPL CALCULATED.3IONS-SCNC: 11 MMOL/L (ref 7–15)
ANION GAP SERPL CALCULATED.3IONS-SCNC: 11 MMOL/L (ref 7–15)
APTT PPP: 36 SECONDS (ref 22–38)
AST SERPL W P-5'-P-CCNC: 43 U/L (ref 0–45)
ATRIAL RATE - MUSE: 105 BPM
BASE EXCESS BLDV CALC-SCNC: -0.6 MMOL/L (ref -3–3)
BILIRUB DIRECT SERPL-MCNC: 0.54 MG/DL (ref 0–0.3)
BILIRUB SERPL-MCNC: 1.2 MG/DL
BUN SERPL-MCNC: 14.7 MG/DL (ref 8–23)
BUN SERPL-MCNC: 14.8 MG/DL (ref 8–23)
CALCIUM SERPL-MCNC: 8.7 MG/DL (ref 8.8–10.4)
CALCIUM SERPL-MCNC: 8.9 MG/DL (ref 8.8–10.4)
CHLORIDE SERPL-SCNC: 100 MMOL/L (ref 98–107)
CHLORIDE SERPL-SCNC: 104 MMOL/L (ref 98–107)
CREAT SERPL-MCNC: 0.49 MG/DL (ref 0.51–0.95)
CREAT SERPL-MCNC: 0.52 MG/DL (ref 0.51–0.95)
DAT POLY: NEGATIVE
DIASTOLIC BLOOD PRESSURE - MUSE: NORMAL MMHG
EGFRCR SERPLBLD CKD-EPI 2021: 89 ML/MIN/1.73M2
EGFRCR SERPLBLD CKD-EPI 2021: 90 ML/MIN/1.73M2
ERYTHROCYTE [DISTWIDTH] IN BLOOD BY AUTOMATED COUNT: 14.6 % (ref 10–15)
ERYTHROCYTE [DISTWIDTH] IN BLOOD BY AUTOMATED COUNT: 14.7 % (ref 10–15)
FIBRINOGEN PPP-MCNC: 552 MG/DL (ref 170–510)
FLUAV RNA SPEC QL NAA+PROBE: NEGATIVE
FLUBV RNA RESP QL NAA+PROBE: NEGATIVE
GLUCOSE BLDC GLUCOMTR-MCNC: 117 MG/DL (ref 70–99)
GLUCOSE SERPL-MCNC: 131 MG/DL (ref 70–99)
GLUCOSE SERPL-MCNC: 91 MG/DL (ref 70–99)
HCO3 BLDV-SCNC: 24 MMOL/L (ref 21–28)
HCO3 SERPL-SCNC: 22 MMOL/L (ref 22–29)
HCO3 SERPL-SCNC: 23 MMOL/L (ref 22–29)
HCT VFR BLD AUTO: 29.7 % (ref 35–47)
HCT VFR BLD AUTO: 30 % (ref 35–47)
HGB BLD-MCNC: 9.7 G/DL (ref 11.7–15.7)
HGB BLD-MCNC: 9.7 G/DL (ref 11.7–15.7)
HOLD SPECIMEN: NORMAL
HOLD SPECIMEN: NORMAL
INR PPP: 1.45 (ref 0.85–1.15)
INTERPRETATION ECG - MUSE: NORMAL
IRON BINDING CAPACITY (ROCHE): 168 UG/DL (ref 240–430)
IRON SATN MFR SERPL: 15 % (ref 15–46)
IRON SERPL-MCNC: 26 UG/DL (ref 37–145)
LVEF ECHO: NORMAL
MCH RBC QN AUTO: 29.7 PG (ref 26.5–33)
MCH RBC QN AUTO: 29.7 PG (ref 26.5–33)
MCHC RBC AUTO-ENTMCNC: 32.7 G/DL (ref 31.5–36.5)
MCHC RBC AUTO-ENTMCNC: 32.7 G/DL (ref 31.5–36.5)
MCV RBC AUTO: 91 FL (ref 78–100)
MCV RBC AUTO: 91 FL (ref 78–100)
O2/TOTAL GAS SETTING VFR VENT: 35 %
OXYHGB MFR BLDV: 91 % (ref 70–75)
P AXIS - MUSE: 59 DEGREES
PATH REPORT.COMMENTS IMP SPEC: NORMAL
PATH REPORT.COMMENTS IMP SPEC: NORMAL
PATH REPORT.FINAL DX SPEC: NORMAL
PATH REPORT.MICROSCOPIC SPEC OTHER STN: NORMAL
PATH REPORT.MICROSCOPIC SPEC OTHER STN: NORMAL
PATH REPORT.RELEVANT HX SPEC: NORMAL
PCO2 BLDV: 39 MM HG (ref 40–50)
PH BLDV: 7.4 [PH] (ref 7.32–7.43)
PLATELET # BLD AUTO: 291 10E3/UL (ref 150–450)
PLATELET # BLD AUTO: 306 10E3/UL (ref 150–450)
PO2 BLDV: 64 MM HG (ref 25–47)
POTASSIUM SERPL-SCNC: 4.2 MMOL/L (ref 3.4–5.3)
POTASSIUM SERPL-SCNC: 4.2 MMOL/L (ref 3.4–5.3)
PR INTERVAL - MUSE: 150 MS
PROT SERPL-MCNC: 6.4 G/DL (ref 6.4–8.3)
QRS DURATION - MUSE: 84 MS
QT - MUSE: 336 MS
QTC - MUSE: 444 MS
R AXIS - MUSE: 80 DEGREES
RBC # BLD AUTO: 3.27 10E6/UL (ref 3.8–5.2)
RBC # BLD AUTO: 3.3 10E6/UL (ref 3.8–5.2)
RSV RNA SPEC NAA+PROBE: NEGATIVE
SAO2 % BLDV: 93.9 % (ref 70–75)
SARS-COV-2 RNA RESP QL NAA+PROBE: NEGATIVE
SODIUM SERPL-SCNC: 134 MMOL/L (ref 135–145)
SODIUM SERPL-SCNC: 137 MMOL/L (ref 135–145)
SPECIMEN EXP DATE BLD: NORMAL
SYSTOLIC BLOOD PRESSURE - MUSE: NORMAL MMHG
T AXIS - MUSE: 107 DEGREES
TROPONIN T SERPL HS-MCNC: 37 NG/L
TROPONIN T SERPL HS-MCNC: 43 NG/L
VENTRICULAR RATE- MUSE: 105 BPM
VIT B12 SERPL-MCNC: 1555 PG/ML (ref 232–1245)
WBC # BLD AUTO: 10.3 10E3/UL (ref 4–11)
WBC # BLD AUTO: 10.6 10E3/UL (ref 4–11)

## 2025-03-07 PROCEDURE — 93321 DOPPLER ECHO F-UP/LMTD STD: CPT | Mod: 26 | Performed by: INTERNAL MEDICINE

## 2025-03-07 PROCEDURE — 5A09357 ASSISTANCE WITH RESPIRATORY VENTILATION, LESS THAN 24 CONSECUTIVE HOURS, CONTINUOUS POSITIVE AIRWAY PRESSURE: ICD-10-PCS | Performed by: INTERNAL MEDICINE

## 2025-03-07 PROCEDURE — 82805 BLOOD GASES W/O2 SATURATION: CPT | Performed by: INTERNAL MEDICINE

## 2025-03-07 PROCEDURE — 85060 BLOOD SMEAR INTERPRETATION: CPT | Performed by: PATHOLOGY

## 2025-03-07 PROCEDURE — 250N000013 HC RX MED GY IP 250 OP 250 PS 637: Performed by: STUDENT IN AN ORGANIZED HEALTH CARE EDUCATION/TRAINING PROGRAM

## 2025-03-07 PROCEDURE — 36415 COLL VENOUS BLD VENIPUNCTURE: CPT | Performed by: STUDENT IN AN ORGANIZED HEALTH CARE EDUCATION/TRAINING PROGRAM

## 2025-03-07 PROCEDURE — 93010 ELECTROCARDIOGRAM REPORT: CPT | Performed by: INTERNAL MEDICINE

## 2025-03-07 PROCEDURE — 258N000003 HC RX IP 258 OP 636: Performed by: INTERNAL MEDICINE

## 2025-03-07 PROCEDURE — 36415 COLL VENOUS BLD VENIPUNCTURE: CPT | Performed by: INTERNAL MEDICINE

## 2025-03-07 PROCEDURE — 83550 IRON BINDING TEST: CPT | Performed by: INTERNAL MEDICINE

## 2025-03-07 PROCEDURE — 80053 COMPREHEN METABOLIC PANEL: CPT | Performed by: STUDENT IN AN ORGANIZED HEALTH CARE EDUCATION/TRAINING PROGRAM

## 2025-03-07 PROCEDURE — 999N000157 HC STATISTIC RCP TIME EA 10 MIN

## 2025-03-07 PROCEDURE — 85014 HEMATOCRIT: CPT | Performed by: INTERNAL MEDICINE

## 2025-03-07 PROCEDURE — 93325 DOPPLER ECHO COLOR FLOW MAPG: CPT | Mod: 26 | Performed by: INTERNAL MEDICINE

## 2025-03-07 PROCEDURE — 85730 THROMBOPLASTIN TIME PARTIAL: CPT | Performed by: INTERNAL MEDICINE

## 2025-03-07 PROCEDURE — 250N000013 HC RX MED GY IP 250 OP 250 PS 637: Performed by: INTERNAL MEDICINE

## 2025-03-07 PROCEDURE — 82248 BILIRUBIN DIRECT: CPT | Performed by: STUDENT IN AN ORGANIZED HEALTH CARE EDUCATION/TRAINING PROGRAM

## 2025-03-07 PROCEDURE — 93325 DOPPLER ECHO COLOR FLOW MAPG: CPT

## 2025-03-07 PROCEDURE — 85610 PROTHROMBIN TIME: CPT | Performed by: INTERNAL MEDICINE

## 2025-03-07 PROCEDURE — 250N000011 HC RX IP 250 OP 636: Performed by: INTERNAL MEDICINE

## 2025-03-07 PROCEDURE — 85384 FIBRINOGEN ACTIVITY: CPT | Performed by: INTERNAL MEDICINE

## 2025-03-07 PROCEDURE — 84484 ASSAY OF TROPONIN QUANT: CPT | Performed by: INTERNAL MEDICINE

## 2025-03-07 PROCEDURE — 85027 COMPLETE CBC AUTOMATED: CPT | Performed by: STUDENT IN AN ORGANIZED HEALTH CARE EDUCATION/TRAINING PROGRAM

## 2025-03-07 PROCEDURE — 71045 X-RAY EXAM CHEST 1 VIEW: CPT

## 2025-03-07 PROCEDURE — 94660 CPAP INITIATION&MGMT: CPT

## 2025-03-07 PROCEDURE — 80051 ELECTROLYTE PANEL: CPT | Performed by: INTERNAL MEDICINE

## 2025-03-07 PROCEDURE — 120N000001 HC R&B MED SURG/OB

## 2025-03-07 PROCEDURE — 93308 TTE F-UP OR LMTD: CPT | Mod: 26 | Performed by: INTERNAL MEDICINE

## 2025-03-07 PROCEDURE — 87637 SARSCOV2&INF A&B&RSV AMP PRB: CPT | Performed by: INTERNAL MEDICINE

## 2025-03-07 PROCEDURE — 99291 CRITICAL CARE FIRST HOUR: CPT | Performed by: INTERNAL MEDICINE

## 2025-03-07 PROCEDURE — 82607 VITAMIN B-12: CPT | Performed by: INTERNAL MEDICINE

## 2025-03-07 PROCEDURE — 82565 ASSAY OF CREATININE: CPT | Performed by: INTERNAL MEDICINE

## 2025-03-07 PROCEDURE — 83540 ASSAY OF IRON: CPT | Performed by: INTERNAL MEDICINE

## 2025-03-07 RX ORDER — LIDOCAINE 40 MG/G
CREAM TOPICAL
Status: DISCONTINUED | OUTPATIENT
Start: 2025-03-07 | End: 2025-03-07

## 2025-03-07 RX ORDER — CEFTRIAXONE 2 G/1
2 INJECTION, POWDER, FOR SOLUTION INTRAMUSCULAR; INTRAVENOUS EVERY 24 HOURS
Status: DISCONTINUED | OUTPATIENT
Start: 2025-03-07 | End: 2025-03-10

## 2025-03-07 RX ORDER — CARBOXYMETHYLCELLULOSE SODIUM 5 MG/ML
1 SOLUTION/ DROPS OPHTHALMIC
Status: DISCONTINUED | OUTPATIENT
Start: 2025-03-07 | End: 2025-03-11 | Stop reason: HOSPADM

## 2025-03-07 RX ORDER — FUROSEMIDE 10 MG/ML
40 INJECTION INTRAMUSCULAR; INTRAVENOUS ONCE
Status: COMPLETED | OUTPATIENT
Start: 2025-03-07 | End: 2025-03-07

## 2025-03-07 RX ADMIN — ACETAMINOPHEN 650 MG: 325 TABLET, FILM COATED ORAL at 01:07

## 2025-03-07 RX ADMIN — APIXABAN 2.5 MG: 2.5 TABLET, FILM COATED ORAL at 21:26

## 2025-03-07 RX ADMIN — AZITHROMYCIN MONOHYDRATE 250 MG: 500 INJECTION, POWDER, LYOPHILIZED, FOR SOLUTION INTRAVENOUS at 13:16

## 2025-03-07 RX ADMIN — ACETAMINOPHEN 650 MG: 325 TABLET, FILM COATED ORAL at 23:55

## 2025-03-07 RX ADMIN — TIMOLOL MALEATE 1 DROP: 5 SOLUTION/ DROPS OPHTHALMIC at 16:05

## 2025-03-07 RX ADMIN — CEFTRIAXONE 2 G: 2 INJECTION, POWDER, FOR SOLUTION INTRAMUSCULAR; INTRAVENOUS at 13:11

## 2025-03-07 RX ADMIN — METOPROLOL SUCCINATE 25 MG: 25 TABLET, EXTENDED RELEASE ORAL at 09:08

## 2025-03-07 RX ADMIN — APIXABAN 2.5 MG: 2.5 TABLET, FILM COATED ORAL at 09:08

## 2025-03-07 RX ADMIN — TIMOLOL MALEATE 1 DROP: 5 SOLUTION/ DROPS OPHTHALMIC at 09:09

## 2025-03-07 RX ADMIN — FUROSEMIDE 40 MG: 10 INJECTION, SOLUTION INTRAMUSCULAR; INTRAVENOUS at 10:17

## 2025-03-07 RX ADMIN — TAFLUPROST OPTHALMIC 1 DROP: 0 SOLUTION/ DROPS OPHTHALMIC at 21:26

## 2025-03-07 ASSESSMENT — ACTIVITIES OF DAILY LIVING (ADL)
ADLS_ACUITY_SCORE: 52
ADLS_ACUITY_SCORE: 52
ADLS_ACUITY_SCORE: 48
ADLS_ACUITY_SCORE: 45
ADLS_ACUITY_SCORE: 45
ADLS_ACUITY_SCORE: 48
ADLS_ACUITY_SCORE: 56
ADLS_ACUITY_SCORE: 43
ADLS_ACUITY_SCORE: 45
ADLS_ACUITY_SCORE: 48
ADLS_ACUITY_SCORE: 52
ADLS_ACUITY_SCORE: 45
ADLS_ACUITY_SCORE: 45
ADLS_ACUITY_SCORE: 56
ADLS_ACUITY_SCORE: 56
ADLS_ACUITY_SCORE: 52
ADLS_ACUITY_SCORE: 48
ADLS_ACUITY_SCORE: 45
ADLS_ACUITY_SCORE: 43
ADLS_ACUITY_SCORE: 56
ADLS_ACUITY_SCORE: 45
ADLS_ACUITY_SCORE: 56
ADLS_ACUITY_SCORE: 56

## 2025-03-07 NOTE — PLAN OF CARE
"Temp: 98.4  F (36.9  C) Temp src: Oral BP: (!) 150/90 Pulse: 106   Resp: 20 SpO2: 95 % O2 Device: None (Room air) Oxygen Delivery: 1 LPM     Orientation:  A&O x4  VS: VSS  Pain:  Denies pain  Tele:  SR/ST  Activity:  A1 GB W  Resp:  2L NC  GI:  Denies nausea and vomiting  : Voiding without difficulty  Skin:  WDL  Lines: PIV infusing  Diet: Cardiac  Labs:  Hgb 9.7  Plan: Home  Discharge: Pending     Problem: Adult Inpatient Plan of Care  Goal: Plan of Care Review  Description: The Plan of Care Review/Shift note should be completed every shift.  The Outcome Evaluation is a brief statement about your assessment that the patient is improving, declining, or no change.  This information will be displayed automatically on your shift  note.  Outcome: Progressing  Flowsheets (Taken 3/7/2025 1238)  Outcome Evaluation: On RA. Hgb: 9.7. Up Ax1 w/ GB to the bathroom.  Plan of Care Reviewed With: patient  Overall Patient Progress: improving  Goal: Patient-Specific Goal (Individualized)  Description: You can add care plan individualizations to a care plan. Examples of Individualization might be:  \"Parent requests to be called daily at 9am for status\", \"I have a hard time hearing out of my right ear\", or \"Do not touch me to wake me up as it startles  me\".  Outcome: Progressing  Goal: Absence of Hospital-Acquired Illness or Injury  Outcome: Progressing  Intervention: Identify and Manage Fall Risk  Recent Flowsheet Documentation  Taken 3/7/2025 1120 by Bee Tyler RN  Safety Promotion/Fall Prevention: safety round/check completed  Taken 3/7/2025 1038 by Bee Tyler RN  Safety Promotion/Fall Prevention: safety round/check completed  Intervention: Prevent Infection  Recent Flowsheet Documentation  Taken 3/7/2025 1038 by Bee Tyler RN  Infection Prevention:   hand hygiene promoted   personal protective equipment utilized   single patient room provided   rest/sleep promoted  Goal: Optimal Comfort " and Wellbeing  Outcome: Progressing  Goal: Readiness for Transition of Care  Outcome: Progressing       Goal Outcome Evaluation:      Plan of Care Reviewed With: patient    Overall Patient Progress: improvingOverall Patient Progress: improving    Outcome Evaluation: On RA. Hgb: 9.7. Up Ax1 w/ GB to the bathroom.

## 2025-03-07 NOTE — PLAN OF CARE
"Care from 19:00-07:30  Inpatient Progress Note - MS3  For vital signs and complete assessments, please see documentation flowsheets.     ORIENTATION: Aox4. Legally blind.  VSS.  PAIN: Denies pain, CP, SOB, n/v.  O2: 1L NC.  GI/: Purewick in place.  ACTIVITY: A1 GB, walker.  DIET: Regular  LINES/DRAINS: L lower PIV SL.  MAJOR SHIFT EVENT: Low Hgb: 6.8. Pt received 1ut PRBC this shift and tolerated the infusion well.  PLAN: Hem/onc consult in; monitoring Hgb.    Goal Outcome Evaluation:      Plan of Care Reviewed With: patient    Overall Patient Progress: improvingOverall Patient Progress: improving    Outcome Evaluation: Aox4, on 1L NC; Hgb 6.8 in the evening, 1 ut PRBC administered. Denies pain - some abdominal discomfort noted after stool softner administered.    Problem: Adult Inpatient Plan of Care  Goal: Plan of Care Review  Description: The Plan of Care Review/Shift note should be completed every shift.  The Outcome Evaluation is a brief statement about your assessment that the patient is improving, declining, or no change.  This information will be displayed automatically on your shift  note.  Outcome: Progressing  Flowsheets (Taken 3/7/2025 0033)  Outcome Evaluation:   Aox4, on 1L NC   Hgb 6.8 in the evening, 1 ut PRBC administered. Denies pain - some abdominal discomfort noted after stool softner administered.  Plan of Care Reviewed With: patient  Overall Patient Progress: improving  Goal: Patient-Specific Goal (Individualized)  Description: You can add care plan individualizations to a care plan. Examples of Individualization might be:  \"Parent requests to be called daily at 9am for status\", \"I have a hard time hearing out of my right ear\", or \"Do not touch me to wake me up as it startles  me\".  Outcome: Progressing  Goal: Absence of Hospital-Acquired Illness or Injury  Outcome: Progressing  Intervention: Prevent Skin Injury  Recent Flowsheet Documentation  Taken 3/6/2025 2120 by Mercy Song, " RN  Body Position: position changed independently  Goal: Optimal Comfort and Wellbeing  Outcome: Progressing  Intervention: Monitor Pain and Promote Comfort  Recent Flowsheet Documentation  Taken 3/6/2025 2231 by Mercy Song RN  Pain Management Interventions:   rest   repositioned  Intervention: Provide Person-Centered Care  Recent Flowsheet Documentation  Taken 3/6/2025 2120 by Mercy Song, RN  Trust Relationship/Rapport:   care explained   choices provided   emotional support provided   empathic listening provided   questions answered   questions encouraged   reassurance provided   thoughts/feelings acknowledged  Goal: Readiness for Transition of Care  Outcome: Progressing

## 2025-03-07 NOTE — PROGRESS NOTES
[facility]    Medicine Progress Note - Hospitalist Service    Date of Admission:  3/5/2025    Assessment & Plan   89 year old female with a history of infrarenal AAA, aortic valve stenosis status post TAVR 2/2024, paroxysmal atrial fibrillation on anticoagulation, hypertension, admitted on 3/5/2025 with acute hypoxic respiratory failure and sepsis 2/2 RUL pneumonia.     RUL/Right sided pneumonia (community acquired)  Sepsis concern initially on presentation  Worsening respiratory distress AM 3/7, Acute hypoxic resp failure:  Patient more SOB this AM following blood transfusion overnight.   She denies new pain.  Cough has increased though is not more productive.  RRT was called.   Patient with stat CXR showing ongoing right sided changes most suggestive of infection + possible superimposed bilateral early pulm edema/increased vasc markings.     -  Continue rocephin + zithromax IV, cultures pending.  Checked COVID/Flu/RSV this AM which is negative.  -  Patient O2 sats reasonable 90's on modest amounts of O2 this AM but her work of breathing had increased.  Trying trial of BiPAP.   Empiric Lasix 40 mg IV x 1 given with RRT.    -  Hold on neb as not wheezing and allergic to albuterol  -  Switch to increase monitoring ([facility])    ADDENDUM:  By midday patients resp status much better.  Now off BiPAP after significant diuresis.  Response/improvement with diuresis suggestive of CHF with the blood transfusion volume shifts.   Continue to monitor.  Given improvement will advance diet.  Echo limited still requested to assess valve given the acute change.  Given rapid improvement with lasix, I believe the acute decompensation this AM was pulm edema.   I discussed with pulm service and will have them hold off on consult today and we can see how she responds to antibiotics over the weekend.  If she is not improving from the pulm perspective after a couple more days of abx, would consider re-imaging and then  having pulm see.    ADDENDUM 1415:  Continues to improve with diuresis.  Patient feels much better, only complaint is the ongoing frequent urination.  Hold on any more diuretics for now, reassess further dosing in AM.  Given improvement will move back off [facility].  Appears overall issue is the pneumonia however acute decompensation early AM was flash pulm edema following the overnight blood.    TAVR history  AAA history  pAfib:  -  Will request echo with worsening SOB and valve hx.   Patient has no new chest pain or ABD pain.  Hgb stable today.    -  Already took metoprolol this AM, will consider further dosing depending on how status progresses today.  Acute on chronic anemia:  -  Hgb dipped yesterday after fluids.  Unclear etiology, no overt bleeding.   Patient transfused one unit overnight with hgb over 9 now this AM.   Repeat STAT hgb pending given acute breathing issues above.   Path this AM notified there were some schistocytes incidentally noted on blood overnight.  Haptoglobin/LDH pending.  Repeat CBC with stable values from earlier.  Hgb still 9.7.  Will ask hematology to assess given dip in hgb with the schistocytes.   Also checking valve to make sure no new change there contributing.  -  Hold DOAC for moment, NPO with BiPAP    HTN:  -  More uncontrolled but in response with worsening SOB.    Already had AM metoprolol.  Lasix given as above.  Monitor/trend for moment.  Will consider more IV agents pending trend as tx SOB.    Elevated bilirubin   AST elevation:  Possibly related to sepsis/acute illness.  Also ruling out hemolysis as above.  Improving spontaneously.   -LFT better today    Macular Degeneration  Legally Blind:  At baseline is legally blind; complete loss of vision in R eye, maintains photoreception in L eye, though to lesser degree. PTA PreserVision PO 2 tabs daily, Tafluprost ophthalmic solution 0.5% 1 drop at bedtime, Timoptic Ocudose 0.5% ophthalmic solution 1 drop BID continued during  admission.     Incidental findings  Stable 4.5 cm infrarenal abdominal aortic aneurysm  Stable severe narrowing in the proximal right subclavian, celiac, superior mesenteric arteries.  Renal Lesion  Has been seen on prior imaging, and has been suspected to be a cyst. On imaging in 01/2025 during admission to [facility] for hypertensive urgency, imaging showed 10mm cyst in L kidney, slightly increased to 13 mm on imaging in February.  Also noted some mild nodular thickening/enhancement of the left wrist renal pelvis which is stable/slightly increased.  However, has not had follow-up CT urogram. Has established care /  Urology recently for urinary retention. Recommend urogram study in ~3 months as recommended by Radiology team for interval assessment of renal lesion.  Pancreatic Cystic Lesion  Stable lesion measuring 9mm noted in the pancreatic body. Appears cystic in nature per [facility]. Should follow up with PCP to order pancreatic protocol CT or MRI in 18 months if within goals of care (sometime around August 2026).  Lung nodules  2 mm nodule in the superior segment of the left lower lobe stable, recommended chest CT in 1 year per Sebastian.  2 mm endobronchial nodule in the right middle lobe slightly increased.  Stable cystic lesion in pancreatic body, per radiology recommended follow-up pancreatic protocol CT or MRI in 18 months if within goals of care.      PPE Used:  Mask    Family Update:   Daughter at bedside.        Diet: NPO for Medical/Clinical Reasons Except for: No Exceptions    DVT Prophylaxis: DOAC  Mendieta Catheter: Not present  Lines: None     Cardiac Monitoring: None  Code Status:  Confirmed no intubation but would want CPR briefly with patient.  She was willing to try BiPAP as above.    Clinically Significant Risk Factors         # Hyponatremia: Lowest Na = 131 mmol/L in last 2 days, will monitor as appropriate  # Hypochloremia: Lowest Cl = 96 mmol/L in last 2 days, will monitor as appropriate      #  Hypoalbuminemia: Lowest albumin = 2.7 g/dL at 3/6/2025  7:19 AM, will monitor as appropriate     # Hypertension: Noted on problem list                # Financial/Environmental Concerns:           Disposition Plan     Medically Ready for Discharge: Anticipated in 2-4 Days             Zhou Castaneda,   Hospitalist Service  [facility]  Securely message with [employer] (more info)  Text page via [employer] Paging/Directory   ______________________________________________________________________    Interval History   Assumed hospitalist care for the day, history reviewed. Called to room this AM for RRT.  Patient with gradually worsening SOB/distress she states over minutes though daughter present thinks gradually worsening since breakfast.  Patient denies any chest pain, abd pain.  She has had increasedslight cough this AM.    Physical Exam   Vital Signs: Temp: 98.4  F (36.9  C) Temp src: Oral BP: (!) 185/100 Pulse: 110   Resp: 18 SpO2: 94 % O2 Device: Nasal cannula Oxygen Delivery: 1 LPM  Weight: 117 lbs 0 oz    GEN:  Alert, oriented, appears ill in modest distress complaining of SOB.   (Appears more comfortable after initiation of bipap)  HEENT:  Normocephalic/atraumatic, no scleral icterus, no nasal discharge, mouth moist.  CV:  Tachy, regular, soft systolic murmur.  No rub.  LUNGS:  Crackles slightly on left, more notable throughout right.  No wheezes/retractions.  Symmetric chest rise on inhalation noted.  ABD:  Active bowel sounds, soft, non-tender, mildly distended.  No guarding/rigidity.  EXT: +1 edema.  No cyanosis.  No new joint synovitis noted.  SKIN:  Dry to touch, no new exanthems noted in the visualized areas.    Medical Decision Making       Critical Care Time:  50 Minutes      Data   Medications   Current Facility-Administered Medications   Medication Dose Route Frequency Provider Last Rate Last Admin    No lozenges or gum should be given while patient on BIPAP/AVAPS/AVAPS AE   Does not  apply Continuous PRN Zhou Castaneda DO        Patient is already receiving anticoagulation with heparin, enoxaparin (LOVENOX), warfarin (COUMADIN)  or other anticoagulant medication   Does not apply Continuous PRN Radha Miramontes DO         Current Facility-Administered Medications   Medication Dose Route Frequency Provider Last Rate Last Admin    [Held by provider] apixaban ANTICOAGULANT (ELIQUIS) tablet 2.5 mg  2.5 mg Oral BID Radha Miramontes DO   2.5 mg at 03/07/25 0908    azithromycin (ZITHROMAX) 500 mg in  mL intermittent infusion  500 mg Intravenous Q24H Vamshi Ivey  mL/hr at 03/06/25 1313 500 mg at 03/06/25 1313    cefTRIAXone (ROCEPHIN) 1 g vial to attach to  mL bag for ADULTS or NS 50 mL bag for PEDS  1 g Intravenous Q24H Vamshi Ivey DO   1 g at 03/06/25 1233    [Held by provider] docusate sodium (COLACE) capsule 100 mg  100 mg Oral BID Radha Miramontes DO   100 mg at 03/06/25 2112    [Held by provider] metoprolol succinate ER (TOPROL XL) 24 hr tablet 25 mg  25 mg Oral Daily Radha Miramontes DO   25 mg at 03/07/25 0908    sodium chloride (PF) 0.9% PF flush 3 mL  3 mL Intracatheter Q8H Radha Miramontes DO   3 mL at 03/06/25 2120    tafluprost (ZIOPTAN) ophthalmic solution 1 drop [PATIENT SUPPLIED MEDICATION]  1 drop Both Eyes At Bedtime Radha Miramontes DO   1 drop at 03/06/25 2112    timolol maleate (TIMOPTIC) 0.5 % ophthalmic solution 1 drop [PATIENT SUPPLIED MEDICATION]  1 drop Both Eyes BID Radha Miramontes DO   1 drop at 03/07/25 0909     Labs and Imaging results below reviewed today.  Recent Labs   Lab 03/07/25  1024 03/07/25  0635 03/06/25 2110 03/06/25  1344 03/06/25  1142   WBC 10.3 10.6  --   --  13.5*   HGB 9.7* 9.7* 6.8*   < > 7.8*   HCT 29.7* 30.0*  --   --  23.7*   MCV 91 91  --   --  89    291  --   --  298    < > = values in this interval not displayed.     7-Day Micro Results       Collected Updated Procedure Result Status      03/07/2025 0913  03/07/2025 1002 Influenza A/B, RSV and SARS-CoV2 PCR (COVID-19) Nose [[id number]]    Swab from Nose    Final result Component Value   Influenza A PCR Negative   Influenza B PCR Negative   RSV PCR Negative   SARS CoV2 PCR Negative   NEGATIVE: SARS-CoV-2 (COVID-19) RNA not detected, presumed negative.            03/05/2025 1727 03/06/2025 2031 Blood Culture Arm, Left [[id number]]   Blood from Arm, Left    Preliminary result Component Value   Culture No growth after 1 day  [P]                      Recent Labs   Lab 03/07/25  0635 03/06/25  0719 03/05/25  1619    136 131*   POTASSIUM 4.2 4.2 4.6   CHLORIDE 104 104 96*   CO2 22 22 20*   ANIONGAP 11 10 15   GLC 91 97 197*   BUN 14.8 17.3 15.9   CR 0.52 0.59 0.59   GFRESTIMATED 89 86 86   JERRY 8.9 8.5* 9.1   PROTTOTAL 6.4 6.0* 7.4   ALBUMIN 3.0* 2.7* 3.7   BILITOTAL 1.2 0.9 2.6*   ALKPHOS 75 66 91   AST 43 46* 74*   ALT 17 16 22     Recent Results (from the past 24 hours)   XR Chest Port 1 View    Narrative    EXAM: XR CHEST PORT 1 VIEW  LOCATION: [facility]  DATE: 3/7/2025    INDICATION: Shortness of breath.  COMPARISON: 3/5/2025.      Impression    IMPRESSION: Airspace opacity in the right upper lobe has increased slightly. Interstitial prominence throughout both lungs, greater on the right, is unchanged, and may be related to fibrosis or infection. No pneumothorax. TAVR. Heart size is stable.   Pulmonary vascularity is within normal limits. Aortic calcification.     Peripheral Smear    The red blood cells appear normochromic.  Rouleaux formation does not appear increased.  Polychromasia does appear increased.  Poikilocytosis is mild but includes rare to occasional red blood cell fragments.  Some triangular forms are present which demonstrates central pallor, however other true schistocytes are identified.  Platelets  are well granulated.  Lymphocytes are predominantly small with cytologically mature chromatin and appear overall  polymorphous.  Neutrophils contain normal cytoplasmic granulation and unremarkable nuclear morphology.  There is no dysplasia and no circulating blasts are seen.

## 2025-03-07 NOTE — CONSULTS
[facility]    Hematology / Oncology Consultation     Date of Admission:  3/5/2025    Assessment & Plan   Kavita Merlos is a 89 year old female who was admitted on 3/5/2025. I was asked to see the patient for Anemia workup    Assessment:  Plan:  Right upper lobe pneumonia   Sepsis  Acute hypoxic respiratory failure  - Patient is on antibiotics Rocephin and azithromycin  4. Acute on chronic anemia  - Anemia is normocytic normochromic, LDH was elevated haptoglobin was low and initial bilirubin was elevated.   -There is evidence of some red cell regeneration and fragmentation on peripheral smear.   Plan   - The findings could be due to sepsis, Some Red cell fragmentation associated with TAVR.   -Will check AILYN, complement levels to assess for autoimmune hemolytic anemia.   -Will check fibrinogen level PT PTT to evaluate for DIC.  -Will also check iron levels B12 folic acid  -Continue to follow hemoglobin transfuse to keep hemoglobin above 7.   -Echocardiogram to evaluate and rule out endocarditis    James Hitchcock MD   [facility]  Office:  [phone]    Code Status    Special Code    Reason for Consult   Reason for consult:     Primary Care Physician   Ty Cordero    Chief Complaint   Generalized weakness abdominal pain      History of Present Illness   Kavita Merlos is a 89 year old female who presents with Symptoms of generalized malaise nausea abdominal pain that symptoms have been going on for about 2-1/2 weeks. Prior to admission on 3/5/2025 patient had new symptoms of chills shortness of breath hypoxia her daughter called the assisted living and advised them to call EMT.   On arrival in the ER patient's sodium was 131 creatinine was 0.59 her total bilirubin was elevated at 2.6  Her initial white blood cell count was 16.2 hemoglobin was 10.3 platelets were 303 differential count showed predominantly neutrophilia. MCV was 90.  Her hemoglobin dropped quickly to 6.8 requiring a blood  transfusion her hemoglobin today is 9.7, Her LDH was elevated at 962 haptoglobin was less than 10 reticulocyte count was 4% yesterday. On peripheral blood morphology there was moderate anemia normocytic normochromic with increased red blood cell regeneration and schistocytes present.   Her blood cultures are negative so far.   She had a CT scan of the chest which showed findings consistent with emphysema and area of consolidation in the right lung.  Patient been getting antibiotics with azithromycin and ceftriaxone.  Patient has recent history of hospital admission due to urinary tract infection.  Her daughter mentioned that in the past her hemoglobin has always been normal around 13 but she had a TAVR procedure done in 2/2024, Since then her hemoglobin has been lower around 10 range.     Past Medical History   I have reviewed this patient's medical history and updated it with pertinent information if needed.   Past Medical History:   Diagnosis Date    AAA (abdominal aortic aneurysm)     Aortic stenosis     Benign essential hypertension with BP difference between arms     Hyperlipidemia LDL goal <70        Past Surgical History   I have reviewed this patient's surgical history and updated it with pertinent information if needed.  Past Surgical History:   Procedure Laterality Date    CV CORONARY ANGIOGRAM N/A 12/19/2023    Procedure: Coronary Angiogram;  Surgeon: Seth Duarte MD;  Location:  [facility]    CV PERIPHERAL VASCULAR LITHOTRIPSY N/A 2/20/2024    Procedure: Peripheral Vascular Lithotripsy;  Surgeon: Seth Duarte MD;  Location:  [facility]    CV RIGHT HEART CATH MEASUREMENTS RECORDED N/A 12/19/2023    Procedure: Right Heart Catheterization;  Surgeon: Seth Duarte MD;  Location: [facility] CARDIAC C[facility]    CV TRANSCATHETER AORTIC VALVE REPLACEMENT-FEMORAL APPROACH N/A 2/20/2024    Procedure: Transcatheter Aortic Valve Replacement-Femoral Approach;   Surgeon: Seth Duarte MD;  Location:  [facility]ATH [facility]       Prior to Admission Medications   Prior to Admission Medications   Prescriptions Last Dose Informant Patient Reported? Taking?   apixaban ANTICOAGULANT (ELIQUIS) 2.5 MG tablet 3/5/2025 Morning  No Yes   Sig: Take 1 tablet (2.5 mg) by mouth 2 times daily.   azithromycin (ZITHROMAX) 250 MG tablet 3/5/2025 Morning  Yes Yes   Sig: Take 250 mg by mouth daily.   cefpodoxime (VANTIN) 200 MG tablet 3/5/2025 Morning  No Yes   Sig: Take 1 tablet (200 mg) by mouth 2 times daily.   metoprolol succinate ER (TOPROL XL) 25 MG 24 hr tablet 3/5/2025 Morning  No Yes   Sig: Take 1 tablet (25 mg) by mouth daily.   tafluprost (ZIOPTAN) 0.0015 % SOLN ophthalmic solution 3/4/2025 Bedtime Self Yes Yes   Sig: Place 1 drop into both eyes at bedtime   timolol maleate (TIMOPTIC) 0.5 % ophthalmic solution 3/5/2025 Morning  Yes Yes   Sig: Place 1 drop into both eyes 2 times daily.      Facility-Administered Medications: None     Allergies   Allergies   Allergen Reactions    Albuterol     Amlodipine     Bimatoprost Itching    Brimonidine Itching    Clotrimazole Itching    Dorzolamide Hcl-Timolol Mal Itching    Latanoprost Itching    Lisinopril     Losartan      depression    Neomycin-Polymyxin-Gramicidin Swelling    Neosporin [Neomycin-Polymyxin-Gramicidin]     Tape [Adhesive Tape]     Timolol Itching    Travoprost Itching    Vicodin [Hydrocodone-Acetaminophen]     Penicillins Unknown     Extensive use and toleration of cephalosporins       Social History   I have reviewed this patient's social history and updated it with pertinent information if needed. Kavita Merlos  reports that she quit smoking about 18 years ago. Her smoking use included cigarettes. She has never been exposed to tobacco smoke. She has never used smokeless tobacco. She reports that she does not currently use alcohol. She reports that she does not use drugs.    Family History   I have reviewed this  patient's family history and updated it with pertinent information if needed.   No family history on file.    Review of Systems       Physical Exam   Temp: 98.4  F (36.9  C) Temp src: Oral BP: (!) 150/90 Pulse: 106   Resp: 20 SpO2: 95 % O2 Device: None (Room air) Oxygen Delivery: 1 LPM  Vital Signs with Ranges  Temp:  [97.8  F (36.6  C)-98.6  F (37  C)] 98.4  F (36.9  C)  Pulse:  [] 106  Resp:  [18-20] 20  BP: (104-185)/() 150/90  FiO2 (%):  [35 %] 35 %  SpO2:  [93 %-98 %] 95 %  117 lbs 0 oz    Constitutional: Awake, alert, cooperative, Patient is in moderate distress appears sick. ECOG PS 2  Eyes: Conjunctiva and pupils examined There is pallor  GI: Soft, non-distended, non-tender, normal bowel sounds.  Lymph/Hematologic: No anterior cervical or supraclavicular adenopathy.    Psychiatric: Alert, oriented to person, place and time.     Data   Results for orders placed or performed during the hospital encounter of 03/05/25 (from the past 24 hours)   Hemoglobin   Result Value Ref Range    Hemoglobin 7.2 (L) 11.7 - 15.7 g/dL   Hemoglobin   Result Value Ref Range    Hemoglobin 6.8 (LL) 11.7 - 15.7 g/dL   ABO/Rh type and screen    Narrative    The following orders were created for panel order ABO/Rh type and screen.  Procedure                               Abnormality         Status                     ---------                               -----------         ------                     Adult Type and Screen[[id number]]                           Final result                 Please view results for these tests on the individual orders.   Adult Type and Screen   Result Value Ref Range    ABO/RH(D) O POS     Antibody Screen Negative Negative    SPECIMEN EXPIRATION DATE [id number]    CONDITIONAL Prepare red blood cells (unit)   Result Value Ref Range    Blood Component Type Red Blood Cells     Product Code G4625B67     Unit Status Transfused     Unit Number [id number]     CROSSMATCH Compatible     CODING  SYSTEM [id number]     ISSUE DATE AND TIME [id number]     UNIT ABO/RH O+     UNIT TYPE ISBT 5100    CBC with platelets   Result Value Ref Range    WBC Count 10.6 4.0 - 11.0 10e3/uL    RBC Count 3.30 (L) 3.80 - 5.20 10e6/uL    Hemoglobin 9.7 (L) 11.7 - 15.7 g/dL    Hematocrit 30.0 (L) 35.0 - 47.0 %    MCV 91 78 - 100 fL    MCH 29.7 26.5 - 33.0 pg    MCHC 32.7 31.5 - 36.5 g/dL    RDW 14.6 10.0 - 15.0 %    Platelet Count 291 150 - 450 10e3/uL   Comprehensive metabolic panel   Result Value Ref Range    Sodium 137 135 - 145 mmol/L    Potassium 4.2 3.4 - 5.3 mmol/L    Carbon Dioxide (CO2) 22 22 - 29 mmol/L    Anion Gap 11 7 - 15 mmol/L    Urea Nitrogen 14.8 8.0 - 23.0 mg/dL    Creatinine 0.52 0.51 - 0.95 mg/dL    GFR Estimate 89 >60 mL/min/1.73m2    Calcium 8.9 8.8 - 10.4 mg/dL    Chloride 104 98 - 107 mmol/L    Glucose 91 70 - 99 mg/dL    Alkaline Phosphatase 75 40 - 150 U/L    AST 43 0 - 45 U/L    ALT 17 0 - 50 U/L    Protein Total 6.4 6.4 - 8.3 g/dL    Albumin 3.0 (L) 3.5 - 5.2 g/dL    Bilirubin Total 1.2 <=1.2 mg/dL   Bilirubin direct   Result Value Ref Range    Bilirubin Direct 0.54 (H) 0.00 - 0.30 mg/dL   Influenza A/B, RSV and SARS-CoV2 PCR (COVID-19) Nose    Specimen: Nose; Swab   Result Value Ref Range    Influenza A PCR Negative Negative    Influenza B PCR Negative Negative    RSV PCR Negative Negative    SARS CoV2 PCR Negative Negative    Narrative    Testing was performed using the Xpert Xpress CoV2/Flu/RSV Assay on the Cepheid GeneXpert Instrument. This test should be ordered for the detection of SARS-CoV2, influenza, and RSV viruses in individuals with signs and symptoms of respiratory tract infection. This test is for in vitro diagnostic use under the [place] FDA for laboratories certified under CLIA to perform high or moderate complexity testing. This test has been [place] FDA cleared. A negative result does not rule out the presence of PCR inhibitors in the specimen or target RNA in concentration below the limit of  detection for the assay. If only one viral target is positive but coinfection with multiple targets is suspected, the sample should be re-tested with another FDA cleared, approved, or authorized test, if coninfection would change clinical management. This test was validated by the [facility]. These laboratories are certified under the Clinical Laboratory Improvement Amendments of 1988 (CLIA-88) as qualified to perfom high complexity laboratory testing.   EKG 12-lead, tracing only   Result Value Ref Range    Systolic Blood Pressure  mmHg    Diastolic Blood Pressure  mmHg    Ventricular Rate 105 BPM    Atrial Rate 105 BPM    SD Interval 150 ms    QRS Duration 84 ms     ms    QTc 444 ms    P Axis 59 degrees    R AXIS 80 degrees    T Axis 107 degrees    Interpretation ECG       Sinus tachycardia  Possible Left atrial enlargement  Nonspecific ST abnormality  Abnormal ECG     CBC with platelets   Result Value Ref Range    WBC Count 10.3 4.0 - 11.0 10e3/uL    RBC Count 3.27 (L) 3.80 - 5.20 10e6/uL    Hemoglobin 9.7 (L) 11.7 - 15.7 g/dL    Hematocrit 29.7 (L) 35.0 - 47.0 %    MCV 91 78 - 100 fL    MCH 29.7 26.5 - 33.0 pg    MCHC 32.7 31.5 - 36.5 g/dL    RDW 14.7 10.0 - 15.0 %    Platelet Count 306 150 - 450 10e3/uL   Blood gas venous   Result Value Ref Range    pH Venous 7.40 7.32 - 7.43    pCO2 Venous 39 (L) 40 - 50 mm Hg    pO2 Venous 64 (H) 25 - 47 mm Hg    Bicarbonate Venous 24 21 - 28 mmol/L    Base Excess/Deficit Venous -0.6 -3.0 - 3.0 mmol/L    FIO2 35     Oxyhemoglobin Venous 91 (H) 70 - 75 %    O2 Sat, Venous 93.9 (H) 70.0 - 75.0 %    Narrative    In healthy individuals, oxyhemoglobin (O2Hb) and oxygen saturation (SO2) are approximately equal. In the presence of dyshemoglobins, oxyhemoglobin can be considerably lower than oxygen saturation.   Basic metabolic panel   Result Value Ref Range    Sodium 134 (L) 135 - 145 mmol/L    Potassium 4.2 3.4 - 5.3 mmol/L    Chloride 100 98 - 107 mmol/L     Carbon Dioxide (CO2) 23 22 - 29 mmol/L    Anion Gap 11 7 - 15 mmol/L    Urea Nitrogen 14.7 8.0 - 23.0 mg/dL    Creatinine 0.49 (L) 0.51 - 0.95 mg/dL    GFR Estimate 90 >60 mL/min/1.73m2    Calcium 8.7 (L) 8.8 - 10.4 mg/dL    Glucose 131 (H) 70 - 99 mg/dL   Troponin T, High Sensitivity   Result Value Ref Range    Troponin T, High Sensitivity 37 (H) <=14 ng/L   Extra Tube    Narrative    The following orders were created for panel order Extra Tube.  Procedure                               Abnormality         Status                     ---------                               -----------         ------                     Extra Blue Top Tube[[id number]]                             Final result               Extra Red Top Tube[[id number]]                              Final result                 Please view results for these tests on the individual orders.   Extra Blue Top Tube   Result Value Ref Range    Hold Specimen JIC    Extra Red Top Tube   Result Value Ref Range    Hold Specimen JIC    XR Chest Port 1 View    Narrative    EXAM: XR CHEST PORT 1 VIEW  LOCATION: [facility]  DATE: 3/7/2025    INDICATION: Shortness of breath.  COMPARISON: 3/5/2025.      Impression    IMPRESSION: Airspace opacity in the right upper lobe has increased slightly. Interstitial prominence throughout both lungs, greater on the right, is unchanged, and may be related to fibrosis or infection. No pneumothorax. TAVR. Heart size is stable.   Pulmonary vascularity is within normal limits. Aortic calcification.   Glucose by meter   Result Value Ref Range    GLUCOSE BY METER POCT 117 (H) 70 - 99 mg/dL

## 2025-03-07 NOTE — PROVIDER NOTIFICATION
01:17 - xCover paged relating to pt's stauts/VS during PRBC infusion. Infusion nearing completion, pt noted a 6/10 headache now (I gave Tylenol) and BP is 161/71 which is up from 143/62 at my last hourly check.    xCover responded to continue to monitor and page if changes.

## 2025-03-07 NOTE — PROGRESS NOTES
RT Note:    A BiPAP of  10/5 @ 35% was applied to the pt via the mask for an increase in WOB and SOB.  The skin in contact with the mask and straps looks good and intact. Pt is tolerating it well. RT will continue to monitor and assess the pt's respiratory status and needs.  RT/RN huddle to discuss contraindications prior to placement? Y/N? Y

## 2025-03-07 NOTE — PROGRESS NOTES
[facility]  - Consult Note  [facility]  Cardiac Services     Referral Source: Staff    Staff  assessment:   -Pt calm, coping well, eating, and more settled than earlier in her admission.   -Family supportively present at bedside, in good spirits, relieved.   -Family encouraging, supporting pt.   -Pt and family receptive to emotional support, but very stable and calm right now.     Plan: Spiritual Health follow-up emotional support available to pt and family as pt remains in hospital.       Epifanio Denise M.Div.  Staff   Pager [phone]  [facility] available 24/7 for emergent requests/referrals, either by paging the on-call  or by entering ASAP/STAT consult in EPIC (this will also page the on-call ).

## 2025-03-08 LAB
ALBUMIN SERPL BCG-MCNC: 2.8 G/DL (ref 3.5–5.2)
ALP SERPL-CCNC: 72 U/L (ref 40–150)
ALT SERPL W P-5'-P-CCNC: 15 U/L (ref 0–50)
ANION GAP SERPL CALCULATED.3IONS-SCNC: 9 MMOL/L (ref 7–15)
AST SERPL W P-5'-P-CCNC: 41 U/L (ref 0–45)
BILIRUB DIRECT SERPL-MCNC: 0.4 MG/DL (ref 0–0.3)
BILIRUB SERPL-MCNC: 0.9 MG/DL
BUN SERPL-MCNC: 13.6 MG/DL (ref 8–23)
CALCIUM SERPL-MCNC: 8.6 MG/DL (ref 8.8–10.4)
CHLORIDE SERPL-SCNC: 100 MMOL/L (ref 98–107)
CREAT SERPL-MCNC: 0.49 MG/DL (ref 0.51–0.95)
EGFRCR SERPLBLD CKD-EPI 2021: 90 ML/MIN/1.73M2
ERYTHROCYTE [DISTWIDTH] IN BLOOD BY AUTOMATED COUNT: 14.8 % (ref 10–15)
FERRITIN SERPL-MCNC: 525 NG/ML (ref 11–328)
FOLATE SERPL-MCNC: 21.8 NG/ML (ref 4.6–34.8)
GLUCOSE SERPL-MCNC: 150 MG/DL (ref 70–99)
HCO3 SERPL-SCNC: 25 MMOL/L (ref 22–29)
HCT VFR BLD AUTO: 29.4 % (ref 35–47)
HGB BLD-MCNC: 9.6 G/DL (ref 11.7–15.7)
MCH RBC QN AUTO: 29.5 PG (ref 26.5–33)
MCHC RBC AUTO-ENTMCNC: 32.7 G/DL (ref 31.5–36.5)
MCV RBC AUTO: 91 FL (ref 78–100)
PLATELET # BLD AUTO: 304 10E3/UL (ref 150–450)
POTASSIUM SERPL-SCNC: 3.6 MMOL/L (ref 3.4–5.3)
PROT SERPL-MCNC: 6.3 G/DL (ref 6.4–8.3)
RBC # BLD AUTO: 3.25 10E6/UL (ref 3.8–5.2)
SODIUM SERPL-SCNC: 134 MMOL/L (ref 135–145)
WBC # BLD AUTO: 8.8 10E3/UL (ref 4–11)

## 2025-03-08 PROCEDURE — 82248 BILIRUBIN DIRECT: CPT | Performed by: STUDENT IN AN ORGANIZED HEALTH CARE EDUCATION/TRAINING PROGRAM

## 2025-03-08 PROCEDURE — 82728 ASSAY OF FERRITIN: CPT | Performed by: INTERNAL MEDICINE

## 2025-03-08 PROCEDURE — 250N000013 HC RX MED GY IP 250 OP 250 PS 637: Performed by: INTERNAL MEDICINE

## 2025-03-08 PROCEDURE — 82746 ASSAY OF FOLIC ACID SERUM: CPT | Performed by: INTERNAL MEDICINE

## 2025-03-08 PROCEDURE — 85014 HEMATOCRIT: CPT | Performed by: INTERNAL MEDICINE

## 2025-03-08 PROCEDURE — 258N000003 HC RX IP 258 OP 636: Performed by: INTERNAL MEDICINE

## 2025-03-08 PROCEDURE — 36415 COLL VENOUS BLD VENIPUNCTURE: CPT | Performed by: INTERNAL MEDICINE

## 2025-03-08 PROCEDURE — 99233 SBSQ HOSP IP/OBS HIGH 50: CPT | Performed by: INTERNAL MEDICINE

## 2025-03-08 PROCEDURE — 120N000001 HC R&B MED SURG/OB

## 2025-03-08 PROCEDURE — 250N000011 HC RX IP 250 OP 636: Performed by: INTERNAL MEDICINE

## 2025-03-08 RX ORDER — HYDRALAZINE HYDROCHLORIDE 20 MG/ML
10 INJECTION INTRAMUSCULAR; INTRAVENOUS EVERY 4 HOURS PRN
Status: DISCONTINUED | OUTPATIENT
Start: 2025-03-08 | End: 2025-03-09

## 2025-03-08 RX ORDER — FUROSEMIDE 20 MG/1
20 TABLET ORAL DAILY
Status: DISCONTINUED | OUTPATIENT
Start: 2025-03-08 | End: 2025-03-10

## 2025-03-08 RX ADMIN — APIXABAN 2.5 MG: 2.5 TABLET, FILM COATED ORAL at 20:17

## 2025-03-08 RX ADMIN — DOCUSATE SODIUM 100 MG: 100 CAPSULE, LIQUID FILLED ORAL at 20:17

## 2025-03-08 RX ADMIN — TAFLUPROST OPTHALMIC 1 DROP: 0 SOLUTION/ DROPS OPHTHALMIC at 20:18

## 2025-03-08 RX ADMIN — CEFTRIAXONE 2 G: 2 INJECTION, POWDER, FOR SOLUTION INTRAMUSCULAR; INTRAVENOUS at 13:06

## 2025-03-08 RX ADMIN — AZITHROMYCIN MONOHYDRATE 250 MG: 500 INJECTION, POWDER, LYOPHILIZED, FOR SOLUTION INTRAVENOUS at 14:08

## 2025-03-08 RX ADMIN — FUROSEMIDE 20 MG: 20 TABLET ORAL at 12:07

## 2025-03-08 RX ADMIN — METOPROLOL SUCCINATE 25 MG: 25 TABLET, EXTENDED RELEASE ORAL at 09:33

## 2025-03-08 RX ADMIN — TIMOLOL MALEATE 1 DROP: 5 SOLUTION/ DROPS OPHTHALMIC at 09:31

## 2025-03-08 RX ADMIN — DOCUSATE SODIUM 100 MG: 100 CAPSULE, LIQUID FILLED ORAL at 09:33

## 2025-03-08 RX ADMIN — APIXABAN 2.5 MG: 2.5 TABLET, FILM COATED ORAL at 09:33

## 2025-03-08 RX ADMIN — TIMOLOL MALEATE 1 DROP: 5 SOLUTION/ DROPS OPHTHALMIC at 16:39

## 2025-03-08 ASSESSMENT — ACTIVITIES OF DAILY LIVING (ADL)
ADLS_ACUITY_SCORE: 43
ADLS_ACUITY_SCORE: 48
ADLS_ACUITY_SCORE: 43
ADLS_ACUITY_SCORE: 47
ADLS_ACUITY_SCORE: 45
ADLS_ACUITY_SCORE: 43
ADLS_ACUITY_SCORE: 48
ADLS_ACUITY_SCORE: 47
ADLS_ACUITY_SCORE: 48
ADLS_ACUITY_SCORE: 47
ADLS_ACUITY_SCORE: 48
ADLS_ACUITY_SCORE: 47
ADLS_ACUITY_SCORE: 47
ADLS_ACUITY_SCORE: 45
ADLS_ACUITY_SCORE: 47
ADLS_ACUITY_SCORE: 45
ADLS_ACUITY_SCORE: 43
ADLS_ACUITY_SCORE: 45
DEPENDENT_IADLS:: CLEANING;LAUNDRY;SHOPPING;MEDICATION MANAGEMENT;MONEY MANAGEMENT;TRANSPORTATION
ADLS_ACUITY_SCORE: 45
ADLS_ACUITY_SCORE: 45
ADLS_ACUITY_SCORE: 43
ADLS_ACUITY_SCORE: 47
ADLS_ACUITY_SCORE: 48

## 2025-03-08 NOTE — PROGRESS NOTES
[facility]      Date of Admission:  3/5/2025    Assessment & Plan   Kavita Merlos is a 89 year old female who was admitted on 3/5/2025.  We were asked to see the patient for anemia on 3/7/2025    Assessment:  Plan:  Right upper lobe pneumonia   Sepsis  Acute hypoxic respiratory failure  - Patient is on antibiotics Rocephin and azithromycin  4. Acute on chronic anemia  - Anemia is normocytic normochromic, LDH was elevated haptoglobin was low and initial bilirubin was elevated.   3/7-further workup of anemia-serum iron decreased 26, decreased TIBC 168, saturation 15%.  Ferritin ordered and pending INR slightly elevated 1.45, PTT normal 36, normal to increased fibrinogen 552.  AILYN/complement levels pending  -Bilirubin now normal.  -B12 elevated 1555  -evidence of some red cell regeneration and fragmentation on peripheral smear.   5.  2D echo performed 3/7-no endocarditis, findings similar to prior echo    Plan   - The findings most likely due to sepsis, Some Red cell fragmentation associated with TAVR.   -Unlikely to be DIC.  -Follow-up on pending  AILYN, complement levels to assess for autoimmune hemolytic anemia.   -Decreased iron, but could be secondary to chronic illness.  Will check for ferritin  -Follow-up on pending folic acid, ferritin, AILYN, etc  -LDH tomorrow-order placed  -May need to restart oral iron or  give her IV iron  -Continue to follow hemoglobin transfuse to keep hemoglobin above 7.   -  All questions answered to patient and family satisfaction.  Case discussed with bedside RN  Will continue to follow the patient along with you.  Please call with additional questions or concerns      Lanny Page MD   [facility]  Office:  [phone]    Code Status    Special Code    Reason for Consult   Reason for consult:     Primary Care Physician   Ty Cordero    Chief Complaint   Generalized weakness abdominal pain      History of Present Illness   Kavita Merlos is a 89 year old female  who presents with Symptoms of generalized malaise nausea abdominal pain that symptoms have been going on for about 2-1/2 weeks. Prior to admission on 3/5/2025 patient had new symptoms of chills shortness of breath hypoxia her daughter called the assisted living and advised them to call EMT.   On arrival in the ER patient's sodium was 131 creatinine was 0.59 her total bilirubin was elevated at 2.6  Her initial white blood cell count was 16.2 hemoglobin was 10.3 platelets were 303 differential count showed predominantly neutrophilia. MCV was 90.  Her hemoglobin dropped quickly to 6.8 requiring a blood transfusion her hemoglobin today is 9.7, Her LDH was elevated at 962 haptoglobin was less than 10 reticulocyte count was 4% yesterday. On peripheral blood morphology there was moderate anemia normocytic normochromic with increased red blood cell regeneration and schistocytes present.   Her blood cultures are negative so far.   She had a CT scan of the chest which showed findings consistent with emphysema and area of consolidation in the right lung.  Patient been getting antibiotics with azithromycin and ceftriaxone.  Patient has recent history of hospital admission due to urinary tract infection.  Her daughter mentioned that in the past her hemoglobin has always been normal around 13 but she had a TAVR procedure done in 2/2024, Since then her hemoglobin has been lower around 10 range.     3/8/25: Feeling better , but still very tired, remains on O2.  Was on oral iron up until about 3 weeks ago, was taking with vitamin C, tolerating okay.  Appetite has been low, was nauseous for last few weeks, resolved now.  Has lost about 8 to 10 pounds.  Daughter is present in the room.       Past Medical History   I have reviewed this patient's medical history and updated it with pertinent information if needed.   Past Medical History:   Diagnosis Date    AAA (abdominal aortic aneurysm)     Aortic stenosis     Benign essential  hypertension with BP difference between arms     Hyperlipidemia LDL goal <70        Past Surgical History   I have reviewed this patient's surgical history and updated it with pertinent information if needed.  Past Surgical History:   Procedure Laterality Date    CV CORONARY ANGIOGRAM N/A 12/19/2023    Procedure: Coronary Angiogram;  Surgeon: Seth Duarte MD;  Location: [facility] CARDIAC CATH LAB    CV PERIPHERAL VASCULAR LITHOTRIPSY N/A 2/20/2024    Procedure: Peripheral Vascular Lithotripsy;  Surgeon: Seth Duarte MD;  Location:  HEART CARDIAC CATH LAB    CV RIGHT HEART CATH MEASUREMENTS RECORDED N/A 12/19/2023    Procedure: Right Heart Catheterization;  Surgeon: Seth Duarte MD;  Location: [facility] CARDIAC CATH LAB    CV TRANSCATHETER AORTIC VALVE REPLACEMENT-FEMORAL APPROACH N/A 2/20/2024    Procedure: Transcatheter Aortic Valve Replacement-Femoral Approach;  Surgeon: Seth Duarte MD;  Location: [facility] CARDIAC CATH LAB       Prior to Admission Medications   Prior to Admission Medications   Prescriptions Last Dose Informant Patient Reported? Taking?   apixaban ANTICOAGULANT (ELIQUIS) 2.5 MG tablet 3/5/2025 Morning  No Yes   Sig: Take 1 tablet (2.5 mg) by mouth 2 times daily.   azithromycin (ZITHROMAX) 250 MG tablet 3/5/2025 Morning  Yes Yes   Sig: Take 250 mg by mouth daily.   cefpodoxime (VANTIN) 200 MG tablet 3/5/2025 Morning  No Yes   Sig: Take 1 tablet (200 mg) by mouth 2 times daily.   metoprolol succinate ER (TOPROL XL) 25 MG 24 hr tablet 3/5/2025 Morning  No Yes   Sig: Take 1 tablet (25 mg) by mouth daily.   tafluprost (ZIOPTAN) 0.0015 % SOLN ophthalmic solution 3/4/2025 Bedtime Self Yes Yes   Sig: Place 1 drop into both eyes at bedtime   timolol maleate (TIMOPTIC) 0.5 % ophthalmic solution 3/5/2025 Morning  Yes Yes   Sig: Place 1 drop into both eyes 2 times daily.      Facility-Administered Medications: None     Allergies   Allergies   Allergen Reactions     Albuterol     Amlodipine     Bimatoprost Itching    Brimonidine Itching    Clotrimazole Itching    Dorzolamide Hcl-Timolol Mal Itching    Latanoprost Itching    Lisinopril     Losartan      depression    Neomycin-Polymyxin-Gramicidin Swelling    Neosporin [Neomycin-Polymyxin-Gramicidin]     Tape [Adhesive Tape]     Timolol Itching    Travoprost Itching    Vicodin [Hydrocodone-Acetaminophen]     Penicillins Unknown     Extensive use and toleration of cephalosporins       Social History   I have reviewed this patient's social history and updated it with pertinent information if needed. Kavita Merlos  reports that she quit smoking about 18 years ago. Her smoking use included cigarettes. She has never been exposed to tobacco smoke. She has never used smokeless tobacco. She reports that she does not currently use alcohol. She reports that she does not use drugs.    Family History   I have reviewed this patient's family history and updated it with pertinent information if needed.   No family history on file.    Review of Systems       Physical Exam   Temp: 98.4  F (36.9  C) Temp src: Oral BP: 133/63 Pulse: 84   Resp: 18 SpO2: 96 % O2 Device: Nasal cannula Oxygen Delivery: 2 LPM  Vital Signs with Ranges  Temp:  [98.4  F (36.9  C)-98.6  F (37  C)] 98.4  F (36.9  C)  Pulse:  [] 84  Resp:  [18-22] 18  BP: (133-185)/() 133/63  FiO2 (%):  [35 %] 35 %  SpO2:  [94 %-96 %] 96 %  117 lbs 0 oz    Constitutional: Awake, alert, cooperative, Patient is in moderate distress appears sick. ECOG PS 2  Eyes: Conjunctiva and pupils examined There is pallor  GI: Soft, non-distended, non-tender, normal bowel sounds.  Lymph/Hematologic: No anterior cervical or supraclavicular adenopathy.    Psychiatric: Alert, oriented to person, place and time.     Data   Results for orders placed or performed during the hospital encounter of 03/05/25 (from the past 24 hours)   Influenza A/B, RSV and SARS-CoV2 PCR (COVID-19) Nose    Specimen:  Nose; Swab   Result Value Ref Range    Influenza A PCR Negative Negative    Influenza B PCR Negative Negative    RSV PCR Negative Negative    SARS CoV2 PCR Negative Negative    Narrative    Testing was performed using the Xpert Xpress CoV2/Flu/RSV Assay on the Cepheid GeneXpert Instrument. This test should be ordered for the detection of SARS-CoV2, influenza, and RSV viruses in individuals with signs and symptoms of respiratory tract infection. This test is for in vitro diagnostic use under the [place] FDA for laboratories certified under CLIA to perform high or moderate complexity testing. This test has been [place] FDA cleared. A negative result does not rule out the presence of PCR inhibitors in the specimen or target RNA in concentration below the limit of detection for the assay. If only one viral target is positive but coinfection with multiple targets is suspected, the sample should be re-tested with another FDA cleared, approved, or authorized test, if coninfection would change clinical management. This test was validated by the [facility] ([employer]). These laboratories are certified under the Clinical Laboratory Improvement Amendments of 1988 (CLIA-88) as qualified to perfom high complexity laboratory testing.   EKG 12-lead, tracing only   Result Value Ref Range    Systolic Blood Pressure  mmHg    Diastolic Blood Pressure  mmHg    Ventricular Rate 105 BPM    Atrial Rate 105 BPM    VT Interval 150 ms    QRS Duration 84 ms     ms    QTc 444 ms    P Axis 59 degrees    R AXIS 80 degrees    T Axis 107 degrees    Interpretation ECG       Sinus tachycardia  Possible Left atrial enlargement  Nonspecific ST abnormality  Abnormal ECG    Confirmed by Isael Goodman ([id number]) on 3/7/2025 3:43:47 PM     CBC with platelets   Result Value Ref Range    WBC Count 10.3 4.0 - 11.0 10e3/uL    RBC Count 3.27 (L) 3.80 - 5.20 10e6/uL    Hemoglobin 9.7 (L) 11.7 - 15.7 g/dL    Hematocrit 29.7 (L) 35.0 - 47.0 %    MCV 91 78 -  100 fL    MCH 29.7 26.5 - 33.0 pg    MCHC 32.7 31.5 - 36.5 g/dL    RDW 14.7 10.0 - 15.0 %    Platelet Count 306 150 - 450 10e3/uL   Blood gas venous   Result Value Ref Range    pH Venous 7.40 7.32 - 7.43    pCO2 Venous 39 (L) 40 - 50 mm Hg    pO2 Venous 64 (H) 25 - 47 mm Hg    Bicarbonate Venous 24 21 - 28 mmol/L    Base Excess/Deficit Venous -0.6 -3.0 - 3.0 mmol/L    FIO2 35     Oxyhemoglobin Venous 91 (H) 70 - 75 %    O2 Sat, Venous 93.9 (H) 70.0 - 75.0 %    Narrative    In healthy individuals, oxyhemoglobin (O2Hb) and oxygen saturation (SO2) are approximately equal. In the presence of dyshemoglobins, oxyhemoglobin can be considerably lower than oxygen saturation.   Basic metabolic panel   Result Value Ref Range    Sodium 134 (L) 135 - 145 mmol/L    Potassium 4.2 3.4 - 5.3 mmol/L    Chloride 100 98 - 107 mmol/L    Carbon Dioxide (CO2) 23 22 - 29 mmol/L    Anion Gap 11 7 - 15 mmol/L    Urea Nitrogen 14.7 8.0 - 23.0 mg/dL    Creatinine 0.49 (L) 0.51 - 0.95 mg/dL    GFR Estimate 90 >60 mL/min/1.73m2    Calcium 8.7 (L) 8.8 - 10.4 mg/dL    Glucose 131 (H) 70 - 99 mg/dL   Troponin T, High Sensitivity   Result Value Ref Range    Troponin T, High Sensitivity 37 (H) <=14 ng/L   Extra Tube    Narrative    The following orders were created for panel order Extra Tube.  Procedure                               Abnormality         Status                     ---------                               -----------         ------                     Extra Blue Top Tube[[id number]]                             Final result               Extra Red Top Tube[[id number]]                              Final result                 Please view results for these tests on the individual orders.   Extra Blue Top Tube   Result Value Ref Range    Hold Specimen JIC    Extra Red Top Tube   Result Value Ref Range    Hold Specimen JIC    Vitamin B12   Result Value Ref Range    Vitamin B12 1,555 (H) 232 - 1,245 pg/mL   Fibrinogen activity   Result Value  Ref Range    Fibrinogen Activity 552 (H) 170 - 510 mg/dL   INR   Result Value Ref Range    INR 1.45 (H) 0.85 - 1.15   XR Chest Port 1 View    Narrative    EXAM: XR CHEST PORT 1 VIEW  LOCATION: [facility]  DATE: 3/7/2025    INDICATION: Shortness of breath.  COMPARISON: 3/5/2025.      Impression    IMPRESSION: Airspace opacity in the right upper lobe has increased slightly. Interstitial prominence throughout both lungs, greater on the right, is unchanged, and may be related to fibrosis or infection. No pneumothorax. TAVR. Heart size is stable.   Pulmonary vascularity is within normal limits. Aortic calcification.   Glucose by meter   Result Value Ref Range    GLUCOSE BY METER POCT 117 (H) 70 - 99 mg/dL   Troponin T, High Sensitivity   Result Value Ref Range    Troponin T, High Sensitivity 43 (H) <=14 ng/L   Iron and iron binding capacity   Result Value Ref Range    Iron 26 (L) 37 - 145 ug/dL    Iron Binding Capacity 168 (L) 240 - 430 ug/dL    Iron Sat Index 15 15 - 46 %   Echocardiogram Limited   Result Value Ref Range    LVEF  55-60%     Narrative    [id number]  [id number]  [id number]  [id number]^LEXX^AARON^JUSTIN     [facility]  Echocardiography Laboratory  [address] [place] [address]     Name: DYLON PORTER  MRN: [id number]  : 1935  Study Date: 2025 12:44 PM  Age: 89 yrs  Gender: Female  Patient Location: [facility]  Reason For Study: SOB, Aortic Valve Repair  Ordering Physician: AARON HALLMAN  Referring Physician: Ty Cordero  Performed By: Mercy Morrissey     BSA: 1.6 m2  Height: 64 in  Weight: 117 lb  HR: 100  BP: 185/100 mmHg  ______________________________________________________________________________  Procedure  Limited Echocardiogram with two-dimensional, color and spectral Doppler.  ______________________________________________________________________________  Interpretation Summary     1. The left ventricle is normal in size. Moderately  increased left ventricular  wall thickness is noted. Left ventricular systolic function is normal. The  visual ejection fraction is 55-60%. No regional wall motion abnormalities  noted.  2. The right ventricle is normal size. The right ventricular systolic function  is normal.  3. There is moderate biatrial enlargement  4. There is a bioprosthetic aortic valve. (23 mm Ross Adelso Valve  implanted on 02/20/2024). The mean AoV pressure gradient is 14.0 mmHg. The  peak AoV pressure gradient is 25.0 mmHg. There is mild paravalvular  regurgitation.  5. No pericardial effusion.  6. In comparison to the previous report dated 01/10/2025, the findings are  similar.  ______________________________________________________________________________  Left Ventricle  The left ventricle is normal in size. Moderately increased left ventricular  wall thickness is noted. Left ventricular systolic function is normal. The  visual ejection fraction is 55-60%. No regional wall motion abnormalities  noted.     Right Ventricle  The right ventricle is normal size. The right ventricular systolic function is  normal.     Atria  There is moderate biatrial enlargement.     Mitral Valve  There is trace mitral regurgitation.     Tricuspid Valve  There is trace tricuspid regurgitation. Right ventricular systolic pressure  could not be approximated due to inadequate tricuspid regurgitation.     Aortic Valve  There is a bioprosthetic aortic valve. (23 mm Ross Adelso Valve implanted  on 02/20/2024). The mean AoV pressure gradient is 14.0 mmHg. The peak AoV  pressure gradient is 25.0 mmHg. There is mild paravalvular regurgitation.     Pulmonic Valve  The pulmonic valve is not well visualized.     Pericardium  There is no pericardial effusion.     Rhythm  Sinus rhythm was noted.     ______________________________________________________________________________  MMode/2D Measurements & Calculations  LVOT diam: 2.0 cm  LVOT area: 3.1 cm2     Doppler  Measurements & Calculations  Ao V2 max: 252.3 cm/sec  Ao max P.0 mmHg  Ao V2 mean: 168.3 cm/sec  Ao mean P.0 mmHg  Ao V2 VTI: 39.8 cm     ______________________________________________________________________________  Report approved by: Nieves Chavez MD on 2025 01:38 PM         Partial thromboplastin time   Result Value Ref Range    aPTT 36 22 - 38 Seconds        TT 45  mins-face-to-face with the patient, chart prep, placing orders, documentation, care coordination,

## 2025-03-08 NOTE — PLAN OF CARE
"Goal Outcome Evaluation:      Plan of Care Reviewed With: patient    Overall Patient Progress: no changeOverall Patient Progress: no change    Outcome Evaluation: Alert and oriented, legally blind, purewick placed this afternoon after lasix given per pt request, dtr at bedside, good appetite, up with 1 assist gait belt walker, hemeonc involved      Problem: Adult Inpatient Plan of Care  Goal: Plan of Care Review  Description: The Plan of Care Review/Shift note should be completed every shift.  The Outcome Evaluation is a brief statement about your assessment that the patient is improving, declining, or no change.  This information will be displayed automatically on your shift  note.  Outcome: Progressing  Flowsheets (Taken 3/8/2025 1352)  Outcome Evaluation: Alert and oriented, legally blind, purewick placed this afternoon after lasix given per pt request, dtr at bedside, good appetite, up with 1 assist gait belt walker, hemeonc involved  Plan of Care Reviewed With: patient  Overall Patient Progress: no change  Goal: Patient-Specific Goal (Individualized)  Description: You can add care plan individualizations to a care plan. Examples of Individualization might be:  \"Parent requests to be called daily at 9am for status\", \"I have a hard time hearing out of my right ear\", or \"Do not touch me to wake me up as it startles  me\".  Outcome: Progressing  Goal: Absence of Hospital-Acquired Illness or Injury  Outcome: Progressing  Intervention: Identify and Manage Fall Risk  Recent Flowsheet Documentation  Taken 3/8/2025 0900 by Anabell Steve RN  Safety Promotion/Fall Prevention: activity supervised  Intervention: Prevent Skin Injury  Recent Flowsheet Documentation  Taken 3/8/2025 0900 by Anabell Steve RN  Body Position:   position changed independently   weight shifting  Intervention: Prevent and Manage VTE (Venous Thromboembolism) Risk  Recent Flowsheet Documentation  Taken 3/8/2025 0900 by Anabell Steve" RN  VTE Prevention/Management: SCDs on (sequential compression devices)  Intervention: Prevent Infection  Recent Flowsheet Documentation  Taken 3/8/2025 0900 by Anabell Steve RN  Infection Prevention:   hand hygiene promoted   single patient room provided  Goal: Optimal Comfort and Wellbeing  Outcome: Progressing  Intervention: Provide Person-Centered Care  Recent Flowsheet Documentation  Taken 3/8/2025 0900 by Anabell Steve RN  Trust Relationship/Rapport:   care explained   emotional support provided   thoughts/feelings acknowledged  Goal: Readiness for Transition of Care  Outcome: Progressing     Problem: Infection  Goal: Absence of Infection Signs and Symptoms  Outcome: Progressing     Problem: Delirium  Goal: Optimal Coping  Outcome: Progressing  Goal: Improved Behavioral Control  Outcome: Progressing  Intervention: Minimize Safety Risk  Recent Flowsheet Documentation  Taken 3/8/2025 0900 by Anabell Steve RN  Enhanced Safety Measures: room near unit station  Trust Relationship/Rapport:   care explained   emotional support provided   thoughts/feelings acknowledged  Goal: Improved Attention and Thought Clarity  Outcome: Progressing  Goal: Improved Sleep  Outcome: Progressing

## 2025-03-08 NOTE — PLAN OF CARE
"A&OX4. Forgetful. Legally Blind. Oxygen at 2L NC. Up with assst of 1 with gait belt to bathroom. Tele SR/ST. Bed alarm intact. Educated patient to call for help. Frequent checks. Denied pain at the time of assessment. Continue POC and monitoring.       Problem: Adult Inpatient Plan of Care  Goal: Plan of Care Review  Description: The Plan of Care Review/Shift note should be completed every shift.  The Outcome Evaluation is a brief statement about your assessment that the patient is improving, declining, or no change.  This information will be displayed automatically on your shift  note.  Outcome: Progressing  Flowsheets (Taken 3/7/2025 2051)  Outcome Evaluation: Legally blind.  On oxygen @2 L NC. Continue to be off Bipap.  Plan of Care Reviewed With:   patient   family  Goal: Patient-Specific Goal (Individualized)  Description: You can add care plan individualizations to a care plan. Examples of Individualization might be:  \"Parent requests to be called daily at 9am for status\", \"I have a hard time hearing out of my right ear\", or \"Do not touch me to wake me up as it startles  me\".  Outcome: Progressing  Goal: Absence of Hospital-Acquired Illness or Injury  Outcome: Progressing  Intervention: Identify and Manage Fall Risk  Recent Flowsheet Documentation  Taken 3/7/2025 1611 by Maryana Harmon RN  Safety Promotion/Fall Prevention: activity supervised  Intervention: Prevent Skin Injury  Recent Flowsheet Documentation  Taken 3/7/2025 1611 by Maryana Harmon RN  Body Position: position changed independently  Intervention: Prevent and Manage VTE (Venous Thromboembolism) Risk  Recent Flowsheet Documentation  Taken 3/7/2025 1611 by Maryana Harmon RN  VTE Prevention/Management: SCDs on (sequential compression devices)  Intervention: Prevent Infection  Recent Flowsheet Documentation  Taken 3/7/2025 1611 by Maryana Harmon RN  Infection Prevention:   hand hygiene promoted   single patient room provided  Goal: " Optimal Comfort and Wellbeing  Outcome: Progressing  Intervention: Provide Person-Centered Care  Recent Flowsheet Documentation  Taken 3/7/2025 1611 by Maryana Harmon RN  Trust Relationship/Rapport:   care explained   emotional support provided   thoughts/feelings acknowledged  Goal: Readiness for Transition of Care  Outcome: Progressing     Problem: Infection  Goal: Absence of Infection Signs and Symptoms  Outcome: Progressing     Problem: Delirium  Goal: Optimal Coping  Outcome: Progressing  Goal: Improved Behavioral Control  Outcome: Progressing  Intervention: Minimize Safety Risk  Recent Flowsheet Documentation  Taken 3/7/2025 1611 by Maryana Harmon RN  Enhanced Safety Measures: room near unit station  Trust Relationship/Rapport:   care explained   emotional support provided   thoughts/feelings acknowledged  Goal: Improved Attention and Thought Clarity  Outcome: Progressing  Goal: Improved Sleep  Outcome: Progressing   Goal Outcome Evaluation:      Plan of Care Reviewed With: patient, family          Outcome Evaluation: Legally blind.  On oxygen @2 L NC. Continue to be off Bipap.

## 2025-03-08 NOTE — PROGRESS NOTES
[facility]  Hospitalist Progress Note  Admit 3/5/2025  4:04 PM    Name: Kavita Merlos    MRN: [id number]  Provider:  Parveen Parker MD, [facility]    Date of Service: 03/08/2025     Reason for Stay (Diagnosis): Right upper lobe pneumonia and sepsis         Summary of hospital stay & Assessment/Plan:   Summary of Stay: Kavita Merlos is a 89 year old female who was admitted on 3/5/2025  89-year-old female with a history of infrarenal AAA, aortic stenosis s/p TAVR (2/2024), pAF, and hypertension, admitted with acute hypoxic respiratory failure and sepsis secondary to RUL pneumonia. Worsening respiratory distress on 3/7, likely due to flash pulmonary edema following overnight blood transfusion. Responded well to BiPAP and IV diuresis with improvement in symptoms. Antibiotics continued. TAVR-associated anemia under workup, with hemolysis markers pending. Hematology consulted. Echocardiogram planned to evaluate valve function.    Right upper lobe pneumonia, sepsis  Continue IV ceftriaxone + azithromycin, cultures pending  COVID/Flu/RSV negative  Monitor response to antibiotics    Acute hypoxic respiratory failure-likely acute pulmonary edema from acute diastolic heart failure with preserved ejection fraction after blood transfusion  Likely multifactorial: pneumonia + flash pulmonary edema post-transfusion  Improved with BiPAP and IV Lasix  Continue monitoring, started on Lasix 20 mg orally assess need on discharge    Acute on chronic anemia  Normocytic, normochromic with evidence of hemolysis (low haptoglobin, elevated LDH, schistocytes) per hematology:The findings most likely due to sepsis, Some Red cell fragmentation associated with TAVR.   Suspect contribution from TAVR-related hemolysis vs. sepsis-related  Labs pending: AILYN, complement, fibrinogen, PT/PTT, iron, B12, folate  Maintain hemoglobin >7 with transfusion as needed  Echocardiogram 55 to 60% ejection fraction no endocarditis suspected    pAF s/p  TAVR on anticoagulation  Resume DOAC tomorrow if okay by hematology  Metoprolol continued; additional dosing as needed for BP control    Hypertension  BP elevated with worsening SOB, improving post-diuresis  Monitor trends, consider additional IV antihypertensives if needed    Macular degeneration, legally blind  Continue home ophthalmic medications              Clinically Significant Risk Factors         # Hyponatremia: Lowest Na = 134 mmol/L in last 2 days, will monitor as appropriate       # Hypoalbuminemia: Lowest albumin = 2.7 g/dL at 3/6/2025  7:19 AM, will monitor as appropriate  # Coagulation Defect: INR = 1.45 (Ref range: 0.85 - 1.15) and/or PTT = 36 Seconds (Ref range: 22 - 38 Seconds), will monitor for bleeding    # Hypertension: Noted on problem list                # Financial/Environmental Concerns:             DVT Prophylaxis: DOAC  Code Status:  Special Code    Disposition Plan   Incidental findings/discharge issues Incidental findings  AAA: Stable at 4.5 cm  Renal lesion: Suspected cyst, follow-up CT urogram in ~3 months  Pancreatic cystic lesion: 9 mm, follow-up pancreatic CT/MRI in ~18 months  Lung nodules: Small stable nodules, repeat chest CT in 1 year    Medically Ready for Discharge: Anticipated in 2-4 Days   prior living arrangement once clinically improving, counts are stable and cleared by therapy.     Entered: Parveen Parker MD 03/08/2025, 7:55 AM                 Interval History:       Feeling much better today, reports eating for the first time in more than a week with a good appetite  Today's plan detailed above discussed with nursing               Physical Exam:   Physical Exam   Temp: 98.4  F (36.9  C) Temp src: Oral BP: 133/63 Pulse: 84   Resp: 18 SpO2: 96 % O2 Device: Nasal cannula Oxygen Delivery: 2 LPM  Vitals:    03/05/25 1612   Weight: 53.1 kg (117 lb)     I/O last 3 completed shifts:  In: -   Out: 1425 [Urine:1425]          GENERAL:  Comfortable.   PSYCH: pleasant,  oriented, No acute distress.  EYES: PERRLA, Normal conjunctiva.  HEART:  Normal S1, S2 with no edema.  LUNGS:  Clear to auscultation, normal Respiratory effort.  ABDOMEN:  Soft, no hepatosplenomegaly, normal bowel sounds.  SKIN:  Dry to touch, No rash.      Medications   Current Facility-Administered Medications   Medication Dose Route Frequency Provider Last Rate Last Admin    Patient is already receiving anticoagulation with heparin, enoxaparin (LOVENOX), warfarin (COUMADIN)  or other anticoagulant medication   Does not apply Continuous PRN Radha Miramontes, DO         Current Facility-Administered Medications   Medication Dose Route Frequency Provider Last Rate Last Admin    apixaban ANTICOAGULANT (ELIQUIS) tablet 2.5 mg  2.5 mg Oral BID Zhou Castaneda, DO   2.5 mg at 03/07/25 2126    azithromycin (ZITHROMAX) 250 mg in  mL intermittent infusion  250 mg Intravenous Q24H Zhou Castaneda P, DO   250 mg at 03/07/25 1316    cefTRIAXone (ROCEPHIN) 2 g vial to attach to  ml bag for ADULTS or NS 50 ml bag for PEDS  2 g Intravenous Q24H Zhou Castaneda P, DO   2 g at 03/07/25 1311    docusate sodium (COLACE) capsule 100 mg  100 mg Oral BID Zhou Castaneda P, DO   100 mg at 03/06/25 2112    metoprolol succinate ER (TOPROL XL) 24 hr tablet 25 mg  25 mg Oral Daily Zhou Castaneda P, DO   25 mg at 03/07/25 0908    sodium chloride (PF) 0.9% PF flush 3 mL  3 mL Intracatheter Q8H Zhou Castaneda P, DO   3 mL at 03/08/25 0340    sodium chloride (PF) 0.9% PF flush 3 mL  3 mL Intracatheter Q8H Radha Miramontes, DO   3 mL at 03/06/25 2120    tafluprost (ZIOPTAN) ophthalmic solution 1 drop [PATIENT SUPPLIED MEDICATION]  1 drop Both Eyes At Bedtime Radha Miramontes, DO   1 drop at 03/07/25 2126    timolol maleate (TIMOPTIC) 0.5 % ophthalmic solution 1 drop [PATIENT SUPPLIED MEDICATION]  1 drop Both Eyes BID Radha Miramontes, DO   1 drop at 03/07/25 1605     Data     -Data reviewed today:  I personally reviewed  all new labs and imaging  results over the last 24 hours.    Recent Labs   Lab 25  1024 25  0635 25  2110 25  1344 25  1142   WBC 10.3 10.6  --   --  13.5*   HGB 9.7* 9.7* 6.8*   < > 7.8*   HCT 29.7* 30.0*  --   --  23.7*   MCV 91 91  --   --  89    291  --   --  298    < > = values in this interval not displayed.     Recent Labs   Lab 25  1105 25  1024 25  0635 25  0719   NA  --  134* 137 136   POTASSIUM  --  4.2 4.2 4.2   CHLORIDE  --  100 104 104   CO2  --  23 22 22   ANIONGAP  --  11 11 10   * 131* 91 97   BUN  --  14.7 14.8 17.3   CR  --  0.49* 0.52 0.59   GFRESTIMATED  --  90 89 86   JERRY  --  8.7* 8.9 8.5*       Recent Results (from the past 24 hours)   XR Chest Port 1 View    Narrative    EXAM: XR CHEST PORT 1 VIEW  LOCATION: [facility]  DATE: 3/7/2025    INDICATION: Shortness of breath.  COMPARISON: 3/5/2025.      Impression    IMPRESSION: Airspace opacity in the right upper lobe has increased slightly. Interstitial prominence throughout both lungs, greater on the right, is unchanged, and may be related to fibrosis or infection. No pneumothorax. TAVR. Heart size is stable.   Pulmonary vascularity is within normal limits. Aortic calcification.   Echocardiogram Limited   Result Value    LVEF  55-60%    Narrative    [id number]  [id number]  [id number]  [id number]^LEXX^AARON^JUSTIN     [facility]  Echocardiography Laboratory  [address] [place] [address]     Name: DYLON PORTER  MRN: [id number]  : 1935  Study Date: 2025 12:44 PM  Age: 89 yrs  Gender: Female  Patient Location: [facility]  Reason For Study: SOB, Aortic Valve Repair  Ordering Physician: AARON HALLMAN  Referring Physician: Ty Cordero  Performed By: Mercy Morrissey     BSA: 1.6 m2  Height: 64 in  Weight: 117 lb  HR: 100  BP: 185/100 mmHg  ______________________________________________________________________________  Procedure  Limited  Echocardiogram with two-dimensional, color and spectral Doppler.  ______________________________________________________________________________  Interpretation Summary     1. The left ventricle is normal in size. Moderately increased left ventricular  wall thickness is noted. Left ventricular systolic function is normal. The  visual ejection fraction is 55-60%. No regional wall motion abnormalities  noted.  2. The right ventricle is normal size. The right ventricular systolic function  is normal.  3. There is moderate biatrial enlargement  4. There is a bioprosthetic aortic valve. (23 mm Ross Adelso Valve  implanted on 02/20/2024). The mean AoV pressure gradient is 14.0 mmHg. The  peak AoV pressure gradient is 25.0 mmHg. There is mild paravalvular  regurgitation.  5. No pericardial effusion.  6. In comparison to the previous report dated 01/10/2025, the findings are  similar.  ______________________________________________________________________________  Left Ventricle  The left ventricle is normal in size. Moderately increased left ventricular  wall thickness is noted. Left ventricular systolic function is normal. The  visual ejection fraction is 55-60%. No regional wall motion abnormalities  noted.     Right Ventricle  The right ventricle is normal size. The right ventricular systolic function is  normal.     Atria  There is moderate biatrial enlargement.     Mitral Valve  There is trace mitral regurgitation.     Tricuspid Valve  There is trace tricuspid regurgitation. Right ventricular systolic pressure  could not be approximated due to inadequate tricuspid regurgitation.     Aortic Valve  There is a bioprosthetic aortic valve. (23 mm Ross Adelso Valve implanted  on 02/20/2024). The mean AoV pressure gradient is 14.0 mmHg. The peak AoV  pressure gradient is 25.0 mmHg. There is mild paravalvular regurgitation.     Pulmonic Valve  The pulmonic valve is not well visualized.     Pericardium  There is no  pericardial effusion.     Rhythm  Sinus rhythm was noted.     ______________________________________________________________________________  MMode/2D Measurements & Calculations  LVOT diam: 2.0 cm  LVOT area: 3.1 cm2     Doppler Measurements & Calculations  Ao V2 max: 252.3 cm/sec  Ao max P.0 mmHg  Ao V2 mean: 168.3 cm/sec  Ao mean P.0 mmHg  Ao V2 VTI: 39.8 cm     ______________________________________________________________________________  Report approved by: Nieves Chavez MD on 2025 01:38 PM             This document was produced using voice recognition software

## 2025-03-08 NOTE — CONSULTS
Care Management Initial Consult    General Information  Assessment completed with: Patient, Family, VM-chart review,         Primary Care Provider verified and updated as needed:     Readmission within the last 30 days: current reason for admission unrelated to previous admission      Reason for Consult: discharge planning  Advance Care Planning: Advance Care Planning Reviewed: no concerns identified          Communication Assessment  Patient's communication style: spoken language (English or Bilingual)    Hearing Difficulty or Deaf: no   Wear Glasses or Blind: yes    Cognitive  Cognitive/Neuro/Behavioral: WDL  Level of Consciousness: alert  Arousal Level: opens eyes spontaneously  Orientation: oriented x 4  Mood/Behavior: calm, cooperative  Best Language: 0 - No aphasia  Speech: spontaneous, clear    Living Environment:   People in home: alone     Current living Arrangements: independent living facility, apartment      Able to return to prior arrangements: yes       Family/Social Support:  Care provided by: self  Provides care for: no one     Support system:            Description of Support System:           Current Resources:   Patient receiving home care services: No        Community Resources:    Equipment currently used at home: walker, rolling  Supplies currently used at home:      Employment/Financial:  Employment Status: retired        Financial Concerns: none           Does the patient's insurance plan have a 3 day qualifying hospital stay waiver?  No    Lifestyle & Psychosocial Needs:  Social Drivers of Health     Food Insecurity: Low Risk  (3/5/2025)    Food Insecurity     Within the past 12 months, did you worry that your food would run out before you got money to buy more?: No     Within the past 12 months, did the food you bought just not last and you didn t have money to get more?: No   Depression: Not at risk (10/24/2024)    PHQ-2     PHQ-2 Score: 0   Housing Stability: Low Risk  (3/5/2025)     Housing Stability     Do you have housing? : Yes     Are you worried about losing your housing?: No   Tobacco Use: Medium Risk (3/3/2025)    Patient History     Smoking Tobacco Use: Former     Smokeless Tobacco Use: Never     Passive Exposure: Never   Financial Resource Strain: Low Risk  (3/5/2025)    Financial Resource Strain     Within the past 12 months, have you or your family members you live with been unable to get utilities (heat, electricity) when it was really needed?: No   Alcohol Use: Not on file   Transportation Needs: Low Risk  (3/5/2025)    Transportation Needs     Within the past 12 months, has lack of transportation kept you from medical appointments, getting your medicines, non-medical meetings or appointments, work, or from getting things that you need?: No   Physical Activity: Patient Declined (10/21/2024)    Exercise Vital Sign     Days of Exercise per Week: Patient declined     Minutes of Exercise per Session: Patient declined   Interpersonal Safety: Low Risk  (3/6/2025)    Interpersonal Safety     Do you feel physically and emotionally safe where you currently live?: Yes     Within the past 12 months, have you been hit, slapped, kicked or otherwise physically hurt by someone?: No     Within the past 12 months, have you been humiliated or emotionally abused in other ways by your partner or ex-partner?: No   Stress: No Stress Concern Present (10/21/2024)    [employer] - Occupational Stress Questionnaire     Feeling of Stress : Only a little   Social Connections: Unknown (10/21/2024)    Social Connection and Isolation Panel [NHANES]     Frequency of Communication with Friends and Family: Not on file     Frequency of Social Gatherings with Friends and Family: Twice a week     Attends Faith Services: Not on file     Active Member of Clubs or Organizations: Not on file     Attends Club or Organization Meetings: Not on file     Marital Status: Not on file   Health  Literacy: Not on file       Functional Status:  Prior to admission patient needed assistance:   Dependent ADLs:: Independent  Dependent IADLs:: Cleaning, Laundry, Shopping, Medication Management, Money Management, Transportation       Mental Health Status:          Chemical Dependency Status:                Values/Beliefs:  Spiritual, Cultural Beliefs, Caodaism Practices, Values that affect care: no               Discussed  Partnership in Safe Discharge Planning  document with patient/family: Yes:    Additional Information:  SW met with patient and son, Ronaldo, at bedside to complete initial assessment and assess for discharge needs. Patient comes from [facility], where her only services are light housekeeping. Per chart review, Kavita is almost completely blind. Her children are her support system and help with transportation and tasks such as shopping and laundry.    Patient anticipates discharging back to her ILF and her children will provide transportation.     Next Steps:     No CM needs identified at this time. Please re-consult if needs arise.    MAXIM Trevino, [facility]   Care Coordinator-Casual  [email or web address]

## 2025-03-09 LAB
ALBUMIN SERPL BCG-MCNC: 2.9 G/DL (ref 3.5–5.2)
ALP SERPL-CCNC: 69 U/L (ref 40–150)
ALT SERPL W P-5'-P-CCNC: 13 U/L (ref 0–50)
ANION GAP SERPL CALCULATED.3IONS-SCNC: 7 MMOL/L (ref 7–15)
AST SERPL W P-5'-P-CCNC: 42 U/L (ref 0–45)
BILIRUB DIRECT SERPL-MCNC: 0.44 MG/DL (ref 0–0.3)
BILIRUB SERPL-MCNC: 0.9 MG/DL
BUN SERPL-MCNC: 10 MG/DL (ref 8–23)
CALCIUM SERPL-MCNC: 8.7 MG/DL (ref 8.8–10.4)
CHLORIDE SERPL-SCNC: 100 MMOL/L (ref 98–107)
CREAT SERPL-MCNC: 0.44 MG/DL (ref 0.51–0.95)
EGFRCR SERPLBLD CKD-EPI 2021: >90 ML/MIN/1.73M2
ERYTHROCYTE [DISTWIDTH] IN BLOOD BY AUTOMATED COUNT: 14.8 % (ref 10–15)
GLUCOSE SERPL-MCNC: 105 MG/DL (ref 70–99)
HCO3 SERPL-SCNC: 27 MMOL/L (ref 22–29)
HCT VFR BLD AUTO: 28.2 % (ref 35–47)
HGB BLD-MCNC: 9.2 G/DL (ref 11.7–15.7)
LDH SERPL L TO P-CCNC: 803 U/L (ref 0–250)
MCH RBC QN AUTO: 29.2 PG (ref 26.5–33)
MCHC RBC AUTO-ENTMCNC: 32.6 G/DL (ref 31.5–36.5)
MCV RBC AUTO: 90 FL (ref 78–100)
PLATELET # BLD AUTO: 294 10E3/UL (ref 150–450)
POTASSIUM SERPL-SCNC: 3.5 MMOL/L (ref 3.4–5.3)
PROT SERPL-MCNC: 6.1 G/DL (ref 6.4–8.3)
RBC # BLD AUTO: 3.15 10E6/UL (ref 3.8–5.2)
SODIUM SERPL-SCNC: 134 MMOL/L (ref 135–145)
WBC # BLD AUTO: 7.7 10E3/UL (ref 4–11)

## 2025-03-09 PROCEDURE — 250N000013 HC RX MED GY IP 250 OP 250 PS 637: Performed by: INTERNAL MEDICINE

## 2025-03-09 PROCEDURE — 258N000003 HC RX IP 258 OP 636: Performed by: INTERNAL MEDICINE

## 2025-03-09 PROCEDURE — 36415 COLL VENOUS BLD VENIPUNCTURE: CPT | Performed by: INTERNAL MEDICINE

## 2025-03-09 PROCEDURE — 250N000011 HC RX IP 250 OP 636: Performed by: INTERNAL MEDICINE

## 2025-03-09 PROCEDURE — 120N000001 HC R&B MED SURG/OB

## 2025-03-09 PROCEDURE — 250N000013 HC RX MED GY IP 250 OP 250 PS 637: Performed by: STUDENT IN AN ORGANIZED HEALTH CARE EDUCATION/TRAINING PROGRAM

## 2025-03-09 PROCEDURE — 83615 LACTATE (LD) (LDH) ENZYME: CPT | Performed by: HOSPITALIST

## 2025-03-09 PROCEDURE — 99233 SBSQ HOSP IP/OBS HIGH 50: CPT | Performed by: INTERNAL MEDICINE

## 2025-03-09 PROCEDURE — 250N000011 HC RX IP 250 OP 636: Performed by: HOSPITALIST

## 2025-03-09 PROCEDURE — 82248 BILIRUBIN DIRECT: CPT | Performed by: STUDENT IN AN ORGANIZED HEALTH CARE EDUCATION/TRAINING PROGRAM

## 2025-03-09 PROCEDURE — 85014 HEMATOCRIT: CPT | Performed by: INTERNAL MEDICINE

## 2025-03-09 RX ORDER — METOPROLOL SUCCINATE 25 MG/1
25 TABLET, EXTENDED RELEASE ORAL ONCE
Status: COMPLETED | OUTPATIENT
Start: 2025-03-09 | End: 2025-03-09

## 2025-03-09 RX ORDER — METOPROLOL SUCCINATE 50 MG/1
50 TABLET, EXTENDED RELEASE ORAL DAILY
Status: DISCONTINUED | OUTPATIENT
Start: 2025-03-10 | End: 2025-03-10

## 2025-03-09 RX ADMIN — AZITHROMYCIN MONOHYDRATE 250 MG: 500 INJECTION, POWDER, LYOPHILIZED, FOR SOLUTION INTRAVENOUS at 14:00

## 2025-03-09 RX ADMIN — HYDRALAZINE HYDROCHLORIDE 10 MG: 20 INJECTION INTRAMUSCULAR; INTRAVENOUS at 00:18

## 2025-03-09 RX ADMIN — APIXABAN 2.5 MG: 2.5 TABLET, FILM COATED ORAL at 21:32

## 2025-03-09 RX ADMIN — METOPROLOL SUCCINATE 25 MG: 25 TABLET, EXTENDED RELEASE ORAL at 08:39

## 2025-03-09 RX ADMIN — METOPROLOL SUCCINATE 25 MG: 25 TABLET, EXTENDED RELEASE ORAL at 09:31

## 2025-03-09 RX ADMIN — TAFLUPROST OPTHALMIC 1 DROP: 0 SOLUTION/ DROPS OPHTHALMIC at 21:36

## 2025-03-09 RX ADMIN — FUROSEMIDE 20 MG: 20 TABLET ORAL at 08:39

## 2025-03-09 RX ADMIN — CEFTRIAXONE 2 G: 2 INJECTION, POWDER, FOR SOLUTION INTRAMUSCULAR; INTRAVENOUS at 13:24

## 2025-03-09 RX ADMIN — TIMOLOL MALEATE 1 DROP: 5 SOLUTION/ DROPS OPHTHALMIC at 08:40

## 2025-03-09 RX ADMIN — ACETAMINOPHEN 650 MG: 325 TABLET, FILM COATED ORAL at 08:50

## 2025-03-09 RX ADMIN — ACETAMINOPHEN 650 MG: 325 TABLET, FILM COATED ORAL at 13:27

## 2025-03-09 RX ADMIN — APIXABAN 2.5 MG: 2.5 TABLET, FILM COATED ORAL at 08:39

## 2025-03-09 RX ADMIN — TIMOLOL MALEATE 1 DROP: 5 SOLUTION/ DROPS OPHTHALMIC at 16:46

## 2025-03-09 RX ADMIN — ACETAMINOPHEN 650 MG: 325 TABLET, FILM COATED ORAL at 01:49

## 2025-03-09 RX ADMIN — POLYETHYLENE GLYCOL 3350 17 G: 17 POWDER, FOR SOLUTION ORAL at 14:46

## 2025-03-09 ASSESSMENT — ACTIVITIES OF DAILY LIVING (ADL)
ADLS_ACUITY_SCORE: 47

## 2025-03-09 NOTE — PLAN OF CARE
"Goal Outcome Evaluation:      Plan of Care Reviewed With: patient    Overall Patient Progress: no changeOverall Patient Progress: no change    Outcome Evaluation: Patient alert, oriented x 4, legally blind and able to make needs known. Crosscover paged for elevated BP. PRN hydralazine ordered and given with relief. Tylenol for HA. 2L NC. Purewick in place.     Plan: Monitor BP, wean off oxygen     BP (!) 142/63 (BP Location: Left arm)   Pulse 111   Temp 98.9  F (37.2  C) (Oral)   Resp 18   Ht 1.626 m (5' 4\")   Wt 53.1 kg (117 lb)   LMP  (LMP Unknown)   SpO2 96%   BMI 20.08 kg/m      Problem: Adult Inpatient Plan of Care  Goal: Plan of Care Review  Description: The Plan of Care Review/Shift note should be completed every shift.  The Outcome Evaluation is a brief statement about your assessment that the patient is improving, declining, or no change.  This information will be displayed automatically on your shift  note.  Outcome: Not Progressing  Flowsheets (Taken 3/9/2025 050)  Outcome Evaluation: Patient alert, oriented x 4, legally blind and able to make needs known. Crosscover paged for elevated BP. PRN hydralazine ordered and given with relief. Tylenol for HA.  Plan of Care Reviewed With: patient  Overall Patient Progress: no change  Goal: Patient-Specific Goal (Individualized)  Description: You can add care plan individualizations to a care plan. Examples of Individualization might be:  \"Parent requests to be called daily at 9am for status\", \"I have a hard time hearing out of my right ear\", or \"Do not touch me to wake me up as it startles  me\".  Outcome: Not Progressing  Goal: Absence of Hospital-Acquired Illness or Injury  Outcome: Not Progressing  Intervention: Identify and Manage Fall Risk  Recent Flowsheet Documentation  Taken 3/8/2025 2000 by Katie Dennis RN  Safety Promotion/Fall Prevention: safety round/check completed  Intervention: Prevent Skin Injury  Recent Flowsheet Documentation  Taken " 3/9/2025 0149 by Katie Dennis, RN  Body Position: position changed independently  Taken 3/8/2025 2000 by Katie Dennis, RN  Body Position: position changed independently  Goal: Optimal Comfort and Wellbeing  Outcome: Not Progressing  Intervention: Monitor Pain and Promote Comfort  Recent Flowsheet Documentation  Taken 3/9/2025 0149 by Katie Dennis, RN  Pain Management Interventions: medication (see MAR)  Goal: Readiness for Transition of Care  Outcome: Not Progressing     Problem: Infection  Goal: Absence of Infection Signs and Symptoms  Outcome: Not Progressing     Problem: Delirium  Goal: Optimal Coping  Outcome: Not Progressing  Goal: Improved Behavioral Control  Outcome: Not Progressing  Goal: Improved Attention and Thought Clarity  Outcome: Not Progressing  Goal: Improved Sleep  Outcome: Not Progressing

## 2025-03-09 NOTE — PLAN OF CARE
"Patient is A&O. Appears anxios on/off throughout the day. Sleeping between cares. Family at bedside for the duration of the day. O2 weaned to 0.5L via NC. Tele sinus tachy. IV abx infused per orders. Appetite fair. PRN miralx administered for concerns of constipation. PRN tylenol administered for c/o headache with adequate relief.    Goal Outcome Evaluation:      Plan of Care Reviewed With: patient    Overall Patient Progress: no change    Outcome Evaluation: Patient reports not feeling well today d/t PRN hydralazine that she recieved overnight.      Problem: Adult Inpatient Plan of Care  Goal: Plan of Care Review  Description: The Plan of Care Review/Shift note should be completed every shift.  The Outcome Evaluation is a brief statement about your assessment that the patient is improving, declining, or no change.  This information will be displayed automatically on your shift  note.  Outcome: Progressing  Flowsheets (Taken 3/9/2025 1512)  Outcome Evaluation: Patient reports not feeling well today d/t PRN hydralazine that she recieved overnight.  Plan of Care Reviewed With: patient  Overall Patient Progress: no change  Goal: Patient-Specific Goal (Individualized)  Description: You can add care plan individualizations to a care plan. Examples of Individualization might be:  \"Parent requests to be called daily at 9am for status\", \"I have a hard time hearing out of my right ear\", or \"Do not touch me to wake me up as it startles  me\".  Outcome: Progressing  Goal: Absence of Hospital-Acquired Illness or Injury  Outcome: Progressing  Intervention: Identify and Manage Fall Risk  Recent Flowsheet Documentation  Taken 3/9/2025 0850 by Laureen Chirinos RN  Safety Promotion/Fall Prevention:   activity supervised   assistive device/personal items within reach   clutter free environment maintained   safety round/check completed  Intervention: Prevent Skin Injury  Recent Flowsheet Documentation  Taken 3/9/2025 0850 by Candis, " Laureen RN  Body Position: position changed independently  Intervention: Prevent Infection  Recent Flowsheet Documentation  Taken 3/9/2025 0850 by Laureen Chirinos RN  Infection Prevention: rest/sleep promoted  Goal: Optimal Comfort and Wellbeing  Outcome: Progressing  Intervention: Provide Person-Centered Care  Recent Flowsheet Documentation  Taken 3/9/2025 0850 by Laureen Chirinos RN  Trust Relationship/Rapport:   care explained   questions answered   thoughts/feelings acknowledged  Goal: Readiness for Transition of Care  Outcome: Progressing     Problem: Infection  Goal: Absence of Infection Signs and Symptoms  Outcome: Progressing     Problem: Delirium  Goal: Optimal Coping  Outcome: Progressing  Goal: Improved Behavioral Control  Outcome: Progressing  Intervention: Minimize Safety Risk  Recent Flowsheet Documentation  Taken 3/9/2025 0850 by Laureen Chirinos RN  Trust Relationship/Rapport:   care explained   questions answered   thoughts/feelings acknowledged  Goal: Improved Attention and Thought Clarity  Outcome: Progressing  Goal: Improved Sleep  Outcome: Progressing

## 2025-03-09 NOTE — PROGRESS NOTES
[facility]  Hospitalist Progress Note  Admit 3/5/2025  4:04 PM    Name: Kavita Merlos    MRN: [id number]  Provider:  Parveen Parker MD, [facility], [facility]    Date of Service: 03/09/2025     Reason for Stay (Diagnosis): Right upper lobe pneumonia and sepsis         Summary of hospital stay & Assessment/Plan:   Summary of Stay: Kavita Merlos is a 89 year old female who was admitted on 3/5/2025  89-year-old female with a history of infrarenal AAA, aortic stenosis s/p TAVR (2/2024), pAF, and hypertension, admitted with acute hypoxic respiratory failure and sepsis secondary to RUL pneumonia. Worsening respiratory distress on 3/7, likely due to flash pulmonary edema following overnight blood transfusion. Responded well to BiPAP and IV diuresis with improvement in symptoms. Antibiotics continued. TAVR-associated anemia under workup, with hemolysis markers pending. Hematology consulted. Echocardiogram planned to evaluate valve function.    Right upper lobe pneumonia, sepsis  Continue IV ceftriaxone + azithromycin, cultures pending  COVID/Flu/RSV negative  Monitor response to antibiotics  Wean off oxygen today    Acute hypoxic respiratory failure-likely acute pulmonary edema from acute diastolic heart failure with preserved ejection fraction after blood transfusion  Likely multifactorial: pneumonia + flash pulmonary edema post-transfusion  Improved with BiPAP and IV Lasix  Continue monitoring, started on Lasix 20 mg orally I would recommend on discharge with close monitoring of kidney function as outpatient    Acute on chronic anemia  Normocytic, normochromic with evidence of hemolysis (low haptoglobin, elevated LDH, schistocytes) per hematology:The findings most likely due to sepsis, Some Red cell fragmentation associated with TAVR.   Suspect contribution from TAVR-related hemolysis vs. sepsis-related  Labs pending: AILYN, complement, fibrinogen, PT/PTT, iron, B12, folate  Maintain hemoglobin >7 with transfusion as  needed  Echocardiogram 55 to 60% ejection fraction no endocarditis suspected    pAF s/p TAVR on anticoagulation  Resume DOAC  Metoprolol continued; dose increased because of ongoing hypertension and tachycardia    Hypertension  BP elevated with worsening SOB, improving post-diuresis  Blood pressure still not well-controlled, increasing metoprolol to 50 mg XL daily, patient is allergic to 16 different medications limiting the options especially now she does not want also hydralazine    Depression suspected based on discussion with the patient  Start low-dose Lexapro and follow-up as outpatient    Macular degeneration, legally blind  Continue home ophthalmic medications              Clinically Significant Risk Factors         # Hyponatremia: Lowest Na = 134 mmol/L in last 2 days, will monitor as appropriate       # Hypoalbuminemia: Lowest albumin = 2.7 g/dL at 3/6/2025  7:19 AM, will monitor as appropriate    # Coagulation Defect: INR = 1.45 (Ref range: 0.85 - 1.15) and/or PTT = 36 Seconds (Ref range: 22 - 38 Seconds), will monitor for bleeding    # Hypertension: Noted on problem list                # Financial/Environmental Concerns: none           DVT Prophylaxis: DOAC  Code Status:  Special Code    Disposition Plan   Incidental findings/discharge issues Incidental findings  AAA: Stable at 4.5 cm  Renal lesion: Suspected cyst, follow-up CT urogram in ~3 months  Pancreatic cystic lesion: 9 mm, follow-up pancreatic CT/MRI in ~18 months  Lung nodules: Small stable nodules, repeat chest CT in 1 year    Medically Ready for Discharge: Anticipated Tomorrow   prior living arrangement once if clinically continues to improve and cleared by physical therapy, as of now the consult is pending       Entered: Parveen Parker MD 03/09/2025, 9:02 AM                 Interval History:   In tears this morning overwhelmed is upset that she was given hydralazine yesterday and had an allergic reaction with rapid heart rate to it she  does not want this medication again      Today's plan detailed above discussed with nursing               Physical Exam:   Physical Exam   Temp: 97.6  F (36.4  C) Temp src: Oral BP: (!) 161/72 Pulse: 107   Resp: 18 SpO2: 97 % O2 Device: Nasal cannula Oxygen Delivery: 2 LPM  Vitals:    03/05/25 1612   Weight: 53.1 kg (117 lb)     I/O last 3 completed shifts:  In: 50 [P.O.:50]  Out: 1400 [Urine:1400]          GENERAL:  Comfortable.   PSYCH: pleasant, oriented, No acute distress.  EYES: PERRLA, Normal conjunctiva.  HEART:  Normal S1, S2 with no edema.  LUNGS:  Clear to auscultation some rales basilar, normal Respiratory effort.  ABDOMEN:  Soft, no hepatosplenomegaly, normal bowel sounds.  SKIN:  Dry to touch, No rash.      Medications   Current Facility-Administered Medications   Medication Dose Route Frequency Provider Last Rate Last Admin    Patient is already receiving anticoagulation with heparin, enoxaparin (LOVENOX), warfarin (COUMADIN)  or other anticoagulant medication   Does not apply Continuous PRN Radha Miramontes DO         Current Facility-Administered Medications   Medication Dose Route Frequency Provider Last Rate Last Admin    apixaban ANTICOAGULANT (ELIQUIS) tablet 2.5 mg  2.5 mg Oral BID Zhou Castaneda P, DO   2.5 mg at 03/09/25 0839    azithromycin (ZITHROMAX) 250 mg in  mL intermittent infusion  250 mg Intravenous Q24H Zhou Castaneda P, DO   250 mg at 03/08/25 1408    cefTRIAXone (ROCEPHIN) 2 g vial to attach to  ml bag for ADULTS or NS 50 ml bag for PEDS  2 g Intravenous Q24H Zhou Castaneda P, DO   2 g at 03/08/25 1306    docusate sodium (COLACE) capsule 100 mg  100 mg Oral BID Zhou Castaneda P, DO   100 mg at 03/08/25 2017    furosemide (LASIX) tablet 20 mg  20 mg Oral Daily Parveen Parker MD   20 mg at 03/09/25 0839    metoprolol succinate ER (TOPROL XL) 24 hr tablet 25 mg  25 mg Oral Once Parveen Parker MD        [START ON 3/10/2025] metoprolol succinate ER (TOPROL XL) 24  hr tablet 50 mg  50 mg Oral Daily Parveen Parker MD        sodium chloride (PF) 0.9% PF flush 3 mL  3 mL Intracatheter Q8H Zhou Castaneda, DO   3 mL at 03/08/25 2016    sodium chloride (PF) 0.9% PF flush 3 mL  3 mL Intracatheter Q8H Radha Miramontes, DO   3 mL at 03/08/25 2017    tafluprost (ZIOPTAN) ophthalmic solution 1 drop [PATIENT SUPPLIED MEDICATION]  1 drop Both Eyes At Bedtime Radha Miramontes, DO   1 drop at 03/08/25 2018    timolol maleate (TIMOPTIC) 0.5 % ophthalmic solution 1 drop [PATIENT SUPPLIED MEDICATION]  1 drop Both Eyes BID Radha Miramontes DO   1 drop at 03/09/25 0840     Data     -Data reviewed today:  I personally reviewed  all new labs and imaging results over the last 24 hours.    Recent Labs   Lab 03/09/25  0749 03/08/25  1211 03/07/25  1024   WBC 7.7 8.8 10.3   HGB 9.2* 9.6* 9.7*   HCT 28.2* 29.4* 29.7*   MCV 90 91 91    304 306     Recent Labs   Lab 03/09/25  0749 03/08/25  1211 03/07/25  1105 03/07/25  1024   * 134*  --  134*   POTASSIUM 3.5 3.6  --  4.2   CHLORIDE 100 100  --  100   CO2 27 25  --  23   ANIONGAP 7 9  --  11   * 150* 117* 131*   BUN 10.0 13.6  --  14.7   CR 0.44* 0.49*  --  0.49*   GFRESTIMATED >90 90  --  90   JERRY 8.7* 8.6*  --  8.7*       No results found for this or any previous visit (from the past 24 hours).      This document was produced using voice recognition software

## 2025-03-09 NOTE — PROGRESS NOTES
[facility]      Date of Admission:  3/5/2025    Assessment & Plan   Kavita Merlos is a 89 year old female who was admitted on 3/5/2025.  We were asked to see the patient for anemia on 3/7/2025    Assessment:  Plan:  Right upper lobe pneumonia   Sepsis  Acute hypoxic respiratory failure  - Patient is on antibiotics Rocephin and azithromycin  4. Acute on chronic anemia  5.  Acute pulmonary edema with acute diastolic heart failure with preserved ejection fraction after blood transfusion  - Anemia is normocytic normochromic, LDH was elevated haptoglobin was low and initial bilirubin was elevated.   3/7-further workup of anemia-serum iron decreased 26, decreased TIBC 168, saturation 15%.  Ferritin elevated 525 INR slightly elevated 1.45, PTT normal 36, normal to increased fibrinogen 552.  AILYN negative  -Bilirubin now normal.  -B12 elevated 1555, folate normal 21.8  -evidence of some red cell regeneration and fragmentation on peripheral smear.    Hgb 9.2 g/dl today   5.  2D echo performed 3/7-no endocarditis, findings similar to prior echo    Plan   - The findings most likely due to sepsis, Some Red cell fragmentation associated with TAVR.   -Unlikely to be DIC or hemolysis  -Decreased iron, iron saturation with significantly elevated ferritin -consistent with acute illness/sepsis, will continue to monitor periodically.  Unlikely to benefit from  iron replacement  - down from 962 on admission.  Continue to monitor periodically  -Continue to follow hemoglobin transfuse to keep hemoglobin above 7.   -On Lasix 20 Mg daily    All questions answered to patient and family satisfaction.    Will continue to follow the patient along with you.  Please call with additional questions or concerns      Lanny Page MD   [facility]  Office:  [phone]    Code Status    Special Code      Primary Care Physician   Ty Cordero    Chief Complaint   Generalized weakness abdominal pain      History of Present  Illness   Kavita Merlos is a 89 year old female who presents with Symptoms of generalized malaise nausea abdominal pain that symptoms have been going on for about 2-1/2 weeks. Prior to admission on 3/5/2025 patient had new symptoms of chills shortness of breath hypoxia her daughter called the assisted living and advised them to call EMT.   On arrival in the ER patient's sodium was 131 creatinine was 0.59 her total bilirubin was elevated at 2.6  Her initial white blood cell count was 16.2 hemoglobin was 10.3 platelets were 303 differential count showed predominantly neutrophilia. MCV was 90.  Her hemoglobin dropped quickly to 6.8 requiring a blood transfusion her hemoglobin today is 9.7, Her LDH was elevated at 962 haptoglobin was less than 10 reticulocyte count was 4% yesterday. On peripheral blood morphology there was moderate anemia normocytic normochromic with increased red blood cell regeneration and schistocytes present.   Her blood cultures are negative so far.   She had a CT scan of the chest which showed findings consistent with emphysema and area of consolidation in the right lung.  Patient been getting antibiotics with azithromycin and ceftriaxone.  Patient has recent history of hospital admission due to urinary tract infection.  Her daughter mentioned that in the past her hemoglobin has always been normal around 13 but she had a TAVR procedure done in 2/2024, Since then her hemoglobin has been lower around 10 range.     3/8/25: Feeling better , but still very tired, remains on O2.  Was on oral iron up until about 3 weeks ago, was taking with vitamin C, tolerating okay.  Appetite has been low, was nauseous for last few weeks, resolved now.  Has lost about 8 to 10 pounds.  Daughter is present in the room.     3/9/2025: She is not feeling very good today.  Had a very rough night.  She had a reaction to hydralazine with rapid heart rate.  Did not sleep very well.  She does not like being on Lasix.  Patient  and family multiple questions regarding her anemia and further plan.      Past Medical History   I have reviewed this patient's medical history and updated it with pertinent information if needed.   Past Medical History:   Diagnosis Date    AAA (abdominal aortic aneurysm)     Aortic stenosis     Benign essential hypertension with BP difference between arms     Hyperlipidemia LDL goal <70        Past Surgical History   I have reviewed this patient's surgical history and updated it with pertinent information if needed.  Past Surgical History:   Procedure Laterality Date    CV CORONARY ANGIOGRAM N/A 12/19/2023    Procedure: Coronary Angiogram;  Surgeon: Seth Duarte MD;  Location: [facility] CARDIAC CATH LAB    CV PERIPHERAL VASCULAR LITHOTRIPSY N/A 2/20/2024    Procedure: Peripheral Vascular Lithotripsy;  Surgeon: Seth Duarte MD;  Location: [facility] CARDIAC CATH LAB    CV RIGHT HEART CATH MEASUREMENTS RECORDED N/A 12/19/2023    Procedure: Right Heart Catheterization;  Surgeon: Seth Duarte MD;  Location: [facility] CARDIAC CATH LAB    CV TRANSCATHETER AORTIC VALVE REPLACEMENT-FEMORAL APPROACH N/A 2/20/2024    Procedure: Transcatheter Aortic Valve Replacement-Femoral Approach;  Surgeon: Seth Duarte MD;  Location: [facility] CARDIAC CATH LAB       Prior to Admission Medications   Prior to Admission Medications   Prescriptions Last Dose Informant Patient Reported? Taking?   apixaban ANTICOAGULANT (ELIQUIS) 2.5 MG tablet 3/5/2025 Morning  No Yes   Sig: Take 1 tablet (2.5 mg) by mouth 2 times daily.   azithromycin (ZITHROMAX) 250 MG tablet 3/5/2025 Morning  Yes Yes   Sig: Take 250 mg by mouth daily.   cefpodoxime (VANTIN) 200 MG tablet 3/5/2025 Morning  No Yes   Sig: Take 1 tablet (200 mg) by mouth 2 times daily.   metoprolol succinate ER (TOPROL XL) 25 MG 24 hr tablet 3/5/2025 Morning  No Yes   Sig: Take 1 tablet (25 mg) by mouth daily.   tafluprost (ZIOPTAN) 0.0015 % SOLN  ophthalmic solution 3/4/2025 Bedtime Self Yes Yes   Sig: Place 1 drop into both eyes at bedtime   timolol maleate (TIMOPTIC) 0.5 % ophthalmic solution 3/5/2025 Morning  Yes Yes   Sig: Place 1 drop into both eyes 2 times daily.      Facility-Administered Medications: None     Allergies   Allergies   Allergen Reactions    Albuterol     Amlodipine     Bimatoprost Itching    Brimonidine Itching    Clotrimazole Itching    Dorzolamide Hcl-Timolol Mal Itching    Latanoprost Itching    Lisinopril     Losartan      depression    Neomycin-Polymyxin-Gramicidin Swelling    Neosporin [Neomycin-Polymyxin-Gramicidin]     Tape [Adhesive Tape]     Timolol Itching    Travoprost Itching    Vicodin [Hydrocodone-Acetaminophen]     Penicillins Unknown     Extensive use and toleration of cephalosporins       Social History   I have reviewed this patient's social history and updated it with pertinent information if needed. Kavita Merlos  reports that she quit smoking about 18 years ago. Her smoking use included cigarettes. She has never been exposed to tobacco smoke. She has never used smokeless tobacco. She reports that she does not currently use alcohol. She reports that she does not use drugs.    Family History   I have reviewed this patient's family history and updated it with pertinent information if needed.   No family history on file.    Review of Systems       Physical Exam   Temp: 98.1  F (36.7  C) Temp src: Oral BP: 135/64 Pulse: 90   Resp: 16 SpO2: 97 % O2 Device: Nasal cannula Oxygen Delivery: 2 LPM  Vital Signs with Ranges  Temp:  [97.6  F (36.4  C)-99.2  F (37.3  C)] 98.1  F (36.7  C)  Pulse:  [] 90  Resp:  [16-18] 16  BP: (135-192)/(62-77) 135/64  SpO2:  [96 %-99 %] 97 %  117 lbs 0 oz    Constitutional: Awake, alert, cooperative, Patient is in moderate distress/flat affect today . ECOG PS 2  Eyes: Conjunctiva and pupils examined There is pallor  GI: Soft, non-distended, non-tender,   Lymph/Hematologic: No anterior  cervical or supraclavicular adenopathy.  Respiratory: Breathing comfortably with oxygen via nasal cannula  Psychiatric: Alert, oriented to person, place and time.     Data   Results for orders placed or performed during the hospital encounter of 03/05/25 (from the past 24 hours)   Hepatic panel   Result Value Ref Range    Protein Total 6.3 (L) 6.4 - 8.3 g/dL    Albumin 2.8 (L) 3.5 - 5.2 g/dL    Bilirubin Total 0.9 <=1.2 mg/dL    Alkaline Phosphatase 72 40 - 150 U/L    AST 41 0 - 45 U/L    ALT 15 0 - 50 U/L    Bilirubin Direct 0.40 (H) 0.00 - 0.30 mg/dL   Folate   Result Value Ref Range    Folic Acid 21.8 4.6 - 34.8 ng/mL   CBC with platelets   Result Value Ref Range    WBC Count 8.8 4.0 - 11.0 10e3/uL    RBC Count 3.25 (L) 3.80 - 5.20 10e6/uL    Hemoglobin 9.6 (L) 11.7 - 15.7 g/dL    Hematocrit 29.4 (L) 35.0 - 47.0 %    MCV 91 78 - 100 fL    MCH 29.5 26.5 - 33.0 pg    MCHC 32.7 31.5 - 36.5 g/dL    RDW 14.8 10.0 - 15.0 %    Platelet Count 304 150 - 450 10e3/uL   Basic metabolic panel   Result Value Ref Range    Sodium 134 (L) 135 - 145 mmol/L    Potassium 3.6 3.4 - 5.3 mmol/L    Chloride 100 98 - 107 mmol/L    Carbon Dioxide (CO2) 25 22 - 29 mmol/L    Anion Gap 9 7 - 15 mmol/L    Urea Nitrogen 13.6 8.0 - 23.0 mg/dL    Creatinine 0.49 (L) 0.51 - 0.95 mg/dL    GFR Estimate 90 >60 mL/min/1.73m2    Calcium 8.6 (L) 8.8 - 10.4 mg/dL    Glucose 150 (H) 70 - 99 mg/dL   Ferritin   Result Value Ref Range    Ferritin 525 (H) 11 - 328 ng/mL   Hepatic panel   Result Value Ref Range    Protein Total 6.1 (L) 6.4 - 8.3 g/dL    Albumin 2.9 (L) 3.5 - 5.2 g/dL    Bilirubin Total 0.9 <=1.2 mg/dL    Alkaline Phosphatase 69 40 - 150 U/L    AST 42 0 - 45 U/L    ALT 13 0 - 50 U/L    Bilirubin Direct 0.44 (H) 0.00 - 0.30 mg/dL   Basic metabolic panel   Result Value Ref Range    Sodium 134 (L) 135 - 145 mmol/L    Potassium 3.5 3.4 - 5.3 mmol/L    Chloride 100 98 - 107 mmol/L    Carbon Dioxide (CO2) 27 22 - 29 mmol/L    Anion Gap 7 7 - 15  mmol/L    Urea Nitrogen 10.0 8.0 - 23.0 mg/dL    Creatinine 0.44 (L) 0.51 - 0.95 mg/dL    GFR Estimate >90 >60 mL/min/1.73m2    Calcium 8.7 (L) 8.8 - 10.4 mg/dL    Glucose 105 (H) 70 - 99 mg/dL   CBC with platelets   Result Value Ref Range    WBC Count 7.7 4.0 - 11.0 10e3/uL    RBC Count 3.15 (L) 3.80 - 5.20 10e6/uL    Hemoglobin 9.2 (L) 11.7 - 15.7 g/dL    Hematocrit 28.2 (L) 35.0 - 47.0 %    MCV 90 78 - 100 fL    MCH 29.2 26.5 - 33.0 pg    MCHC 32.6 31.5 - 36.5 g/dL    RDW 14.8 10.0 - 15.0 %    Platelet Count 294 150 - 450 10e3/uL   Lactate Dehydrogenase   Result Value Ref Range    Lactate Dehydrogenase 803 (H) 0 - 250 U/L        TT 40 mins-face-to-face with the patient, chart prep, placing orders, documentation, care coordination,

## 2025-03-09 NOTE — PLAN OF CARE
Patient alert and oriented x4. Denies pain, N/V. Complains of intermittent SOB. Son at bedside. Purewick in place per patient preference d/t frequent urination. O2 at 2L via NC.

## 2025-03-10 ENCOUNTER — APPOINTMENT (OUTPATIENT)
Dept: PHYSICAL THERAPY | Facility: CLINIC | Age: 89
DRG: 871 | End: 2025-03-10
Attending: INTERNAL MEDICINE
Payer: MEDICARE

## 2025-03-10 LAB
ALBUMIN SERPL BCG-MCNC: 2.9 G/DL (ref 3.5–5.2)
ALP SERPL-CCNC: 67 U/L (ref 40–150)
ALT SERPL W P-5'-P-CCNC: 13 U/L (ref 0–50)
ANION GAP SERPL CALCULATED.3IONS-SCNC: 9 MMOL/L (ref 7–15)
AST SERPL W P-5'-P-CCNC: 52 U/L (ref 0–45)
BACTERIA BLD CULT: NO GROWTH
BILIRUB DIRECT SERPL-MCNC: 0.47 MG/DL (ref 0–0.3)
BILIRUB SERPL-MCNC: 1.1 MG/DL
BUN SERPL-MCNC: 10 MG/DL (ref 8–23)
CALCIUM SERPL-MCNC: 8.9 MG/DL (ref 8.8–10.4)
CHLORIDE SERPL-SCNC: 98 MMOL/L (ref 98–107)
CREAT SERPL-MCNC: 0.43 MG/DL (ref 0.51–0.95)
EGFRCR SERPLBLD CKD-EPI 2021: >90 ML/MIN/1.73M2
ERYTHROCYTE [DISTWIDTH] IN BLOOD BY AUTOMATED COUNT: 14.8 % (ref 10–15)
GLUCOSE SERPL-MCNC: 100 MG/DL (ref 70–99)
HCO3 SERPL-SCNC: 27 MMOL/L (ref 22–29)
HCT VFR BLD AUTO: 30.8 % (ref 35–47)
HGB BLD-MCNC: 10.2 G/DL (ref 11.7–15.7)
MCH RBC QN AUTO: 29.5 PG (ref 26.5–33)
MCHC RBC AUTO-ENTMCNC: 33.1 G/DL (ref 31.5–36.5)
MCV RBC AUTO: 89 FL (ref 78–100)
PLATELET # BLD AUTO: 314 10E3/UL (ref 150–450)
POTASSIUM SERPL-SCNC: 3.8 MMOL/L (ref 3.4–5.3)
PROT SERPL-MCNC: 6.3 G/DL (ref 6.4–8.3)
RBC # BLD AUTO: 3.46 10E6/UL (ref 3.8–5.2)
SODIUM SERPL-SCNC: 134 MMOL/L (ref 135–145)
WBC # BLD AUTO: 7.5 10E3/UL (ref 4–11)

## 2025-03-10 PROCEDURE — 250N000013 HC RX MED GY IP 250 OP 250 PS 637: Performed by: INTERNAL MEDICINE

## 2025-03-10 PROCEDURE — 36415 COLL VENOUS BLD VENIPUNCTURE: CPT | Performed by: STUDENT IN AN ORGANIZED HEALTH CARE EDUCATION/TRAINING PROGRAM

## 2025-03-10 PROCEDURE — 80048 BASIC METABOLIC PNL TOTAL CA: CPT | Performed by: INTERNAL MEDICINE

## 2025-03-10 PROCEDURE — 85048 AUTOMATED LEUKOCYTE COUNT: CPT | Performed by: INTERNAL MEDICINE

## 2025-03-10 PROCEDURE — 82565 ASSAY OF CREATININE: CPT | Performed by: INTERNAL MEDICINE

## 2025-03-10 PROCEDURE — 250N000013 HC RX MED GY IP 250 OP 250 PS 637: Performed by: STUDENT IN AN ORGANIZED HEALTH CARE EDUCATION/TRAINING PROGRAM

## 2025-03-10 PROCEDURE — 97530 THERAPEUTIC ACTIVITIES: CPT | Mod: GP | Performed by: PHYSICAL THERAPIST

## 2025-03-10 PROCEDURE — 82248 BILIRUBIN DIRECT: CPT | Performed by: STUDENT IN AN ORGANIZED HEALTH CARE EDUCATION/TRAINING PROGRAM

## 2025-03-10 PROCEDURE — 120N000001 HC R&B MED SURG/OB

## 2025-03-10 PROCEDURE — 99233 SBSQ HOSP IP/OBS HIGH 50: CPT | Mod: 25 | Performed by: INTERNAL MEDICINE

## 2025-03-10 PROCEDURE — 97161 PT EVAL LOW COMPLEX 20 MIN: CPT | Mod: GP | Performed by: PHYSICAL THERAPIST

## 2025-03-10 PROCEDURE — 85018 HEMOGLOBIN: CPT | Performed by: INTERNAL MEDICINE

## 2025-03-10 PROCEDURE — 99497 ADVNCD CARE PLAN 30 MIN: CPT | Performed by: INTERNAL MEDICINE

## 2025-03-10 PROCEDURE — 82310 ASSAY OF CALCIUM: CPT | Performed by: INTERNAL MEDICINE

## 2025-03-10 RX ORDER — METOPROLOL TARTRATE 1 MG/ML
5 INJECTION, SOLUTION INTRAVENOUS EVERY 4 HOURS PRN
Status: DISCONTINUED | OUTPATIENT
Start: 2025-03-10 | End: 2025-03-11 | Stop reason: HOSPADM

## 2025-03-10 RX ORDER — AZITHROMYCIN 250 MG/1
250 TABLET, FILM COATED ORAL DAILY
Status: COMPLETED | OUTPATIENT
Start: 2025-03-10 | End: 2025-03-11

## 2025-03-10 RX ORDER — METOPROLOL TARTRATE 25 MG/1
25 TABLET, FILM COATED ORAL ONCE
Status: COMPLETED | OUTPATIENT
Start: 2025-03-10 | End: 2025-03-10

## 2025-03-10 RX ORDER — FUROSEMIDE 20 MG/1
10 TABLET ORAL DAILY
Status: DISCONTINUED | OUTPATIENT
Start: 2025-03-11 | End: 2025-03-11 | Stop reason: HOSPADM

## 2025-03-10 RX ORDER — CEFDINIR 300 MG/1
300 CAPSULE ORAL EVERY 12 HOURS SCHEDULED
Status: DISCONTINUED | OUTPATIENT
Start: 2025-03-10 | End: 2025-03-11 | Stop reason: HOSPADM

## 2025-03-10 RX ORDER — METOPROLOL SUCCINATE 25 MG/1
25 TABLET, EXTENDED RELEASE ORAL ONCE
Status: COMPLETED | OUTPATIENT
Start: 2025-03-10 | End: 2025-03-10

## 2025-03-10 RX ORDER — SENNOSIDES 8.6 MG
8.6 TABLET ORAL 2 TIMES DAILY PRN
Status: DISCONTINUED | OUTPATIENT
Start: 2025-03-10 | End: 2025-03-11 | Stop reason: HOSPADM

## 2025-03-10 RX ADMIN — TIMOLOL MALEATE 1 DROP: 5 SOLUTION/ DROPS OPHTHALMIC at 08:21

## 2025-03-10 RX ADMIN — CEFDINIR 300 MG: 300 CAPSULE ORAL at 10:10

## 2025-03-10 RX ADMIN — ACETAMINOPHEN 650 MG: 325 TABLET, FILM COATED ORAL at 03:05

## 2025-03-10 RX ADMIN — CEFDINIR 300 MG: 300 CAPSULE ORAL at 19:58

## 2025-03-10 RX ADMIN — AZITHROMYCIN DIHYDRATE 250 MG: 250 TABLET ORAL at 10:10

## 2025-03-10 RX ADMIN — APIXABAN 2.5 MG: 2.5 TABLET, FILM COATED ORAL at 21:00

## 2025-03-10 RX ADMIN — TAFLUPROST OPTHALMIC 1 DROP: 0 SOLUTION/ DROPS OPHTHALMIC at 21:00

## 2025-03-10 RX ADMIN — METOPROLOL SUCCINATE 50 MG: 50 TABLET, EXTENDED RELEASE ORAL at 08:19

## 2025-03-10 RX ADMIN — POLYETHYLENE GLYCOL 3350 17 G: 17 POWDER, FOR SOLUTION ORAL at 10:28

## 2025-03-10 RX ADMIN — APIXABAN 2.5 MG: 2.5 TABLET, FILM COATED ORAL at 08:20

## 2025-03-10 RX ADMIN — METOPROLOL TARTRATE 25 MG: 25 TABLET, FILM COATED ORAL at 03:17

## 2025-03-10 RX ADMIN — TIMOLOL MALEATE 1 DROP: 5 SOLUTION/ DROPS OPHTHALMIC at 17:08

## 2025-03-10 RX ADMIN — METOPROLOL SUCCINATE 25 MG: 25 TABLET, EXTENDED RELEASE ORAL at 10:10

## 2025-03-10 ASSESSMENT — ACTIVITIES OF DAILY LIVING (ADL)
ADLS_ACUITY_SCORE: 48
ADLS_ACUITY_SCORE: 47
ADLS_ACUITY_SCORE: 45
ADLS_ACUITY_SCORE: 47
ADLS_ACUITY_SCORE: 45
ADLS_ACUITY_SCORE: 47
ADLS_ACUITY_SCORE: 45
ADLS_ACUITY_SCORE: 47
ADLS_ACUITY_SCORE: 45
ADLS_ACUITY_SCORE: 48
ADLS_ACUITY_SCORE: 47
ADLS_ACUITY_SCORE: 45
ADLS_ACUITY_SCORE: 47
ADLS_ACUITY_SCORE: 47
ADLS_ACUITY_SCORE: 48
ADLS_ACUITY_SCORE: 47

## 2025-03-10 NOTE — PROGRESS NOTES
Care Management Follow Up    Length of Stay (days): 5    Expected Discharge Date: 03/11/2025     Concerns to be Addressed: discharge planning     Patient plan of care discussed at interdisciplinary rounds: Yes    Anticipated Discharge Disposition: Transitional Care              Anticipated Discharge Services:  TCU  Anticipated Discharge DME: None    Patient/family educated on Medicare website which has current facility and service quality ratings: no  Education Provided on the Discharge Plan:    Patient/Family in Agreement with the Plan: yes    Referrals Placed by CM/SW:    Private pay costs discussed: Not applicable    Discussed  Partnership in Safe Discharge Planning  document with patient/family: No     Handoff Completed: No, handoff not indicated or clinically appropriate    Additional Information:  Pt and daughter receptive to TCU recommendation. Preference expressed to be [facility], due to pt living right next door. TCU referral sent to [facility].     Next Steps: SW to follow for discharge planning.     MAXIM Irene, SHERLYN MARTINEZ Care Coordinator/ Med Surg [address] [place] [address]  [phone]

## 2025-03-10 NOTE — CONSULTS
"SPIRITUAL HEALTH SERVICES - Consult Note  [facility] Med/Surg 3    Referral Source: [facility] consult; staff requested follow-up support for pt.    Pt Kavita's daughter Janette was at the bedside.  Kavita reported, \"God is taking good care of me.\"  She denied having any concerns.  Kavita shared that she is Non-Taoism Anabaptist but is drawn to \"the philosophy\" of Coptic Gnosticism.  Her spirituality is more private than communal.  Kavita welcomed my offer to keep her in prayer but declined a prayer in the moment.    Janette reported that she is coping well with Kavita's hospitalization.    Plan: Informed pt how she can request further  support.  This author and other chaplains remain available per pt/family request.     Baldemar Bains M.Div., [facility]  Staff     SHS available 24/7 for emergent requests/referrals, either by paging the on-call  or by entering an ASAP/STAT consult in [facility], which will also page the on-call .   "

## 2025-03-10 NOTE — PROGRESS NOTES
Heme-onc chart check note.  Please see previous notes for details.  89-year-old lady admitted with right-sided pneumonia sepsis respiratory distress.  -Heme-onc was consulted due to acute on chronic anemia.  - Workup for hemolytic anemia negative. No evidence of bone marrow pathology.  - No evidence of DIC, Some Red cell fragmentation associated with TAVR.   Hemoglobin is now stable.  Plan   - No specific recommendations will follow along with primary team please call with any questions treatment directed at pneumonia sepsis, As per primary team.   James Hitchcock  [facility]   [phone]

## 2025-03-10 NOTE — PLAN OF CARE
"Patient is A&O. Pt calm during shift, episode of elevated bp, MD notified metoprolol given, pt BP stable. On  0.5L via NC. Tele sinus tachy. IV abx infused per orders.  PRN tylenol administered for c/o headache with adequate relief.   Problem: Adult Inpatient Plan of Care  Goal: Plan of Care Review  Description: The Plan of Care Review/Shift note should be completed every shift.  The Outcome Evaluation is a brief statement about your assessment that the patient is improving, declining, or no change.  This information will be displayed automatically on your shift  note.  Outcome: Progressing  Flowsheets (Taken 3/10/2025 0659)  Plan of Care Reviewed With: patient  Overall Patient Progress: no change  Goal: Patient-Specific Goal (Individualized)  Description: You can add care plan individualizations to a care plan. Examples of Individualization might be:  \"Parent requests to be called daily at 9am for status\", \"I have a hard time hearing out of my right ear\", or \"Do not touch me to wake me up as it startles  me\".  Outcome: Progressing  Goal: Absence of Hospital-Acquired Illness or Injury  Outcome: Progressing  Intervention: Identify and Manage Fall Risk  Recent Flowsheet Documentation  Taken 3/9/2025 2124 by Devorah Bingham RN  Safety Promotion/Fall Prevention:   activity supervised   assistive device/personal items within reach   clutter free environment maintained   safety round/check completed  Intervention: Prevent Skin Injury  Recent Flowsheet Documentation  Taken 3/9/2025 2124 by Devorah Bingham RN  Body Position: position changed independently  Intervention: Prevent Infection  Recent Flowsheet Documentation  Taken 3/9/2025 2124 by Devorah Bingham RN  Infection Prevention: rest/sleep promoted  Goal: Optimal Comfort and Wellbeing  Outcome: Progressing  Intervention: Provide Person-Centered Care  Recent Flowsheet Documentation  Taken 3/9/2025 2124 by Devorah Bingham RN  Trust Relationship/Rapport:   care " explained   questions answered   questions encouraged  Goal: Readiness for Transition of Care  Outcome: Progressing   Goal Outcome Evaluation:      Plan of Care Reviewed With: patient    Overall Patient Progress: no changeOverall Patient Progress: no change

## 2025-03-10 NOTE — PLAN OF CARE
15:03 RN shift summary 7-3:  No c/o pain.  Alert and oriented.  Calm and cooperative this shift. (Can be anxious at times.) Poor eyesight. Ambulates with assist of 1, gait belt and walker. Lungs diminished, clear. 93% on 1/2 L O2.  De-sats on room air.  Sometimes needs O2 increased, temporarily for activity.  PIV saline locked.  PT/OT, hem onc, spiritual health following.  Discharge to TCU possibly tomorrow.              Heart Failure Care Map  GOALS TO BE MET BEFORE DISCHARGE:    1. Decrease congestion and/or edema with diuretic therapy to achieve near optimal volume status.     Dyspnea improved: Yes, satisfactory for discharge.   Edema improved: Yes, satisfactory for discharge.        Last 24 hour I/O:   Intake/Output Summary (Last 24 hours) at 3/10/2025 1507  Last data filed at 3/10/2025 1100  Gross per 24 hour   Intake 680 ml   Output 1800 ml   Net -1120 ml           Net I/O and Weights since admission:   02/08 2300 - 03/10 2259  In: 843 [P.O.:730; I.V.:113]  Out: 5575 [Urine:5575]  Net: -4732     Vitals:    03/05/25 1612   Weight: 53.1 kg (117 lb)       2.  O2 sats > 90% on room air, or at prior home O2 therapy level.      Able to wean O2 this shift to keep sats above 90%?: No, further care required to meet this goal. Please explain Still needs 1/2-1 L O2.   Does patient use Home O2? No          Current oxygenation status:   SpO2: 92 %     O2 Device: Nasal cannula, Oxygen Delivery: 1 LPM    3.  Tolerates ambulation and mobility near baseline.     Ambulation: No, further care required to meet this goal. Please explain Assist of 1, gait belt and walker.   Times patient ambulated with staff this shift: 3    Please review the Heart Failure Care Map for additional HF goal outcomes.    Emily Buitrago RN  3/10/2025           Goal Outcome Evaluation:      Plan of Care Reviewed With: patient          Outcome Evaluation: Dose of oral metoprolol increased for blood pressures.  Code status DNR/DNI.  PT/OT, hem/onc,  "spiritual health following.    Problem: Adult Inpatient Plan of Care  Goal: Plan of Care Review  Description: The Plan of Care Review/Shift note should be completed every shift.  The Outcome Evaluation is a brief statement about your assessment that the patient is improving, declining, or no change.  This information will be displayed automatically on your shift  note.  Outcome: Progressing  Flowsheets (Taken 3/10/2025 1430)  Outcome Evaluation: Dose of oral metoprolol increased for blood pressures.  Code status DNR/DNI.  PT/OT, hem/onc, spiritual health following.  Plan of Care Reviewed With: patient  Goal: Patient-Specific Goal (Individualized)  Description: You can add care plan individualizations to a care plan. Examples of Individualization might be:  \"Parent requests to be called daily at 9am for status\", \"I have a hard time hearing out of my right ear\", or \"Do not touch me to wake me up as it startles  me\".  Outcome: Progressing  Goal: Absence of Hospital-Acquired Illness or Injury  Outcome: Progressing  Intervention: Identify and Manage Fall Risk  Recent Flowsheet Documentation  Taken 3/10/2025 1000 by Emily Buitrago, RN  Safety Promotion/Fall Prevention:   nonskid shoes/slippers when out of bed   clutter free environment maintained  Intervention: Prevent Skin Injury  Recent Flowsheet Documentation  Taken 3/10/2025 1000 by Emily Buitrago, RN  Body Position: supine, head elevated  Goal: Optimal Comfort and Wellbeing  Outcome: Progressing  Goal: Readiness for Transition of Care  Outcome: Progressing     Problem: Infection  Goal: Absence of Infection Signs and Symptoms  Outcome: Progressing     Problem: Delirium  Goal: Optimal Coping  Outcome: Progressing  Goal: Improved Behavioral Control  Outcome: Progressing  Intervention: Minimize Safety Risk  Recent Flowsheet Documentation  Taken 3/10/2025 1000 by Emily Buitrago, RN  Enhanced Safety Measures: pain management  Goal: Improved Attention and Thought " Clarity  Outcome: Progressing  Goal: Improved Sleep  Outcome: Progressing

## 2025-03-10 NOTE — PROGRESS NOTES
03/10/25 1112   Appointment Info   Signing Clinician's Name / Credentials (PT) Herilnda Lang DPT   Rehab Comments (PT) decreased vision (sees shadows and some outlines)   Living Environment   People in Home alone   Current Living Arrangements independent living facility   Home Accessibility no concerns   Transportation Anticipated family or friend will provide   Living Environment Comments Does not use AD in apartment, 4WW in community; IND with basic ADLs, prepares her own food, has hired cleaning assist and dtr provides transportation and does laundry   Self-Care   Usual Activity Tolerance good   Current Activity Tolerance fair   Equipment Currently Used at Home walker, rolling   Fall history within last six months no   General Information   Onset of Illness/Injury or Date of Surgery 03/05/25   Referring Physician Parveen Parker MD   Patient/Family Therapy Goals Statement (PT) to get stronger   Pertinent History of Current Problem (include personal factors and/or comorbidities that impact the POC) 89 year old female who was admitted on 3/5/2025  89-year-old female with a history of infrarenal AAA, aortic stenosis s/p TAVR (2/2024), pAF, and hypertension, admitted with acute hypoxic respiratory failure and sepsis secondary to RUL pneumonia   Existing Precautions/Restrictions fall;oxygen therapy device and L/min  (currenlty 1L, RA at baseline)   General Observations supine, NAD   Cognition   Affect/Mental Status (Cognition) WFL   Orientation Status (Cognition) oriented x 4   Follows Commands (Cognition) WFL   Pain Assessment   Patient Currently in Pain No   Integumentary/Edema   Integumentary/Edema no deficits were identifed   Posture    Posture Forward head position;Protracted shoulders;Kyphosis   Range of Motion (ROM)   Range of Motion ROM is WFL   Strength (Manual Muscle Testing)   Strength (Manual Muscle Testing) Deficits observed during functional mobility   Strength Comments poor activity tolerance    Bed Mobility   Comment, (Bed Mobility) SBA supine <> sit   Transfers   Comment, (Transfers) CGA sit <> stand with 2WW, initially pulls to walker   Gait/Stairs (Locomotion)   Weld Level (Gait) contact guard   Assistive Device (Gait) walker, front-wheeled   Distance in Feet (Gait) 6' for eval; add'l for treat   Pattern (Gait) step-through   Balance   Balance Comments Fair, no overt LOB noted and no recent falls   Clinical Impression   Criteria for Skilled Therapeutic Intervention Yes, treatment indicated   PT Diagnosis (PT) decreased functional mobility   Influenced by the following impairments weakness/poor activity tolerance   Functional limitations due to impairments bed mobility, transfers, ambulation   Clinical Presentation (PT Evaluation Complexity) stable   Clinical Presentation Rationale clinical judgement   Clinical Decision Making (Complexity) low complexity   Planned Therapy Interventions (PT) balance training;bed mobility training;gait training;home exercise program;neuromuscular re-education;patient/family education;strengthening;stretching;transfer training;progressive activity/exercise;risk factor education;home program guidelines   Risk & Benefits of therapy have been explained evaluation/treatment results reviewed;care plan/treatment goals reviewed;risks/benefits reviewed;current/potential barriers reviewed;participants voiced agreement with care plan;participants included;patient;daughter   PT Total Evaluation Time   PT Eval, Low Complexity Minutes (60357) 8   Physical Therapy Goals   PT Frequency 5x/week   PT Predicted Duration/Target Date for Goal Attainment 03/17/25   PT Goals Bed Mobility;Transfers;Gait   PT: Bed Mobility Independent;Supine to/from sit   PT: Transfers Modified independent;Sit to/from stand;Bed to/from chair;Assistive device   PT: Gait Modified independent;Rolling walker;100 feet   PT Discharge Planning   PT Plan progress mobility as able, monitor O2 needs, pt with  decreased vision   PT Discharge Recommendation (DC Rec) Transitional Care Facility   PT Rationale for DC Rec Pt is below baseline for mobility and demonstrating poor activity tolerance/strength.  Rec TCU for progression of strength, gait, and balance prior to returning to ILF.   PT Brief overview of current status CGA with 2WW, in room ambulation   PT Total Distance Amb During Session (feet) 12   Physical Therapy Time and Intention   Timed Code Treatment Minutes 19   Total Session Time (sum of timed and untimed services) 27

## 2025-03-10 NOTE — PROGRESS NOTES
Cross Cover    Called for hypertension 200/80 with complaints of a headache  's  No pain or anxiety       Here with RUL pna, known hx of AF on AC     Give metop 5 mg IV x 1 and metop 25 mg po x 1

## 2025-03-10 NOTE — PROGRESS NOTES
Care Management Discharge Note    Discharge Date: 03/11/2025       Discharge Disposition: Transitional Care    Discharge Services:  TCU    Discharge DME: Oxygen    Discharge Transportation: agency    Private pay costs discussed: transportation costs    Does the patient's insurance plan have a 3 day qualifying hospital stay waiver?  No    PAS Confirmation Code: [id number]  Patient/family educated on Medicare website which has current facility and service quality ratings: no    Education Provided on the Discharge Plan: Yes  Persons Notified of Discharge Plans: pt and pt's daughter  Patient/Family in Agreement with the Plan: yes    Handoff Referral Completed: No, handoff not indicated or clinically appropriate    Additional Information:  Pt appeared alert and orientated with daughter present at bedside. Pt scheduled for discharge 3/11/2025 to [facility]. Transportation discussed with pt and daughter. Daughter preferred for agency transport due to pt's weakness.     Reviewed out of pocket cost for [facility] transport, approx. $90 for base rate and over $6 per mile to the destination. Pt/family expressed understanding and are agreeable to this.      Transportation scheduled via  [employer] [facility] for 3/11/2025 between [id number]. Update provided to pt and daughter.     PAS completed.     Orders to be sent when ready.     MAXIM Irene, SHERLYN  SW Care Coordinator/ Med Surg [address] [place] [address]  [phone]

## 2025-03-10 NOTE — PROGRESS NOTES
[facility]  Hospitalist Progress Note  Admit 3/5/2025  4:04 PM    Name: Kavita Merlos    MRN: [id number]  Provider:  Sudhir Walters MD     Date of Service: 03/10/2025     Reason for Stay (Diagnosis): Right upper lobe pneumonia and sepsis         Summary of hospital stay & Assessment/Plan:   Summary of Stay: Kavita Merlos is a 89 year old female who was admitted on 3/5/2025  89-year-old female with a history of infrarenal AAA, aortic stenosis s/p TAVR (2/2024), pAF, and hypertension, admitted with acute hypoxic respiratory failure and sepsis secondary to RUL pneumonia. Worsening respiratory distress on 3/7, likely due to flash pulmonary edema following overnight blood transfusion. Responded well to BiPAP and IV diuresis with improvement in symptoms. Antibiotics continued. TAVR-associated anemia under workup, with hemolysis markers pending. Hematology consulted. Echocardiogram planned to evaluate valve function.    Right upper lobe pneumonia, sepsis, acute hypoxic respiratory failure due to pneumonia and pulmonary edema  Continue IV ceftriaxone + azithromycin, blood culture negative to date. Transition to finish of oral antibiotics with omnicef/azithromycin  Continue on oxygen as needed, wean as able as she is on not on oxygen at home  COVID/Flu/RSV negative  Monitor response to antibiotics    Acute hypoxic respiratory failure-likely acute pulmonary edema from acute diastolic heart failure with preserved ejection fraction after blood transfusion  Likely multifactorial: pneumonia + flash pulmonary edema post-transfusion  Improved with BiPAP and IV Lasix  Continue monitoring, started on Lasix 20 mg orally I would recommend on discharge with close monitoring of kidney function as outpatient  Creatinine continue to be normal, patient requesting to try a lower dose of lasix and actually getting rid of it all together.  If she goes to a Tcu this could be followed closely to see if she can tolerate  being off of it.    Acute on chronic anemia  Normocytic, normochromic without evidence of significant hemolysis (low haptoglobin, elevated LDH, schistocytes) per hematology:The findings most likely due to sepsis, Some Red cell fragmentation associated with TAVR.   Suspect contribution from TAVR-related hemolysis vs. sepsis-related  Maintain hemoglobin >7 with transfusion as needed, hemoglobin up to 10.2, no signs of bleeding  Echocardiogram 55 to 60% ejection fraction no endocarditis suspected    pAF s/p TAVR on chronic anticoagulation  Resume DOAC  Metoprolol continued; dose increased because of ongoing hypertension and tachycardia    Hypertension, uncontrolled  BP elevated with worsening SOB, improving post-diuresis  Blood pressure still not well-controlled, increasing metoprolol to 75 mg XL daily, patient is allergic to 16 different medications limiting the options especially now she does not want also hydralazine  Watch for orthostatic symptoms which she currently denies  Consider titrating up toprol again vs adding imdur  Control is difficult as patient does have a AAA but is elderly and frail too.  Would ideally like her blood pressure <150 at this point.     Depression suspected based on discussion with the patient  Start low-dose Lexapro and follow-up as outpatient    Macular degeneration, legally blind  Continue home ophthalmic medications    Deconditioning  Physical therapy recommends Tcu, social work working on this    Advance care planning  Had a 25 minute conversation with the patient with daughter bedside.  Patient currently was not DNR but did not want intubation.  We had a long conversation discussing her goals of care, current quality life where she lives in independent living on her own but is also blind.  We discussed what CPR looks like in the elderly and frail as well as the fact that she does not want to be intubated.  At this point the patient has agreed to be DNR/DNI with the daughter in  agreement.  Patient also has a son whom would be in agreement per the daughter as well.  Would not be appropriate due to CPR without be able to protect her lungs.  Patient understands this was able to repeated and would not want CPR going forward.    Clinically Significant Risk Factors      # Hyponatremia: Lowest Na = 134 mmol/L in last 2 days, will monitor as appropriate       # Hypoalbuminemia: Lowest albumin = 2.7 g/dL at 3/6/2025  7:19 AM, will monitor as appropriate       # Hypertension: Noted on problem list                # Financial/Environmental Concerns: none         Discussed with nursing, social work/case management, daughter bedside updated at length    DVT Prophylaxis: DOAC  Code Status:  No CPR- Do NOT Intubate    Disposition Plan   Incidental findings/discharge issues Incidental findings  AAA: Stable at 4.5 cm  Renal lesion: Suspected cyst, follow-up CT urogram in ~3 months per radiology, given age and frailty would not recommend follow up as the risk out weighs the benefits.  This was dicussed with the patient and daughter  Pancreatic cystic lesion: 9 mm, follow-up pancreatic CT/MRI in ~18 months per radiology.  Again, given her age and frailty would not be a candidate for chemo/surgery so would not recommend any work up for follow up. This was discussed with the patient and daughter who were in agreement  Lung nodules: Small stable nodules, repeat chest CT in 1 year per rad.  Would not recommend follow up going forward.     Medically Ready for Discharge: Anticipated Tomorrow   transitional care unit per physical therapy recommendations. Patient was living in independent living at an assisted living.  Will need placement.  May be able to wean oxygen at the Tcu if unable to do it here.           Entered: Sudhir Walters MD 03/10/2025, 12:10 PM                 Interval History:     Patient new to me. Doing better.  Had a long discussion about her anxiety and not focusing on any given blood pressure  number.  Denies any current headaches, chest pain, shortness of breath.  Is getting weaned off of her oxygen.  Had this conversation about advance code planning as well as discussing her current code plan with the daughter bedside.  Patient denies any current fever, chills, sweats.  Also discussed her cyst on her pancreas and kidneys and discussed the fact that she would not be a great candidate for ongoing chemotherapy or surgery so would not recommend following this up.                   Physical Exam:   Physical Exam   Temp: 97.7  F (36.5  C) Temp src: Oral BP: (!) 179/80 Pulse: 105   Resp: 18 SpO2: 92 % O2 Device: Nasal cannula Oxygen Delivery: 1/2 LPM  Vitals:    03/05/25 1612   Weight: 53.1 kg (117 lb)     I/O last 3 completed shifts:  In: 483 [P.O.:480; I.V.:3]  Out: 1600 [Urine:1600]    Exam improving  GENERAL:  Comfortable.  Not currently anxious, appears elderly and frail, is blind, speaks in full sentences  HEENT: MMM  HEART:RRR,  with no edema.  LUNGS:  Clear to auscultation some rales basilar, normal Respiratory effort. Wearing 1 lpm oxygen  ABDOMEN:  Soft, not  distended, not tender, normal bowel sounds.  Neurology: no focal deficits      Medications   Current Facility-Administered Medications   Medication Dose Route Frequency Provider Last Rate Last Admin    Patient is already receiving anticoagulation with heparin, enoxaparin (LOVENOX), warfarin (COUMADIN)  or other anticoagulant medication   Does not apply Continuous PRN Radha Miramontes DO         Current Facility-Administered Medications   Medication Dose Route Frequency Provider Last Rate Last Admin    apixaban ANTICOAGULANT (ELIQUIS) tablet 2.5 mg  2.5 mg Oral BID Zhou Castaneda DO   2.5 mg at 03/10/25 0820    azithromycin (ZITHROMAX) tablet 250 mg  250 mg Oral Daily Sudhir Walters MD   250 mg at 03/10/25 1010    cefdinir (OMNICEF) capsule 300 mg  300 mg Oral Q12H [facility] (08/20) Sudhir Walters MD   300 mg at 03/10/25 1010    docusate  sodium (COLACE) capsule 100 mg  100 mg Oral BID Zhou Castaneda DO   100 mg at 03/08/25 2017    [START ON 3/11/2025] furosemide (LASIX) half-tab 10 mg  10 mg Oral Daily Sudhir Walters MD        [START ON 3/11/2025] metoprolol succinate ER (TOPROL XL) 24 hr tablet 75 mg  75 mg Oral Daily Sudhir Walters MD        sodium chloride (PF) 0.9% PF flush 3 mL  3 mL Intracatheter Q8H Zhou Castaneda DO   3 mL at 03/10/25 1013    tafluprost (ZIOPTAN) ophthalmic solution 1 drop [PATIENT SUPPLIED MEDICATION]  1 drop Both Eyes At Bedtime Radha Miramontes DO   1 drop at 03/09/25 2136    timolol maleate (TIMOPTIC) 0.5 % ophthalmic solution 1 drop [PATIENT SUPPLIED MEDICATION]  1 drop Both Eyes BID Radha Miramontes DO   1 drop at 03/10/25 0821     Data     -Data reviewed today:  I personally reviewed  all new labs and imaging results over the last 24 hours.    Recent Labs   Lab 03/10/25  0719 03/09/25  0749 03/08/25  1211   WBC 7.5 7.7 8.8   HGB 10.2* 9.2* 9.6*   HCT 30.8* 28.2* 29.4*   MCV 89 90 91    294 304     Recent Labs   Lab 03/10/25  0719 03/09/25  0749 03/08/25  1211   * 134* 134*   POTASSIUM 3.8 3.5 3.6   CHLORIDE 98 100 100   CO2 27 27 25   ANIONGAP 9 7 9   * 105* 150*   BUN 10.0 10.0 13.6   CR 0.43* 0.44* 0.49*   GFRESTIMATED >90 >90 90   JERRY 8.9 8.7* 8.6*       No results found for this or any previous visit (from the past 24 hours).      This document was produced using voice recognition software

## 2025-03-11 ENCOUNTER — LAB REQUISITION (OUTPATIENT)
Dept: LAB | Facility: CLINIC | Age: 89
End: 2025-03-11
Payer: MEDICARE

## 2025-03-11 ENCOUNTER — DOCUMENTATION ONLY (OUTPATIENT)
Dept: GERIATRICS | Facility: CLINIC | Age: 89
End: 2025-03-11
Payer: MEDICARE

## 2025-03-11 VITALS
HEART RATE: 88 BPM | DIASTOLIC BLOOD PRESSURE: 61 MMHG | RESPIRATION RATE: 18 BRPM | TEMPERATURE: 98.2 F | WEIGHT: 117 LBS | SYSTOLIC BLOOD PRESSURE: 151 MMHG | HEIGHT: 64 IN | OXYGEN SATURATION: 93 % | BODY MASS INDEX: 19.97 KG/M2

## 2025-03-11 DIAGNOSIS — Z11.1 ENCOUNTER FOR SCREENING FOR RESPIRATORY TUBERCULOSIS: ICD-10-CM

## 2025-03-11 PROCEDURE — 250N000013 HC RX MED GY IP 250 OP 250 PS 637: Performed by: INTERNAL MEDICINE

## 2025-03-11 PROCEDURE — 99239 HOSP IP/OBS DSCHRG MGMT >30: CPT | Performed by: INTERNAL MEDICINE

## 2025-03-11 PROCEDURE — 250N000013 HC RX MED GY IP 250 OP 250 PS 637: Performed by: STUDENT IN AN ORGANIZED HEALTH CARE EDUCATION/TRAINING PROGRAM

## 2025-03-11 RX ORDER — CEFDINIR 300 MG/1
300 CAPSULE ORAL EVERY 12 HOURS
DISCHARGE
Start: 2025-03-11 | End: 2025-03-14

## 2025-03-11 RX ORDER — TIMOLOL MALEATE 5 MG/ML
1 SOLUTION/ DROPS OPHTHALMIC 2 TIMES DAILY
DISCHARGE
Start: 2025-03-11 | End: 2025-03-11

## 2025-03-11 RX ORDER — DOCUSATE SODIUM 100 MG/1
100 CAPSULE, LIQUID FILLED ORAL 2 TIMES DAILY
DISCHARGE
Start: 2025-03-11 | End: 2025-03-12

## 2025-03-11 RX ORDER — SENNOSIDES 8.6 MG
1 TABLET ORAL 2 TIMES DAILY PRN
Status: ON HOLD | DISCHARGE
Start: 2025-03-11 | End: 2025-03-24

## 2025-03-11 RX ORDER — METOPROLOL SUCCINATE 100 MG/1
100 TABLET, EXTENDED RELEASE ORAL DAILY
DISCHARGE
Start: 2025-03-11

## 2025-03-11 RX ORDER — METOPROLOL SUCCINATE 100 MG/1
100 TABLET, EXTENDED RELEASE ORAL DAILY
Status: DISCONTINUED | OUTPATIENT
Start: 2025-03-11 | End: 2025-03-11 | Stop reason: HOSPADM

## 2025-03-11 RX ORDER — FUROSEMIDE 20 MG/1
10 TABLET ORAL DAILY
DISCHARGE
Start: 2025-03-11 | End: 2025-03-12

## 2025-03-11 RX ORDER — POLYETHYLENE GLYCOL 3350 17 G/17G
17 POWDER, FOR SOLUTION ORAL DAILY
Status: ON HOLD | DISCHARGE
Start: 2025-03-11 | End: 2025-03-24

## 2025-03-11 RX ADMIN — APIXABAN 2.5 MG: 2.5 TABLET, FILM COATED ORAL at 08:47

## 2025-03-11 RX ADMIN — TIMOLOL MALEATE 1 DROP: 5 SOLUTION/ DROPS OPHTHALMIC at 08:51

## 2025-03-11 RX ADMIN — TIMOLOL MALEATE 1 DROP: 5 SOLUTION/ DROPS OPHTHALMIC at 15:05

## 2025-03-11 RX ADMIN — POLYETHYLENE GLYCOL 3350 17 G: 17 POWDER, FOR SOLUTION ORAL at 08:43

## 2025-03-11 RX ADMIN — METOPROLOL SUCCINATE 100 MG: 100 TABLET, EXTENDED RELEASE ORAL at 08:45

## 2025-03-11 RX ADMIN — CEFDINIR 300 MG: 300 CAPSULE ORAL at 08:44

## 2025-03-11 RX ADMIN — AZITHROMYCIN DIHYDRATE 250 MG: 250 TABLET ORAL at 08:44

## 2025-03-11 ASSESSMENT — ACTIVITIES OF DAILY LIVING (ADL)
ADLS_ACUITY_SCORE: 45

## 2025-03-11 NOTE — DISCHARGE SUMMARY
[facility]  Hospitalist Discharge Summary      Date of Admission:  3/5/2025  Date of Discharge:  3/11/2025  Discharging Provider: Sudhir Walters MD  Discharge Service: Hospitalist Service  Primary Care Physician   Ty Cordero    Discharge Diagnoses   Right upper lobe community-acquired pneumonia  Sepsis   Acute hypoxic respiratory failure due to pneumonia as well as pulmonary edema  Flash pulm edema due to transfusion  Acute diastolic CHF  Acute on chronic anemia   Paroxysmal atrial fibrillation  History of TAVR   Chronic anticoagulation  Uncontrolled hypertension  Anxiety and depression   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 Worsening respiratory distress on 3/7, likely due to flash pulmonary edema following overnight blood transfusion. Responded well to BiPAP and IV diuresis with improvement in symptoms. Antibiotics continued. TAVR-associated anemia under workup, with hemolysis markers pending. Hematology consulted. Echocardiogram planned to evaluate valve function.     Right upper lobe pneumonia, sepsis, acute hypoxic respiratory failure due to pneumonia and pulmonary edema  Patient treated with IV ceftriaxone + azithromycin, blood culture negative to date. Transitiond to finish of oral antibiotics with omnicef/azithromycin  Continue on oxygen as needed, wean as able as she is on not on oxygen at home, still on 1 lpm at discharge and will wean off at the TCu  COVID/Flu/RSV negative  Monitor response to antibiotics     Acute hypoxic respiratory failure-likely acute pulmonary edema from acute diastolic heart failure with preserved  ejection fraction after blood transfusion  Likely multifactorial: pneumonia + flash pulmonary edema post-transfusion  Improved with BiPAP and IV Lasix  Continue monitoring, started on Lasix 20 mg orally and cut to 10 mg a day, I would recommend on discharge with close monitoring of kidney function as outpatient, patient thinks her diuretic is making her urinate too much and is anxious over that fact.  She was told she is on it until her oxygen is able to wean off.  She refused her lasix dose prior to discharge  Creatinine continue to be normal, patient requesting to try a lower dose of lasix and actually getting rid of it all together.  If she goes to a Tcu this could be followed closely to see if she can tolerate being off of it.     Acute on chronic anemia  Normocytic, normochromic without evidence of significant hemolysis (low haptoglobin, elevated LDH, schistocytes) per hematology:The findings most likely due to sepsis, Some Red cell fragmentation associated with TAVR.   Suspect contribution from TAVR-related hemolysis vs. sepsis-related  Maintain hemoglobin >7 with transfusion as needed, hemoglobin up to 10.2, no signs of bleeding  Echocardiogram 55-60% ejection fraction, no endocarditis suspected     pAF s/p TAVR on chronic anticoagulation  Resume DOAC  Metoprolol continued at a higher dose because of ongoing hypertension and tachycardia     Hypertension, uncontrolled  BP elevated with worsening SOB, improving post-diuresis  Blood pressure still not well-controlled, increasing metoprolol to 100 mg XL daily, patient is allergic to 16 different medications limiting the options especially now she does not want also hydralazine  Watch for orthostatic symptoms which she currently denies  Consider adding imdur  Control is difficult as patient does have a AAA but is also elderly and frail too.  Would ideally like her blood pressure <150 at this point but may need to accept a higher blood pressure     Depression,  anxiety suspected based on discussion with the patient  Start low-dose Lexapro and follow-up as outpatient     Macular degeneration, legally blind  Continue home ophthalmic medications     Deconditioning  Physical therapy recommends Tcu, social work working on this     Advance care planning  Had a 25 minute conversation with the patient with daughter bedside 3/10/25.  Patient currently was not DNR but did not want intubation.  We had a long conversation discussing her goals of care, current quality life where she lives in independent living on her own but is also blind.  We discussed what CPR looks like in the elderly and frail as well as the fact that she does not want to be intubated.  At this point the patient has agreed to be DNR/DNI with the daughter in agreement.  Patient also has a son whom would be in agreement per the daughter as well.  Would not be appropriate due to CPR without be able to protect her lungs.  Patient understands this was able to repeated and would not want CPR going forward.        Clinically Significant Risk Factors     # Hyponatremia: Lowest Na = 134 mmol/L in last 2 days, will monitor as appropriate       # Hypoalbuminemia: Lowest albumin = 2.7 g/dL at 3/6/2025  7:19 AM, will monitor as appropriate       # Hypertension: Noted on problem list            Clinically Significant Risk Factors          Significant Results and Procedures   Most Recent 3 CBC's:  Recent Labs   Lab Test 03/10/25  0719 03/09/25  0749 03/08/25  1211   WBC 7.5 7.7 8.8   HGB 10.2* 9.2* 9.6*   MCV 89 90 91    294 304     Most Recent 3 BMP's:  Recent Labs   Lab Test 03/10/25  0719 03/09/25  0749 03/08/25  1211   * 134* 134*   POTASSIUM 3.8 3.5 3.6   CHLORIDE 98 100 100   CO2 27 27 25   BUN 10.0 10.0 13.6   CR 0.43* 0.44* 0.49*   ANIONGAP 9 7 9   JERRY 8.9 8.7* 8.6*   * 105* 150*   ,   Results for orders placed or performed during the hospital encounter of 03/05/25   XR Chest Port 1 View    Narrative     EXAM: XR CHEST PORT 1 VIEW  LOCATION: [facility]  DATE: 3/5/2025    INDICATION: hypoxia  COMPARISON: Chest radiograph 3/3/2025, CT 2/15/2025.      Impression    IMPRESSION: Stable enlargement of the cardiac silhouette status post aortic valve replacement. Asymmetric interstitial and alveolar opacities throughout the right lung, increased in conspicuity in comparison to recent chest radiograph, favor   infectious/inflammatory process, less likely asymmetric edema. Possible trace right pleural effusion. Left lung is grossly clear. No pneumothorax. Bones are unchanged.   CT Chest w Contrast    Narrative    EXAM: CT CHEST W CONTRAST  LOCATION: [facility]  DATE: 3/5/2025    INDICATION: acute hypoxic respiratory failure  COMPARISON: Portable chest 3/5/2025. CT chest 4/17/2020.. CT 2/15/2025 chest abdomen pelvis.  TECHNIQUE: CT chest with IV contrast. Multiplanar reformats were obtained. Dose reduction techniques were used.    CONTRAST: 59 mL Isovue 370    FINDINGS:   LUNGS AND PLEURA: Moderate emphysema. Superimposed consolidative infiltrate in the right upper lobe posteriorly and to lesser degree the right lower lobe. Most likely pneumonia. Tiny right pleural effusion.    MEDIASTINUM/AXILLAE: Mildly prominent mediastinal nodes, favor reactive.    CORONARY ARTERY CALCIFICATION: Moderate.    UPPER ABDOMEN: Aortic aneurysm incompletely included.    MUSCULOSKELETAL: Normal.      Impression    IMPRESSION:   1.  Moderate emphysema.    2.  Superimposed consolidative infiltrate in the right lung most likely pneumonia.   XR Chest Port 1 View    Narrative    EXAM: XR CHEST PORT 1 VIEW  LOCATION: [facility]  DATE: 3/7/2025    INDICATION: Shortness of breath.  COMPARISON: 3/5/2025.      Impression    IMPRESSION: Airspace opacity in the right upper lobe has increased slightly. Interstitial prominence throughout both lungs, greater on the right, is unchanged, and  may be related to fibrosis or infection. No pneumothorax. TAVR. Heart size is stable.   Pulmonary vascularity is within normal limits. Aortic calcification.   Echocardiogram Limited     Value    LVEF  55-60%    [facility]    [id number]  [id number]  [id number]^LEXX^AARON^JUSTIN     [facility]  Echocardiography Laboratory  [address] [place] [address]     Name: DYLON PORTER  MRN: [id number]  : 1935  Study Date: 2025 12:44 PM  Age: 89 yrs  Gender: Female  Patient Location: [facility]  Reason For Study: SOB, Aortic Valve Repair  Ordering Physician: AARON HALLMAN  Referring Physician: Ty Cordero  Performed By: Mercy Morrissey     BSA: 1.6 m2  Height: 64 in  Weight: 117 lb  HR: 100  BP: 185/100 mmHg  ______________________________________________________________________________  Procedure  Limited Echocardiogram with two-dimensional, color and spectral Doppler.  ______________________________________________________________________________  Interpretation Summary     1. The left ventricle is normal in size. Moderately increased left ventricular  wall thickness is noted. Left ventricular systolic function is normal. The  visual ejection fraction is 55-60%. No regional wall motion abnormalities  noted.  2. The right ventricle is normal size. The right ventricular systolic function  is normal.  3. There is moderate biatrial enlargement  4. There is a bioprosthetic aortic valve. (23 mm Ross Adelso Valve  implanted on 2024). The mean AoV pressure gradient is 14.0 mmHg. The  peak AoV pressure gradient is 25.0 mmHg. There is mild paravalvular  regurgitation.  5. No pericardial effusion.  6. In comparison to the previous report dated 01/10/2025, the findings are  similar.  ______________________________________________________________________________  Left Ventricle  The left ventricle is normal in size. Moderately increased left ventricular  wall thickness is  noted. Left ventricular systolic function is normal. The  visual ejection fraction is 55-60%. No regional wall motion abnormalities  noted.     Right Ventricle  The right ventricle is normal size. The right ventricular systolic function is  normal.     Atria  There is moderate biatrial enlargement.     Mitral Valve  There is trace mitral regurgitation.     Tricuspid Valve  There is trace tricuspid regurgitation. Right ventricular systolic pressure  could not be approximated due to inadequate tricuspid regurgitation.     Aortic Valve  There is a bioprosthetic aortic valve. (23 mm Ross Adelso Valve implanted  on 2024). The mean AoV pressure gradient is 14.0 mmHg. The peak AoV  pressure gradient is 25.0 mmHg. There is mild paravalvular regurgitation.     Pulmonic Valve  The pulmonic valve is not well visualized.     Pericardium  There is no pericardial effusion.     Rhythm  Sinus rhythm was noted.     ______________________________________________________________________________  MMode/2D Measurements & Calculations  LVOT diam: 2.0 cm  LVOT area: 3.1 cm2     Doppler Measurements & Calculations  Ao V2 max: 252.3 cm/sec  Ao max P.0 mmHg  Ao V2 mean: 168.3 cm/sec  Ao mean P.0 mmHg  Ao V2 VTI: 39.8 cm     ______________________________________________________________________________  Report approved by: Nieves Chavez MD on 2025 01:38 PM                  Follow up/instructions: patient to see her provider at the Tcu in a week with a cbc and bmp to follow up her anemia and creatinine/electrolytes    Pending test results at discharge:      Unresulted Labs Ordered in the Past 30 Days of this Admission       No orders found from 2/3/2025 to 3/6/2025.            Discharge Orders      Primary Care - Care Coordination Referral      Primary Care - Care Coordination Referral      General info for SNF    Length of Stay Estimate: Short Term Care: Estimated # of Days <30  Condition at Discharge: Improving  Level  of care:skilled   Rehabilitation Potential: Good  Admission H&P remains valid and up-to-date: Yes  Recent Chemotherapy: N/A  Use Nursing Home Standing Orders: Yes     Mantoux instructions    Give two-step Mantoux (PPD) Per Facility Policy Yes     Follow Up and recommended labs and tests    Follow up with longterm physician.  The following labs/tests are recommended: cbc, follow up hypoxia.     Reason for your hospital stay    Pneumonia, hypoxia, flash pulmonary edema with transfusion     Activity - Up with nursing assistance     No CPR- Do NOT Intubate     Physical Therapy Adult Consult    Evaluate and treat as clinically indicated.    Reason:  deconditioning     Occupational Therapy Adult Consult    Evaluate and treat as clinically indicated.    Reason:  cognitive eval     Oxygen (SNF/TCU) Discharge     Fall precautions     Diet    Follow this diet upon discharge: Current Diet:Orders Placed This Encounter      2 Gram Sodium Diet       Discharge Disposition   Discharged to short-term care facility  Condition at discharge: Stable      Consultations This Hospital Stay   HEMATOLOGY & ONCOLOGY IP CONSULT  PULMONARY IP CONSULT  HEMATOLOGY & ONCOLOGY IP CONSULT  SPIRITUAL HEALTH SERVICES IP CONSULT  CARE MANAGEMENT / SOCIAL WORK IP CONSULT  PHYSICAL THERAPY ADULT IP CONSULT  PHYSICAL THERAPY ADULT IP CONSULT  OCCUPATIONAL THERAPY ADULT IP CONSULT    Code Status   No CPR- Do NOT Intubate    Time Spent on this Encounter   I, Sudhir Walters MD, personally saw the patient today and spent greater than 30 minutes discharging this patient. Discussed with nursing, social work/case management        This document was created using voice recognition technology.  Please excuse any typographical errors that may have occurred.  Please call with any questions.       Sudhir Walters MD  [place] [id number] [facility]  [address] [facility] [address]  Phone: [phone]  Fax:  [phone]  ______________________________________________________________________    Physical Exam   Vital Signs: Temp: 98.2  F (36.8  C) Temp src: Oral BP: (!) 165/73 Pulse: 105   Resp: 20 SpO2: 95 % O2 Device: Nasal cannula Oxygen Delivery: 1 LPM  Weight: 117 lbs 0 oz      Exam improving  GENERAL:  Comfortable.  Not currently anxious, appears elderly and frail, is blind, speaks in full sentences, sitting up in bed  HEENT: MMM  HEART:RRR,  with no edema.  LUNGS:  Clear to auscultation some rales basilar, normal Respiratory effort. Wearing 1 lpm oxygen still, dropped sats with exertion  ABDOMEN:  Soft, not  distended, not tender, normal bowel sounds.  Neurology: no focal deficits         Discharge Medications   Current Discharge Medication List        START taking these medications    Details   cefdinir (OMNICEF) 300 MG capsule Take 1 capsule (300 mg) by mouth every 12 hours for 2 days.    Associated Diagnoses: Pneumonia of right upper lobe due to infectious organism      docusate sodium (COLACE) 100 MG capsule Take 1 capsule (100 mg) by mouth 2 times daily.    Associated Diagnoses: Constipation, unspecified constipation type      furosemide (LASIX) 20 MG tablet Take 0.5 tablets (10 mg) by mouth daily.    Associated Diagnoses: Acute respiratory failure with hypoxia (H); Flash pulmonary edema (H)      polyethylene glycol (MIRALAX) 17 GM/Dose powder Take 17 g by mouth daily.    Associated Diagnoses: Constipation, unspecified constipation type      sennosides (SENOKOT) 8.6 MG tablet Take 1 tablet by mouth 2 times daily as needed for constipation.    Associated Diagnoses: Constipation, unspecified constipation type           CONTINUE these medications which have CHANGED    Details   metoprolol succinate ER (TOPROL XL) 100 MG 24 hr tablet Take 1 tablet (100 mg) by mouth daily.    Associated Diagnoses: Hypertensive emergency           CONTINUE these medications which have NOT CHANGED    Details   apixaban ANTICOAGULANT  (ELIQUIS) 2.5 MG tablet Take 1 tablet (2.5 mg) by mouth 2 times daily.  Qty: 180 tablet, Refills: 3    Associated Diagnoses: Atrial fibrillation, unspecified type (H)      tafluprost (ZIOPTAN) 0.0015 % SOLN ophthalmic solution Place 1 drop into both eyes at bedtime      timolol maleate (TIMOPTIC) 0.5 % ophthalmic solution Place 1 drop into both eyes 2 times daily.           STOP taking these medications       azithromycin (ZITHROMAX) 250 MG tablet Comments:   Reason for Stopping:         cefpodoxime (VANTIN) 200 MG tablet Comments:   Reason for Stopping:         Timolol Maleate (TIMOPTIC OCUDOSE) 0.5 % ophthalmic solution Comments:   Reason for Stopping:             Allergies   Allergies   Allergen Reactions    Albuterol     Amlodipine     Bimatoprost Itching    Brimonidine Itching    Clotrimazole Itching    Dorzolamide Hcl-Timolol Mal Itching    Latanoprost Itching    Lisinopril     Losartan      depression    Neomycin-Polymyxin-Gramicidin Swelling    Neosporin [Neomycin-Polymyxin-Gramicidin]     Tape [Adhesive Tape]     Timolol Itching    Travoprost Itching    Vicodin [Hydrocodone-Acetaminophen]     Penicillins Unknown     Extensive use and toleration of cephalosporins

## 2025-03-11 NOTE — PROVIDER NOTIFICATION
"9:08 Text page sent to MD to update, \"FYI: Pt is refusing to take the oral lasix.  She is concerned that it will cause all the palpitations, anxiety and too-frequent urination.  The heart \"thumping\" and anxiety is what she is most nervous about.  She hopes to never take lasix again.\"    MD acknowledged text.  No new orders at this time.  "

## 2025-03-11 NOTE — PLAN OF CARE
"/69  Pulse 101   Temp 97.9  F  Resp 20  SpO2 95%     Patient is alert and oriented x4, no complaints of pain, assist of 1 with gait belt and walker, continent of bowel and bladder, on oral azithromycin and cefdinir, plan is to discharge today between 3:45-4:30 to TCU.      Goal Outcome Evaluation:      Plan of Care Reviewed With: patient    Overall Patient Progress: improvingOverall Patient Progress: improving    Outcome Evaluation: Patient is alert and oriented x4, no complaints of pain, assist of 1 with gait belt and walker, continent of bowel and bladder, on oral azithromycin and cefdinir, plan is to discharge today between 3:45-4:30 to TCU.      Problem: Adult Inpatient Plan of Care  Goal: Plan of Care Review  Description: The Plan of Care Review/Shift note should be completed every shift.  The Outcome Evaluation is a brief statement about your assessment that the patient is improving, declining, or no change.  This information will be displayed automatically on your shift  note.  Outcome: Progressing  Flowsheets (Taken 3/11/2025 0358)  Outcome Evaluation: Patient is alert and oriented x4, no complaints of pain, assist of 1 with gait belt and walker, continent of bowel and bladder, on oral azithromycin and cefdinir, plan is to discharge today between 3:45-4:30 to TCU.  Plan of Care Reviewed With: patient  Overall Patient Progress: improving  Goal: Patient-Specific Goal (Individualized)  Description: You can add care plan individualizations to a care plan. Examples of Individualization might be:  \"Parent requests to be called daily at 9am for status\", \"I have a hard time hearing out of my right ear\", or \"Do not touch me to wake me up as it startles  me\".  Outcome: Progressing  Goal: Absence of Hospital-Acquired Illness or Injury  Outcome: Progressing  Intervention: Identify and Manage Fall Risk  Recent Flowsheet Documentation  Taken 3/11/2025 0004 by Magaly Curry RN  Safety Promotion/Fall " Prevention:   activity supervised   clutter free environment maintained   lighting adjusted   nonskid shoes/slippers when out of bed   patient and family education   room near nurse's station   room organization consistent   safety round/check completed  Intervention: Prevent Skin Injury  Recent Flowsheet Documentation  Taken 3/11/2025 0250 by Magaly Curry RN  Body Position: position changed independently  Taken 3/11/2025 0004 by Magaly Curry RN  Body Position: position changed independently  Intervention: Prevent and Manage VTE (Venous Thromboembolism) Risk  Recent Flowsheet Documentation  Taken 3/11/2025 0004 by Magaly Curry RN  VTE Prevention/Management: SCDs off (sequential compression devices)  Intervention: Prevent Infection  Recent Flowsheet Documentation  Taken 3/11/2025 0004 by Magaly Curry RN  Infection Prevention:   cohorting utilized   hand hygiene promoted   rest/sleep promoted   single patient room provided  Goal: Optimal Comfort and Wellbeing  Outcome: Progressing  Goal: Readiness for Transition of Care  Outcome: Progressing

## 2025-03-11 NOTE — PLAN OF CARE
A&O x4. A1 w/ gb and walker.   VSS on 0.5-1L NC.   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   PO metoprolol in place for HTN. PRN available if >180.   To discharge to TCU tomorrow with W/C transport between 1546 and 1631.       Goal Outcome Evaluation:      Plan of Care Reviewed With: patient    Overall Patient Progress: improvingOverall Patient Progress: improving    Outcome Evaluation: last /67      Problem: Adult Inpatient Plan of Care  Goal: Plan of Care Review  Description: The Plan of Care Review/Shift note should be completed every shift.  The Outcome Evaluation is a brief statement about your assessment that the patient is improving, declining, or no change.  This information will be displayed automatically on your shift  note.  Outcome: Progressing  Flowsheets (Taken 3/10/2025 2255)  Outcome Evaluation: last /67  Plan of Care Reviewed With: patient  Overall Patient Progress: improving  Goal: Absence of Hospital-Acquired Illness or Injury  Intervention: Identify and Manage Fall Risk  Recent Flowsheet Documentation  Taken 3/10/2025 2473 by Henry Bunn, RN  Safety Promotion/Fall Prevention: safety round/check completed

## 2025-03-11 NOTE — PROGRESS NOTES
[facility] GERIATRICS    PRIMARY CARE PROVIDER AND CLINIC:  Ty Cordero MD, [address] [facility] [address]  Chief Complaint   Patient presents with    Hospital F/U      [place] Medical Record Number:  [id number]  Place of Service where encounter took place:  [facility] ([facility]) [[id number]]    Kavita Merlos  is a 89 year old  (1935), admitted to the above facility from  [facility]. Hospital stay 3/5/25 through 3/11/25..   HPI:    Notes from hospitalization reviewed including H&P, hematology consult and D/c summary    Kavita Merlos is an 89-year-old female with past medical history of aortic stenosis status post TAVR, HFpEF, hypertension, glaucoma who was recently hospitalized at [facility].  Presented for evaluation of URI symptoms.  Found to have right upper lobe pneumonia.  Viral panel was negative.  Treated with antibiotics.  Hospital course complicated by worsening chronic anemia.  Received 1 unit PRBCs.  Unfortunately, developed pulmonary edema required IV diuresis.  TTE with stable EF and functioning bioprosthetic valve.  Diuresed with IV Lasix.  Continue to require some supplemental oxygen.  Discharged to [facility]    Kavita is evaluated in her room.  Her daughter, Janette, joins us via speaker phone.  Overall doing well.  Already walking to the bathroom.  Very motivated for short TCU stay.  Hospital course reviewed.  She does not want to continue furosemide.  She feels that the diuretic contributed to palpitations and elevated blood pressure.  Understands the rationale for which she received furosemide but is adamant she does not want to continue.  She denies chest pain.  No shortness of breath.  Still on oxygen but discussed with nurse will work on weaning.  Lives at the [facility] next-door but is in independent living.      Review of nursing home EMR: -139    CODE STATUS/ADVANCE DIRECTIVES DISCUSSION:  No CPR- Do NOT Intubate  DNR  / DNI  ALLERGIES:   Allergies   Allergen Reactions    Albuterol     Amlodipine     Bimatoprost Itching    Brimonidine Itching    Clotrimazole Itching    Dorzolamide Hcl-Timolol Mal Itching    Latanoprost Itching    Lisinopril     Losartan      depression    Neomycin-Polymyxin-Gramicidin Swelling    Neosporin [Neomycin-Polymyxin-Gramicidin]     Tape [Adhesive Tape]     Timolol Itching    Travoprost Itching    Vicodin [Hydrocodone-Acetaminophen]     Penicillins Unknown     Extensive use and toleration of cephalosporins      PAST MEDICAL HISTORY:   Past Medical History:   Diagnosis Date    AAA (abdominal aortic aneurysm)     Aortic stenosis     Benign essential hypertension with BP difference between arms     Hyperlipidemia LDL goal <70       PAST SURGICAL HISTORY:   has a past surgical history that includes Coronary Angiogram (N/A, 12/19/2023); Right Heart Catheterization (N/A, 12/19/2023); Transcatheter Aortic Valve Replacement-Femoral Approach (N/A, 2/20/2024); and Peripheral Vascular Lithotripsy (N/A, 2/20/2024).  FAMILY HISTORY: family history is not on file.  SOCIAL HISTORY:   reports that she quit smoking about 18 years ago. Her smoking use included cigarettes. She has never been exposed to tobacco smoke. She has never used smokeless tobacco. She reports that she does not currently use alcohol. She reports that she does not use drugs.  Patient's living condition: lives alone. Lives at the [facility] but independent living    Post Discharge Medication Reconciliation Status:   MED REC REQUIRED  Post Medication Reconciliation Status: discharge medications reconciled and changed, per note/orders       Current Outpatient Medications   Medication Sig Dispense Refill    apixaban ANTICOAGULANT (ELIQUIS) 2.5 MG tablet Take 1 tablet (2.5 mg) by mouth 2 times daily. 180 tablet 3    cefdinir (OMNICEF) 300 MG capsule Take 1 capsule (300 mg) by mouth every 12 hours for 2 days.      metoprolol succinate ER (TOPROL XL) 100 MG 24  "hr tablet Take 1 tablet (100 mg) by mouth daily.      polyethylene glycol (MIRALAX) 17 GM/Dose powder Take 17 g by mouth daily.      sennosides (SENOKOT) 8.6 MG tablet Take 1 tablet by mouth 2 times daily as needed for constipation.      tafluprost (ZIOPTAN) 0.0015 % SOLN ophthalmic solution Place 1 drop into both eyes at bedtime      timolol maleate (TIMOPTIC) 0.5 % ophthalmic solution Place 1 drop into both eyes 2 times daily.       No current facility-administered medications for this visit.       ROS:  Review of Systems   Constitutional:  Positive for fatigue.   HENT: Negative.     Respiratory:  Negative for shortness of breath.    Cardiovascular:  Negative for chest pain.   Gastrointestinal:  Negative for abdominal distention.   Genitourinary:  Negative for difficulty urinating.   Neurological:  Negative for dizziness.        Vitals:  /77   Pulse 96   Temp 97  F (36.1  C)   Resp 18   Ht 1.626 m (5' 4\")   Wt 53.1 kg (117 lb)   LMP  (LMP Unknown)   SpO2 93%   BMI 20.08 kg/m    Exam:  Physical Exam  Vitals (Facility EMR) reviewed.   Constitutional:       General: She is not in acute distress.  HENT:      Head: Normocephalic and atraumatic.   Eyes:      General: No scleral icterus.  Cardiovascular:      Rate and Rhythm: Normal rate and regular rhythm.      Heart sounds: No murmur heard.  Pulmonary:      Effort: Pulmonary effort is normal.      Breath sounds: No wheezing or rales.      Comments: 2L NC  Musculoskeletal:      Right lower leg: No edema.      Left lower leg: No edema.   Skin:     General: Skin is warm and dry.      Findings: No rash.   Neurological:      Mental Status: She is alert. Mental status is at baseline.   Psychiatric:         Behavior: Behavior normal.     \    Lab/Diagnostic data:  Recent labs in [facility] reviewed by me today.  and Most Recent 3 CBC's:  Recent Labs   Lab Test 03/10/25  0719 03/09/25  0749 03/08/25  1211   WBC 7.5 7.7 8.8   HGB 10.2* 9.2* 9.6*   MCV 89 90 91    " 294 304     Most Recent 3 BMP's:  Recent Labs   Lab Test 03/10/25  0719 25  0749 25  1211   * 134* 134*   POTASSIUM 3.8 3.5 3.6   CHLORIDE 98 100 100   CO2 27 27 25   BUN 10.0 10.0 13.6   CR 0.43* 0.44* 0.49*   ANIONGAP 9 7 9   JERRY 8.9 8.7* 8.6*   * 105* 150*     Most Recent 2 LFT's:  Recent Labs   Lab Test 03/10/25  0719 25  0749   AST 52* 42   ALT 13 13   ALKPHOS 67 69   BILITOTAL 1.1 0.9     Most Recent TSH and T4:  Recent Labs   Lab Test 10/24/24  0950   TSH 1.85     Most Recent Hemoglobin A1c:No lab results found.  Most Recent Anemia Panel:  Recent Labs   Lab Test 03/10/25  0719 25  0749 25  1211 25  1256 25  1024 25  1344 25  1142   WBC 7.5   < > 8.8  --  10.3   < > 13.5*   HGB 10.2*   < > 9.6*  --  9.7*   < > 7.8*   HCT 30.8*   < > 29.4*  --  29.7*   < > 23.7*   MCV 89   < > 91  --  91   < > 89      < > 304  --  306   < > 298   IRON  --   --   --  26*  --   --   --    IRONSAT  --   --   --  15  --   --   --    RETICABSCT  --   --   --   --   --   --  0.103*   RETP  --   --   --   --   --   --  4.0*   FEB  --   --   --  168*  --   --   --    JESICA  --   --  525*  --   --   --   --    B12  --   --   --   --  1,555*  --   --    FOLIC  --   --  21.8  --   --   --   --     < > = values in this interval not displayed.          Name: DYLON PORTER  MRN: [id number]  : 1935  Study Date: 2025 12:44 PM  Age: 89 yrs  Gender: Female  Patient [facility]  Reason For Study: SOB, Aortic Valve Repair  Ordering Physician: AARON HALLMAN  Referring Physician: Ty Cordero  Performed By: Mercy Morrissey     BSA: 1.6 m2  Height: 64 in  Weight: 117 lb  HR: 100  BP: 185/100 mmHg  ______________________________________________________________________________  Procedure  Limited Echocardiogram with two-dimensional, color and spectral  Doppler.  ______________________________________________________________________________  Interpretation Summary     1. The left ventricle is normal in size. Moderately increased left ventricular  wall thickness is noted. Left ventricular systolic function is normal. The  visual ejection fraction is 55-60%. No regional wall motion abnormalities  noted.  2. The right ventricle is normal size. The right ventricular systolic function  is normal.  3. There is moderate biatrial enlargement  4. There is a bioprosthetic aortic valve. (23 mm Ross Adelso Valve  implanted on 02/20/2024). The mean AoV pressure gradient is 14.0 mmHg. The  peak AoV pressure gradient is 25.0 mmHg. There is mild paravalvular  regurgitation.  5. No pericardial effusion.  6. In comparison to the previous report dated 01/10/2025, the findings are  similar.  ___________________________    ASSESSMENT/PLAN:  RUL Pneumonia: Treated with IV ceftriaxone and azithromycin. Viral panel negative  Continue Cefdinir through 3/12 to complete course  Monitor off antibiotics      Acute HFpEF  H/o TAVR  HTN: EF 55-60%. Developed suspected acute pulm edema after transfusion. Received IV diuresis and transitioned to po lasix. Metoprolol XL increased ( mg/d) on hospital discharge due to elevated BP. Has numerous drug allergies  Continue Metoprolol XL increased to 100 mg.d   Patient does not want to continue Lasix.  She feels it caused adverse effects. RvB discussed will discontinue Lasix.  Monitor volume status  2 times weekly weights  [facility] 3/18      Acute Hypoxic Respiratory Failure: Multifactorial due to CHF, Pneumonia and anemia  Continue supplemental oxygen  Wean as able    Acute on Chronic normocytic anemia: Yamil 6.8. Received PRBC. Hematology consulted and thought to be 2/2 acute illness. Hemolytic work up negative.. Hgb on discharge 10.2  CBC 3/18    Physical deconditioning: At baseline lives in independent living apartment  PT/social work  consulted    Atrial Fibrillation  Continue Metoprolol XL  Continue anticoagulation with apixaban    Glaucoma  Continue eyedrops    This note was completed in part using Dragon voice recognition software. Although reviewed after completion, some word and grammatical errors may occur.        Electronically signed by:  Felicita Dumont PA-C

## 2025-03-11 NOTE — PLAN OF CARE
13:01 RN shift summary 7-3:  No c/o pain.  Alert and oriented.  Daughter at bedside. Calm and cooperative this shift. Poor eyesight. Ambulates with assist of 1, gait belt and walker. Lungs diminished, clear. 95% on 1/2-1 L O2.  De-sats into upper 80's on room air.   PIV saline locked.  PT/OT, hem onc, spiritual health following.  Discharge to TCU today.  W/C ride arranged for 3:45-4:30.      Heart Failure Care Map  GOALS TO BE MET BEFORE DISCHARGE:    1. Decrease congestion and/or edema with diuretic therapy to achieve near optimal volume status.     Dyspnea improved: Yes, satisfactory for discharge.   Edema improved: Yes, satisfactory for discharge.        Last 24 hour I/O:   Intake/Output Summary (Last 24 hours) at 3/11/2025 1314  Last data filed at 3/11/2025 0454  Gross per 24 hour   Intake 120 ml   Output 9 ml   Net 111 ml           Net I/O and Weights since admission:   02/09 1500 - 03/11 1459  In: 963 [P.O.:850; I.V.:113]  Out: 5584 [Urine:5575]  Net: -4621     Vitals:    03/05/25 1612   Weight: 53.1 kg (117 lb)       2.  O2 sats > 90% on room air, or at prior home O2 therapy level.      Able to wean O2 this shift to keep sats above 90%?: No, further care required to meet this goal. Please explain still requiring 1/2-1L O2.   Does patient use Home O2? No          Current oxygenation status:   SpO2: 93 %     O2 Device: Nasal cannula, Oxygen Delivery: 1/2 LPM    3.  Tolerates ambulation and mobility near baseline.     Ambulation: Yes, satisfactory for discharge.   Times patient ambulated with staff this shift: 3    Please review the Heart Failure Care Map for additional HF goal outcomes.    Emily Buitrago RN  3/11/2025            Goal Outcome Evaluation:      Plan of Care Reviewed With: patient, family    Overall Patient Progress: improvingOverall Patient Progress: improving    Outcome Evaluation: Plan to discharge to TCU this afternoon. Daughter at bedside.      Problem: Adult Inpatient Plan of Care  Goal:  "Plan of Care Review  Description: The Plan of Care Review/Shift note should be completed every shift.  The Outcome Evaluation is a brief statement about your assessment that the patient is improving, declining, or no change.  This information will be displayed automatically on your shift  note.  Outcome: Adequate for Care Transition  Flowsheets (Taken 3/11/2025 1109)  Outcome Evaluation: Plan to discharge to TCU this afternoon. Daughter at bedside.  Plan of Care Reviewed With:   patient   family  Overall Patient Progress: improving  Goal: Patient-Specific Goal (Individualized)  Description: You can add care plan individualizations to a care plan. Examples of Individualization might be:  \"Parent requests to be called daily at 9am for status\", \"I have a hard time hearing out of my right ear\", or \"Do not touch me to wake me up as it startles  me\".  Outcome: Adequate for Care Transition  Goal: Absence of Hospital-Acquired Illness or Injury  Outcome: Adequate for Care Transition  Intervention: Identify and Manage Fall Risk  Recent Flowsheet Documentation  Taken 3/11/2025 0845 by Emily Buitrago, RN  Safety Promotion/Fall Prevention:   nonskid shoes/slippers when out of bed   clutter free environment maintained  Intervention: Prevent Skin Injury  Recent Flowsheet Documentation  Taken 3/11/2025 0845 by Emily Buitrago, RN  Body Position: supine, head elevated  Goal: Optimal Comfort and Wellbeing  Outcome: Adequate for Care Transition  Goal: Readiness for Transition of Care  Outcome: Adequate for Care Transition     Problem: Infection  Goal: Absence of Infection Signs and Symptoms  Outcome: Adequate for Care Transition     Problem: Delirium  Goal: Optimal Coping  Outcome: Adequate for Care Transition  Goal: Improved Behavioral Control  Outcome: Adequate for Care Transition  Intervention: Minimize Safety Risk  Recent Flowsheet Documentation  Taken 3/11/2025 0845 by Emily Buitrago, RN  Enhanced Safety Measures: pain " management  Goal: Improved Attention and Thought Clarity  Outcome: Adequate for Care Transition  Goal: Improved Sleep  Outcome: Adequate for Care Transition     Problem: Heart Failure  Goal: Optimal Coping  Outcome: Adequate for Care Transition  Goal: Optimal Cardiac Output  Outcome: Adequate for Care Transition  Goal: Stable Heart Rate and Rhythm  Outcome: Adequate for Care Transition  Goal: Fluid and Electrolyte Balance  Outcome: Adequate for Care Transition  Goal: Optimal Functional Ability  Outcome: Adequate for Care Transition  Intervention: Optimize Functional Ability  Recent Flowsheet Documentation  Taken 3/11/2025 0845 by Emily Buitrago RN  Activity Management: activity adjusted per tolerance  Goal: Improved Oral Intake  Outcome: Adequate for Care Transition  Goal: Effective Oxygenation and Ventilation  Outcome: Adequate for Care Transition  Intervention: Promote Airway Secretion Clearance  Recent Flowsheet Documentation  Taken 3/11/2025 0845 by Emily Buitrago RN  Activity Management: activity adjusted per tolerance  Intervention: Optimize Oxygenation and Ventilation  Recent Flowsheet Documentation  Taken 3/11/2025 0845 by Emily Buitrago, RN  Head of Bed (HOB) Positioning: HOB at 20 degrees  Goal: Effective Breathing Pattern During Sleep  Outcome: Adequate for Care Transition  Intervention: Monitor and Manage Obstructive Sleep Apnea  Recent Flowsheet Documentation  Taken 3/11/2025 0845 by Emily Buitrago, RN  Medication Review/Management: medications reviewed

## 2025-03-12 ENCOUNTER — TRANSITIONAL CARE UNIT VISIT (OUTPATIENT)
Dept: GERIATRICS | Facility: CLINIC | Age: 89
End: 2025-03-12
Payer: MEDICARE

## 2025-03-12 ENCOUNTER — PATIENT OUTREACH (OUTPATIENT)
Dept: CARE COORDINATION | Facility: CLINIC | Age: 89
End: 2025-03-12

## 2025-03-12 VITALS
OXYGEN SATURATION: 93 % | DIASTOLIC BLOOD PRESSURE: 77 MMHG | WEIGHT: 117 LBS | HEIGHT: 64 IN | BODY MASS INDEX: 19.97 KG/M2 | HEART RATE: 96 BPM | SYSTOLIC BLOOD PRESSURE: 139 MMHG | RESPIRATION RATE: 18 BRPM | TEMPERATURE: 97 F

## 2025-03-12 DIAGNOSIS — D64.9 NORMOCYTIC ANEMIA: ICD-10-CM

## 2025-03-12 DIAGNOSIS — I48.20 CHRONIC ATRIAL FIBRILLATION (H): ICD-10-CM

## 2025-03-12 DIAGNOSIS — J96.01 ACUTE RESPIRATORY FAILURE WITH HYPOXIA (H): ICD-10-CM

## 2025-03-12 DIAGNOSIS — H40.9 GLAUCOMA, UNSPECIFIED GLAUCOMA TYPE, UNSPECIFIED LATERALITY: ICD-10-CM

## 2025-03-12 DIAGNOSIS — I50.32 CHRONIC DIASTOLIC (CONGESTIVE) HEART FAILURE (H): ICD-10-CM

## 2025-03-12 DIAGNOSIS — J18.9 PNEUMONIA OF RIGHT UPPER LOBE DUE TO INFECTIOUS ORGANISM: Primary | ICD-10-CM

## 2025-03-12 DIAGNOSIS — R53.81 PHYSICAL DECONDITIONING: ICD-10-CM

## 2025-03-12 PROCEDURE — P9604 ONE-WAY ALLOW PRORATED TRIP: HCPCS | Mod: ORL | Performed by: INTERNAL MEDICINE

## 2025-03-12 PROCEDURE — 36415 COLL VENOUS BLD VENIPUNCTURE: CPT | Mod: ORL | Performed by: INTERNAL MEDICINE

## 2025-03-12 PROCEDURE — 86481 TB AG RESPONSE T-CELL SUSP: CPT | Mod: ORL | Performed by: INTERNAL MEDICINE

## 2025-03-12 PROCEDURE — 99309 SBSQ NF CARE MODERATE MDM 30: CPT | Performed by: PHYSICIAN ASSISTANT

## 2025-03-12 ASSESSMENT — ENCOUNTER SYMPTOMS
ABDOMINAL DISTENTION: 0
FATIGUE: 1
SHORTNESS OF BREATH: 0
DIFFICULTY URINATING: 0
DIZZINESS: 0

## 2025-03-12 NOTE — LETTER
3/12/2025      Kavita Merlos  [address]  [address]  [facility] [address]        [facility] GERIATRICS    PRIMARY CARE PROVIDER AND CLINIC:  Ty Cordero MD, [address] / [facility] [address]  Chief Complaint   Patient presents with     Hospital F/U      [place] Medical Record Number:  [id number]  Place of Service where encounter took place:  [facility] ([facility]) [[id number]]    Kavita Merlos  is a 89 year old  (1935), admitted to the above facility from  [facility]. Hospital stay 3/5/25 through 3/11/25..   HPI:    Notes from hospitalization reviewed including H&P, hematology consult and D/c summary    Kavita Merlos is an 89-year-old female with past medical history of aortic stenosis status post TAVR, HFpEF, hypertension, glaucoma who was recently hospitalized at [facility].  Presented for evaluation of URI symptoms.  Found to have right upper lobe pneumonia.  Viral panel was negative.  Treated with antibiotics.  Hospital course complicated by worsening chronic anemia.  Received 1 unit PRBCs.  Unfortunately, developed pulmonary edema required IV diuresis.  TTE with stable EF and functioning bioprosthetic valve.  Diuresed with IV Lasix.  Continue to require some supplemental oxygen.  Discharged to U    Kavita is evaluated in her room.  Her daughter, Janette, joins us via speaker phone.  Overall doing well.  Already walking to the bathroom.  Very motivated for short TCU stay.  Hospital course reviewed.  She does not want to continue furosemide.  She feels that the diuretic contributed to palpitations and elevated blood pressure.  Understands the rationale for which she received furosemide but is adamant she does not want to continue.  She denies chest pain.  No shortness of breath.  Still on oxygen but discussed with nurse will work on weaning.  [facility] but is in independent living.      Review of nursing home  EMR: -[id number]    CODE STATUS/ADVANCE DIRECTIVES DISCUSSION:  No CPR- Do NOT Intubate  DNR / DNI  ALLERGIES:   Allergies   Allergen Reactions     Albuterol      Amlodipine      Bimatoprost Itching     Brimonidine Itching     Clotrimazole Itching     Dorzolamide Hcl-Timolol Mal Itching     Latanoprost Itching     Lisinopril      Losartan      depression     Neomycin-Polymyxin-Gramicidin Swelling     Neosporin [Neomycin-Polymyxin-Gramicidin]      Tape [Adhesive Tape]      Timolol Itching     Travoprost Itching     Vicodin [Hydrocodone-Acetaminophen]      Penicillins Unknown     Extensive use and toleration of cephalosporins      PAST MEDICAL HISTORY:   Past Medical History:   Diagnosis Date     AAA (abdominal aortic aneurysm)      Aortic stenosis      Benign essential hypertension with BP difference between arms      Hyperlipidemia LDL goal <70       PAST SURGICAL HISTORY:   has a past surgical history that includes Coronary Angiogram (N/A, 12/19/2023); Right Heart Catheterization (N/A, 12/19/2023); Transcatheter Aortic Valve Replacement-Femoral Approach (N/A, 2/20/2024); and Peripheral Vascular Lithotripsy (N/A, 2/20/2024).  FAMILY HISTORY: family history is not on file.  SOCIAL HISTORY:   reports that she quit smoking about 18 years ago. Her smoking use included cigarettes. She has never been exposed to tobacco smoke. She has never used smokeless tobacco. She reports that she does not currently use alcohol. She reports that she does not use drugs.  Patient's living condition: lives alone. Lives at the [facility] but independent living    Post Discharge Medication Reconciliation Status:   MED REC REQUIRED  Post Medication Reconciliation Status: discharge medications reconciled and changed, per note/orders       Current Outpatient Medications   Medication Sig Dispense Refill     apixaban ANTICOAGULANT (ELIQUIS) 2.5 MG tablet Take 1 tablet (2.5 mg) by mouth 2 times daily. 180 tablet 3     cefdinir (OMNICEF) 300 MG  "capsule Take 1 capsule (300 mg) by mouth every 12 hours for 2 days.       metoprolol succinate ER (TOPROL XL) 100 MG 24 hr tablet Take 1 tablet (100 mg) by mouth daily.       polyethylene glycol (MIRALAX) 17 GM/Dose powder Take 17 g by mouth daily.       sennosides (SENOKOT) 8.6 MG tablet Take 1 tablet by mouth 2 times daily as needed for constipation.       tafluprost (ZIOPTAN) 0.0015 % SOLN ophthalmic solution Place 1 drop into both eyes at bedtime       timolol maleate (TIMOPTIC) 0.5 % ophthalmic solution Place 1 drop into both eyes 2 times daily.       No current facility-administered medications for this visit.       ROS:  Review of Systems   Constitutional:  Positive for fatigue.   HENT: Negative.     Respiratory:  Negative for shortness of breath.    Cardiovascular:  Negative for chest pain.   Gastrointestinal:  Negative for abdominal distention.   Genitourinary:  Negative for difficulty urinating.   Neurological:  Negative for dizziness.        Vitals:  /77   Pulse 96   Temp 97  F (36.1  C)   Resp 18   Ht 1.626 m (5' 4\")   Wt 53.1 kg (117 lb)   LMP  (LMP Unknown)   SpO2 93%   BMI 20.08 kg/m    Exam:  Physical Exam  Vitals (Facility EMR) reviewed.   Constitutional:       General: She is not in acute distress.  HENT:      Head: Normocephalic and atraumatic.   Eyes:      General: No scleral icterus.  Cardiovascular:      Rate and Rhythm: Normal rate and regular rhythm.      Heart sounds: No murmur heard.  Pulmonary:      Effort: Pulmonary effort is normal.      Breath sounds: No wheezing or rales.      Comments: 2L NC  Musculoskeletal:      Right lower leg: No edema.      Left lower leg: No edema.   Skin:     General: Skin is warm and dry.      Findings: No rash.   Neurological:      Mental Status: She is alert. Mental status is at baseline.   Psychiatric:         Behavior: Behavior normal.     \    Lab/Diagnostic data:  Recent labs in [facility] reviewed by me today.  and Most Recent 3 CBC's:  Recent " Labs   Lab Test 03/10/25  0719 25  0749 25  1211   WBC 7.5 7.7 8.8   HGB 10.2* 9.2* 9.6*   MCV 89 90 91    294 304     Most Recent 3 BMP's:  Recent Labs   Lab Test 03/10/25  0719 25  0749 25  1211   * 134* 134*   POTASSIUM 3.8 3.5 3.6   CHLORIDE 98 100 100   CO2 27 27 25   BUN 10.0 10.0 13.6   CR 0.43* 0.44* 0.49*   ANIONGAP 9 7 9   JERRY 8.9 8.7* 8.6*   * 105* 150*     Most Recent 2 LFT's:  Recent Labs   Lab Test 03/10/25  0719 25  0749   AST 52* 42   ALT 13 13   ALKPHOS 67 69   BILITOTAL 1.1 0.9     Most Recent TSH and T4:  Recent Labs   Lab Test 10/24/24  0950   TSH 1.85     Most Recent Hemoglobin A1c:No lab results found.  Most Recent Anemia Panel:  Recent Labs   Lab Test 03/10/25  0719 25  0749 25  1211 25  1256 25  1024 25  1344 25  1142   WBC 7.5   < > 8.8  --  10.3   < > 13.5*   HGB 10.2*   < > 9.6*  --  9.7*   < > 7.8*   HCT 30.8*   < > 29.4*  --  29.7*   < > 23.7*   MCV 89   < > 91  --  91   < > 89      < > 304  --  306   < > 298   IRON  --   --   --  26*  --   --   --    IRONSAT  --   --   --  15  --   --   --    RETICABSCT  --   --   --   --   --   --  0.103*   RETP  --   --   --   --   --   --  4.0*   FEB  --   --   --  168*  --   --   --    JESICA  --   --  525*  --   --   --   --    B12  --   --   --   --  1,555*  --   --    FOLIC  --   --  21.8  --   --   --   --     < > = values in this interval not displayed.          Name: DYLON PORTER  MRN: [id number]  : 1935  Study Date: 2025 12:44 PM  Age: 89 yrs  Gender: Female  Patient Location: [facility]  Reason For Study: SOB, Aortic Valve Repair  Ordering Physician: AARON HALLMAN  Referring Physician: Ty Cordero  Performed By: Mercy Morrissey     BSA: 1.6 m2  Height: 64 in  Weight: 117 lb  HR: 100  BP: 185/100 mmHg  ______________________________________________________________________________  Procedure  Limited Echocardiogram with  two-dimensional, color and spectral Doppler.  ______________________________________________________________________________  Interpretation Summary     1. The left ventricle is normal in size. Moderately increased left ventricular  wall thickness is noted. Left ventricular systolic function is normal. The  visual ejection fraction is 55-60%. No regional wall motion abnormalities  noted.  2. The right ventricle is normal size. The right ventricular systolic function  is normal.  3. There is moderate biatrial enlargement  4. There is a bioprosthetic aortic valve. (23 mm Ross Adelso Valve  implanted on 02/20/2024). The mean AoV pressure gradient is 14.0 mmHg. The  peak AoV pressure gradient is 25.0 mmHg. There is mild paravalvular  regurgitation.  5. No pericardial effusion.  6. In comparison to the previous report dated 01/10/2025, the findings are  similar.  ___________________________    ASSESSMENT/PLAN:  RUL Pneumonia: Treated with IV ceftriaxone and azithromycin. Viral panel negative  Continue Cefdinir through 3/12 to complete course  Monitor off antibiotics      Acute HFpEF  H/o TAVR  HTN: EF 55-60%. Developed suspected acute pulm edema after transfusion. Received IV diuresis and transitioned to po lasix. Metoprolol XL increased ( mg/d) on hospital discharge due to elevated BP. Has numerous drug allergies  Continue Metoprolol XL increased to 100 mg.d   Patient does not want to continue Lasix.  She feels it caused adverse effects. RvB discussed will discontinue Lasix.  Monitor volume status  2 times weekly weights  [facility] 3/18      Acute Hypoxic Respiratory Failure: Multifactorial due to CHF, Pneumonia and anemia  Continue supplemental oxygen  Wean as able    Acute on Chronic normocytic anemia: Yamli 6.8. Received PRBC. Hematology consulted and thought to be 2/2 acute illness. Hemolytic work up negative.. Hgb on discharge 10.2  CBC 3/18    Physical deconditioning: At baseline lives in independent living  apartment  PT/social work consulted    Atrial Fibrillation  Continue Metoprolol XL  Continue anticoagulation with apixaban    Glaucoma  Continue eyedrops    This note was completed in part using Dragon voice recognition software. Although reviewed after completion, some word and grammatical errors may occur.        Electronically signed by:  Felicita Dumont PA-C                   Sincerely,        Felicita Dumont PA-C    Electronically signed

## 2025-03-12 NOTE — PLAN OF CARE
Physical Therapy Discharge Summary    Reason for therapy discharge:    Discharged to transitional care facility.    Progress towards therapy goal(s). See goals on Care Plan in [facility] electronic health record for goal details.  Goals not met.  Barriers to achieving goals:   discharge from facility.    Therapy recommendation(s):    Continued therapy is recommended.  Rationale/Recommendations:  PT as indicated in the TCU setting.

## 2025-03-12 NOTE — LETTER
[facility]   To:   Please give to facility  or Discharge Planner   From:   TSERING Person      Patient Name:  Kavita Merlos YOB: 1935   Admit date: 03/11/25      Please let me know the discharge date and plan. If home health is set up, please let me know the same of the agency. If going to a long term care facility, please let me know the name/location. I will plan to follow up with the patient once discharged home and if appropriate. I'm happy to attend any care conferences if needed and if I can provide any further assistance.   Please call or email me!     Thanks,    TSERING Person  Primary Care Social Work Care Coordinator  [facility], & [place]   PH: [phone]  E: [email or web address]

## 2025-03-12 NOTE — PROGRESS NOTES
Clinic Care Coordination Contact  Care Coordination Transition Communication    Clinical Data: Patient was hospitalized at [facility] from 03/05/25 to 03/11/25 with diagnosis of:     Kavita Merlos is a 89 year old female who was admitted on 3/5/2025 with a history of infrarenal AAA, aortic stenosis s/p TAVR (2/2024), pAF, and hypertension, admitted with acute hypoxic respiratory failure and sepsis secondary to RUL pneumonia.     Assessment: Patient has transitioned to TCU/ARU for short term rehabilitation:    Facility Name: [facility]  Transition Communication:  Notified facility of Primary Care- Care Coordination support via Epic fax.    Plan: Care Coordinator will await notification from facility staff informing of patient's discharge plans/needs. Care Coordinator will review chart and outreach to facility staff every 4 weeks and as needed.     TSERING Person  [facility]   Primary Care Social Work Care Coordinator  [facility] & [place]   Phone: [phone]

## 2025-03-13 LAB
GAMMA INTERFERON BACKGROUND BLD IA-ACNC: 0.04 IU/ML
M TB IFN-G BLD-IMP: NEGATIVE
M TB IFN-G CD4+ BCKGRND COR BLD-ACNC: 5.16 IU/ML
MITOGEN IGNF BCKGRD COR BLD-ACNC: 0.01 IU/ML
MITOGEN IGNF BCKGRD COR BLD-ACNC: 0.01 IU/ML
QUANTIFERON MITOGEN: 5.2 IU/ML
QUANTIFERON NIL TUBE: 0.04 IU/ML
QUANTIFERON TB1 TUBE: 0.05 IU/ML
QUANTIFERON TB2 TUBE: 0.05

## 2025-03-13 NOTE — TELEPHONE ENCOUNTER
[facility] Call Center    Phone Message    May a detailed message be left on voicemail: no     Reason for Call: Other: Patient had testing done 1/9/25 done echocardiogram. Testing is not needed until Feb 2026.      Action Taken: Other: cardiology     Travel Screening: Not Applicable     Date of Service:

## 2025-03-14 ENCOUNTER — LAB REQUISITION (OUTPATIENT)
Dept: LAB | Facility: CLINIC | Age: 89
End: 2025-03-14
Payer: MEDICARE

## 2025-03-14 DIAGNOSIS — J96.01 ACUTE RESPIRATORY FAILURE WITH HYPOXIA (H): ICD-10-CM

## 2025-03-17 ENCOUNTER — LAB REQUISITION (OUTPATIENT)
Dept: LAB | Facility: CLINIC | Age: 89
End: 2025-03-17
Payer: MEDICARE

## 2025-03-17 DIAGNOSIS — J96.01 ACUTE RESPIRATORY FAILURE WITH HYPOXIA (H): ICD-10-CM

## 2025-03-18 ENCOUNTER — TRANSITIONAL CARE UNIT VISIT (OUTPATIENT)
Dept: GERIATRICS | Facility: CLINIC | Age: 89
End: 2025-03-18
Payer: MEDICARE

## 2025-03-18 VITALS
OXYGEN SATURATION: 93 % | BODY MASS INDEX: 20.06 KG/M2 | RESPIRATION RATE: 18 BRPM | TEMPERATURE: 97.5 F | HEART RATE: 79 BPM | SYSTOLIC BLOOD PRESSURE: 141 MMHG | WEIGHT: 117.5 LBS | HEIGHT: 64 IN | DIASTOLIC BLOOD PRESSURE: 72 MMHG

## 2025-03-18 DIAGNOSIS — I50.32 CHRONIC DIASTOLIC (CONGESTIVE) HEART FAILURE (H): ICD-10-CM

## 2025-03-18 DIAGNOSIS — D64.9 NORMOCYTIC ANEMIA: ICD-10-CM

## 2025-03-18 DIAGNOSIS — R53.81 PHYSICAL DECONDITIONING: ICD-10-CM

## 2025-03-18 DIAGNOSIS — J18.9 PNEUMONIA OF RIGHT UPPER LOBE DUE TO INFECTIOUS ORGANISM: Primary | ICD-10-CM

## 2025-03-18 LAB
ANION GAP SERPL CALCULATED.3IONS-SCNC: 12 MMOL/L (ref 7–15)
BUN SERPL-MCNC: 13.5 MG/DL (ref 8–23)
CALCIUM SERPL-MCNC: 9.5 MG/DL (ref 8.8–10.4)
CHLORIDE SERPL-SCNC: 101 MMOL/L (ref 98–107)
CREAT SERPL-MCNC: 0.69 MG/DL (ref 0.51–0.95)
EGFRCR SERPLBLD CKD-EPI 2021: 83 ML/MIN/1.73M2
ERYTHROCYTE [DISTWIDTH] IN BLOOD BY AUTOMATED COUNT: 17.8 % (ref 10–15)
GLUCOSE SERPL-MCNC: 137 MG/DL (ref 70–99)
HCO3 SERPL-SCNC: 23 MMOL/L (ref 22–29)
HCT VFR BLD AUTO: 28.7 % (ref 35–47)
HGB BLD-MCNC: 9 G/DL (ref 11.7–15.7)
MCH RBC QN AUTO: 29.6 PG (ref 26.5–33)
MCHC RBC AUTO-ENTMCNC: 31.4 G/DL (ref 31.5–36.5)
MCV RBC AUTO: 94 FL (ref 78–100)
PLATELET # BLD AUTO: 275 10E3/UL (ref 150–450)
POTASSIUM SERPL-SCNC: 3.9 MMOL/L (ref 3.4–5.3)
RBC # BLD AUTO: 3.04 10E6/UL (ref 3.8–5.2)
SODIUM SERPL-SCNC: 136 MMOL/L (ref 135–145)
WBC # BLD AUTO: 7.6 10E3/UL (ref 4–11)

## 2025-03-18 PROCEDURE — 85027 COMPLETE CBC AUTOMATED: CPT | Mod: ORL | Performed by: PHYSICIAN ASSISTANT

## 2025-03-18 PROCEDURE — P9603 ONE-WAY ALLOW PRORATED MILES: HCPCS | Mod: ORL | Performed by: PHYSICIAN ASSISTANT

## 2025-03-18 PROCEDURE — 36415 COLL VENOUS BLD VENIPUNCTURE: CPT | Mod: ORL | Performed by: PHYSICIAN ASSISTANT

## 2025-03-18 PROCEDURE — 99309 SBSQ NF CARE MODERATE MDM 30: CPT | Performed by: PHYSICIAN ASSISTANT

## 2025-03-18 PROCEDURE — 80048 BASIC METABOLIC PNL TOTAL CA: CPT | Mod: ORL | Performed by: PHYSICIAN ASSISTANT

## 2025-03-18 NOTE — LETTER
" 3/18/2025      Kavita Merlos  [address]  [address]  [facility] [address]          Chief Complaint   Patient presents with     Nursing Home Acute       HPI:  Kavita Merlos is a 89 year old  (1935), who is being seen today for an episodic care visit at: [facility] ([facility]) [[id number]].     Brief summary:  Kavita Merlos is an 89-year-old female with past medical history of aortic stenosis status post TAVR, HFpEF, hypertension, glaucoma who was recently hospitalized at [facility].  Presented for evaluation of URI symptoms.  Found to have right upper lobe pneumonia.  Viral panel was negative.  Treated with antibiotics.  Hospital course complicated by worsening chronic anemia.  Received 1 unit PRBCs.  Unfortunately, developed pulmonary edema required IV diuresis.  TTE with stable EF and functioning bioprosthetic valve.  Diuresed with IV Lasix.  Continue to require some supplemental oxygen.  Discharged to U    Today's concern is: Continues to do well. No c/o SOB. Remains off O2. No dizziness.        Review of nursing home EMR:-159, [address],      Allergies, and PMH/PSH reviewed in [employer] today.    REVIEW OF SYSTEMS:  4 point ROS including Respiratory, CV, GI and , other than that noted in the HPI,  is negative    Objective:   BP (!) 141/72   Pulse 79   Temp 97.5  F (36.4  C)   Resp 18   Ht 1.626 m (5' 4\")   Wt 53.3 kg (117 lb 8 oz)   LMP  (LMP Unknown)   SpO2 93%   BMI 20.17 kg/m      Physical Exam  Vitals (Facility EMR) reviewed.   Constitutional:       General: She is not in acute distress.  HENT:      Head: Normocephalic and atraumatic.   Eyes:      General: No scleral icterus.  Cardiovascular:      Rate and Rhythm: Normal rate and regular rhythm.   Pulmonary:      Effort: Pulmonary effort is normal.   Musculoskeletal:      Right lower leg: No edema.      Left lower leg: No edema.   Skin:     General: Skin is warm and dry.      Findings: No rash.   Neurological:      Mental " Status: She is alert. Mental status is at baseline.   Psychiatric:         Behavior: Behavior normal.          MED REC REQUIRED  Post Medication Reconciliation Status: discharge medications reconciled and changed, per note/orders      Recent labs in [facility] reviewed by me today.  and Most Recent 3 CBC's:  Recent Labs   Lab Test 03/18/25  0955 03/10/25  0719 03/09/25  0749   WBC 7.6 7.5 7.7   HGB 9.0* 10.2* 9.2*   MCV 94 89 90    314 294     Most Recent 3 BMP's:  Recent Labs   Lab Test 03/18/25  0955 03/10/25  0719 03/09/25  0749    134* 134*   POTASSIUM 3.9 3.8 3.5   CHLORIDE 101 98 100   CO2 23 27 27   BUN 13.5 10.0 10.0   CR 0.69 0.43* 0.44*   ANIONGAP 12 9 7   JERRY 9.5 8.9 8.7*   * 100* 105*     Most Recent 3 Hemoglobins:  Recent Labs   Lab Test 03/18/25  0955 03/10/25  0719 03/09/25  0749   HGB 9.0* 10.2* 9.2*       Assessment/Plan:  RUL Pneumonia: Treated with IV ceftriaxone and azithromycin. Viral panel negative  Completed cefdinir 3/12  Monitor off antibiotics      Acute HFpEF  H/o TAVR  HTN: EF 55-60%. Developed suspected acute pulm edema after transfusion. Received IV diuresis and transitioned to po lasix. Metoprolol XL increased ( mg/d) on hospital discharge due to elevated BP. Has numerous drug allergies  Continue Metoprolol XL increased to 100 mg/d   Patient does not want to continue Lasix.  She feels it caused adverse effects. RvB discussed and discontinued Lasix on TCU admission  2 times weekly weights.  Overall stable.  Appears euvolemic  BMP today reviewed and stable      Acute Hypoxic Respiratory Failure, resolved: Multifactorial due to CHF, Pneumonia and anemia  Now off oxygen    Acute on Chronic normocytic anemia: Yamil 6.8. Received PRBC. Hematology consulted and thought to be 2/2 acute illness. Hemolytic work up negative.. Hgb on discharge 10.2  CBC with Hgb 9.0. Down slightly from hospital discharge but largely stable  Can repeat next week either at TCU or with home care  if discharges back to [facility]    Physical deconditioning: At baseline lives in independent living apartment  PT/social work consulted    Atrial Fibrillation  Continue Metoprolol XL  Continue anticoagulation with apixaban    Glaucoma  Continue eyedrops    Orders:  NNO    Electronically signed by: Felicita Dumont PA-C       Sincerely,        Felicita Dumont PA-C    Electronically signed

## 2025-03-18 NOTE — PROGRESS NOTES
"  Chief Complaint   Patient presents with    Nursing Home Acute       HPI:  Kavita Merlos is a 89 year old  (1935), who is being seen today for an episodic care visit at: [facility] ([facility]) [[id number]].     Brief summary:  Kavita Merlos is an 89-year-old female with past medical history of aortic stenosis status post TAVR, HFpEF, hypertension, glaucoma who was recently hospitalized at [facility].  Presented for evaluation of URI symptoms.  Found to have right upper lobe pneumonia.  Viral panel was negative.  Treated with antibiotics.  Hospital course complicated by worsening chronic anemia.  Received 1 unit PRBCs.  Unfortunately, developed pulmonary edema required IV diuresis.  TTE with stable EF and functioning bioprosthetic valve.  Diuresed with IV Lasix.  Continue to require some supplemental oxygen.  Discharged to [facility]    Today's concern is: Continues to do well. No c/o SOB. Remains off O2. No dizziness.        Review of nursing home EMR:-159, [address],      Allergies, and PMH/PSH reviewed in [employer] today.    REVIEW OF SYSTEMS:  4 point ROS including Respiratory, CV, GI and , other than that noted in the HPI,  is negative    Objective:   BP (!) 141/72   Pulse 79   Temp 97.5  F (36.4  C)   Resp 18   Ht 1.626 m (5' 4\")   Wt 53.3 kg (117 lb 8 oz)   LMP  (LMP Unknown)   SpO2 93%   BMI 20.17 kg/m      Physical Exam  Vitals (Facility EMR) reviewed.   Constitutional:       General: She is not in acute distress.  HENT:      Head: Normocephalic and atraumatic.   Eyes:      General: No scleral icterus.  Cardiovascular:      Rate and Rhythm: Normal rate and regular rhythm.   Pulmonary:      Effort: Pulmonary effort is normal.   Musculoskeletal:      Right lower leg: No edema.      Left lower leg: No edema.   Skin:     General: Skin is warm and dry.      Findings: No rash.   Neurological:      Mental Status: She is alert. Mental status is at baseline.   Psychiatric:         Behavior: " Behavior normal.          MED REC REQUIRED  Post Medication Reconciliation Status: discharge medications reconciled and changed, per note/orders      Recent labs in EPIC reviewed by me today.  and Most Recent 3 CBC's:  Recent Labs   Lab Test 03/18/25  0955 03/10/25  0719 03/09/25  0749   WBC 7.6 7.5 7.7   HGB 9.0* 10.2* 9.2*   MCV 94 89 90    314 294     Most Recent 3 BMP's:  Recent Labs   Lab Test 03/18/25  0955 03/10/25  0719 03/09/25  0749    134* 134*   POTASSIUM 3.9 3.8 3.5   CHLORIDE 101 98 100   CO2 23 27 27   BUN 13.5 10.0 10.0   CR 0.69 0.43* 0.44*   ANIONGAP 12 9 7   JERRY 9.5 8.9 8.7*   * 100* 105*     Most Recent 3 Hemoglobins:  Recent Labs   Lab Test 03/18/25  0955 03/10/25  0719 03/09/25  0749   HGB 9.0* 10.2* 9.2*       Assessment/Plan:  RUL Pneumonia: Treated with IV ceftriaxone and azithromycin. Viral panel negative  Completed cefdinir 3/12  Monitor off antibiotics      Acute HFpEF  H/o TAVR  HTN: EF 55-60%. Developed suspected acute pulm edema after transfusion. Received IV diuresis and transitioned to po lasix. Metoprolol XL increased ( mg/d) on hospital discharge due to elevated BP. Has numerous drug allergies  Continue Metoprolol XL increased to 100 mg/d   Patient does not want to continue Lasix.  She feels it caused adverse effects. RvB discussed and discontinued Lasix on TCU admission  2 times weekly weights.  Overall stable.  Appears euvolemic  BMP today reviewed and stable      Acute Hypoxic Respiratory Failure, resolved: Multifactorial due to CHF, Pneumonia and anemia  Now off oxygen    Acute on Chronic normocytic anemia: Yamil 6.8. Received PRBC. Hematology consulted and thought to be 2/2 acute illness. Hemolytic work up negative.. Hgb on discharge 10.2  CBC with Hgb 9.0. Down slightly from hospital discharge but largely stable  Can repeat next week either at TCU or with home care if discharges back to [facility]    Physical deconditioning: At baseline lives in independent  living apartment  PT/social work consulted    Atrial Fibrillation  Continue Metoprolol XL  Continue anticoagulation with apixaban    Glaucoma  Continue eyedrops    Orders:  NNO    Electronically signed by: Felicita Dumont PA-C

## 2025-03-20 ENCOUNTER — DISCHARGE SUMMARY NURSING HOME (OUTPATIENT)
Dept: GERIATRICS | Facility: CLINIC | Age: 89
End: 2025-03-20
Payer: MEDICARE

## 2025-03-20 VITALS
HEIGHT: 64 IN | WEIGHT: 117.5 LBS | BODY MASS INDEX: 20.06 KG/M2 | HEART RATE: 93 BPM | RESPIRATION RATE: 17 BRPM | SYSTOLIC BLOOD PRESSURE: 115 MMHG | DIASTOLIC BLOOD PRESSURE: 66 MMHG | TEMPERATURE: 97.1 F | OXYGEN SATURATION: 93 %

## 2025-03-20 NOTE — LETTER
3/20/2025      Kavita Merlos  [address]  [address]  [facility] [address]        [facility] GERIATRICS DISCHARGE SUMMARY  PATIENT'S NAME: Kavita Merlos  YOB: 1935  MEDICAL RECORD NUMBER:  [id number]  Place of Service where encounter took place:  [facility] (TCU) [[id number]]    PRIMARY CARE PROVIDER AND CLINIC RESPONSIBLE AFTER TRANSFER:   Ty Cordero MD, [address] / [facility] [address]    [facility] Provider     Transferring providers: Felicita Dumont PA-C, Dr. Viola Ramirez MD  Recent Hospitalization/ED:  [facility] Hospital stay 3/5/25 to 3/11/25.  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  Physical Function: Ambulating 550 ft with 4WW independent  DME: No DME needed    CODE STATUS/ADVANCE DIRECTIVES DISCUSSION:  No CPR- Do NOT Intubate   ALLERGIES: Albuterol, Amlodipine, Bimatoprost, Brimonidine, Clotrimazole, Dorzolamide hcl-timolol mal, Latanoprost, Lisinopril, Losartan, Neomycin-polymyxin-gramicidin, Neosporin [neomycin-polymyxin-gramicidin], Tape [adhesive tape], Timolol, Travoprost, Vicodin [hydrocodone-acetaminophen], and Penicillins    NURSING FACILITY COURSE   Kavita Merlos is an 89-year-old female with past medical history of aortic stenosis status post TAVR, HFpEF, hypertension, glaucoma who was recently hospitalized at [facility].  Presented for evaluation of URI symptoms.  Found to have right upper lobe pneumonia.  Viral panel was negative.  Treated with antibiotics.  Hospital course complicated by worsening chronic anemia.  Received 1 unit PRBCs.  Unfortunately, developed pulmonary edema required IV diuresis.  TTE with stable EF and functioning bioprosthetic valve.  Diuresed with IV Lasix.  Continue to require some supplemental oxygen.  Discharged to  TCU    Patient admitted to TCU. Weaned from Oxygen. Progressed with physical therapy. BP overall appropriate for age. She asked to stop Lasix on TCU admission due to concerns for AE and her volume status remained stable off diuretics.     Patient evaluated today in her room. Continues to do well. Denies SOB. No edema. Eager to discharge back to [facility].    RUL Pneumonia: Treated with IV ceftriaxone and azithromycin. Viral panel negative  Completed cefdinir 3/12  Monitor off antibiotics      Acute HFpEF  H/o TAVR  HTN: EF 55-60%. Developed suspected acute pulm edema after transfusion. Received IV diuresis and transitioned to po lasix. Metoprolol XL increased ( mg/d) on hospital discharge due to elevated BP. Has numerous drug allergies  Continue Metoprolol XL increased to 100 mg/d   Patient does not want to continue Lasix.  She feels it caused adverse effects. RvB discussed and discontinued Lasix on TCU admission  2 times weekly weights.  Overall stable.  Appears euvolemic  BMP today reviewed and stable      Acute Hypoxic Respiratory Failure, resolved: Multifactorial due to CHF, Pneumonia and anemia  Now off oxygen    Acute on Chronic normocytic anemia: Yamil 6.8. Received PRBC. Hematology consulted and thought to be 2/2 acute illness. Hemolytic work up negative.. Hgb on discharge 10.2  CBC with Hgb 9.0. Down slightly from hospital discharge but largely stable. Planning to repeat with home care next week.      Physical deconditioning: At baseline lives in independent living apartment  PT/social work consulted    Atrial Fibrillation  Continue Metoprolol XL  Continue anticoagulation with apixaban    Glaucoma  Continue eyedrops      Discharge Medications:  MED REC REQUIRED  Post Medication Reconciliation Status: discharge medications reconciled and changed, per note/orders       Current Outpatient Medications   Medication Sig Dispense Refill     apixaban ANTICOAGULANT (ELIQUIS) 2.5 MG tablet Take 1 tablet (2.5 mg) by  "mouth 2 times daily. 180 tablet 3     metoprolol succinate ER (TOPROL XL) 100 MG 24 hr tablet Take 1 tablet (100 mg) by mouth daily.       polyethylene glycol (MIRALAX) 17 GM/Dose powder Take 17 g by mouth daily.       sennosides (SENOKOT) 8.6 MG tablet Take 1 tablet by mouth 2 times daily as needed for constipation.       tafluprost (ZIOPTAN) 0.0015 % SOLN ophthalmic solution Place 1 drop into both eyes at bedtime       timolol maleate (TIMOPTIC) 0.5 % ophthalmic solution Place 1 drop into both eyes 2 times daily.       Controlled medications:   not applicable/none     Past Medical History:   Past Medical History:   Diagnosis Date     AAA (abdominal aortic aneurysm)      Aortic stenosis      Benign essential hypertension with BP difference between arms      Hyperlipidemia LDL goal <70      Physical Exam:   Vitals: /66   Pulse 93   Temp 97.1  F (36.2  C)   Resp 17   Ht 1.626 m (5' 4\")   Wt 53.3 kg (117 lb 8 oz)   LMP  (LMP Unknown)   SpO2 93%   BMI 20.17 kg/m    BMI: Body mass index is 20.17 kg/m .  Physical Exam  Vitals (Facility EMR) reviewed.   Constitutional:       General: She is not in acute distress.  HENT:      Head: Normocephalic and atraumatic.   Eyes:      General: No scleral icterus.  Cardiovascular:      Rate and Rhythm: Normal rate and regular rhythm.      Heart sounds: Murmur heard.   Pulmonary:      Effort: Pulmonary effort is normal.      Breath sounds: No wheezing.   Musculoskeletal:      Right lower leg: No edema.      Left lower leg: No edema.   Skin:     General: Skin is warm and dry.      Findings: No rash.   Neurological:      Mental Status: She is alert. Mental status is at baseline.   Psychiatric:         Behavior: Behavior normal.         SNF labs: Recent labs in EPIC reviewed by me today.  and Most Recent 3 CBC's:  Recent Labs   Lab Test 03/18/25  0955 03/10/25  0719 03/09/25  0749   WBC 7.6 7.5 7.7   HGB 9.0* 10.2* 9.2*   MCV 94 89 90    314 294     Most Recent 3 " BMP's:  Recent Labs   Lab Test 03/18/25  0955 03/10/25  0719 03/09/25  0749    134* 134*   POTASSIUM 3.9 3.8 3.5   CHLORIDE 101 98 100   CO2 23 27 27   BUN 13.5 10.0 10.0   CR 0.69 0.43* 0.44*   ANIONGAP 12 9 7   JERRY 9.5 8.9 8.7*   * 100* 105*     Most Recent 2 LFT's:  Recent Labs   Lab Test 03/10/25  0719 03/09/25  0749   AST 52* 42   ALT 13 13   ALKPHOS 67 69   BILITOTAL 1.1 0.9     Most Recent TSH and T4:  Recent Labs   Lab Test 10/24/24  0950   TSH 1.85     Most Recent Hemoglobin A1c:No lab results found.    DISCHARGE PLAN:  Follow up labs: Repeat Hgb with home care next week with results to PCP. This should be done on or before 3/28  Medical Follow Up:      Follow up with primary care provider in 1-2 weeks    Discharge Services: Home Care:  Occupational Therapy, Physical Therapy, and Registered Nurse.  Discharge Instructions Verbalized to Patient at Discharge:   Weight bearing restrictions:  Weight bearing as tolerated.   Weigh yourself daily in the morning and keep a record. Call your primary clinic: a) if you are more short of breath, or b) if your weight changes more than 3 pounds in one day or more than 5 pounds in one week.     TOTAL DISCHARGE TIME:   Greater than 30 minutes  Electronically signed by:  Felicita Dumont PA-C     Documentation of Face to Face and Certification for Home Health Services    I certify that services are/were furnished while this patient was under the care of a physician and that a physician or an allowed non-physician practitioner (NPP), had a face-to-face encounter that meets the physician face-to-face encounter requirements. The encounter was in whole, or in part, related to the primary reason for home health. The patient is confined to his/her home and needs intermittent skilled nursing, physical therapy, speech-language pathology, or the continued need for occupational therapy. A plan of care has been established by a physician and is periodically reviewed by  a physician.  Date of Face-to-Face Encounter: 3/20/2025.    I certify that, based on my findings, the following services are medically necessary home health services: Nursing, Occupational Therapy, and Physical Therapy.  RN to draw Hgb on or before 3/28. Dx Anemia. Results to Dr. Cordero    My clinical findings support the need for the above skilled services because: Requires assistance of another person or specialized equipment to access medical services because patient: Requires supervision of another for safe transfer...    Patient to re-establish plan of care with their PCP within 7-10 days after leaving the facility to reestablish care.  Medicare certified PECOS provider: Felicita Dumont PA-C  Date: March 20, 2025                 Sincerely,        Felicita Dumont PA-C    Electronically signed

## 2025-03-20 NOTE — PATIENT INSTRUCTIONS
Kavita Merlos  YOB: 1935                                   Discharge Date: 3/22      PHYSICIAN's DISCHARGE SUMMARY / ORDER SHEET  1. Discharge to: Previous [facility]  2. Medications:      Current Outpatient Medications   Medication Sig Dispense Refill    apixaban ANTICOAGULANT (ELIQUIS) 2.5 MG tablet Take 1 tablet (2.5 mg) by mouth 2 times daily. 180 tablet 3    metoprolol succinate ER (TOPROL XL) 100 MG 24 hr tablet Take 1 tablet (100 mg) by mouth daily.      polyethylene glycol (MIRALAX) 17 GM/Dose powder Take 17 g by mouth daily.      sennosides (SENOKOT) 8.6 MG tablet Take 1 tablet by mouth 2 times daily as needed for constipation.      tafluprost (ZIOPTAN) 0.0015 % SOLN ophthalmic solution Place 1 drop into both eyes at bedtime      timolol maleate (TIMOPTIC) 0.5 % ophthalmic solution Place 1 drop into both eyes 2 times daily.        DISCHARGE PLAN:  Follow up labs: Repeat hemoglobin with home care next week with results to PCP (Ty Cordero). This should be done on or before 3/28  Medical Follow Up:      Follow up with primary care provider in 1-2 weeks    Discharge Services: Home Care:  Occupational Therapy, Physical Therapy, and Registered Nurse.  Discharge Instructions Verbalized to Patient at Discharge:   Weight bearing restrictions:  Weight bearing as tolerated.   Weigh yourself daily in the morning and keep a record. Call your primary clinic: a) if you are more short of breath, or b) if your weight changes more than 3 pounds in one day or more than 5 pounds in one week.     Discharge patient to home with current medications and treatments  Discharge Home with Home care as above  - Nursing call in 30 days supply of needed meds to pharmacy of choice upon discharge. Future refills by PCP Dr. Ty Cordero with phone number [phone].  - Please send home with original of these discharge instructions and copy for chart.       ______________________________  Felicita Dumont PA-C    [facility]                   3/20/2025

## 2025-03-20 NOTE — LETTER
3/20/2025      Kavita Merlos  [address]  [address]  [facility] [address]        [facility] GERIATRICS DISCHARGE SUMMARY  PATIENT'S NAME: Kavita Merlos  YOB: 1935  MEDICAL RECORD NUMBER:  [id number]  Place of Service where encounter took place:  [facility] (TCU) [[id number]]    PRIMARY CARE PROVIDER AND CLINIC RESPONSIBLE AFTER TRANSFER:   Ty Cordero MD, [address] / [facility] [address]    [facility] Provider     Transferring providers: Felicita Dumont PA-C, Dr. Viola Ramirez MD  Recent Hospitalization/ED:  [facility] Hospital stay 3/5/25 to 3/11/25.  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  Physical Function: Ambulating 550 ft with 4WW independent  DME: No DME needed    CODE STATUS/ADVANCE DIRECTIVES DISCUSSION:  No CPR- Do NOT Intubate   ALLERGIES: Albuterol, Amlodipine, Bimatoprost, Brimonidine, Clotrimazole, Dorzolamide hcl-timolol mal, Latanoprost, Lisinopril, Losartan, Neomycin-polymyxin-gramicidin, Neosporin [neomycin-polymyxin-gramicidin], Tape [adhesive tape], Timolol, Travoprost, Vicodin [hydrocodone-acetaminophen], and Penicillins    NURSING FACILITY COURSE   Kavita Merlos is an 89-year-old female with past medical history of aortic stenosis status post TAVR, HFpEF, hypertension, glaucoma who was recently hospitalized at [facility].  Presented for evaluation of URI symptoms.  Found to have right upper lobe pneumonia.  Viral panel was negative.  Treated with antibiotics.  Hospital course complicated by worsening chronic anemia.  Received 1 unit PRBCs.  Unfortunately, developed pulmonary edema required IV diuresis.  TTE with stable EF and functioning bioprosthetic valve.  Diuresed with IV Lasix.  Continue to require some supplemental oxygen.  Discharged to  TCU    Patient admitted to TCU. Weaned from Oxygen. Progressed with physical therapy. BP overall appropriate for age. She asked to stop Lasix on TCU admission due to concerns for AE and her volume status remained stable off diuretics.     Patient evaluated today in her room. Continues to do well. Denies SOB. No edema. Eager to discharge back to snf.    RUL Pneumonia: Treated with IV ceftriaxone and azithromycin. Viral panel negative  Completed cefdinir 3/12  Monitor off antibiotics      Acute HFpEF  H/o TAVR  HTN: EF 55-60%. Developed suspected acute pulm edema after transfusion. Received IV diuresis and transitioned to po lasix. Metoprolol XL increased ( mg/d) on hospital discharge due to elevated BP. Has numerous drug allergies  Continue Metoprolol XL increased to 100 mg/d   Patient does not want to continue Lasix.  She feels it caused adverse effects. RvB discussed and discontinued Lasix on TCU admission  2 times weekly weights.  Overall stable.  Appears euvolemic  BMP today reviewed and stable      Acute Hypoxic Respiratory Failure, resolved: Multifactorial due to CHF, Pneumonia and anemia  Now off oxygen    Acute on Chronic normocytic anemia: Yamil 6.8. Received PRBC. Hematology consulted and thought to be 2/2 acute illness. Hemolytic work up negative.. Hgb on discharge 10.2  CBC with Hgb 9.0. Down slightly from hospital discharge but largely stable. Planning to repeat with home care next week.      Physical deconditioning: At baseline lives in independent living apartment  PT/social work consulted    Atrial Fibrillation  Continue Metoprolol XL  Continue anticoagulation with apixaban    Glaucoma  Continue eyedrops      Discharge Medications:  MED REC REQUIRED  Post Medication Reconciliation Status: discharge medications reconciled and changed, per note/orders       Current Outpatient Medications   Medication Sig Dispense Refill     apixaban ANTICOAGULANT (ELIQUIS) 2.5 MG tablet Take 1 tablet (2.5 mg) by  "mouth 2 times daily. 180 tablet 3     metoprolol succinate ER (TOPROL XL) 100 MG 24 hr tablet Take 1 tablet (100 mg) by mouth daily.       polyethylene glycol (MIRALAX) 17 GM/Dose powder Take 17 g by mouth daily.       sennosides (SENOKOT) 8.6 MG tablet Take 1 tablet by mouth 2 times daily as needed for constipation.       tafluprost (ZIOPTAN) 0.0015 % SOLN ophthalmic solution Place 1 drop into both eyes at bedtime       timolol maleate (TIMOPTIC) 0.5 % ophthalmic solution Place 1 drop into both eyes 2 times daily.       Controlled medications:   not applicable/none     Past Medical History:   Past Medical History:   Diagnosis Date     AAA (abdominal aortic aneurysm)      Aortic stenosis      Benign essential hypertension with BP difference between arms      Hyperlipidemia LDL goal <70      Physical Exam:   Vitals: /66   Pulse 93   Temp 97.1  F (36.2  C)   Resp 17   Ht 1.626 m (5' 4\")   Wt 53.3 kg (117 lb 8 oz)   LMP  (LMP Unknown)   SpO2 93%   BMI 20.17 kg/m    BMI: Body mass index is 20.17 kg/m .  Physical Exam  Vitals (Facility EMR) reviewed.   Constitutional:       General: She is not in acute distress.  HENT:      Head: Normocephalic and atraumatic.   Eyes:      General: No scleral icterus.  Cardiovascular:      Rate and Rhythm: Normal rate and regular rhythm.      Heart sounds: Murmur heard.   Pulmonary:      Effort: Pulmonary effort is normal.      Breath sounds: No wheezing.   Musculoskeletal:      Right lower leg: No edema.      Left lower leg: No edema.   Skin:     General: Skin is warm and dry.      Findings: No rash.   Neurological:      Mental Status: She is alert. Mental status is at baseline.   Psychiatric:         Behavior: Behavior normal.         SNF labs: Recent labs in EPIC reviewed by me today.  and Most Recent 3 CBC's:  Recent Labs   Lab Test 03/18/25  0955 03/10/25  0719 03/09/25  0749   WBC 7.6 7.5 7.7   HGB 9.0* 10.2* 9.2*   MCV 94 89 90    314 294     Most Recent 3 " BMP's:  Recent Labs   Lab Test 03/18/25  0955 03/10/25  0719 03/09/25  0749    134* 134*   POTASSIUM 3.9 3.8 3.5   CHLORIDE 101 98 100   CO2 23 27 27   BUN 13.5 10.0 10.0   CR 0.69 0.43* 0.44*   ANIONGAP 12 9 7   JERRY 9.5 8.9 8.7*   * 100* 105*     Most Recent 2 LFT's:  Recent Labs   Lab Test 03/10/25  0719 03/09/25  0749   AST 52* 42   ALT 13 13   ALKPHOS 67 69   BILITOTAL 1.1 0.9     Most Recent TSH and T4:  Recent Labs   Lab Test 10/24/24  0950   TSH 1.85     Most Recent Hemoglobin A1c:No lab results found.    DISCHARGE PLAN:  Follow up labs: Repeat Hgb with home care next week with results to PCP. This should be done on or before 3/28  Medical Follow Up:      Follow up with primary care provider in 1-2 weeks    Discharge Services: Home Care:  Occupational Therapy, Physical Therapy, and Registered Nurse.  Discharge Instructions Verbalized to Patient at Discharge:   Weight bearing restrictions:  Weight bearing as tolerated.   Weigh yourself daily in the morning and keep a record. Call your primary clinic: a) if you are more short of breath, or b) if your weight changes more than 3 pounds in one day or more than 5 pounds in one week.     TOTAL DISCHARGE TIME:   Greater than 30 minutes  Electronically signed by:  Felicita Dumont PA-C     Documentation of Face to Face and Certification for Home Health Services    I certify that services are/were furnished while this patient was under the care of a physician and that a physician or an allowed non-physician practitioner (NPP), had a face-to-face encounter that meets the physician face-to-face encounter requirements. The encounter was in whole, or in part, related to the primary reason for home health. The patient is confined to his/her home and needs intermittent skilled nursing, physical therapy, speech-language pathology, or the continued need for occupational therapy. A plan of care has been established by a physician and is periodically reviewed by  a physician.  Date of Face-to-Face Encounter: 3/20/2025.    I certify that, based on my findings, the following services are medically necessary home health services: Nursing, Occupational Therapy, and Physical Therapy.  RN to draw Hgb on or before 3/28. Dx Anemia. Results to Dr. Cordero    My clinical findings support the need for the above skilled services because: Requires assistance of another person or specialized equipment to access medical services because patient: Requires supervision of another for safe transfer...    Patient to re-establish plan of care with their PCP within 7-10 days after leaving the facility to reestablish care.  Medicare certified PECOS provider: Felicita Dumont PA-C  Date: March 20, 2025                 Sincerely,        Felicita Dumont PA-C    Electronically signed

## 2025-03-20 NOTE — PROGRESS NOTES
[facility] GERIATRICS DISCHARGE SUMMARY  PATIENT'S NAME: Kavita Merlos  YOB: 1935  MEDICAL RECORD NUMBER:  [id number]  Place of Service where encounter took place:  [facility] ([facility]) [[id number]]    PRIMARY CARE PROVIDER AND CLINIC RESPONSIBLE AFTER TRANSFER:   Ty Cordero MD, [address] [facility] [address]    [facility] Provider     Transferring providers: Felicita Dumont PA-C, Dr. Viola Ramirez MD  Recent Hospitalization/ED:  [facility] stay 3/5/25 to 3/11/25.  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  Physical Function: Ambulating 550 ft with 4WW independent  DME: No DME needed    CODE STATUS/ADVANCE DIRECTIVES DISCUSSION:  No CPR- Do NOT Intubate   ALLERGIES: Albuterol, Amlodipine, Bimatoprost, Brimonidine, Clotrimazole, Dorzolamide hcl-timolol mal, Latanoprost, Lisinopril, Losartan, Neomycin-polymyxin-gramicidin, Neosporin [neomycin-polymyxin-gramicidin], Tape [adhesive tape], Timolol, Travoprost, Vicodin [hydrocodone-acetaminophen], and Penicillins    NURSING FACILITY COURSE   Kavita Merlos is an 89-year-old female with past medical history of aortic stenosis status post TAVR, HFpEF, hypertension, glaucoma who was recently hospitalized at [facility].  Presented for evaluation of URI symptoms.  Found to have right upper lobe pneumonia.  Viral panel was negative.  Treated with antibiotics.  Hospital course complicated by worsening chronic anemia.  Received 1 unit PRBCs.  Unfortunately, developed pulmonary edema required IV diuresis.  TTE with stable EF and functioning bioprosthetic valve.  Diuresed with IV Lasix.  Continue to require some supplemental oxygen.  Discharged to TCU    Patient admitted to TCU. Weaned from Oxygen. Progressed with physical therapy. BP overall  appropriate for age. She asked to stop Lasix on TCU admission due to concerns for AE and her volume status remained stable off diuretics.     Patient evaluated today in her room. Continues to do well. Denies SOB. No edema. Eager to discharge back to [facility].    RUL Pneumonia: Treated with IV ceftriaxone and azithromycin. Viral panel negative  Completed cefdinir 3/12  Monitor off antibiotics      Acute HFpEF  H/o TAVR  HTN: EF 55-60%. Developed suspected acute pulm edema after transfusion. Received IV diuresis and transitioned to po lasix. Metoprolol XL increased ( mg/d) on hospital discharge due to elevated BP. Has numerous drug allergies  Continue Metoprolol XL increased to 100 mg/d   Patient does not want to continue Lasix.  She feels it caused adverse effects. RvB discussed and discontinued Lasix on TCU admission  2 times weekly weights.  Overall stable.  Appears euvolemic  BMP today reviewed and stable      Acute Hypoxic Respiratory Failure, resolved: Multifactorial due to CHF, Pneumonia and anemia  Now off oxygen    Acute on Chronic normocytic anemia: Yamil 6.8. Received PRBC. Hematology consulted and thought to be 2/2 acute illness. Hemolytic work up negative.. Hgb on discharge 10.2  CBC with Hgb 9.0. Down slightly from hospital discharge but largely stable. Planning to repeat with home care next week.      Physical deconditioning: At baseline lives in independent living apartment  PT/social work consulted    Atrial Fibrillation  Continue Metoprolol XL  Continue anticoagulation with apixaban    Glaucoma  Continue eyedrops      Discharge Medications:  MED REC REQUIRED  Post Medication Reconciliation Status: discharge medications reconciled and changed, per note/orders       Current Outpatient Medications   Medication Sig Dispense Refill    apixaban ANTICOAGULANT (ELIQUIS) 2.5 MG tablet Take 1 tablet (2.5 mg) by mouth 2 times daily. 180 tablet 3    metoprolol succinate ER (TOPROL XL) 100 MG 24 hr tablet Take 1  "tablet (100 mg) by mouth daily.      polyethylene glycol (MIRALAX) 17 GM/Dose powder Take 17 g by mouth daily.      sennosides (SENOKOT) 8.6 MG tablet Take 1 tablet by mouth 2 times daily as needed for constipation.      tafluprost (ZIOPTAN) 0.0015 % SOLN ophthalmic solution Place 1 drop into both eyes at bedtime      timolol maleate (TIMOPTIC) 0.5 % ophthalmic solution Place 1 drop into both eyes 2 times daily.       Controlled medications:   not applicable/none     Past Medical History:   Past Medical History:   Diagnosis Date    AAA (abdominal aortic aneurysm)     Aortic stenosis     Benign essential hypertension with BP difference between arms     Hyperlipidemia LDL goal <70      Physical Exam:   Vitals: /66   Pulse 93   Temp 97.1  F (36.2  C)   Resp 17   Ht 1.626 m (5' 4\")   Wt 53.3 kg (117 lb 8 oz)   LMP  (LMP Unknown)   SpO2 93%   BMI 20.17 kg/m    BMI: Body mass index is 20.17 kg/m .  Physical Exam  Vitals (Facility EMR) reviewed.   Constitutional:       General: She is not in acute distress.  HENT:      Head: Normocephalic and atraumatic.   Eyes:      General: No scleral icterus.  Cardiovascular:      Rate and Rhythm: Normal rate and regular rhythm.      Heart sounds: Murmur heard.   Pulmonary:      Effort: Pulmonary effort is normal.      Breath sounds: No wheezing.   Musculoskeletal:      Right lower leg: No edema.      Left lower leg: No edema.   Skin:     General: Skin is warm and dry.      Findings: No rash.   Neurological:      Mental Status: She is alert. Mental status is at baseline.   Psychiatric:         Behavior: Behavior normal.         SNF labs: Recent labs in [facility] reviewed by me today.  and Most Recent 3 CBC's:  Recent Labs   Lab Test 03/18/25  0955 03/10/25  0719 03/09/25  0749   WBC 7.6 7.5 7.7   HGB 9.0* 10.2* 9.2*   MCV 94 89 90    314 294     Most Recent 3 BMP's:  Recent Labs   Lab Test 03/18/25  0955 03/10/25  0719 03/09/25  0749    134* 134*   POTASSIUM 3.9 " 3.8 3.5   CHLORIDE 101 98 100   CO2 23 27 27   BUN 13.5 10.0 10.0   CR 0.69 0.43* 0.44*   ANIONGAP 12 9 7   JERRY 9.5 8.9 8.7*   * 100* 105*     Most Recent 2 LFT's:  Recent Labs   Lab Test 03/10/25  0719 03/09/25  0749   AST 52* 42   ALT 13 13   ALKPHOS 67 69   BILITOTAL 1.1 0.9     Most Recent TSH and T4:  Recent Labs   Lab Test 10/24/24  0950   TSH 1.85     Most Recent Hemoglobin A1c:No lab results found.    DISCHARGE PLAN:  Follow up labs: Repeat Hgb with home care next week with results to PCP. This should be done on or before 3/28  Medical Follow Up:      Follow up with primary care provider in 1-2 weeks    Discharge Services: Home Care:  Occupational Therapy, Physical Therapy, and Registered Nurse.  Discharge Instructions Verbalized to Patient at Discharge:   Weight bearing restrictions:  Weight bearing as tolerated.   Weigh yourself daily in the morning and keep a record. Call your primary clinic: a) if you are more short of breath, or b) if your weight changes more than 3 pounds in one day or more than 5 pounds in one week.     TOTAL DISCHARGE TIME:   Greater than 30 minutes  Electronically signed by:  Felicita Dumont PA-C     Documentation of Face to Face and Certification for Home Health Services    I certify that services are/were furnished while this patient was under the care of a physician and that a physician or an allowed non-physician practitioner (NPP), had a face-to-face encounter that meets the physician face-to-face encounter requirements. The encounter was in whole, or in part, related to the primary reason for home health. The patient is confined to his/her home and needs intermittent skilled nursing, physical therapy, speech-language pathology, or the continued need for occupational therapy. A plan of care has been established by a physician and is periodically reviewed by a physician.  Date of Face-to-Face Encounter: 3/20/2025.    I certify that, based on my findings, the following  services are medically necessary home health services: Nursing, Occupational Therapy, and Physical Therapy.  RN to draw Hgb on or before 3/28. Dx Anemia. Results to Dr. Cordero    My clinical findings support the need for the above skilled services because: Requires assistance of another person or specialized equipment to access medical services because patient: Requires supervision of another for safe transfer...    Patient to re-establish plan of care with their PCP within 7-10 days after leaving the facility to reestablish care.  Medicare certified PEC[employer] provider: Felicita Dumnot PA-C  Date: March 20, 2025

## 2025-03-23 ENCOUNTER — APPOINTMENT (OUTPATIENT)
Dept: GENERAL RADIOLOGY | Facility: CLINIC | Age: 89
DRG: 193 | End: 2025-03-23
Attending: EMERGENCY MEDICINE
Payer: MEDICARE

## 2025-03-23 ENCOUNTER — HOSPITAL ENCOUNTER (INPATIENT)
Facility: CLINIC | Age: 89
DRG: 193 | End: 2025-03-23
Attending: EMERGENCY MEDICINE | Admitting: INTERNAL MEDICINE
Payer: MEDICARE

## 2025-03-23 DIAGNOSIS — R09.02 HYPOXIA: ICD-10-CM

## 2025-03-23 DIAGNOSIS — J18.9 PNEUMONIA OF RIGHT LOWER LOBE DUE TO INFECTIOUS ORGANISM: ICD-10-CM

## 2025-03-23 DIAGNOSIS — J21.0 RSV BRONCHIOLITIS: ICD-10-CM

## 2025-03-23 LAB
ANION GAP SERPL CALCULATED.3IONS-SCNC: 12 MMOL/L (ref 7–15)
BASOPHILS # BLD AUTO: 0.1 10E3/UL (ref 0–0.2)
BASOPHILS NFR BLD AUTO: 1 %
BUN SERPL-MCNC: 14.8 MG/DL (ref 8–23)
CALCIUM SERPL-MCNC: 8.7 MG/DL (ref 8.8–10.4)
CHLORIDE SERPL-SCNC: 94 MMOL/L (ref 98–107)
CREAT SERPL-MCNC: 0.54 MG/DL (ref 0.51–0.95)
EGFRCR SERPLBLD CKD-EPI 2021: 88 ML/MIN/1.73M2
EOSINOPHIL # BLD AUTO: 0 10E3/UL (ref 0–0.7)
EOSINOPHIL NFR BLD AUTO: 1 %
ERYTHROCYTE [DISTWIDTH] IN BLOOD BY AUTOMATED COUNT: 19 % (ref 10–15)
FLUAV RNA SPEC QL NAA+PROBE: NEGATIVE
FLUBV RNA RESP QL NAA+PROBE: NEGATIVE
GLUCOSE SERPL-MCNC: 123 MG/DL (ref 70–99)
HCO3 BLDV-SCNC: 23 MMOL/L (ref 21–28)
HCO3 SERPL-SCNC: 21 MMOL/L (ref 22–29)
HCT VFR BLD AUTO: 27.2 % (ref 35–47)
HGB BLD-MCNC: 8.8 G/DL (ref 11.7–15.7)
HOLD SPECIMEN: NORMAL
IMM GRANULOCYTES # BLD: 0 10E3/UL
IMM GRANULOCYTES NFR BLD: 1 %
LACTATE BLD-SCNC: 0.8 MMOL/L (ref 0.7–2)
LYMPHOCYTES # BLD AUTO: 0.8 10E3/UL (ref 0.8–5.3)
LYMPHOCYTES NFR BLD AUTO: 12 %
MCH RBC QN AUTO: 30 PG (ref 26.5–33)
MCHC RBC AUTO-ENTMCNC: 32.4 G/DL (ref 31.5–36.5)
MCV RBC AUTO: 93 FL (ref 78–100)
MONOCYTES # BLD AUTO: 0.6 10E3/UL (ref 0–1.3)
MONOCYTES NFR BLD AUTO: 10 %
NEUTROPHILS # BLD AUTO: 5 10E3/UL (ref 1.6–8.3)
NEUTROPHILS NFR BLD AUTO: 76 %
NRBC # BLD AUTO: 0 10E3/UL
NRBC BLD AUTO-RTO: 0 /100
NT-PROBNP SERPL-MCNC: 4677 PG/ML (ref 0–1800)
PCO2 BLDV: 41 MM HG (ref 40–50)
PH BLDV: 7.35 [PH] (ref 7.32–7.43)
PLATELET # BLD AUTO: 209 10E3/UL (ref 150–450)
PO2 BLDV: 52 MM HG (ref 25–47)
POTASSIUM SERPL-SCNC: 4.1 MMOL/L (ref 3.4–5.3)
PROCALCITONIN SERPL IA-MCNC: 0.09 NG/ML
RBC # BLD AUTO: 2.93 10E6/UL (ref 3.8–5.2)
RSV RNA SPEC NAA+PROBE: POSITIVE
SAO2 % BLDV: 85 % (ref 70–75)
SARS-COV-2 RNA RESP QL NAA+PROBE: NEGATIVE
SODIUM SERPL-SCNC: 127 MMOL/L (ref 135–145)
TROPONIN T SERPL HS-MCNC: 23 NG/L
TROPONIN T SERPL HS-MCNC: 24 NG/L
WBC # BLD AUTO: 6.5 10E3/UL (ref 4–11)

## 2025-03-23 PROCEDURE — 87637 SARSCOV2&INF A&B&RSV AMP PRB: CPT | Performed by: EMERGENCY MEDICINE

## 2025-03-23 PROCEDURE — 250N000011 HC RX IP 250 OP 636: Performed by: EMERGENCY MEDICINE

## 2025-03-23 PROCEDURE — 250N000011 HC RX IP 250 OP 636: Mod: JW | Performed by: HOSPITALIST

## 2025-03-23 PROCEDURE — 83605 ASSAY OF LACTIC ACID: CPT

## 2025-03-23 PROCEDURE — 93005 ELECTROCARDIOGRAM TRACING: CPT

## 2025-03-23 PROCEDURE — 85025 COMPLETE CBC W/AUTO DIFF WBC: CPT | Performed by: EMERGENCY MEDICINE

## 2025-03-23 PROCEDURE — 96374 THER/PROPH/DIAG INJ IV PUSH: CPT

## 2025-03-23 PROCEDURE — 80048 BASIC METABOLIC PNL TOTAL CA: CPT | Performed by: EMERGENCY MEDICINE

## 2025-03-23 PROCEDURE — 258N000003 HC RX IP 258 OP 636: Performed by: EMERGENCY MEDICINE

## 2025-03-23 PROCEDURE — 84484 ASSAY OF TROPONIN QUANT: CPT | Performed by: EMERGENCY MEDICINE

## 2025-03-23 PROCEDURE — 71046 X-RAY EXAM CHEST 2 VIEWS: CPT

## 2025-03-23 PROCEDURE — 83880 ASSAY OF NATRIURETIC PEPTIDE: CPT | Performed by: EMERGENCY MEDICINE

## 2025-03-23 PROCEDURE — 87040 BLOOD CULTURE FOR BACTERIA: CPT | Performed by: EMERGENCY MEDICINE

## 2025-03-23 PROCEDURE — 82803 BLOOD GASES ANY COMBINATION: CPT

## 2025-03-23 PROCEDURE — 84145 PROCALCITONIN (PCT): CPT | Performed by: INTERNAL MEDICINE

## 2025-03-23 PROCEDURE — 99285 EMERGENCY DEPT VISIT HI MDM: CPT | Mod: 25

## 2025-03-23 PROCEDURE — 120N000001 HC R&B MED SURG/OB

## 2025-03-23 PROCEDURE — 36415 COLL VENOUS BLD VENIPUNCTURE: CPT | Performed by: EMERGENCY MEDICINE

## 2025-03-23 PROCEDURE — 99223 1ST HOSP IP/OBS HIGH 75: CPT | Mod: AI | Performed by: INTERNAL MEDICINE

## 2025-03-23 PROCEDURE — 250N000011 HC RX IP 250 OP 636: Performed by: INTERNAL MEDICINE

## 2025-03-23 RX ORDER — TAFLUPROST OPTHALMIC 0 MG/.3ML
1 SOLUTION/ DROPS OPHTHALMIC AT BEDTIME
Status: DISCONTINUED | OUTPATIENT
Start: 2025-03-23 | End: 2025-04-01 | Stop reason: HOSPADM

## 2025-03-23 RX ORDER — SODIUM CHLORIDE 9 MG/ML
INJECTION, SOLUTION INTRAVENOUS CONTINUOUS
Status: DISCONTINUED | OUTPATIENT
Start: 2025-03-23 | End: 2025-03-24

## 2025-03-23 RX ORDER — BENZONATATE 100 MG/1
100 CAPSULE ORAL 3 TIMES DAILY PRN
Status: DISCONTINUED | OUTPATIENT
Start: 2025-03-23 | End: 2025-04-01 | Stop reason: HOSPADM

## 2025-03-23 RX ORDER — CEFTRIAXONE 2 G/1
2 INJECTION, POWDER, FOR SOLUTION INTRAMUSCULAR; INTRAVENOUS EVERY 24 HOURS
Status: DISCONTINUED | OUTPATIENT
Start: 2025-03-24 | End: 2025-03-30

## 2025-03-23 RX ORDER — TIMOLOL MALEATE 5 MG/ML
1 SOLUTION/ DROPS OPHTHALMIC 2 TIMES DAILY
Status: DISCONTINUED | OUTPATIENT
Start: 2025-03-24 | End: 2025-04-01 | Stop reason: HOSPADM

## 2025-03-23 RX ORDER — GUAIFENESIN 200 MG/10ML
200 LIQUID ORAL EVERY 4 HOURS PRN
Status: DISCONTINUED | OUTPATIENT
Start: 2025-03-23 | End: 2025-04-01 | Stop reason: HOSPADM

## 2025-03-23 RX ORDER — METOPROLOL SUCCINATE 100 MG/1
100 TABLET, EXTENDED RELEASE ORAL DAILY
Status: DISCONTINUED | OUTPATIENT
Start: 2025-03-24 | End: 2025-04-01 | Stop reason: HOSPADM

## 2025-03-23 RX ORDER — HYDROXYZINE HYDROCHLORIDE 25 MG/1
25 TABLET, FILM COATED ORAL EVERY 6 HOURS PRN
Status: DISCONTINUED | OUTPATIENT
Start: 2025-03-23 | End: 2025-04-01 | Stop reason: HOSPADM

## 2025-03-23 RX ORDER — HYDROXYZINE HYDROCHLORIDE 50 MG/1
50 TABLET, FILM COATED ORAL EVERY 6 HOURS PRN
Status: DISCONTINUED | OUTPATIENT
Start: 2025-03-23 | End: 2025-04-01 | Stop reason: HOSPADM

## 2025-03-23 RX ORDER — CEFTRIAXONE 1 G/1
1 INJECTION, POWDER, FOR SOLUTION INTRAMUSCULAR; INTRAVENOUS ONCE
Status: COMPLETED | OUTPATIENT
Start: 2025-03-23 | End: 2025-03-24

## 2025-03-23 RX ORDER — LABETALOL HYDROCHLORIDE 5 MG/ML
10 INJECTION, SOLUTION INTRAVENOUS EVERY 4 HOURS PRN
Status: DISCONTINUED | OUTPATIENT
Start: 2025-03-23 | End: 2025-04-01 | Stop reason: HOSPADM

## 2025-03-23 RX ORDER — HYDRALAZINE HYDROCHLORIDE 20 MG/ML
5 INJECTION INTRAMUSCULAR; INTRAVENOUS ONCE
Status: COMPLETED | OUTPATIENT
Start: 2025-03-23 | End: 2025-03-23

## 2025-03-23 RX ORDER — POLYETHYLENE GLYCOL 3350 17 G/17G
17 POWDER, FOR SOLUTION ORAL DAILY
Status: DISCONTINUED | OUTPATIENT
Start: 2025-03-24 | End: 2025-03-24

## 2025-03-23 RX ORDER — AZITHROMYCIN 500 MG/5ML
500 INJECTION, POWDER, LYOPHILIZED, FOR SOLUTION INTRAVENOUS ONCE
Status: COMPLETED | OUTPATIENT
Start: 2025-03-23 | End: 2025-03-23

## 2025-03-23 RX ORDER — PIPERACILLIN SODIUM, TAZOBACTAM SODIUM 4; .5 G/20ML; G/20ML
4.5 INJECTION, POWDER, LYOPHILIZED, FOR SOLUTION INTRAVENOUS ONCE
Status: DISCONTINUED | OUTPATIENT
Start: 2025-03-23 | End: 2025-03-23

## 2025-03-23 RX ORDER — ONDANSETRON 2 MG/ML
4 INJECTION INTRAMUSCULAR; INTRAVENOUS ONCE
Status: COMPLETED | OUTPATIENT
Start: 2025-03-23 | End: 2025-03-23

## 2025-03-23 RX ORDER — AZITHROMYCIN 250 MG/1
250 TABLET, FILM COATED ORAL EVERY EVENING
Status: COMPLETED | OUTPATIENT
Start: 2025-03-24 | End: 2025-03-27

## 2025-03-23 RX ORDER — PROCHLORPERAZINE MALEATE 5 MG/1
5 TABLET ORAL EVERY 6 HOURS PRN
Status: DISCONTINUED | OUTPATIENT
Start: 2025-03-23 | End: 2025-04-01 | Stop reason: HOSPADM

## 2025-03-23 RX ORDER — SENNOSIDES 8.6 MG
1 TABLET ORAL 2 TIMES DAILY PRN
Status: DISCONTINUED | OUTPATIENT
Start: 2025-03-23 | End: 2025-03-24

## 2025-03-23 RX ORDER — ACETAMINOPHEN 325 MG/1
650 TABLET ORAL EVERY 4 HOURS PRN
Status: DISCONTINUED | OUTPATIENT
Start: 2025-03-23 | End: 2025-04-01 | Stop reason: HOSPADM

## 2025-03-23 RX ORDER — LIDOCAINE 40 MG/G
CREAM TOPICAL
Status: DISCONTINUED | OUTPATIENT
Start: 2025-03-23 | End: 2025-03-31

## 2025-03-23 RX ORDER — ACETAMINOPHEN 650 MG/1
650 SUPPOSITORY RECTAL EVERY 4 HOURS PRN
Status: DISCONTINUED | OUTPATIENT
Start: 2025-03-23 | End: 2025-04-01 | Stop reason: HOSPADM

## 2025-03-23 RX ORDER — METOCLOPRAMIDE HYDROCHLORIDE 5 MG/ML
10 INJECTION INTRAMUSCULAR; INTRAVENOUS ONCE
Status: COMPLETED | OUTPATIENT
Start: 2025-03-23 | End: 2025-03-24

## 2025-03-23 RX ORDER — CALCIUM CARBONATE 500 MG/1
1000 TABLET, CHEWABLE ORAL 4 TIMES DAILY PRN
Status: DISCONTINUED | OUTPATIENT
Start: 2025-03-23 | End: 2025-04-01 | Stop reason: HOSPADM

## 2025-03-23 RX ORDER — ONDANSETRON 2 MG/ML
4 INJECTION INTRAMUSCULAR; INTRAVENOUS EVERY 6 HOURS PRN
Status: DISCONTINUED | OUTPATIENT
Start: 2025-03-23 | End: 2025-04-01 | Stop reason: HOSPADM

## 2025-03-23 RX ORDER — ONDANSETRON 4 MG/1
4 TABLET, ORALLY DISINTEGRATING ORAL EVERY 6 HOURS PRN
Status: DISCONTINUED | OUTPATIENT
Start: 2025-03-23 | End: 2025-04-01 | Stop reason: HOSPADM

## 2025-03-23 RX ADMIN — LABETALOL HYDROCHLORIDE 10 MG: 5 INJECTION, SOLUTION INTRAVENOUS at 23:19

## 2025-03-23 RX ADMIN — CEFTRIAXONE 1 G: 1 INJECTION, POWDER, FOR SOLUTION INTRAMUSCULAR; INTRAVENOUS at 23:59

## 2025-03-23 RX ADMIN — AZITHROMYCIN MONOHYDRATE 500 MG: 500 INJECTION, POWDER, LYOPHILIZED, FOR SOLUTION INTRAVENOUS at 20:25

## 2025-03-23 RX ADMIN — PROCHLORPERAZINE EDISYLATE 5 MG: 5 INJECTION INTRAMUSCULAR; INTRAVENOUS at 21:27

## 2025-03-23 RX ADMIN — ONDANSETRON 4 MG: 2 INJECTION, SOLUTION INTRAMUSCULAR; INTRAVENOUS at 20:51

## 2025-03-23 ASSESSMENT — ACTIVITIES OF DAILY LIVING (ADL)
ADLS_ACUITY_SCORE: 57

## 2025-03-23 NOTE — ED TRIAGE NOTES
"Pt presents via ems with increasingly feeling \"ill.\" Was admitted for pna, then was in TCU. Was discharged yx from TCU. Coming from the [place] in . Found to be at 89% on room air for ems, 4L implemented en route. Room air sat upon arrival 74%. Initially requiring up to 4-5L NC to bring up spo02. Now 97% on 3L NC. Pt endorses waves of nausea and lower abd pain. 4mg zofran given en route. Decreased PO intake. Bg 166.      Triage Assessment (Adult)       Row Name 03/23/25 1755          Triage Assessment    Airway WDL WDL        Respiratory WDL    Respiratory WDL X;cough;all  hypoxic on room air, 77%.                     "

## 2025-03-23 NOTE — ED PROVIDER NOTES
Emergency Department Note      History of Present Illness     Chief Complaint   Nausea and Cough      HPI   Kavita Merlos is a 89 year old female on Eliquis with a history of aortic stenosis, hypertension, hyperlipidemia, and glaucoma presenting for evaluation of cough, fatigue, and nausea. The patient's daughter reports that the patient was recently hospitalized for pneumonia and was discharged from TCU yesterday morning. Her current cough started 3-4 days ago, and the care unit checked her lungs which sounded clear. The patient had congestion and fatigue yesterday but was otherwise doing fine. Since leaving the care facility yesterday, her nausea, cough, fatigue, and chest congestion have gotten worse. She called her daughter this morning since she felt like she was not getting enough oxygen. Her daughter reports checking her oxygen saturation which measured 94% and decreased when she would cough. The patient reports that when she coughs, she feels like she is producing phlegm. She could not lay down and had to sit up because she couldn't breathe. She feels like her breathing is not quite as good right now but not bad. The patient denies fever, vomiting, diarrhea, leg edema, change in color, pain, and normally being on oxygen.    Independent Historian   Daughter as detailed above.    Review of External Notes   I reviewed discharged note from TCU from yesterday, 3/22/25.  I reviewed a discharge summary from [facility] from 3/20/25. Her status is no CPR- do not intubate. Suspected acute pulmonary edema after blood transfusion.  I reviewed a discharge summary from [facility] from 3/11/25. The patient was hospitalized for acute hypoxic respiratory failure and sepsis secondary to RUL pneumonia.    Past Medical History     Medical History and Problem List   Hypertension  Hyperlipidemia  Visual loss  Abdominal aortic aneurysm  Aortic stenosis    Medications  Eliquis  Toprol XL    Surgical  "History   Coronary angiogram  Right heart catheterization  Transcatheter aortic valve replacement-femoral approach  Peripheral vascular lithotripsy    Physical Exam     Patient Vitals for the past 24 hrs:   BP Temp Temp src Pulse Resp SpO2 Height Weight   03/23/25 2022 (!) 170/76 -- -- 65 16 97 % -- --   03/23/25 2000 (!) 188/78 -- -- 70 19 97 % -- --   03/23/25 1900 (!) 189/80 -- -- 72 21 95 % -- --   03/23/25 1845 (!) 202/86 -- -- 77 19 96 % -- --   03/23/25 1840 -- -- -- 80 20 97 % -- --   03/23/25 1830 (!) 196/108 -- -- 79 22 97 % -- --   03/23/25 1816 -- -- -- 72 19 97 % -- --   03/23/25 1815 (!) 188/83 -- -- 74 -- -- -- --   03/23/25 1812 -- -- -- 78 21 97 % -- --   03/23/25 1811 (!) 206/100 -- -- 78 -- -- -- --   03/23/25 1805 -- -- -- -- -- -- 1.626 m (5' 4\") 53 kg (116 lb 13.5 oz)   03/23/25 1804 -- -- -- 74 20 97 % -- --   03/23/25 1803 -- -- -- 79 21 97 % -- --   03/23/25 1801 -- -- -- 84 18 97 % -- --   03/23/25 1800 (!) 200/105 98.3  F (36.8  C) Oral 74 26 (!) 74 % -- --     Physical Exam  General: The patient is alert, in mild respiratory distress.    HENT: Mucous membranes moist.    Cardiovascular: Regular rate and rhythm. Good pulses in all four extremities. Normal capillary refill and skin turgor.     Respiratory: Nasal cannula. Tachypneic, breathy voice. Cough and decreased air movement    Gastrointestinal: Abdomen soft. No guarding, no rebound. No palpable hernias.     Musculoskeletal: No gross deformity.     Skin: No rashes or petechiae. Pale    Neurologic: The patient is alert and oriented x3. GCS 15. No testable cranial nerve deficit. Follows commands with clear and appropriate speech. Gives appropriate answers. Good strength in all extremities. No gross neurologic deficit. Gross sensation intact. Pupils are round and reactive. No meningismus.     Lymphatic: No cervical adenopathy. Moderate lower extremity swelling.    Psychiatric: The patient is non-tearful.      Diagnostics     Lab Results "   Labs Ordered and Resulted from Time of ED Arrival to Time of ED Departure   BASIC METABOLIC PANEL - Abnormal       Result Value    Sodium 127 (*)     Potassium 4.1      Chloride 94 (*)     Carbon Dioxide (CO2) 21 (*)     Anion Gap 12      Urea Nitrogen 14.8      Creatinine 0.54      GFR Estimate 88      Calcium 8.7 (*)     Glucose 123 (*)    TROPONIN T, HIGH SENSITIVITY - Abnormal    Troponin T, High Sensitivity 24 (*)    NT PROBNP INPATIENT - Abnormal    N terminal Pro BNP Inpatient 4,677 (*)    INFLUENZA A/B, RSV AND SARS-COV2 PCR - Abnormal    Influenza A PCR Negative      Influenza B PCR Negative      RSV PCR Positive (*)     SARS CoV2 PCR Negative     CBC WITH PLATELETS AND DIFFERENTIAL - Abnormal    WBC Count 6.5      RBC Count 2.93 (*)     Hemoglobin 8.8 (*)     Hematocrit 27.2 (*)     MCV 93      MCH 30.0      MCHC 32.4      RDW 19.0 (*)     Platelet Count 209      % Neutrophils 76      % Lymphocytes 12      % Monocytes 10      % Eosinophils 1      % Basophils 1      % Immature Granulocytes 1      NRBCs per 100 WBC 0      Absolute Neutrophils 5.0      Absolute Lymphocytes 0.8      Absolute Monocytes 0.6      Absolute Eosinophils 0.0      Absolute Basophils 0.1      Absolute Immature Granulocytes 0.0      Absolute NRBCs 0.0     ISTAT GASES LACTATE VENOUS POCT - Abnormal    Lactic Acid POCT 0.8      Bicarbonate Venous POCT 23      O2 Sat, Venous POCT 85 (*)     pCO2 Venous POCT 41      pH Venous POCT 7.35      pO2 Venous POCT 52 (*)    TROPONIN T, HIGH SENSITIVITY   PROCALCITONIN   BLOOD CULTURE   BLOOD CULTURE       Imaging   XR Chest 2 Views   Final Result   IMPRESSION: Aortic valve prosthesis. Patchy opacities throughout the right lung with the upper lobe opacities improved since prior study but increasing opacities in the right mid and lower lung likely new pneumonia.          EKG   ECG taken at 1759, ECG read at 1839  Normal sinus rhythm  RSR' or QR pattern in V1 suggests right ventricular conduction  delay   New peaked T waves as compared to prior, dated 3/7/25.  Rate 75 bpm. MN interval 142 ms. QRS duration 90 ms. QT/QTc 394/439 ms. P-R-T axes 23 -18 85.    Independent Interpretation   CXR: worsening consolidation on the right.    ED Course      Medications Administered   Medications   azithromycin (ZITHROMAX) 500 mg in  mL intermittent infusion (500 mg Intravenous $New Bag 3/23/25 2025)   cefTRIAXone (ROCEPHIN) 1 g vial to attach to  mL bag for ADULTS or NS 50 mL bag for PEDS (has no administration in time range)   hydrALAZINE (APRESOLINE) injection 5 mg (has no administration in time range)   ondansetron (ZOFRAN) injection 4 mg (has no administration in time range)       Procedures   Procedures     Discussion of Management   I does not discussed the case with Dr. Orta of the hospitalist service who agrees to admit the patient    ED Course   ED Course as of 03/23/25 2045   Sun Mar 23, 2025   1828 I obtained history and performed physical exam.   1943 I received a note that the patient is tachypneic.    1949 Recheck. She is not having labored breathing.         Medical Decision Making / Diagnosis         MDM   Kavita Merlos is a 89 year old female who had recently been in the hospital for pneumonia with a blood transfusion causing pulmonary edema the patient had eventually been discharged to a TCU and in talking to the family it sounds like several days ago she developed a cough different from her previous cough.  When she went home she became suddenly more short of breath was hypoxic to the point when she came to the ER and was on room air she was at 74%.  The patient has decreased breath sounds here there is lower extremity swelling I did consider with her history there could be CHF however the other pulmonary processes are were considered with her recent pneumonia I did consider that there could be worsening pneumonia cultures were ordered lactic acid level her white count is elevated BNP is  elevated.  However on her chest x-ray there are new infiltrates on the right reviewed treated with antibiotics and then later it returned that she is positive for RSV I discussed with the admitting hospitalist who was concerned about her blood pressure that she does have some hyponatremia I think RSV is playing a big part of this recovering her with antibiotics for pneumonia and the patient was admitted with hypoxia on oxygen her ACT oxygen saturations are appropriate but she was set mid 70s on arrival to the ER off oxygen.    Disposition   The patient was admitted to the hospital.         Diagnosis     ICD-10-CM    1. Pneumonia of right lower lobe due to infectious organism  J18.9       2. Hypoxia  R09.02       3. RSV bronchiolitis  J21.0            Discharge Medications   New Prescriptions    No medications on file         Scribe Disclosure:  I, Marianne Myles, am serving as a scribe at 7:33 PM on 3/23/2025 to document services personally performed by Bulmaro Gan MD based on my observations and the provider's statements to me.     Scribe Disclosure:  I, Xin Richards, am serving as a scribe at 7:43 PM on 3/23/2025 to document services personally performed by Bulmaro Gan MD based on my observations and the provider's statements to me.       Bulmaro Gan MD  03/23/25 2047

## 2025-03-24 ENCOUNTER — APPOINTMENT (OUTPATIENT)
Dept: GENERAL RADIOLOGY | Facility: CLINIC | Age: 89
DRG: 193 | End: 2025-03-24
Attending: INTERNAL MEDICINE
Payer: MEDICARE

## 2025-03-24 LAB
ANION GAP SERPL CALCULATED.3IONS-SCNC: 13 MMOL/L (ref 7–15)
ANION GAP SERPL CALCULATED.3IONS-SCNC: 14 MMOL/L (ref 7–15)
ATRIAL RATE - MUSE: 67 BPM
ATRIAL RATE - MUSE: 75 BPM
BASE EXCESS BLDV CALC-SCNC: -0.2 MMOL/L (ref -3–3)
BASE EXCESS BLDV CALC-SCNC: -1.7 MMOL/L (ref -3–3)
BASE EXCESS BLDV CALC-SCNC: -2.6 MMOL/L (ref -3–3)
BASE EXCESS BLDV CALC-SCNC: -2.9 MMOL/L (ref -3–3)
BUN SERPL-MCNC: 15.5 MG/DL (ref 8–23)
BUN SERPL-MCNC: 15.6 MG/DL (ref 8–23)
CALCIUM SERPL-MCNC: 8.5 MG/DL (ref 8.8–10.4)
CALCIUM SERPL-MCNC: 8.6 MG/DL (ref 8.8–10.4)
CHLORIDE SERPL-SCNC: 93 MMOL/L (ref 98–107)
CHLORIDE SERPL-SCNC: 94 MMOL/L (ref 98–107)
CREAT SERPL-MCNC: 0.53 MG/DL (ref 0.51–0.95)
CREAT SERPL-MCNC: 0.54 MG/DL (ref 0.51–0.95)
DIASTOLIC BLOOD PRESSURE - MUSE: NORMAL MMHG
DIASTOLIC BLOOD PRESSURE - MUSE: NORMAL MMHG
EGFRCR SERPLBLD CKD-EPI 2021: 88 ML/MIN/1.73M2
EGFRCR SERPLBLD CKD-EPI 2021: 88 ML/MIN/1.73M2
ERYTHROCYTE [DISTWIDTH] IN BLOOD BY AUTOMATED COUNT: 19.3 % (ref 10–15)
FRAGMENTS BLD QL SMEAR: ABNORMAL
GLUCOSE SERPL-MCNC: 112 MG/DL (ref 70–99)
GLUCOSE SERPL-MCNC: 170 MG/DL (ref 70–99)
HCO3 BLDV-SCNC: 24 MMOL/L (ref 21–28)
HCO3 BLDV-SCNC: 26 MMOL/L (ref 21–28)
HCO3 BLDV-SCNC: 27 MMOL/L (ref 21–28)
HCO3 BLDV-SCNC: 27 MMOL/L (ref 21–28)
HCO3 SERPL-SCNC: 21 MMOL/L (ref 22–29)
HCO3 SERPL-SCNC: 21 MMOL/L (ref 22–29)
HCT VFR BLD AUTO: 32.8 % (ref 35–47)
HGB BLD-MCNC: 10.5 G/DL (ref 11.7–15.7)
INTERPRETATION ECG - MUSE: NORMAL
INTERPRETATION ECG - MUSE: NORMAL
MCH RBC QN AUTO: 30.2 PG (ref 26.5–33)
MCHC RBC AUTO-ENTMCNC: 32 G/DL (ref 31.5–36.5)
MCV RBC AUTO: 94 FL (ref 78–100)
O2/TOTAL GAS SETTING VFR VENT: 0 %
O2/TOTAL GAS SETTING VFR VENT: 3 %
O2/TOTAL GAS SETTING VFR VENT: 3 %
O2/TOTAL GAS SETTING VFR VENT: 94 %
OXYHGB MFR BLDV: 31 % (ref 70–75)
OXYHGB MFR BLDV: 36 % (ref 70–75)
OXYHGB MFR BLDV: 44 % (ref 70–75)
OXYHGB MFR BLDV: 81 % (ref 70–75)
P AXIS - MUSE: 23 DEGREES
P AXIS - MUSE: 25 DEGREES
PCO2 BLDV: 51 MM HG (ref 40–50)
PCO2 BLDV: 53 MM HG (ref 40–50)
PCO2 BLDV: 60 MM HG (ref 40–50)
PCO2 BLDV: 62 MM HG (ref 40–50)
PH BLDV: 7.24 [PH] (ref 7.32–7.43)
PH BLDV: 7.24 [PH] (ref 7.32–7.43)
PH BLDV: 7.28 [PH] (ref 7.32–7.43)
PH BLDV: 7.31 [PH] (ref 7.32–7.43)
PLAT MORPH BLD: ABNORMAL
PLATELET # BLD AUTO: 167 10E3/UL (ref 150–450)
PO2 BLDV: 24 MM HG (ref 25–47)
PO2 BLDV: 26 MM HG (ref 25–47)
PO2 BLDV: 27 MM HG (ref 25–47)
PO2 BLDV: 54 MM HG (ref 25–47)
POTASSIUM SERPL-SCNC: 4.1 MMOL/L (ref 3.4–5.3)
POTASSIUM SERPL-SCNC: 4.1 MMOL/L (ref 3.4–5.3)
PR INTERVAL - MUSE: 142 MS
PR INTERVAL - MUSE: 148 MS
QRS DURATION - MUSE: 90 MS
QRS DURATION - MUSE: 94 MS
QT - MUSE: 394 MS
QT - MUSE: 448 MS
QTC - MUSE: 439 MS
QTC - MUSE: 473 MS
R AXIS - MUSE: -18 DEGREES
R AXIS - MUSE: 74 DEGREES
RBC # BLD AUTO: 3.48 10E6/UL (ref 3.8–5.2)
RBC MORPH BLD: ABNORMAL
SAO2 % BLDV: 32 % (ref 70–75)
SAO2 % BLDV: 37.8 % (ref 70–75)
SAO2 % BLDV: 45.4 % (ref 70–75)
SAO2 % BLDV: 84.1 % (ref 70–75)
SODIUM SERPL-SCNC: 127 MMOL/L (ref 135–145)
SODIUM SERPL-SCNC: 129 MMOL/L (ref 135–145)
SYSTOLIC BLOOD PRESSURE - MUSE: NORMAL MMHG
SYSTOLIC BLOOD PRESSURE - MUSE: NORMAL MMHG
T AXIS - MUSE: 75 DEGREES
T AXIS - MUSE: 85 DEGREES
TROPONIN T SERPL HS-MCNC: 22 NG/L
VENTRICULAR RATE- MUSE: 67 BPM
VENTRICULAR RATE- MUSE: 75 BPM
WBC # BLD AUTO: 6.6 10E3/UL (ref 4–11)

## 2025-03-24 PROCEDURE — 250N000009 HC RX 250: Performed by: INTERNAL MEDICINE

## 2025-03-24 PROCEDURE — 5A09357 ASSISTANCE WITH RESPIRATORY VENTILATION, LESS THAN 24 CONSECUTIVE HOURS, CONTINUOUS POSITIVE AIRWAY PRESSURE: ICD-10-PCS | Performed by: HOSPITALIST

## 2025-03-24 PROCEDURE — 82805 BLOOD GASES W/O2 SATURATION: CPT | Performed by: INTERNAL MEDICINE

## 2025-03-24 PROCEDURE — 94640 AIRWAY INHALATION TREATMENT: CPT

## 2025-03-24 PROCEDURE — 84484 ASSAY OF TROPONIN QUANT: CPT | Performed by: INTERNAL MEDICINE

## 2025-03-24 PROCEDURE — 80048 BASIC METABOLIC PNL TOTAL CA: CPT | Performed by: INTERNAL MEDICINE

## 2025-03-24 PROCEDURE — 999N000157 HC STATISTIC RCP TIME EA 10 MIN

## 2025-03-24 PROCEDURE — 85027 COMPLETE CBC AUTOMATED: CPT | Performed by: INTERNAL MEDICINE

## 2025-03-24 PROCEDURE — 258N000003 HC RX IP 258 OP 636: Performed by: INTERNAL MEDICINE

## 2025-03-24 PROCEDURE — 250N000013 HC RX MED GY IP 250 OP 250 PS 637: Performed by: INTERNAL MEDICINE

## 2025-03-24 PROCEDURE — 71045 X-RAY EXAM CHEST 1 VIEW: CPT

## 2025-03-24 PROCEDURE — 250N000011 HC RX IP 250 OP 636: Performed by: INTERNAL MEDICINE

## 2025-03-24 PROCEDURE — 94660 CPAP INITIATION&MGMT: CPT

## 2025-03-24 PROCEDURE — 36415 COLL VENOUS BLD VENIPUNCTURE: CPT | Performed by: INTERNAL MEDICINE

## 2025-03-24 PROCEDURE — 99232 SBSQ HOSP IP/OBS MODERATE 35: CPT | Performed by: INTERNAL MEDICINE

## 2025-03-24 PROCEDURE — 120N000001 HC R&B MED SURG/OB

## 2025-03-24 RX ORDER — POLYETHYLENE GLYCOL 3350 17 G/17G
1 POWDER, FOR SOLUTION ORAL EVERY OTHER DAY
COMMUNITY

## 2025-03-24 RX ORDER — MULTIPLE VITAMINS W/ MINERALS TAB 9MG-400MCG
1 TAB ORAL DAILY
Status: DISCONTINUED | OUTPATIENT
Start: 2025-03-24 | End: 2025-04-01 | Stop reason: HOSPADM

## 2025-03-24 RX ORDER — ALBUTEROL SULFATE 0.83 MG/ML
SOLUTION RESPIRATORY (INHALATION)
Status: COMPLETED
Start: 2025-03-24 | End: 2025-03-24

## 2025-03-24 RX ADMIN — GUAIFENESIN 200 MG: 100 LIQUID ORAL at 14:50

## 2025-03-24 RX ADMIN — ALBUTEROL SULFATE 2.5 MG: 2.5 SOLUTION RESPIRATORY (INHALATION) at 00:17

## 2025-03-24 RX ADMIN — TIMOLOL MALEATE 1 DROP: 5 SOLUTION/ DROPS OPHTHALMIC at 09:37

## 2025-03-24 RX ADMIN — GUAIFENESIN 200 MG: 100 LIQUID ORAL at 09:38

## 2025-03-24 RX ADMIN — TAFLUPROST OPTHALMIC 1 DROP: 0 SOLUTION/ DROPS OPHTHALMIC at 21:44

## 2025-03-24 RX ADMIN — CEFTRIAXONE 2 G: 2 INJECTION, POWDER, FOR SOLUTION INTRAMUSCULAR; INTRAVENOUS at 21:43

## 2025-03-24 RX ADMIN — Medication 1 TABLET: at 14:52

## 2025-03-24 RX ADMIN — APIXABAN 2.5 MG: 2.5 TABLET, FILM COATED ORAL at 21:42

## 2025-03-24 RX ADMIN — METOPROLOL SUCCINATE 100 MG: 100 TABLET, EXTENDED RELEASE ORAL at 09:38

## 2025-03-24 RX ADMIN — TIMOLOL MALEATE 1 DROP: 5 SOLUTION/ DROPS OPHTHALMIC at 18:23

## 2025-03-24 RX ADMIN — SODIUM CHLORIDE: 0.9 INJECTION, SOLUTION INTRAVENOUS at 00:00

## 2025-03-24 RX ADMIN — APIXABAN 2.5 MG: 2.5 TABLET, FILM COATED ORAL at 09:38

## 2025-03-24 RX ADMIN — AZITHROMYCIN DIHYDRATE 250 MG: 250 TABLET ORAL at 21:43

## 2025-03-24 ASSESSMENT — ACTIVITIES OF DAILY LIVING (ADL)
ADLS_ACUITY_SCORE: 58
ADLS_ACUITY_SCORE: 58
ADLS_ACUITY_SCORE: 57
ADLS_ACUITY_SCORE: 62
ADLS_ACUITY_SCORE: 58
ADLS_ACUITY_SCORE: 58
ADLS_ACUITY_SCORE: 62
DEPENDENT_IADLS:: CLEANING;LAUNDRY;SHOPPING;MEAL PREPARATION;TRANSPORTATION
ADLS_ACUITY_SCORE: 59
ADLS_ACUITY_SCORE: 58
ADLS_ACUITY_SCORE: 59
ADLS_ACUITY_SCORE: 57
ADLS_ACUITY_SCORE: 62
ADLS_ACUITY_SCORE: 58
ADLS_ACUITY_SCORE: 58
ADLS_ACUITY_SCORE: 62
ADLS_ACUITY_SCORE: 62
ADLS_ACUITY_SCORE: 59
ADLS_ACUITY_SCORE: 62
ADLS_ACUITY_SCORE: 58

## 2025-03-24 NOTE — ED NOTES
[facility]  ED Nurse Handoff Report    ED Chief complaint: Nausea and Cough  . ED Diagnosis:   Final diagnoses:   Pneumonia of right lower lobe due to infectious organism   Hypoxia   RSV bronchiolitis       Allergies:   Allergies   Allergen Reactions    Albuterol     Amlodipine     Bimatoprost Itching    Brimonidine Itching    Clotrimazole Itching    Dorzolamide Hcl-Timolol Mal Itching    Latanoprost Itching    Lisinopril     Losartan      depression    Neomycin-Polymyxin-Gramicidin Swelling    Neosporin [Neomycin-Polymyxin-Gramicidin]     Tape [Adhesive Tape]     Timolol Itching    Travoprost Itching    Vicodin [Hydrocodone-Acetaminophen]     Penicillins Unknown     Extensive use and toleration of cephalosporins       Code Status: DNR / DNI    Activity level - Baseline/Home:  assist of 1.  Activity Level - Current:   in bed.   Lift room needed: No.   Bariatric: No   Needed: No   Isolation: Yes  Infection: Not Applicable  Other . RSV/Pneumonia    Respiratory status: Nasal cannula    Vital Signs (within 30 minutes):   Vitals:    03/23/25 1900 03/23/25 2000 03/23/25 2022 03/23/25 2048   BP: (!) 189/80 (!) 188/78 (!) 170/76 (!) 163/147   Pulse: 72 70 65 90   Resp: 21 19 16 16   Temp:       TempSrc:       SpO2: 95% 97% 97% 96%   Weight:       Height:           Cardiac Rhythm:  ,      Pain level:    Patient confused: No.   Patient Falls Risk: nonskid shoes/slippers when out of bed, patient and family education, and assistive device/personal items within reach.   Elimination Status: Urethral catheter (christopher) in place; refer to orders to discontinue as per protocol  -andrzej    Patient Report - Initial Complaint: SOB, Cough, Nausea   Focused Assessment:  aKvita Merlos is a 89 year old female on Eliquis with a history of aortic stenosis, hypertension, hyperlipidemia, and glaucoma presenting for evaluation of cough, fatigue, and nausea. The patient's daughter reports that the patient was  recently hospitalized for pneumonia and was discharged from TCU yesterday morning. Her current cough started 3-4 days ago, and the care unit checked her lungs which sounded clear. The patient had congestion and fatigue yesterday but was otherwise doing fine. Since leaving the care facility yesterday, her nausea, cough, fatigue, and chest congestion have gotten worse. She called her daughter this morning since she felt like she was not getting enough oxygen. Her daughter reports checking her oxygen saturation which measured 94% and decreased when she would cough. The patient reports that when she coughs, she feels like she is producing phlegm. She could not lay down and had to sit up because she couldn't breathe. She feels like her breathing is not quite as good right now but not bad. The patient denies fever, vomiting, diarrhea, leg edema, change in color, pain, and normally being on oxygen.     Abnormal Results:   Labs Ordered and Resulted from Time of ED Arrival to Time of ED Departure   BASIC METABOLIC PANEL - Abnormal       Result Value    Sodium 127 (*)     Potassium 4.1      Chloride 94 (*)     Carbon Dioxide (CO2) 21 (*)     Anion Gap 12      Urea Nitrogen 14.8      Creatinine 0.54      GFR Estimate 88      Calcium 8.7 (*)     Glucose 123 (*)    TROPONIN T, HIGH SENSITIVITY - Abnormal    Troponin T, High Sensitivity 24 (*)    NT PROBNP INPATIENT - Abnormal    N terminal Pro BNP Inpatient 4,677 (*)    INFLUENZA A/B, RSV AND SARS-COV2 PCR - Abnormal    Influenza A PCR Negative      Influenza B PCR Negative      RSV PCR Positive (*)     SARS CoV2 PCR Negative     CBC WITH PLATELETS AND DIFFERENTIAL - Abnormal    WBC Count 6.5      RBC Count 2.93 (*)     Hemoglobin 8.8 (*)     Hematocrit 27.2 (*)     MCV 93      MCH 30.0      MCHC 32.4      RDW 19.0 (*)     Platelet Count 209      % Neutrophils 76      % Lymphocytes 12      % Monocytes 10      % Eosinophils 1      % Basophils 1      % Immature Granulocytes 1       NRBCs per 100 WBC 0      Absolute Neutrophils 5.0      Absolute Lymphocytes 0.8      Absolute Monocytes 0.6      Absolute Eosinophils 0.0      Absolute Basophils 0.1      Absolute Immature Granulocytes 0.0      Absolute NRBCs 0.0     ISTAT GASES LACTATE VENOUS POCT - Abnormal    Lactic Acid POCT 0.8      Bicarbonate Venous POCT 23      O2 Sat, Venous POCT 85 (*)     pCO2 Venous POCT 41      pH Venous POCT 7.35      pO2 Venous POCT 52 (*)    TROPONIN T, HIGH SENSITIVITY   PROCALCITONIN   BLOOD CULTURE   BLOOD CULTURE        XR Chest 2 Views   Final Result   IMPRESSION: Aortic valve prosthesis. Patchy opacities throughout the right lung with the upper lobe opacities improved since prior study but increasing opacities in the right mid and lower lung likely new pneumonia.          Treatments provided: Labs, imaging, abx, O2, antiemetics   Family Comments: At bedside   OBS brochure/video discussed/provided to patient:  No  ED Medications:   Medications   azithromycin (ZITHROMAX) 500 mg in  mL intermittent infusion (500 mg Intravenous $New Bag 3/23/25 2025)   cefTRIAXone (ROCEPHIN) 1 g vial to attach to  mL bag for ADULTS or NS 50 mL bag for PEDS (has no administration in time range)   hydrALAZINE (APRESOLINE) injection 5 mg (has no administration in time range)   apixaban ANTICOAGULANT (ELIQUIS) tablet 2.5 mg (has no administration in time range)   metoprolol succinate ER (TOPROL XL) 24 hr tablet 100 mg (has no administration in time range)   polyethylene glycol (MIRALAX) powder 17 g (has no administration in time range)   sennosides (SENOKOT) tablet 1 tablet (has no administration in time range)   tafluprost (ZIOPTAN) ophthalmic solution 1 drop (has no administration in time range)   timolol maleate (TIMOPTIC) 0.5 % ophthalmic solution 1 drop (has no administration in time range)   lidocaine 1 % 0.1-1 mL (has no administration in time range)   lidocaine (LMX4) cream (has no administration in time range)    sodium chloride (PF) 0.9% PF flush 3 mL (has no administration in time range)   sodium chloride (PF) 0.9% PF flush 3 mL (has no administration in time range)   calcium carbonate (TUMS) chewable tablet 1,000 mg (has no administration in time range)   Patient is already receiving anticoagulation with heparin, enoxaparin (LOVENOX), warfarin (COUMADIN)  or other anticoagulant medication (has no administration in time range)   acetaminophen (TYLENOL) tablet 650 mg (has no administration in time range)     Or   acetaminophen (TYLENOL) Suppository 650 mg (has no administration in time range)   melatonin tablet 1 mg (has no administration in time range)   prochlorperazine (COMPAZINE) injection 5 mg (has no administration in time range)     Or   prochlorperazine (COMPAZINE) tablet 5 mg (has no administration in time range)   ondansetron (ZOFRAN ODT) ODT tab 4 mg (has no administration in time range)     Or   ondansetron (ZOFRAN) injection 4 mg (has no administration in time range)   benzonatate (TESSALON) capsule 100 mg (has no administration in time range)   guaiFENesin (ROBITUSSIN) 20 mg/mL solution 200 mg (has no administration in time range)   cefTRIAXone (ROCEPHIN) 2 g vial to attach to  ml bag for ADULTS or NS 50 ml bag for PEDS (has no administration in time range)   azithromycin (ZITHROMAX) tablet 250 mg (has no administration in time range)   ondansetron (ZOFRAN) injection 4 mg (4 mg Intravenous $Given 3/23/25 2051)       Drips infusing:  Yes-Abx  For the majority of the shift this patient was Green.   Interventions performed were .    Sepsis treatment initiated: No    Cares/treatment/interventions/medications to be completed following ED care: Continue Abx, support O2 needs, support nausea sx.     ED Nurse Name: Viola Kate RN  9:06 PM     RECEIVING UNIT ED HANDOFF REVIEW    Above ED Nurse Handoff Report was reviewed: Yes  Reviewed by: Yesenia Toledo RN on March 23, 2025 at 9:36 PM   PEBBLES Brewer  called the ED to inform them the note was read: Yes

## 2025-03-24 NOTE — PROGRESS NOTES
[facility]    Hospitalist Progress Note  Name: Kavita Merlos    MRN: [id number]  Provider:  Isael Jeronimo DO  Date of Service: 03/24/2025    Summary of Stay: Kavita Merlos is a 89 yea2r old female with a history of HFpEF, hypertension, paroxysmal atrial fibrillation on Eliquis, aortic stenosis status post TAVR, infrarenal AAA, macular degeneration, glaucoma, depression admitted on 3/23/2025 with shortness of breath and cough.  Of note, the patient was recently hospitalized from 3/5 -3/11 for the treatment of right upper lobe pneumonia treated with ceftriaxone and azithromycin followed by Omnicef and azithromycin through 3/12.  The patient was discharged to TCU.  The patient was noted to be hypoxic to 89% on room air by EMS.  In the emergency department, the patient is found of a temperature of 98.3  F, heart rate 75, blood pressure as high as 202/86, respiratory rate as high as 28, SpO2 96% on 2 L nasal cannula.  Initial lab work showed sodium 127, proBNP 4677, high-sensitivity troponin 24, procalcitonin 0.09, venous pH 7.35 with a venous pCO2 of 41, WBC 6.5, hemoglobin 8.8.  The patient tested positive for RSV.  Chest x-ray showed patchy opacities throughout the right lung with upper lobe opacities improved since prior but increasing opacities in the right middle and lower lung that are likely pneumonia.  The patient was started on ceftriaxone and azithromycin.  Unfortunately, overnight on 3/23, the patient had worsening shortness of breath with VBG showing hypercapnia and venous pH of 7.24 with a venous pCO2 of 62.  The patient did briefly require CPAP.    TODAY'S PLAN: Patient on CPAP overnight for hypercapnic respiratory failure.  Off CPAP this morning.  Patient did not tolerated very well.  Patient is requiring supplemental oxygen.  Patient reports still feeling short of breath this morning that is improved by leaning forward.  She denies any chest pain.  She is coughing and reports it  to be loose.  Suspect her symptoms and chest x-ray findings are more likely secondary to RSV versus a new bacterial pneumonia, however given how high risk of the patient she is seems reasonable to treat with antibiotics for 5 days.  Plan for repeat VBG this afternoon at 1400.  If worsening hypercapnia, will reconsider CPAP.  With patient's permission, I did leave a voicemail for her daughter.  Encouraged oral intake and plan for physical therapy evaluation.    Problem List:   Acute Hypoxic and Hypercapnic Respiratory Failure  RSV Infection  Possible New Bacterial Community Acquired Pneumonia  - Recent admission from 3/5 - 3/11 for pneumonia and treated with ceftriaxone/azithromycin.   - Ceftriaxone/azithromycin for 5 days  - Suspect CXR findings more consistent with RSV infection given negative procal and normal WBC  - Repeat VBG at 1400  - Antitussives prn  - Antiemetics prn    Hyponatremia  - Encouraged oral intake  - Appreciate Dietician recommendations    Deconditioning/Generalized Weakness  - Appreciate PT recommendations  - Pt just discharged from TCU 1 day prior to admission    Chronic Medical Problems:  HFpEF  Hypertension  Paroxysmal Atrial Fibrillation on Eliquis  Aortic Stenosis s/p TAVR  Infrarenal AAA  Glaucoma  Macular Degeneration  Depression    I spent 47 minutes in reviewing this patient's labs, imaging, medications, medical history.  In addition time was spent interviewing the patient, communicating with family, and medical decision making.    DVT Prophylaxis: DOAC  Code Status: No CPR- Do NOT Intubate  Diet: Combination Diet Regular Diet Adult    Mendieta Catheter: Not present    Disposition: Medically Ready for Discharge: Anticipated in 2-4 Days    Goals to discharge include: respiratory symptoms improved  Family updated today:  Left voicemail for daughter Debbie     Interval History   Pt seen and examined.  Pt reports still feeling sob.  Feels better to lean forward as she can get a full breath  that way.    -Data reviewed today: I personally reviewed all new labs and imaging results over the last 24 hours.     Physical Exam   Temp: 96.9  F (36.1  C) Temp src: Axillary BP: (!) 177/70 Pulse: 88   Resp: 20 SpO2: 97 % O2 Device: Nasal cannula Oxygen Delivery: 3 LPM  Vitals:    03/23/25 1805 03/24/25 0514   Weight: 53 kg (116 lb 13.5 oz) 53.3 kg (117 lb 8.1 oz)     Vital Signs with Ranges  Temp:  [96.9  F (36.1  C)-98.3  F (36.8  C)] 96.9  F (36.1  C)  Pulse:  [65-92] 88  Resp:  [16-26] 20  BP: (139-206)/() 177/70  SpO2:  [74 %-97 %] 97 %  No intake/output data recorded.    GENERAL: No apparent distress. Awake, alert, and fully oriented, frail.  HEENT: Normocephalic, atraumatic. Extraocular movements intact.  CARDIOVASCULAR: Regular rate and rhythm without murmurs or rubs. No S3.  PULMONARY: Clear bilaterally.  GASTROINTESTINAL: Soft, non-tender, non-distended. Bowel sounds normoactive.   EXTREMITIES: No cyanosis or clubbing. No edema.  NEUROLOGICAL: CN 2-12 grossly intact, no focal neurological deficits.  DERMATOLOGICAL: No rash, ulcer, bruising, nor jaundice.    Medications   Current Facility-Administered Medications   Medication Dose Route Frequency Provider Last Rate Last Admin    Patient is already receiving anticoagulation with heparin, enoxaparin (LOVENOX), warfarin (COUMADIN)  or other anticoagulant medication   Does not apply Continuous PRN Lobo Orta MD         Current Facility-Administered Medications   Medication Dose Route Frequency Provider Last Rate Last Admin    apixaban ANTICOAGULANT (ELIQUIS) tablet 2.5 mg  2.5 mg Oral BID Lobo Orta MD   2.5 mg at 03/24/25 0938    azithromycin (ZITHROMAX) tablet 250 mg  250 mg Oral QPM Lobo Orta MD        cefTRIAXone (ROCEPHIN) 2 g vial to attach to  ml bag for ADULTS or NS 50 ml bag for PEDS  2 g Intravenous Q24H Lobo Orta MD        metoprolol succinate ER (TOPROL XL) 24 hr tablet 100 mg  100 mg  "Oral Daily Lobo Orta MD   100 mg at 03/24/25 0938    sodium chloride (PF) 0.9% PF flush 3 mL  3 mL Intracatheter Q8H [facility] Lobo Orta MD        tafluprost (ZIOPTAN) ophthalmic solution 1 drop [PT SUPPLIED}  1 drop Both Eyes At Bedtime Lobo Orta MD        timolol maleate (TIMOPTIC) 0.5 % ophthalmic solution 1 drop {PT SUPPLIED]  1 drop Both Eyes BID Lobo Orta MD   1 drop at 03/24/25 0937     Data     Laboratory:  Recent Labs   Lab 03/24/25  0603 03/23/25  1840 03/18/25  0955   WBC 6.6 6.5 7.6   HGB 10.5* 8.8* 9.0*   HCT 32.8* 27.2* 28.7*   MCV 94 93 94    209 275     Recent Labs   Lab 03/24/25  0603 03/24/25  0035 03/23/25  1840   * 127* 127*   POTASSIUM 4.1 4.1 4.1   CHLORIDE 94* 93* 94*   CO2 21* 21* 21*   ANIONGAP 14 13 12   * 170* 123*   BUN 15.6 15.5 14.8   CR 0.54 0.53 0.54   GFRESTIMATED 88 88 88   JERRY 8.6* 8.5* 8.7*     No results for input(s): \"CULT\" in the last 168 hours.  Troponin I ES   Date Value Ref Range Status   04/17/2020 <0.015 0.000 - 0.045 ug/L Final     Comment:     The 99th percentile for upper reference range is 0.045 ug/L.  Troponin values   in the range of 0.045 - 0.120 ug/L may be associated with risks of adverse   clinical events.         Imaging:  Recent Results (from the past 24 hours)   XR Chest 2 Views    Narrative    EXAM: XR CHEST 2 VIEWS  LOCATION: [facility]  DATE: 3/23/2025    INDICATION: Shortness of breath  COMPARISON: 3/7/2025      Impression    IMPRESSION: Aortic valve prosthesis. Patchy opacities throughout the right lung with the upper lobe opacities improved since prior study but increasing opacities in the right mid and lower lung likely new pneumonia.   XR Chest Port 1 View    Narrative    EXAM: XR CHEST PORT 1 VIEW  LOCATION: [facility]  DATE: 3/24/2025    INDICATION: SOB  COMPARISON: Chest radiographs 3/23/2025. CT chest 3/5/2025.      Impression    " IMPRESSION: Extensive pulmonary infiltrate on the right similar to previous and concerning for multifocal pneumonia. Mild interstitial prominence. Small bilateral pleural effusions and bibasilar atelectasis. Emphysema better demonstrated by prior chest   CT. Normal heart size. Endovascular repair of the aortic valve.         Isael Jeronimo DO  Formerly [facility] Hospitalist  [address] [place] [address]  Securely message with [place] (more info)  Text page via [facility] Paging/Directory   03/24/2025

## 2025-03-24 NOTE — PLAN OF CARE
"CARE FROM [id number]    Goal Outcome Evaluation:      Plan of Care Reviewed With: patient    Overall Patient Progress: improvingOverall Patient Progress: improving    Outcome Evaluation: A&O x4. VSS, on CPAP. Tele-- SR. Per pt, SOB much improved at this time. Denies pain. Voiding via purewick. Plan for repeat VBGs w/ AM labs.    Problem: Adult Inpatient Plan of Care  Goal: Plan of Care Review  Description: The Plan of Care Review/Shift note should be completed every shift.  The Outcome Evaluation is a brief statement about your assessment that the patient is improving, declining, or no change.  This information will be displayed automatically on your shiftnote.  3/24/2025 0548 by Vicenta Tamayo RN  Outcome: Progressing  Flowsheets (Taken 3/24/2025 0527)  Outcome Evaluation: A&O x4. VSS, on CPAP. Tele-- SR. Per pt, SOB much improved at this time. Denies pain. Voiding via purewick. Plan for repeat VBGs w/ AM labs.  Plan of Care Reviewed With: patient  Overall Patient Progress: improving  3/24/2025 0311 by Vicenta Tamayo RN  Outcome: Progressing  Flowsheets (Taken 3/24/2025 0311)  Overall Patient Progress: improving  Goal: Patient-Specific Goal (Individualized)  Description: You can add care plan individualizations to a care plan. Examples of Individualization might be:  \"Parent requests to be called daily at 9am for status\", \"I have a hard time hearing out of my right ear\", or \"Do not touch me to wake me up as it startlesme\".  3/24/2025 0548 by Vicenta Tamayo RN  Outcome: Progressing  3/24/2025 0311 by Vicenta Tamayo RN  Outcome: Progressing  Goal: Absence of Hospital-Acquired Illness or Injury  3/24/2025 0548 by Vicenta Tamayo RN  Outcome: Progressing  3/24/2025 0311 by Vicenta Tamayo RN  Outcome: Progressing  Intervention: Identify and Manage Fall Risk  Recent Flowsheet Documentation  Taken 3/24/2025 0010 by Vicenta Tamayo RN  Safety Promotion/Fall Prevention:   " activity supervised   clutter free environment maintained   nonskid shoes/slippers when out of bed   patient and family education   safety round/check completed   treat reversible contributory factors   treat underlying cause   room near nurse's station   increase visualization of patient  Intervention: Prevent Infection  Recent Flowsheet Documentation  Taken 3/24/2025 0010 by Vicenta Tamayo, RN  Infection Prevention: rest/sleep promoted  Goal: Optimal Comfort and Wellbeing  3/24/2025 0548 by Vicenta Tamayo RN  Outcome: Progressing  3/24/2025 0311 by Vicenta Tamayo RN  Outcome: Progressing  Goal: Readiness for Transition of Care  3/24/2025 0548 by Vicenta Tamayo RN  Outcome: Progressing  3/24/2025 0311 by Vicenta Tamayo RN  Outcome: Progressing

## 2025-03-24 NOTE — PLAN OF CARE
"Cared for [id number]    Pt A/O x 4 (forgetful), VSS (ex BPs elevated - 170s/70s - MD notified); Pt denies pain, headache, dizziness, & N/V. Pt reports SOB. Pt refused to get up out of the bed during day shift; writer encouraged weight-shifting and repo Q2H. Pt up Asst: 1 w/gait belt & walker. Lung sounds diminished/crackles, 3L O2 Nasal Cannula w/humidification - CPap @ night. Tele: SR. Blanchable redness on sacrum, bruises on upper arms/forearms. PT & Social Work following. Plan to discharge 1-2 days. Will continue with plan of care.    +++++++++++++++++++++    Goal Outcome Evaluation:      Plan of Care Reviewed With: patient    Overall Patient Progress: no changeOverall Patient Progress: no change    Outcome Evaluation: 3L O2 Nasal Cannula w/humidification    Problem: Adult Inpatient Plan of Care  Goal: Plan of Care Review  Description: The Plan of Care Review/Shift note should be completed every shift.  The Outcome Evaluation is a brief statement about your assessment that the patient is improving, declining, or no change.  This information will be displayed automatically on your shiftnote.  Outcome: Not Progressing  Flowsheets (Taken 3/24/2025 1567)  Outcome Evaluation: 3L O2 Nasal Cannula w/humidification  Plan of Care Reviewed With: patient  Overall Patient Progress: no change  Goal: Patient-Specific Goal (Individualized)  Description: You can add care plan individualizations to a care plan. Examples of Individualization might be:  \"Parent requests to be called daily at 9am for status\", \"I have a hard time hearing out of my right ear\", or \"Do not touch me to wake me up as it startlesme\".  Outcome: Not Progressing  Goal: Absence of Hospital-Acquired Illness or Injury  Outcome: Not Progressing  Intervention: Identify and Manage Fall Risk  Recent Flowsheet Documentation  Taken 3/24/2025 8045 by Deepa Beach, RN  Safety Promotion/Fall Prevention:   activity supervised   assistive device/personal items within " reach   clutter free environment maintained   lighting adjusted   mobility aid in reach   nonskid shoes/slippers when out of bed   safety round/check completed   supervised activity  Intervention: Prevent Skin Injury  Recent Flowsheet Documentation  Taken 3/24/2025 0820 by Deepa Beach RN  Body Position: position changed independently  Skin Protection: adhesive use limited  Intervention: Prevent and Manage VTE (Venous Thromboembolism) Risk  Recent Flowsheet Documentation  Taken 3/24/2025 0820 by Deepa Beach RN  VTE Prevention/Management: patient refused intervention  Intervention: Prevent Infection  Recent Flowsheet Documentation  Taken 3/24/2025 0820 by Deepa Beach RN  Infection Prevention:   personal protective equipment utilized   rest/sleep promoted   single patient room provided   hand hygiene promoted  Goal: Optimal Comfort and Wellbeing  Outcome: Not Progressing  Goal: Readiness for Transition of Care  Outcome: Not Progressing  Intervention: Mutually Develop Transition Plan  Recent Flowsheet Documentation  Taken 3/24/2025 1500 by Deepa Beach RN  Equipment Currently Used at Home: walker, rolling     Problem: Delirium  Goal: Optimal Coping  Outcome: Not Progressing  Goal: Improved Behavioral Control  Outcome: Not Progressing  Intervention: Minimize Safety Risk  Recent Flowsheet Documentation  Taken 3/24/2025 0820 by Deepa Beach RN  Enhanced Safety Measures:   pain management   review medications for side effects with activity  Goal: Improved Attention and Thought Clarity  Outcome: Not Progressing  Goal: Improved Sleep  Outcome: Not Progressing     Problem: Infection  Goal: Absence of Infection Signs and Symptoms  Outcome: Not Progressing  Intervention: Prevent or Manage Infection  Recent Flowsheet Documentation  Taken 3/24/2025 0820 by Deepa Beach RN  Isolation Precautions: droplet precautions maintained     Problem: Gas Exchange Impaired  Goal: Optimal Gas  Exchange  Outcome: Not Progressing  Intervention: Optimize Oxygenation and Ventilation  Recent Flowsheet Documentation  Taken 3/24/2025 0820 by Deepa Beach RN  Head of Bed (HOB) Positioning: HOB at 20-30 degrees     Problem: Comorbidity Management  Goal: Blood Pressure in Desired Range  Outcome: Not Progressing  Intervention: Maintain Blood Pressure Management  Recent Flowsheet Documentation  Taken 3/24/2025 0820 by Deepa Beach RN  Medication Review/Management: medications reviewed

## 2025-03-24 NOTE — H&P
[facility]  Hospitalist Admission Note  Name: Kavita Merlos    MRN: [id number]  YOB: 1935    Age: 89 year old  Date of admission: 3/23/2025  Primary care provider: Ty Cordero    Chief Complaint:  cough, shortness of breath    Assessment and Plan:   Acute hypoxemic respiratory failure  RSV infection  Possible superimposed bacterial CAP: Hospitalized here 3/5 - 3/11 for RUL pneumonia treated with ceftriaxone/azithromycin then oral Omnicef/azithromycin through 3/12 and discharged to TCU.  Hypoxic during that admission,  but weaned to room air at TCU and discharged back to independent living yesterday.  Progressively short of breath at home today and she has had a mildly productive cough for the last 3 to 4 days along with nausea and some dry heaving.  Hypoxic to 89% for EMS and then when put on room air here she was as low as 74%.  Her RSV PCR is positive which explains her new cough, nausea, and fatigue.  Chest x-ray here shows improved right upper lobe opacities, but worsening right middle and right lower lobe opacities which could be from RSV or perhaps recurrent superimposed bacterial pneumonia although I think this is less likely.  She is afebrile and WBC count is 6.5.  Procalcitonin normal at 0.09.  She was empirically started on IV ceftriaxone and azithromycin in the ER given age and comorbidities.   -Continue empiric IV ceftriaxone 2 g every 24 hours and finished a Z-Craig  -Continuous pulse ox, supplemental oxygen, wean oxygen as able  -Tessalon Perles and guaifenesin as needed for cough  -Zofran and Compazine as needed for nausea    Chronic diastolic CHF  HTN  Paroxysmal A-fib  Aortic stenosis s/p TAVR  Infrarenal AAA  Elevated troponin: She had a TAVR in February 2024.  She is chronically on Eliquis 2.5 mg twice daily for paroxysmal A-fib.  She takes metoprolol succinate with dose increased to 100 mg daily during last admission.  Numerous allergies to different  antihypertensive medications and she refused  oral hydralazine last admission.  She did TTE done last admission was fairly unremarkable, but she did have flash pulmonary edema after receiving a blood transfusion requiring IV diuresis.  She was discharged on 10 mg furosemide daily, but she discontinued this at the TCU.  She does not have significant lower extremity edema on exam despite elevated NT-proBNP at 4677.  Troponin T slightly elevated at 24 with repeat 23 which is likely demand ischemia secondary to hypoxia.  Denies any chest pain.  EKG shows NSR with no ST elevation or depression.  Her p.o. intake has been poor secondary to RSV infection and I do not think she needs diuretics at this time.  Her blood pressure was very high in the ER at 206/100 and persistently in the 180 systolic range, unclear if she had her metoprolol today or not.  -Resume PTA metoprolol succinate 100 mg daily and Eliquis  -Give 5 mg IV hydralazine now  -Treat nausea and cough to see if blood pressure improves    Hyponatremia: Her sodium is low at 127 and chloride is 94 in the setting of decreased p.o. intake.  She has not had furosemide in multiple days as she had this discontinued when she was at TCU.  I suspect the hyponatremia secondary to hypovolemia and decreased p.o. intake in the setting of RSV infection possible SIADH component given the pulmonary infiltrates.  -I will give her 1 L NS over the next 10 hours and recheck a BMP in the morning  -If significantly worsening hypoxia or shortness of breath we will consider stopping fluids and giving a dose of Lasix    Chronic anemia: Hemoglobin 8.8 similar to low 9 range while she was at TCU.  She received a blood transfusion earlier this month when hospitalized here and at flash pulmonary edema.  She was seen by hematology who thought she was having some hemolysis from sepsis and red cell fragmentation from her TAVR.  -CBC in the morning    Macular degeneration  Glaucoma: Resume PTA  eyedrops.    Depression: Per the discharge summary from earlier this month she was started on Lexapro, but this is not on her discharge medication list from the TCU.    Generalized weakness: Was improving at TCU and ambulated 550 feet with a walker a couple days prior to discharge.  Now increasing weakness secondary to RSV infection.  -Consult PT/SW    DVT Prophylaxis: PTA Eliquis  Code Status: DNR / DNI -consistent with discussions from last admission.  FEN: Regular diet  Discharge Dispo: Just discharged back to independent living yesterday, consult PT/SW for ongoing weakness  Estimated Disch Date / # of Days until Disch: Admit inpatient for hypoxia and RSV infection.  Anticipate minimal 2 night hospitalization to wean from oxygen.      History of Present Illness:  Kavita Merlos is a 89 year old female with PMH including infrarenal AAA, aortic stenosis s/p TAVR, HTN, paroxysmal A-fib on Eliquis, diastolic CHF, anemia, depression, and macular degeneration/glaucoma who presents with shortness of breath and cough.  Hospitalized here 3/5 - 3/11 for RUL pneumonia treated with ceftriaxone/azithromycin then oral Omnicef/azithromycin through 3/12 and discharged to TCU.  Hypoxic during that admission,  but weaned to room air at TCU and discharged back to independent living yesterday.  Progressively short of breath at home today and she has had a mildly productive cough for the last 3 to 4 days along with nausea and some dry heaving.  She has felt increasing weakness and fatigue in the last couple of days.  Her p.o. intake has been low.  Denies any lower extremity edema.  Has not had any chest pain.  Hypoxic to 89% for EMS.    History obtained from patient, patient's daughter, medical record, and from Dr. Gan in the emergency department.  Hypertensive in the ER at 206/100 initially then down to 185/95, heart rate 70s, temperature 98.3  F, oxygen as low as 74% on room air, now on 2 L via nasal cannula.  Initial labs  showed WBC 6.5, hemoglobin 8.8, platelet count 204.  Sodium low at 127 as is chloride at 94 otherwise BMP unremarkable.  Troponin T is 24 with repeat 23.  EKG shows NSR with no ST elevation or depression.  Elevated NT proBNP of 4677.  Lactic acid normal at 0.8.  RSV PCR is positive.  Chest x-ray showed improved right upper lobe opacities, but worsening opacities in the right middle and right lower lung.  She received IV ceftriaxone, azithromycin, and IV Zofran.  Admit inpatient.       Clinically Significant Risk Factors Present on Admission         # Hyponatremia: Lowest Na = 127 mmol/L in last 2 days, will monitor as appropriate  # Hypochloremia: Lowest Cl = 94 mmol/L in last 2 days, will monitor as appropriate         # Drug Induced Coagulation Defect: home medication list includes an anticoagulant medication    # Hypertension: Noted on problem list  # Chronic heart failure with preserved ejection fraction: heart failure noted on problem list and last echo with EF >50%     # Anemia: based on hgb <11           # Financial/Environmental Concerns:                     Past Medical History reviewed:  Past Medical History:   Diagnosis Date    AAA (abdominal aortic aneurysm)     Aortic stenosis     Benign essential hypertension with BP difference between arms     Hyperlipidemia LDL goal <70    Diastolic CHF  Anemia    Past Surgical History reviewed:  Past Surgical History:   Procedure Laterality Date    CV CORONARY ANGIOGRAM N/A 12/19/2023    Procedure: Coronary Angiogram;  Surgeon: Seth Duarte MD;  Location: [facility] CARDIAC CATH LAB    CV PERIPHERAL VASCULAR LITHOTRIPSY N/A 2/20/2024    Procedure: Peripheral Vascular Lithotripsy;  Surgeon: Seth Duarte MD;  Location: [facility] CARDIAC CATH LAB    CV RIGHT HEART CATH MEASUREMENTS RECORDED N/A 12/19/2023    Procedure: Right Heart Catheterization;  Surgeon: Seth Duarte MD;  Location: [facility] CARDIAC CATH LAB    CV TRANSCATHETER AORTIC  VALVE REPLACEMENT-FEMORAL APPROACH N/A 2024    Procedure: Transcatheter Aortic Valve Replacement-Femoral Approach;  Surgeon: Seth Duarte MD;  Location:  HEART CARDIAC CATH LAB     Social History reviewed:  Social History     Tobacco Use    Smoking status: Former     Current packs/day: 0.00     Types: Cigarettes     Quit date: 10/24/2006     Years since quittin.4     Passive exposure: Never    Smokeless tobacco: Never    Tobacco comments:     I quit when I was 71 years old, off/on prior to that starting when I was 17   Substance Use Topics    Alcohol use: Not Currently     Social History     Social History Narrative    Not on file     Family History reviewed:  Reviewed chart and noncontributory this admission    Allergies:  Allergies   Allergen Reactions    Albuterol     Amlodipine     Bimatoprost Itching    Brimonidine Itching    Clotrimazole Itching    Dorzolamide Hcl-Timolol Mal Itching    Latanoprost Itching    Lisinopril     Losartan      depression    Neomycin-Polymyxin-Gramicidin Swelling    Neosporin [Neomycin-Polymyxin-Gramicidin]     Tape [Adhesive Tape]     Timolol Itching    Travoprost Itching    Vicodin [Hydrocodone-Acetaminophen]     Penicillins Unknown     Extensive use and toleration of cephalosporins     Medications:  Prior to Admission medications    Medication Sig Last Dose Taking? Auth Provider Long Term End Date   apixaban ANTICOAGULANT (ELIQUIS) 2.5 MG tablet Take 1 tablet (2.5 mg) by mouth 2 times daily.   Vilma Willett, CNP No    metoprolol succinate ER (TOPROL XL) 100 MG 24 hr tablet Take 1 tablet (100 mg) by mouth daily.   Sudhir Walters MD Yes    polyethylene glycol (MIRALAX) 17 GM/Dose powder Take 17 g by mouth daily.   Sudhir Walters MD     sennosides (SENOKOT) 8.6 MG tablet Take 1 tablet by mouth 2 times daily as needed for constipation.   Sudhir Walters MD     tafluprost (ZIOPTAN) 0.0015 % SOLN ophthalmic solution Place 1 drop into both eyes  "at bedtime   Reported, Patient     timolol maleate (TIMOPTIC) 0.5 % ophthalmic solution Place 1 drop into both eyes 2 times daily.   Unknown, Entered By History       Review of Systems:  A Comprehensive greater than 10 system review of systems was carried out.  Pertinent positives and negatives are noted above.  Otherwise negative.     Physical Exam:  Blood pressure (!) 163/147, pulse 90, temperature 98.3  F (36.8  C), temperature source Oral, resp. rate 16, height 1.626 m (5' 4\"), weight 53 kg (116 lb 13.5 oz), SpO2 96%, not currently breastfeeding.  Wt Readings from Last 1 Encounters:   03/23/25 53 kg (116 lb 13.5 oz)     Exam:  Constitutional: Awake, appears uncomfortable which she says is due to nausea  Eyes: sclera white   HEENT: Dry mucous membranes  Respiratory: Crackles throughout the right lung field, left lung clear, no wheeze  Cardiovascular: RRR.  No murmur appreciated  GI: non-tender, not distended, bowel sounds present  Skin: no rash   Musculoskeletal/extremities: No significant lower extremity edema  Neurologic:  alert, speech clear  Psychiatric: Anxious    Lab and imaging data personally reviewed:  Labs:  Recent Labs   Lab 03/23/25  1840 03/18/25  0955   WBC 6.5 7.6   HGB 8.8* 9.0*   HCT 27.2* 28.7*   MCV 93 94    275     Recent Labs   Lab 03/23/25  1840 03/18/25  0955   * 136   POTASSIUM 4.1 3.9   CHLORIDE 94* 101   CO2 21* 23   ANIONGAP 12 12   * 137*   BUN 14.8 13.5   CR 0.54 0.69   GFRESTIMATED 88 83   JERRY 8.7* 9.5     Recent Labs   Lab 03/23/25  1840   NTBNPI 4,677*     Troponin T 24 3 B23  Procalcitonin 0.09  VBG shows pH 7.35, pCO2 41, pO2 52, bicarb 23  Lactic acid 0.8  COVID-19, influenza A/B PCR negative  RSV PCR is positive    EKG: NSR, no ST elevation or depression    Imaging:  Recent Results (from the past 24 hours)   XR Chest 2 Views    Narrative    EXAM: XR CHEST 2 VIEWS  LOCATION: [facility]  DATE: 3/23/2025    INDICATION: Shortness of " breath  COMPARISON: 3/7/2025      Impression    IMPRESSION: Aortic valve prosthesis. Patchy opacities throughout the right lung with the upper lobe opacities improved since prior study but increasing opacities in the right mid and lower lung likely new pneumonia.       Lobo Orta MD  Hospitalist  [facility]

## 2025-03-24 NOTE — PROGRESS NOTES
"Pt started on CPAP +8 and 3L O2 per MD order for hypercapnia. Tolerating it fairly well - doesn't like the mask but understands she needs to keep it on. Pt's skin under mask looks good/intact.       Recent Labs   Lab 03/24/25  0301 03/24/25  0035 03/23/25  1835   PHV 7.24* 7.28* 7.35   PCO2V 62* 51* 41   PO2V 24* 54* 52*   HCO3V 27 24 23   KARYN -1.7 -2.9  --    O2PER 3 3  --        /65 (BP Location: Right arm)   Pulse 69   Temp 97.7  F (36.5  C) (Oral)   Resp 20   Ht 1.626 m (5' 4\")   Wt 53 kg (116 lb 13.5 oz)   LMP  (LMP Unknown)   SpO2 96%   BMI 20.06 kg/m      Lori Wilton, RT    "

## 2025-03-24 NOTE — PROGRESS NOTES
Cross Cover    Called for RRT, patient has sense that she can't catch her breath  Sats 94% on supplemental O2 and VS stable.     Exam VS ok,  diffusely poor air movement no wheeze but looked anxious     Neb ordered but only rec'd half due to ? Hx of allergy  CXR similar findings of multifocal pna, EKG with rather significant J point elevation V2-3 not previously appreciated  VBG 7.28/51    D/w RN, she is now resting comfortably.  Not sure is the neb or just time helped  EKG looks more abnormal than prior ones in epic so will follow trops  Monitor on tele  Isabel VBG at 3 am to ensure that hypercapnia is not worsening

## 2025-03-24 NOTE — PHARMACY-ADMISSION MEDICATION HISTORY
Pharmacist Admission Medication History    Admission medication history is complete. The information provided in this note is only as accurate as the sources available at the time of the update.    Information Source(s): Patient, Clinic records, Hospital records, and CareEverywhere/SureScripts via in-person    Pertinent Information: pt was at [facility] earlier this month, then discharged to TCU and just discharged from TCU 3/22.  Reviewed discharge summary from hospital and TCU.      Changes made to PTA medication list:  Added: miralax qod  Deleted: miralax daily per pt, senna 1 tab bid prn. Per pt she does not take and has bowel movements daily  Changed: None    Allergies reviewed with patient and updates made in EHR: yes    Medication History Completed By: Evelyne Juarez RP 3/24/2025 8:40 AM    PTA Med List   Medication Sig Last Dose/Taking    apixaban ANTICOAGULANT (ELIQUIS) 2.5 MG tablet Take 1 tablet (2.5 mg) by mouth 2 times daily. 3/23/2025 Morning    metoprolol succinate ER (TOPROL XL) 100 MG 24 hr tablet Take 1 tablet (100 mg) by mouth daily. 3/23/2025 Morning    polyethylene glycol (MIRALAX) 17 g packet Take 1 packet by mouth every other day. Past Week    tafluprost (ZIOPTAN) 0.0015 % SOLN ophthalmic solution Place 1 drop into both eyes at bedtime 3/22/2025 Bedtime    timolol maleate (TIMOPTIC) 0.5 % ophthalmic solution Place 1 drop into both eyes 2 times daily. 3/23/2025 Morning

## 2025-03-24 NOTE — PROGRESS NOTES
Pt settled from ED. VSS except for elevated Bps  201/93.  Hydralazine was ordered, but pt's family informed writer that pt had previous adverse reaction to hydralazine (racing heart and palpitations).  Provider notified, alternative requested.  IV labetalol given.

## 2025-03-24 NOTE — PLAN OF CARE
"Aox4. VSS w/ elevated Bps, on 3L NC. Noted inspiratory RUL wheezes at times. Not OOB this shift. LPIV SL. VBGs resulted, no changes at this time per MD. Purewick in place. Tele SR w rare PVCs. Discharge TBD.       Goal Outcome Evaluation:      Plan of Care Reviewed With: patient    Overall Patient Progress: no changeOverall Patient Progress: no change    Outcome Evaluation: continues on 3L      Problem: Adult Inpatient Plan of Care  Goal: Plan of Care Review  Description: The Plan of Care Review/Shift note should be completed every shift.  The Outcome Evaluation is a brief statement about your assessment that the patient is improving, declining, or no change.  This information will be displayed automatically on your shiftnote.  Outcome: Progressing  Flowsheets (Taken 3/24/2025 1720)  Outcome Evaluation: continues on 3L  Plan of Care Reviewed With: patient  Overall Patient Progress: no change  Goal: Patient-Specific Goal (Individualized)  Description: You can add care plan individualizations to a care plan. Examples of Individualization might be:  \"Parent requests to be called daily at 9am for status\", \"I have a hard time hearing out of my right ear\", or \"Do not touch me to wake me up as it startlesme\".  Outcome: Progressing  Goal: Absence of Hospital-Acquired Illness or Injury  Outcome: Progressing  Intervention: Identify and Manage Fall Risk  Recent Flowsheet Documentation  Taken 3/24/2025 1616 by Cassi Leon RN  Safety Promotion/Fall Prevention: safety round/check completed  Goal: Optimal Comfort and Wellbeing  Outcome: Progressing  Goal: Readiness for Transition of Care  Outcome: Progressing     Problem: Delirium  Goal: Optimal Coping  Outcome: Progressing  Goal: Improved Behavioral Control  Outcome: Progressing  Goal: Improved Attention and Thought Clarity  Outcome: Progressing  Goal: Improved Sleep  Outcome: Progressing     Problem: Infection  Goal: Absence of Infection Signs and Symptoms  Outcome: " Progressing     Problem: Gas Exchange Impaired  Goal: Optimal Gas Exchange  Outcome: Progressing     Problem: Comorbidity Management  Goal: Blood Pressure in Desired Range  Outcome: Progressing

## 2025-03-24 NOTE — CONSULTS
"  CLINICAL NUTRITION SERVICES - ASSESSMENT NOTE    RECOMMENDATIONS FOR MDs/PROVIDERS TO ORDER:  Recommend consideration of appetite stimulant.    Registered Dietitian Interventions:  Continue current diet order per provider    Nutrition supplements: Jeanie Adams TID. Please help pt with access to supplements by unscrewing cap on supplement and placing straw in container. Communicated with RN.    Recommended 4-6 smaller, more frequent intakes throughout the day.    Pt family okay to bring food in from outside PRN to help with adequate intakes and food acceptance.    Adding MVI/M d/t concern for poor PO intake.    Discussed high kcal/high protein MNT     REASON FOR ASSESSMENT  Provider order - poor oral intake    PMH: CHF, aortic stenosis, hypertension, hyperlipidemia, and glaucoma     Per EMR, pt presented to ED on 3/23 for evaluation of cough, fatigue, and nausea. The patient's daughter reports that the patient was recently hospitalized for pneumonia and was discharged from TCU yesterday morning.     INFORMATION OBTAINED  Assessed patient in room.    NUTRITION HISTORY  - Information obtained from chart review and pt with daughter at bedside.  - Diet at home: Pt follows a sugar free, low lactose or lactose free, low acid, and \"all natural\" diet at home. Pt verbalizes that this is her preference and she has been doing it for the last 50-60 years. Pt notes that she does not really like meat other than fish and chicken.  - Usual intakes: Over the last month (since mid-February), pt's intakes have been very low. Her daughter reports that she will usually eat 3 meals per day, but that it will just be a few bites up to 25% of the meal. Pt's daughter reports that the largest intake she was able to have over the last few months was a small baked potato.  - Barriers to PO intakes: Lack of appetite, nausea, stomach pain, lack of interest in food, increased WOB  - Use of oral supplements: Takes supplements at baseline (daily " "MVI/M). No ONS at baseline. Pt reports that she was under the impression that all supplements had lactose in them and she has asked her daughter to help her find a supplement that does not have lactose. We discussed some options that can be found at the store.  - Chewing/swallowing issues: Poor dentition - pt reports that she is losing her teeth which makes it difficult to chew things sometimes.  - Meal preparation/grocery shopping: TCU over the last week or so. Daughter helps her at baseline.  - Allergies: NKFA    Pt was assessed by dietitian on 2/21/24. At that time, pt did not meet malnutrition criteria. Per chart note, pt explained that she is not a huge eater and only eats when she is hungry w/ a typical meal intake of 2 per day.    CURRENT NUTRITION ORDERS  Diet: Regular      CURRENT INTAKE/TOLERANCE  Per nursing flowsheet, 0% intake and noted to have refused breakfast. No other intakes available. Limited window of admission.     Per [facility], small breakfast and small lunch today. Suspect being ordered by RN.    Discussed intakes with pt at bedside. She reported that she could not breathe this morning which is why she did not want anything for breakfast. She stated that she is not sure that she is going to be able to increase her intakes until her breathing is \"all fixed\". We discussed how energy needs are actually higher in the setting of increased WOB and hypoxia. We also discussed the need for adequate intakes of both calories and protein for repletion as able. Pt stated that she understands the importance of adequate nutrition. Encouraged smaller, more frequent meals.   Initially, pt was hesitant to try MVI/M because she only wants vitamins and minerals that are pressed from whole food sources and that she usually gets her supplements for a natural health store. We discussed that we don't have that option in the hospital, but discussed why the MVI/M should be seen as other medications that she uses " "right now. Explained the need for proper micronutrient intake. Pt was agreeable to MVI/M.    LABS  Nutrition-relevant labs: Reviewed  Noted: Na 129(L),     MEDICATIONS  Nutrition-relevant medications: Reviewed      ANTHROPOMETRICS  Height: 162.6 cm (5' 4\")  Most Recent Weight: 53.3 kg (117 lb 8.1 oz)  IBW: 54.7 kg  % IBW: 97%  BMI (kg/m ): Body mass index is 20.17 kg/m . (Normal BMI)  Weight History:  Wt Readings from Last 10 Encounters:   03/24/25 53.3 kg (117 lb 8.1 oz)   03/20/25 53.3 kg (117 lb 8 oz)   03/18/25 53.3 kg (117 lb 8 oz)   03/14/25 54.2 kg (119 lb 6.4 oz)   03/12/25 53.1 kg (117 lb)   03/05/25 53.1 kg (117 lb)   03/03/25 53.3 kg (117 lb 9.6 oz)   02/26/25 56.4 kg (124 lb 6.4 oz)   02/15/25 57.6 kg (127 lb)   02/15/25 59 kg (130 lb)     Care Everywhere:  12/11/24 : 59.9 kg (132 lb)    -5.5% BW x 1 month  -9.66% BW x 3 months      ASSESSED NUTRITION NEEDS  Dosing Weight: 53.3 kg, based on actual wt  Estimated Energy Needs: >/= 25 - 30 kcals/kg  Justification: Increased needs and Repletion  Estimated Protein Needs: 1.2 - 1.5 grams of pro/kg  Justification:  CHF, Increased needs, and Repletion  Estimated Fluid Needs: per provider pending fluid status    SYSTEM AND PHYSICAL FINDINGS    GI symptoms: No BM yet noted. Limited window of admission so far. No episodes of emesis documented this admission.  Skin/wounds: No edema, pressure injuries, or non healing wounds currently documented    MALNUTRITION  % Intake: </= 50% for >/= 5 days (severe) and </=75% for >/= 1 month (severe)  % Weight Loss: > 5% in 1 month (severe)  and > 7.5% in 3 months (severe)   Subcutaneous Fat Loss: Orbital: Moderate, Buccal: Moderate, Triceps: Severe, and Fat overlying the ribs: Moderate  Muscle Loss: Temples (temporalis muscle): Moderate, Clavicles (pectoralis and deltoids): Severe, Shoulders (deltoids): Severe, Interosseous muscles: Moderate, Thigh (quadriceps): Moderate, and Calf (gastrocnemius): Moderate  Fluid " "Accumulation/Edema: None noted  Malnutrition Diagnosis: Severe malnutrition in the context of acute on chronic illness  Malnutrition Present on Admission: Yes    NUTRITION DIAGNOSIS  Inadequate oral intake related to poor appetite and increased WOB as evidenced by pt reported intake PTA, weight loss of 5.5% BW x 1 month and 9.66% BW x 3 months, moderate-severe fat and muscle losses, and meets malnutrition criteria on admission.     INTERVENTIONS  See nutrition interventions above    Goals  Patient to consume >/= 50% of nutritionally adequate meal trays or supplements TID to show improved trending of intakes.      Monitoring/Evaluation  Progress toward goals will be monitored and evaluated per policy.        Jeanie Murphy RD, LD  Clinical Dietitian  [place] Message Group: \"Dietitian [[place]]\"  Office Phone: [phone]  Pagers: 3rd floor/ICU: [phone]  All other floors: [phone]  Weekend/holiday: [phone]    "

## 2025-03-24 NOTE — CONSULTS
Care Management Initial Consult    General Information  Assessment completed with: VM-chart review,    Type of CM/SW Visit: Initial Assessment  Primary Care Provider verified and updated as needed: Yes   Readmission within the last 30 days: current reason for admission unrelated to previous admission   Return Category: New Diagnosis  Reason for Consult: discharge planning  Advance Care Planning: Advance Care Planning Reviewed: no concerns identified       Communication Assessment  Patient's communication style: spoken language (English or Bilingual)      Cognitive  Cognitive/Neuro/Behavioral: WDL                      Living Environment:   People in home: alone     Current living Arrangements: independent living facility      Able to return to prior arrangements: yes    Family/Social Support:  Care provided by: self  Provides care for: no one  Marital Status: Single  Support system: Children          Description of Support System: Supportive, Involved    Support Assessment: Adequate family and caregiver support, Adequate social supports    Current Resources:   Patient receiving home care services: Yes  Skilled Home Care Services: Skilled Nursing  Community Resources: None  Equipment currently used at home: walker, rolling  Supplies currently used at home: None    Does the patient's insurance plan have a 3 day qualifying hospital stay waiver?  No    Lifestyle & Psychosocial Needs:  Social Drivers of Health     Food Insecurity: Low Risk  (3/5/2025)    Food Insecurity     Within the past 12 months, did you worry that your food would run out before you got money to buy more?: No     Within the past 12 months, did the food you bought just not last and you didn t have money to get more?: No   Depression: Not at risk (10/24/2024)    PHQ-2     PHQ-2 Score: 0   Housing Stability: Low Risk  (3/5/2025)    Housing Stability     Do you have housing? : Yes     Are you worried about losing your housing?: No   Tobacco Use: Medium  Risk (3/12/2025)    Patient History     Smoking Tobacco Use: Former     Smokeless Tobacco Use: Never     Passive Exposure: Never   Financial Resource Strain: Low Risk  (3/5/2025)    Financial Resource Strain     Within the past 12 months, have you or your family members you live with been unable to get utilities (heat, electricity) when it was really needed?: No   Alcohol Use: Not on file   Transportation Needs: Low Risk  (3/5/2025)    Transportation Needs     Within the past 12 months, has lack of transportation kept you from medical appointments, getting your medicines, non-medical meetings or appointments, work, or from getting things that you need?: No   Physical Activity: Patient Declined (10/21/2024)    Exercise Vital Sign     Days of Exercise per Week: Patient declined     Minutes of Exercise per Session: Patient declined   Interpersonal Safety: Low Risk  (3/6/2025)    Interpersonal Safety     Do you feel physically and emotionally safe where you currently live?: Yes     Within the past 12 months, have you been hit, slapped, kicked or otherwise physically hurt by someone?: No     Within the past 12 months, have you been humiliated or emotionally abused in other ways by your partner or ex-partner?: No   Stress: No Stress Concern Present (10/21/2024)    [employer] [place] [employer] - Occupational Stress Questionnaire     Feeling of Stress : Only a little   Social Connections: Unknown (10/21/2024)    Social Connection and Isolation Panel [NHANES]     Frequency of Communication with Friends and Family: Not on file     Frequency of Social Gatherings with Friends and Family: Twice a week     Attends Yazidi Services: Not on file     Active Member of Clubs or Organizations: Not on file     Attends Club or Organization Meetings: Not on file     Marital Status: Not on file   Health Literacy: Not on file       Functional Status:  Prior to admission patient needed assistance:   Dependent ADLs::  Independent  Dependent IADLs:: Cleaning, Laundry, Shopping, Meal Preparation, Transportation            Additional Information:  Patient admitted on 3/23/2025 with shortness of breath and cough, found to have Acute Hypoxic and Hypercapnic Respiratory Failure, RSV Infection. Care management consulted to assist with discharge planning.     Per chart review, patient recently discharged from [facility] on 3/[facility]/25 with orders for home care RN/PT/OT through [facility]. Patient was re admitted to hospital 1 day later on 3/23/25 so, home care had not yet started.     Writer confirmed with RN at [facility] that patient is in independent living at their facility with no services. Writer received hand in from pt's clinic care coordinator who is following during hospital stay - TSERING Person  ph [phone]. CM will continue to monitor and follow for discharge planning.       Next Steps: Monitor PT recs for safe discharge recommendation.           Na Gaitan RN  Care Coordinator  [facility]  [phone]

## 2025-03-25 ENCOUNTER — APPOINTMENT (OUTPATIENT)
Dept: PHYSICAL THERAPY | Facility: CLINIC | Age: 89
DRG: 193 | End: 2025-03-25
Attending: INTERNAL MEDICINE
Payer: MEDICARE

## 2025-03-25 LAB
ANION GAP SERPL CALCULATED.3IONS-SCNC: 8 MMOL/L (ref 7–15)
BUN SERPL-MCNC: 22.2 MG/DL (ref 8–23)
CALCIUM SERPL-MCNC: 8.3 MG/DL (ref 8.8–10.4)
CHLORIDE SERPL-SCNC: 97 MMOL/L (ref 98–107)
CREAT SERPL-MCNC: 0.61 MG/DL (ref 0.51–0.95)
EGFRCR SERPLBLD CKD-EPI 2021: 86 ML/MIN/1.73M2
ERYTHROCYTE [DISTWIDTH] IN BLOOD BY AUTOMATED COUNT: 19.3 % (ref 10–15)
GLUCOSE SERPL-MCNC: 90 MG/DL (ref 70–99)
HCO3 SERPL-SCNC: 24 MMOL/L (ref 22–29)
HCT VFR BLD AUTO: 25.9 % (ref 35–47)
HGB BLD-MCNC: 8 G/DL (ref 11.7–15.7)
MCH RBC QN AUTO: 29.7 PG (ref 26.5–33)
MCHC RBC AUTO-ENTMCNC: 30.9 G/DL (ref 31.5–36.5)
MCV RBC AUTO: 96 FL (ref 78–100)
PLATELET # BLD AUTO: 159 10E3/UL (ref 150–450)
POTASSIUM SERPL-SCNC: 4.6 MMOL/L (ref 3.4–5.3)
RBC # BLD AUTO: 2.69 10E6/UL (ref 3.8–5.2)
SODIUM SERPL-SCNC: 129 MMOL/L (ref 135–145)
WBC # BLD AUTO: 5.6 10E3/UL (ref 4–11)

## 2025-03-25 PROCEDURE — G0463 HOSPITAL OUTPT CLINIC VISIT: HCPCS

## 2025-03-25 PROCEDURE — 82435 ASSAY OF BLOOD CHLORIDE: CPT | Performed by: INTERNAL MEDICINE

## 2025-03-25 PROCEDURE — 250N000013 HC RX MED GY IP 250 OP 250 PS 637: Performed by: INTERNAL MEDICINE

## 2025-03-25 PROCEDURE — 97161 PT EVAL LOW COMPLEX 20 MIN: CPT | Mod: GP

## 2025-03-25 PROCEDURE — 250N000013 HC RX MED GY IP 250 OP 250 PS 637: Performed by: HOSPITALIST

## 2025-03-25 PROCEDURE — 99232 SBSQ HOSP IP/OBS MODERATE 35: CPT | Performed by: HOSPITALIST

## 2025-03-25 PROCEDURE — 97530 THERAPEUTIC ACTIVITIES: CPT | Mod: GP

## 2025-03-25 PROCEDURE — 85014 HEMATOCRIT: CPT | Performed by: INTERNAL MEDICINE

## 2025-03-25 PROCEDURE — 80048 BASIC METABOLIC PNL TOTAL CA: CPT | Performed by: INTERNAL MEDICINE

## 2025-03-25 PROCEDURE — 36415 COLL VENOUS BLD VENIPUNCTURE: CPT | Performed by: INTERNAL MEDICINE

## 2025-03-25 PROCEDURE — 120N000001 HC R&B MED SURG/OB

## 2025-03-25 PROCEDURE — 82565 ASSAY OF CREATININE: CPT | Performed by: INTERNAL MEDICINE

## 2025-03-25 PROCEDURE — 250N000011 HC RX IP 250 OP 636: Performed by: INTERNAL MEDICINE

## 2025-03-25 RX ORDER — GUAIFENESIN 600 MG/1
600 TABLET, EXTENDED RELEASE ORAL 2 TIMES DAILY
Status: DISCONTINUED | OUTPATIENT
Start: 2025-03-25 | End: 2025-04-01 | Stop reason: HOSPADM

## 2025-03-25 RX ORDER — LEVALBUTEROL INHALATION SOLUTION 1.25 MG/3ML
1.25 SOLUTION RESPIRATORY (INHALATION) EVERY 4 HOURS PRN
Status: DISCONTINUED | OUTPATIENT
Start: 2025-03-25 | End: 2025-04-01 | Stop reason: HOSPADM

## 2025-03-25 RX ADMIN — APIXABAN 2.5 MG: 2.5 TABLET, FILM COATED ORAL at 20:45

## 2025-03-25 RX ADMIN — CEFTRIAXONE 2 G: 2 INJECTION, POWDER, FOR SOLUTION INTRAMUSCULAR; INTRAVENOUS at 20:46

## 2025-03-25 RX ADMIN — AZITHROMYCIN DIHYDRATE 250 MG: 250 TABLET ORAL at 20:46

## 2025-03-25 RX ADMIN — TIMOLOL MALEATE 1 DROP: 5 SOLUTION/ DROPS OPHTHALMIC at 10:49

## 2025-03-25 RX ADMIN — ACETAMINOPHEN 650 MG: 325 TABLET, FILM COATED ORAL at 01:36

## 2025-03-25 RX ADMIN — APIXABAN 2.5 MG: 2.5 TABLET, FILM COATED ORAL at 08:39

## 2025-03-25 RX ADMIN — TAFLUPROST OPTHALMIC 1 DROP: 0 SOLUTION/ DROPS OPHTHALMIC at 21:56

## 2025-03-25 RX ADMIN — GUAIFENESIN 600 MG: 600 TABLET, EXTENDED RELEASE ORAL at 20:46

## 2025-03-25 RX ADMIN — METOPROLOL SUCCINATE 100 MG: 100 TABLET, EXTENDED RELEASE ORAL at 08:39

## 2025-03-25 RX ADMIN — TIMOLOL MALEATE 1 DROP: 5 SOLUTION/ DROPS OPHTHALMIC at 20:55

## 2025-03-25 RX ADMIN — GUAIFENESIN 600 MG: 600 TABLET, EXTENDED RELEASE ORAL at 10:37

## 2025-03-25 RX ADMIN — Medication 1 TABLET: at 08:39

## 2025-03-25 RX ADMIN — GUAIFENESIN 200 MG: 100 LIQUID ORAL at 08:39

## 2025-03-25 ASSESSMENT — ACTIVITIES OF DAILY LIVING (ADL)
ADLS_ACUITY_SCORE: 63
ADLS_ACUITY_SCORE: 58
ADLS_ACUITY_SCORE: 63
ADLS_ACUITY_SCORE: 58
ADLS_ACUITY_SCORE: 63
ADLS_ACUITY_SCORE: 63
ADLS_ACUITY_SCORE: 58
ADLS_ACUITY_SCORE: 63
ADLS_ACUITY_SCORE: 58
ADLS_ACUITY_SCORE: 58
ADLS_ACUITY_SCORE: 63
ADLS_ACUITY_SCORE: 63
ADLS_ACUITY_SCORE: 58
ADLS_ACUITY_SCORE: 63

## 2025-03-25 NOTE — PROGRESS NOTES
[facility]    Hospitalist Progress Note  Name: Kavita Merlos    MRN: [id number]  Provider:  Michael Jeronimo DO MPH  Date of Service: 03/25/2025    Summary of Stay: Kavita Merlos is a 89 yea2r old female with a history of HFpEF, hypertension, paroxysmal atrial fibrillation on Eliquis, aortic stenosis status post TAVR, infrarenal AAA, macular degeneration, glaucoma, depression admitted on 3/23/2025 with shortness of breath and cough.  Of note, the patient was recently hospitalized from 3/5 -3/11 for the treatment of right upper lobe pneumonia treated with ceftriaxone and azithromycin followed by Omnicef and azithromycin through 3/12.  The patient was discharged to TCU.  The patient was noted to be hypoxic to 89% on room air by EMS.  In the emergency department, the patient is found of a temperature of 98.3  F, heart rate 75, blood pressure as high as 202/86, respiratory rate as high as 28, SpO2 96% on 2 L nasal cannula.  Initial lab work showed sodium 127, proBNP 4677, high-sensitivity troponin 24, procalcitonin 0.09, venous pH 7.35 with a venous pCO2 of 41, WBC 6.5, hemoglobin 8.8.  The patient tested positive for RSV.  Chest x-ray showed patchy opacities throughout the right lung with upper lobe opacities improved since prior but increasing opacities in the right middle and lower lung that are likely pneumonia.  The patient was started on ceftriaxone and azithromycin.  Unfortunately, overnight on 3/23, the patient had worsening shortness of breath with VBG showing hypercapnia and venous pH of 7.24 with a venous pCO2 of 62.  The patient did briefly require CPAP.     Problem List:   Acute hypoxic and hypercapnic respiratory failure secondary to RSV pneumonia and commune acquired pneumonia: Was requiring 2 L oxygen by nasal cannula on admission but even required (but poorly tolerated) CPAP the night of 3/23.  Oxygen requirements are slightly better now only on 2 L oxygen by nasal cannula.  She has  tested positive for RSV.  Chest imaging shows what appears to be evolving pneumonia.  Will continue treatment with ceftriaxone and azithromycin for now.  Wean oxygen as able.  Hold off on steroids or diuretics for the time being.  Continue as needed nebulizers, mucolytics, and cough suppressants.  Hypovolemic hyponatremia: Likely poor oral intake.  Sodium relatively stable.  Hold off on IV fluids and diuretics for now.  RD consulted.  Push oral intake.  Deconditioning and generalized weakness: Likely due to her acute infectious process.  She feels too weak to start therapy today but will likely consult PT/OT tomorrow.  To note, she was just discharged from TCU 1 day prior to admission here.  Paroxysmal atrial fibrillation: Currently rate controlled.  Cautiously continue Toprol given 1 soft blood pressure overnight.  Continue Eliquis for stroke prophylaxis.  Chronic HFpEF and aortic stenosis status post TAVR: Appears relatively stable and euvolemic.  Her procedure was in January 2024.  Hypertensive urgency: Blood pressure was initially 206/100.  Has been quite labile but actually had 1 hypotensive reading overnight.  Blood pressure stable today.  Cautiously continuing PTA Toprol.  Chronic anemia: Hemoglobin has been widely fluctuating since her admission.  Initial hemoglobin was 8.8 but trended up to 10.5 and now down to 8.0.  No reports of bleeding.  She did receive a blood transfusion earlier in the month when she was hospitalized and developed acute pulmonary edema as a complication.  She was seen in consultation by hematology who thought she was having some degree of hemolysis from sepsis and RBC fragmentation from her TAVR.  Recheck CBC in the morning.  Depression: Recently prescribed Lexapro but no longer taking.    DVT Prophylaxis: DOAC  Code Status: No CPR- Do NOT Intubate  Diet: Combination Diet Regular Diet Adult  Snacks/Supplements Adult: apomio Standard 1.4 Oral Supplement; Between Meals    Gayatri  Catheter: Not present  Disposition: Expected discharge in 3-4 days to home vs TCU. Goals prior to discharge include manage respiratory failure.   Incidental Findings: None.  Family updated today: Yes     40 MINUTES SPENT BY ME on the date of service doing chart review, history, exam, documentation & further activities per the note.      Interval History   Assumed care from previous hospitalist. The history was fully reviewed.  Patient feels slightly better from a shortness of breath standpoint but still extremely weak and fatigued.  She wants to go back to sleep.  Denies any chest pain or palpitations.  No fevers.  Oxygen requirement slightly better.    -Data reviewed today: I personally reviewed all new labs and imaging results over the last 24 hours.     Physical Exam   Temp: 97.9  F (36.6  C) Temp src: Oral BP: 129/44 Pulse: 74   Resp: 17 SpO2: 97 % O2 Device: Nasal cannula Oxygen Delivery: 2 LPM  Vitals:    03/23/25 1805 03/24/25 0514 03/25/25 0538   Weight: 53 kg (116 lb 13.5 oz) 53.3 kg (117 lb 8.1 oz) 53.9 kg (118 lb 13.3 oz)     Vital Signs with Ranges  Temp:  [97.9  F (36.6  C)-98.9  F (37.2  C)] 97.9  F (36.6  C)  Pulse:  [65-83] 74  Resp:  [16-18] 17  BP: ()/(38-66) 129/44  SpO2:  [97 %-100 %] 97 %  I/O last 3 completed shifts:  In: 240 [P.O.:240]  Out: 600 [Urine:600]    GENERAL: No apparent distress. Awake, alert, and fully oriented.  HEENT: Normocephalic, atraumatic. Extraocular movements intact.  CARDIOVASCULAR: Regular rate and rhythm without murmurs or rubs. No S3.  PULMONARY: Coarse bilaterally.  GASTROINTESTINAL: Soft, non-tender, non-distended. Bowel sounds normoactive.   EXTREMITIES: No cyanosis or clubbing. No edema.  NEUROLOGICAL: CN 2-12 grossly intact, no focal neurological deficits.  DERMATOLOGICAL: No rash, ulcer, bruising, nor jaundice.     Medications   Current Facility-Administered Medications   Medication Dose Route Frequency Provider Last Rate Last Admin    Patient is already  receiving anticoagulation with heparin, enoxaparin (LOVENOX), warfarin (COUMADIN)  or other anticoagulant medication   Does not apply Continuous PRN Lobo Orta MD         Current Facility-Administered Medications   Medication Dose Route Frequency Provider Last Rate Last Admin    apixaban ANTICOAGULANT (ELIQUIS) tablet 2.5 mg  2.5 mg Oral BID Lobo Orta MD   2.5 mg at 03/25/25 0839    azithromycin (ZITHROMAX) tablet 250 mg  250 mg Oral QPM Lobo Orta MD   250 mg at 03/24/25 2143    cefTRIAXone (ROCEPHIN) 2 g vial to attach to  ml bag for ADULTS or NS 50 ml bag for PEDS  2 g Intravenous Q24H Lobo Orta MD   2 g at 03/24/25 2143    guaiFENesin (MUCINEX) 12 hr tablet 600 mg  600 mg Oral BID Michael Jeronimo DO   600 mg at 03/25/25 1037    metoprolol succinate ER (TOPROL XL) 24 hr tablet 100 mg  100 mg Oral Daily Lobo Orta MD   100 mg at 03/25/25 0839    multivitamin w/minerals (THERA-VIT-M) tablet 1 tablet  1 tablet Oral Daily Isael Jeronimo DO   1 tablet at 03/25/25 0839    sodium chloride (PF) 0.9% PF flush 3 mL  3 mL Intracatheter Q8H [facility] Lobo Orta MD   3 mL at 03/25/25 0841    tafluprost (ZIOPTAN) ophthalmic solution 1 drop [PT SUPPLIED}  1 drop Both Eyes At Bedtime Lobo Orta MD   1 drop at 03/24/25 2144    timolol maleate (TIMOPTIC) 0.5 % ophthalmic solution 1 drop {PT SUPPLIED]  1 drop Both Eyes BID Lobo Orta MD   1 drop at 03/25/25 1049     Data     Laboratory:  Recent Labs   Lab 03/25/25  0631 03/24/25  0603 03/23/25  1840   WBC 5.6 6.6 6.5   HGB 8.0* 10.5* 8.8*   HCT 25.9* 32.8* 27.2*   MCV 96 94 93    167 209     Recent Labs   Lab 03/25/25  0631 03/24/25  0603 03/24/25  0035   * 129* 127*   POTASSIUM 4.6 4.1 4.1   CHLORIDE 97* 94* 93*   CO2 24 21* 21*   ANIONGAP 8 14 13   GLC 90 112* 170*   BUN 22.2 15.6 15.5   CR 0.61 0.54 0.53   GFRESTIMATED 86 88 88   JERRY 8.3* 8.6* 8.5*     No  "results for input(s): \"CULT\" in the last 168 hours.    Imaging:  No results found for this or any previous visit (from the past 24 hours).      Michael Jeronimo DO MPH  [facility] Hospitalist  [address] [place] [place] [address]  03/25/2025   "

## 2025-03-25 NOTE — PROGRESS NOTES
Cross Cover    Called for low BPs  107/38 and 99/40.  Patient is asx  Was hypertensive earlier although not treated    Requested colby in one hour and to LMK result

## 2025-03-25 NOTE — CONSULTS
[facility]  WO Nurse Inpatient Assessment     Consulted for: Back    Summary: Back with large mole she had removed which came back.      WO nurse follow-up plan: signing off    Patient History (according to provider note(s):      Kavita Merlos is a 89 year old female with a history of HFpEF, hypertension, paroxysmal atrial fibrillation on Eliquis, aortic stenosis status post TAVR, infrarenal AAA, macular degeneration, glaucoma, depression admitted on 3/23/2025 with shortness of breath and cough.     Assessment:      Areas visualized during today's visit: Focused:    Skin Injury: Back  Last photo: 3/25/25    Skin injury due to:  mole  Skin history and plan of care:   Patient with mole she had removed several years ago which grew back.  3x3cm mole.  No concerns at site.  No dressing needed.         Treatment Plan:     Back mole: Leave open to air     Orders: Written    RECOMMEND PRIMARY TEAM ORDER: None, at this time  Education provided: plan of care  Discussed plan of care with: Patient, Family, and Nurse  Notify WOC if wound(s) deteriorate.  Nursing to notify the Provider(s) and re-consult the [facility] Nurse if new skin concern.    DATA:     Current support surface: Standard  Low air loss (IVÁN pump, Isolibrium, Pulsate)  BMI: Body mass index is 20.4 kg/m .   Active diet order: Orders Placed This Encounter      Combination Diet Regular Diet Adult     Output: I/O last 3 completed shifts:  In: 240 [P.O.:240]  Out: 600 [Urine:600]     Labs:   Recent Labs   Lab 03/25/25  0631   HGB 8.0*   WBC 5.6     Pressure injury risk assessment:   Sensory Perception: 4-->no impairment  Moisture: 2-->very moist  Activity: 3-->walks occasionally  Mobility: 3-->slightly limited  Nutrition: 2-->probably inadequate  Friction and Shear: 2-->potential problem  Pillo Score: 16    Júnior Paz RN CWOCN  Contact [address]- [facility] Nurse ([place])  Dept. Office Number: [phone]

## 2025-03-25 NOTE — PROGRESS NOTES
03/25/25 1041   Appointment Info   Signing Clinician's Name / Credentials (PT) Sandra Condon DPT   Living Environment   People in Home alone   Current Living Arrangements independent living facility   Home Accessibility no concerns   Transportation Anticipated family or friend will provide   Self-Care   Usual Activity Tolerance moderate   Current Activity Tolerance poor   Equipment Currently Used at Home walker, rolling;shower chair   Fall history within last six months no   Activity/Exercise/Self-Care Comment Pt reports using 4WW outside of apartment, no AD in apartment. Daughter does the grocery shopping.   General Information   Onset of Illness/Injury or Date of Surgery 03/23/25   Referring Physician Lobo Orta MD   Patient/Family Therapy Goals Statement (PT) Return home   Pertinent History of Current Problem (include personal factors and/or comorbidities that impact the POC) Kavita Merlos is a 89 yea2r old female with a history of HFpEF, hypertension, paroxysmal atrial fibrillation on Eliquis, aortic stenosis status post TAVR, infrarenal AAA, macular degeneration, glaucoma, depression admitted on 3/23/2025 with shortness of breath and cough.  Of note, the patient was recently hospitalized from 3/5 -3/11 for the treatment of right upper lobe pneumonia treated with ceftriaxone and azithromycin followed by Omnicef and azithromycin through 3/12.  The patient was discharged to TCU.  The patient was noted to be hypoxic to 89% on room air by EMS.   Existing Precautions/Restrictions fall;oxygen therapy device and L/min   Cognition   Affect/Mental Status (Cognition) WFL   Orientation Status (Cognition) oriented x 4   Follows Commands (Cognition) WFL   Pain Assessment   Patient Currently in Pain No   Integumentary/Edema   Integumentary/Edema Comments Age-related changes   Posture    Posture Forward head position;Protracted shoulders;Kyphosis   Range of Motion (ROM)   Range of Motion ROM is WFL   Strength  (Manual Muscle Testing)   Strength (Manual Muscle Testing) Deficits observed during functional mobility   Bed Mobility   Comment, (Bed Mobility) Supine to sit Min A   Transfers   Comment, (Transfers) Not tested d/t weakness and fatigue   Gait/Stairs (Locomotion)   Comment, (Gait/Stairs) Not tested d/t weakness and fatigue   Balance   Balance Comments Fair seated   Sensory Examination   Sensory Perception patient reports no sensory changes   Clinical Impression   Criteria for Skilled Therapeutic Intervention Yes, treatment indicated   PT Diagnosis (PT) Impaired functional mobility and gait   Influenced by the following impairments Pain, weakness, decreased activity tolerance, impaired balance   Functional limitations due to impairments Limited functional mobility requiring AD and assist   Clinical Presentation (PT Evaluation Complexity) stable   Clinical Presentation Rationale Based on PMH, current status, and social support   Clinical Decision Making (Complexity) low complexity   Planned Therapy Interventions (PT) balance training;bed mobility training;gait training;transfer training;progressive activity/exercise;strengthening;patient/family education   Risk & Benefits of therapy have been explained evaluation/treatment results reviewed;care plan/treatment goals reviewed;risks/benefits reviewed;current/potential barriers reviewed;participants voiced agreement with care plan;participants included;patient   PT Total Evaluation Time   PT Eval, Low Complexity Minutes (93915) 7   Physical Therapy Goals   PT Frequency 5x/week   PT Predicted Duration/Target Date for Goal Attainment 04/01/25   PT Goals Bed Mobility;Transfers;Gait   PT: Bed Mobility Independent;Supine to/from sit   PT: Transfers Modified independent;Sit to/from stand;Bed to/from chair;Assistive device   PT: Gait Modified independent;Rolling walker;100 feet   Therapeutic Activity   Therapeutic Activities: dynamic activities to improve functional performance  Minutes (09787) 10   Symptoms Noted During/After Treatment Fatigue   Treatment Detail/Skilled Intervention Pt in bed upon therapist arrival, agreeable to PT w/ encouragement. Pt on 3Ls O2 for session, vital signs stable throughout. Pt sitting up EOB, able to reposition hips and shift forward. Pt sat EOB for 5 minutes w/ SBA. Pt reported fatigue. W/ cues pt able to shift hips up in bed a small amount. Pt transferred to supine w/ Min A. HOB elevated, all needs w/in reach, bed alarm on, RN updated.   PT Discharge Planning   PT Plan Progress bed mob, trial transfers.   PT Discharge Recommendation (DC Rec) Transitional Care Facility   PT Rationale for DC Rec Pt is below baseline for mobility and demonstrating poor activity tolerance/strength. Pt currently Ax1 for bed mob, unable to transfer or amb d/t weakness and fatigue.  Recommend TCU for progression of strength, gait, and balance prior to returning to ILF.   PT Brief overview of current status Ax1. Goals of therapy will be to address safe mobility and make recs for d/c to next level of care. Pt and RN will continue to follow all falls risk precautions as documented by RN staff while hospitalized   PT Total Distance Amb During Session (feet) 0   Physical Therapy Time and Intention   Timed Code Treatment Minutes 10   Total Session Time (sum of timed and untimed services) 17

## 2025-03-25 NOTE — PLAN OF CARE
"Goal Outcome Evaluation:      Plan of Care Reviewed With: patient    Overall Patient Progress: no changeOverall Patient Progress: no change    Outcome Evaluation: Pt slept between cares. Continues on 3L NC, sats above 90%. Tylenol for HA.  Low BP overnight -99/40 and 107/38. No sx. Cross cover MD notified. Recheck after an hour was 136/69. MD updated..No other concerns at this time.    BP (!) 107/38 (BP Location: Right arm)   Pulse 65   Temp 98  F (36.7  C) (Axillary)   Resp 16   Ht 1.626 m (5' 4\")   Wt 53.3 kg (117 lb 8.1 oz)   LMP  (LMP Unknown)   SpO2 100%   BMI 20.17 kg/m      Problem: Adult Inpatient Plan of Care  Goal: Plan of Care Review  Description: The Plan of Care Review/Shift note should be completed every shift.  The Outcome Evaluation is a brief statement about your assessment that the patient is improving, declining, or no change.  This information will be displayed automatically on your shiftnote.  Outcome: Not Progressing  Flowsheets (Taken 3/25/2025 0504)  Outcome Evaluation: Pt slept between cares. Continues on 3L NC, sats above 90%. Tylenol for HA.  Low BP overnight -99/40 and 107/38. No sx. Cross cover MD notified. Recheck after an hour was 136/69. MD updated..No other concerns at this time.  Plan of Care Reviewed With: patient  Overall Patient Progress: no change  Goal: Patient-Specific Goal (Individualized)  Description: You can add care plan individualizations to a care plan. Examples of Individualization might be:  \"Parent requests to be called daily at 9am for status\", \"I have a hard time hearing out of my right ear\", or \"Do not touch me to wake me up as it startlesme\".  Outcome: Not Progressing  Goal: Absence of Hospital-Acquired Illness or Injury  Outcome: Not Progressing  Intervention: Identify and Manage Fall Risk  Recent Flowsheet Documentation  Taken 3/24/2025 2000 by Katie Dennis RN  Safety Promotion/Fall Prevention: safety round/check completed  Intervention: Prevent Skin " Injury  Recent Flowsheet Documentation  Taken 3/24/2025 2000 by Katie Dennis, RN  Body Position:   position changed independently   weight shifting  Goal: Optimal Comfort and Wellbeing  Outcome: Not Progressing  Goal: Readiness for Transition of Care  Outcome: Not Progressing     Problem: Delirium  Goal: Optimal Coping  Outcome: Not Progressing  Goal: Improved Behavioral Control  Outcome: Not Progressing  Goal: Improved Attention and Thought Clarity  Outcome: Not Progressing  Goal: Improved Sleep  Outcome: Not Progressing     Problem: Infection  Goal: Absence of Infection Signs and Symptoms  Outcome: Not Progressing     Problem: Gas Exchange Impaired  Goal: Optimal Gas Exchange  Outcome: Not Progressing  Intervention: Optimize Oxygenation and Ventilation  Recent Flowsheet Documentation  Taken 3/24/2025 2000 by Katie Dennis, RN  Head of Bed (HOB) Positioning: HOB at 60-90 degrees     Problem: Comorbidity Management  Goal: Blood Pressure in Desired Range  Outcome: Not Progressing

## 2025-03-25 NOTE — PLAN OF CARE
"Goal Outcome Evaluation:      Plan of Care Reviewed With: patient, parent    Overall Patient Progress: no change  Outcome Evaluation: No significant improvement throughout the day. Pt remained in bed and slept most of the day. Cough syrup & mucinex provided w/minimal sputum production. No wheezing, coarse lung fields. O2 weaned from 3L NC to 2L NC. Tele NSR. Nebs prn. VSS  Legally blind - needs food tray set up w/positions explained.     U.O - 200ml + incontinent x1. Urine is dark tea color. Pt encouraged to drink more fluids.     Problem: Adult Inpatient Plan of Care  Goal: Plan of Care Review  Description: The Plan of Care Review/Shift note should be completed every shift.  The Outcome Evaluation is a brief statement about your assessment that the patient is improving, declining, or no change.  This information will be displayed automatically on your shiftnote.  Outcome: Not Progressing  Flowsheets (Taken 3/25/2025 1711)  Outcome Evaluation: No significant improvement throughout the day. Pt remained in bed and slept most of the day. Cough syrup & mucinex provided w/minimal sputum production. No wheezing, coarse lung fields. O2 weaned from 3L NC to 2L NC. Tele NSR. Nebs prn. VSS  Plan of Care Reviewed With:   patient   parent  Overall Patient Progress: no change  Goal: Patient-Specific Goal (Individualized)  Description: You can add care plan individualizations to a care plan. Examples of Individualization might be:  \"Parent requests to be called daily at 9am for status\", \"I have a hard time hearing out of my right ear\", or \"Do not touch me to wake me up as it startlesme\".  Outcome: Not Progressing  Goal: Absence of Hospital-Acquired Illness or Injury  Outcome: Not Progressing  Intervention: Identify and Manage Fall Risk  Recent Flowsheet Documentation  Taken 3/25/2025 1128 by Kavita Henriquez RN  Safety Promotion/Fall Prevention: safety round/check completed  Intervention: Prevent Skin Injury  Recent Flowsheet " Documentation  Taken 3/25/2025 1104 by Kavita Henriquez, RN  Body Position:   side-lying 30 degrees   left   legs elevated  Intervention: Prevent and Manage VTE (Venous Thromboembolism) Risk  Recent Flowsheet Documentation  Taken 3/25/2025 1128 by Kavita Henriquez, RN  VTE Prevention/Management: (eliquis) --  Intervention: Prevent Infection  Recent Flowsheet Documentation  Taken 3/25/2025 1128 by Kavita Henriquez, RN  Infection Prevention: hand hygiene promoted  Goal: Optimal Comfort and Wellbeing  Outcome: Not Progressing  Goal: Readiness for Transition of Care  Outcome: Not Progressing

## 2025-03-26 LAB
ANION GAP SERPL CALCULATED.3IONS-SCNC: 8 MMOL/L (ref 7–15)
BUN SERPL-MCNC: 17.2 MG/DL (ref 8–23)
CALCIUM SERPL-MCNC: 8.6 MG/DL (ref 8.8–10.4)
CHLORIDE SERPL-SCNC: 96 MMOL/L (ref 98–107)
CREAT SERPL-MCNC: 0.47 MG/DL (ref 0.51–0.95)
EGFRCR SERPLBLD CKD-EPI 2021: >90 ML/MIN/1.73M2
ERYTHROCYTE [DISTWIDTH] IN BLOOD BY AUTOMATED COUNT: 19.5 % (ref 10–15)
FRAGMENTS BLD QL SMEAR: ABNORMAL
GLUCOSE SERPL-MCNC: 103 MG/DL (ref 70–99)
HCO3 SERPL-SCNC: 26 MMOL/L (ref 22–29)
HCT VFR BLD AUTO: 26 % (ref 35–47)
HGB BLD-MCNC: 8.4 G/DL (ref 11.7–15.7)
MCH RBC QN AUTO: 30.5 PG (ref 26.5–33)
MCHC RBC AUTO-ENTMCNC: 32.3 G/DL (ref 31.5–36.5)
MCV RBC AUTO: 95 FL (ref 78–100)
PLAT MORPH BLD: ABNORMAL
PLATELET # BLD AUTO: 168 10E3/UL (ref 150–450)
POTASSIUM SERPL-SCNC: 4.7 MMOL/L (ref 3.4–5.3)
RBC # BLD AUTO: 2.75 10E6/UL (ref 3.8–5.2)
RBC MORPH BLD: ABNORMAL
SODIUM SERPL-SCNC: 130 MMOL/L (ref 135–145)
WBC # BLD AUTO: 5.5 10E3/UL (ref 4–11)

## 2025-03-26 PROCEDURE — 250N000013 HC RX MED GY IP 250 OP 250 PS 637: Performed by: INTERNAL MEDICINE

## 2025-03-26 PROCEDURE — 250N000009 HC RX 250: Performed by: HOSPITALIST

## 2025-03-26 PROCEDURE — 250N000011 HC RX IP 250 OP 636: Mod: JW | Performed by: HOSPITALIST

## 2025-03-26 PROCEDURE — 99232 SBSQ HOSP IP/OBS MODERATE 35: CPT | Performed by: HOSPITALIST

## 2025-03-26 PROCEDURE — 80048 BASIC METABOLIC PNL TOTAL CA: CPT | Performed by: HOSPITALIST

## 2025-03-26 PROCEDURE — 999N000157 HC STATISTIC RCP TIME EA 10 MIN

## 2025-03-26 PROCEDURE — 250N000013 HC RX MED GY IP 250 OP 250 PS 637: Performed by: HOSPITALIST

## 2025-03-26 PROCEDURE — 85027 COMPLETE CBC AUTOMATED: CPT | Performed by: HOSPITALIST

## 2025-03-26 PROCEDURE — 120N000001 HC R&B MED SURG/OB

## 2025-03-26 PROCEDURE — 250N000011 HC RX IP 250 OP 636: Performed by: INTERNAL MEDICINE

## 2025-03-26 PROCEDURE — 94640 AIRWAY INHALATION TREATMENT: CPT

## 2025-03-26 PROCEDURE — 36415 COLL VENOUS BLD VENIPUNCTURE: CPT | Performed by: HOSPITALIST

## 2025-03-26 RX ADMIN — APIXABAN 2.5 MG: 2.5 TABLET, FILM COATED ORAL at 07:55

## 2025-03-26 RX ADMIN — GUAIFENESIN 200 MG: 100 LIQUID ORAL at 14:54

## 2025-03-26 RX ADMIN — TIMOLOL MALEATE 1 DROP: 5 SOLUTION/ DROPS OPHTHALMIC at 07:56

## 2025-03-26 RX ADMIN — Medication 1 TABLET: at 08:00

## 2025-03-26 RX ADMIN — LEVALBUTEROL HYDROCHLORIDE 1.25 MG: 1.25 SOLUTION RESPIRATORY (INHALATION) at 18:16

## 2025-03-26 RX ADMIN — METOPROLOL SUCCINATE 100 MG: 100 TABLET, EXTENDED RELEASE ORAL at 07:57

## 2025-03-26 RX ADMIN — BENZONATATE 100 MG: 100 CAPSULE ORAL at 17:21

## 2025-03-26 RX ADMIN — TIMOLOL MALEATE 1 DROP: 5 SOLUTION/ DROPS OPHTHALMIC at 14:56

## 2025-03-26 RX ADMIN — TAFLUPROST OPTHALMIC 1 DROP: 0 SOLUTION/ DROPS OPHTHALMIC at 21:07

## 2025-03-26 RX ADMIN — AZITHROMYCIN DIHYDRATE 250 MG: 250 TABLET ORAL at 20:57

## 2025-03-26 RX ADMIN — GUAIFENESIN 200 MG: 100 LIQUID ORAL at 05:11

## 2025-03-26 RX ADMIN — GUAIFENESIN 600 MG: 600 TABLET, EXTENDED RELEASE ORAL at 20:57

## 2025-03-26 RX ADMIN — LABETALOL HYDROCHLORIDE 10 MG: 5 INJECTION, SOLUTION INTRAVENOUS at 16:56

## 2025-03-26 RX ADMIN — APIXABAN 2.5 MG: 2.5 TABLET, FILM COATED ORAL at 20:57

## 2025-03-26 RX ADMIN — CEFTRIAXONE 2 G: 2 INJECTION, POWDER, FOR SOLUTION INTRAMUSCULAR; INTRAVENOUS at 20:56

## 2025-03-26 RX ADMIN — GUAIFENESIN 600 MG: 600 TABLET, EXTENDED RELEASE ORAL at 08:00

## 2025-03-26 RX ADMIN — HYDROXYZINE HYDROCHLORIDE 50 MG: 50 TABLET, FILM COATED ORAL at 18:08

## 2025-03-26 ASSESSMENT — ACTIVITIES OF DAILY LIVING (ADL)
ADLS_ACUITY_SCORE: 63

## 2025-03-26 NOTE — PLAN OF CARE
"Goal Outcome Evaluation:      Plan of Care Reviewed With: patient, child    Overall Patient Progress: improving    Outcome Evaluation: O2 1L NC. Cohen up to chair, could not tolerated chair more than 1 hour. Feels very weak. Lungs diminished, loose cough, some production of clear sputum. Purewick urine dark tea color. PO intake poor. Drinking water and [employer] supplements. Multiple needs in order to be comfortable. Encouragement provided as pt feels discouraged about hospitalization and probable TCU again.    Addendum 1800: Pt experiencing acute dyspnea. Very anxious. O2 increased to 2L NC. RR Lungs diminished L>R. RT provided neb tx. Atarax given. BP high during event, post distress /58. Pt resting comfortably. Pt son in room - all tx explained to pt/son.       Problem: Adult Inpatient Plan of Care  Goal: Plan of Care Review  Description: The Plan of Care Review/Shift note should be completed every shift.  The Outcome Evaluation is a brief statement about your assessment that the patient is improving, declining, or no change.  This information will be displayed automatically on your shiftnote.  Outcome: Progressing  Flowsheets (Taken 3/26/2025 1330)  Outcome Evaluation: O2 1L NC. Cohen up to chair, could not tolerated chair more than 1 hour. Feels very weak. Lungs clear  Plan of Care Reviewed With:   patient   child  Overall Patient Progress: improving  Goal: Patient-Specific Goal (Individualized)  Description: You can add care plan individualizations to a care plan. Examples of Individualization might be:  \"Parent requests to be called daily at 9am for status\", \"I have a hard time hearing out of my right ear\", or \"Do not touch me to wake me up as it startlesme\".  Outcome: Progressing  Goal: Absence of Hospital-Acquired Illness or Injury  Outcome: Progressing  Intervention: Identify and Manage Fall Risk  Recent Flowsheet Documentation  Taken 3/26/2025 1000 by Kavita Henriquez RN  Safety Promotion/Fall " Prevention: safety round/check completed  Intervention: Prevent Skin Injury  Recent Flowsheet Documentation  Taken 3/26/2025 1000 by Kavita Henriquez RN  Body Position:   right   turned  Intervention: Prevent and Manage VTE (Venous Thromboembolism) Risk  Recent Flowsheet Documentation  Taken 3/26/2025 1000 by Kavita Henriquez RN  VTE Prevention/Management: (eliquis) --  Intervention: Prevent Infection  Recent Flowsheet Documentation  Taken 3/26/2025 1000 by Kavita Henriquez RN  Infection Prevention: hand hygiene promoted  Goal: Optimal Comfort and Wellbeing  Outcome: Progressing  Goal: Readiness for Transition of Care  Outcome: Progressing

## 2025-03-26 NOTE — PLAN OF CARE
"A & O x 4, legally blind. Not out of bed this shift. Purewick in place minimal output, bladder scanned 106. Continues IV rocephin and oral Zithromax. VSS with O2 saturations in the high 90's on 1 LPM.  PRN cough medication given. PT consulted. Discharge TBD           Goal Outcome Evaluation:      Plan of Care Reviewed With: patient    Overall Patient Progress: no changeOverall Patient Progress: no change      Problem: Adult Inpatient Plan of Care  Goal: Plan of Care Review  Description: The Plan of Care Review/Shift note should be completed every shift.  The Outcome Evaluation is a brief statement about your assessment that the patient is improving, declining, or no change.  This information will be displayed automatically on your shiftnote.  Outcome: Progressing  Flowsheets (Taken 3/26/2025 0233)  Plan of Care Reviewed With: patient  Overall Patient Progress: no change  Goal: Patient-Specific Goal (Individualized)  Description: You can add care plan individualizations to a care plan. Examples of Individualization might be:  \"Parent requests to be called daily at 9am for status\", \"I have a hard time hearing out of my right ear\", or \"Do not touch me to wake me up as it startlesme\".  Outcome: Progressing  Goal: Absence of Hospital-Acquired Illness or Injury  Outcome: Progressing  Goal: Optimal Comfort and Wellbeing  Outcome: Progressing  Goal: Readiness for Transition of Care  Outcome: Progressing              "

## 2025-03-26 NOTE — PROGRESS NOTES
[facility]    Hospitalist Progress Note  Name: Kavita Merlos    MRN: [id number]  Provider:  Michael Jeronimo DO MPH  Date of Service: 03/26/2025    Summary of Stay: Kavita Merlos is a 89 yea2r old female with a history of HFpEF, hypertension, paroxysmal atrial fibrillation on Eliquis, aortic stenosis status post TAVR, infrarenal AAA, macular degeneration, glaucoma, depression admitted on 3/23/2025 with shortness of breath and cough.  Of note, the patient was recently hospitalized from 3/5 -3/11 for the treatment of right upper lobe pneumonia treated with ceftriaxone and azithromycin followed by Omnicef and azithromycin through 3/12.  The patient was discharged to TCU.  The patient was noted to be hypoxic to 89% on room air by EMS.  In the emergency department, the patient is found of a temperature of 98.3  F, heart rate 75, blood pressure as high as 202/86, respiratory rate as high as 28, SpO2 96% on 2 L nasal cannula.  Initial lab work showed sodium 127, proBNP 4677, high-sensitivity troponin 24, procalcitonin 0.09, venous pH 7.35 with a venous pCO2 of 41, WBC 6.5, hemoglobin 8.8.  The patient tested positive for RSV.  Chest x-ray showed patchy opacities throughout the right lung with upper lobe opacities improved since prior but increasing opacities in the right middle and lower lung that are likely pneumonia.  The patient was started on ceftriaxone and azithromycin.  Unfortunately, overnight on 3/23, the patient had worsening shortness of breath with VBG showing hypercapnia and venous pH of 7.24 with a venous pCO2 of 62.  The patient did briefly require CPAP.     Problem List:   Acute hypoxic and hypercapnic respiratory failure secondary to RSV pneumonia and community acquired pneumonia: Was requiring 2 L oxygen by nasal cannula on admission but even required (but poorly tolerated) CPAP the night of 3/23.  Oxygen requirements are slightly better now only on 1-2 L oxygen by nasal cannula.  She  has tested positive for RSV.  Chest imaging shows what appears to be evolving pneumonia.  Will continue treatment with ceftriaxone and azithromycin for now.  Wean oxygen as able.  Hold off on steroids or diuretics for the time being.  Continue as needed nebulizers, mucolytics, and cough suppressants.  Her weight does appear to be increased so we will recheck this tomorrow as it appears to be an outlier and likely inaccurate.  Hypovolemic hyponatremia: Likely poor oral intake.  Sodium relatively stable.  Hold off on IV fluids and diuretics for now.  RD consulted.  Push oral intake.  Deconditioning and generalized weakness: Likely due to her acute infectious process.  She feels too weak to start therapy today but will likely consult PT/OT in the coming days.  To note, she was just discharged from TCU 1 day prior to admission here.  She became extremely fatigued after being up in the chair this morning.  Paroxysmal atrial fibrillation: Currently rate controlled.  Cautiously continue Toprol given an isolated soft blood pressure a few nights ago.  Continue Eliquis for stroke prophylaxis.  Chronic HFpEF and aortic stenosis status post TAVR: Appears relatively stable and euvolemic.  Her procedure was in January 2024.  Hypertensive urgency: Blood pressure was initially 206/100.  Has been quite labile but reasonably controlled.  Blood pressure stable today.  Cautiously continuing PTA Toprol.  Chronic anemia: Hemoglobin has been widely fluctuating since her admission.  Initial hemoglobin was 8.8 but trended up to 10.5 and now down to 8.0 but stable at that level.  No reports of bleeding.  She did receive a blood transfusion earlier in the month when she was hospitalized and developed acute pulmonary edema as a complication.  She was seen in consultation by hematology who thought she was having some degree of hemolysis from sepsis and RBC fragmentation from her TAVR.  Recheck CBC in the morning.  Depression: Assessed at the  previous hospitalization and some thoughts about prescribing Lexapro but she never began this and is not interested in it.    DVT Prophylaxis: DOAC  Code Status: No CPR- Do NOT Intubate  Diet: Combination Diet Regular Diet Adult  Snacks/Supplements Adult: [employer] Standard 1.4 Oral Supplement; Between Meals    Mendieta Catheter: Not present  Disposition: Expected discharge in 3-4 days to home vs TCU. Goals prior to discharge include manage respiratory failure.   Incidental Findings: None.  Family updated today: Yes daughter by phone who is very supportive and helpful.    40 MINUTES SPENT BY ME on the date of service doing chart review, history, exam, documentation & further activities per the note.      Interval History   Patient feels slightly better from a shortness of breath standpoint but still extremely weak and fatigued.  She was up in a chair this morning.  Denies any chest pain or palpitations.  No fevers.  Oxygen requirement slightly better.    -Data reviewed today: I personally reviewed all new labs and imaging results over the last 24 hours.     Physical Exam   Temp: 97.4  F (36.3  C) Temp src: Oral BP: (!) 165/55 Pulse: 80   Resp: 16 SpO2: 96 % O2 Device: Nasal cannula Oxygen Delivery: 1 LPM  Vitals:    03/24/25 0514 03/25/25 0538 03/26/25 0404   Weight: 53.3 kg (117 lb 8.1 oz) 53.9 kg (118 lb 13.3 oz) 56.7 kg (125 lb)     Vital Signs with Ranges  Temp:  [97.4  F (36.3  C)-98.9  F (37.2  C)] 97.4  F (36.3  C)  Pulse:  [65-82] 80  Resp:  [16] 16  BP: (117-177)/(45-67) 165/55  SpO2:  [95 %-97 %] 96 %  I/O last 3 completed shifts:  In: 763 [P.O.:760; I.V.:3]  Out: 500 [Urine:500]    GENERAL: No apparent distress. Awake, alert, and fully oriented.  HEENT: Normocephalic, atraumatic. Extraocular movements intact.  CARDIOVASCULAR: Regular rate and rhythm without murmurs or rubs. No S3.  PULMONARY: Coarse bilaterally.  GASTROINTESTINAL: Soft, non-tender, non-distended. Bowel sounds normoactive.   EXTREMITIES: No  cyanosis or clubbing. No edema.  NEUROLOGICAL: CN 2-12 grossly intact, no focal neurological deficits.  DERMATOLOGICAL: No rash, ulcer, bruising, nor jaundice.     Medications   Current Facility-Administered Medications   Medication Dose Route Frequency Provider Last Rate Last Admin    Patient is already receiving anticoagulation with heparin, enoxaparin (LOVENOX), warfarin (COUMADIN)  or other anticoagulant medication   Does not apply Continuous PRN Lobo Orta MD         Current Facility-Administered Medications   Medication Dose Route Frequency Provider Last Rate Last Admin    apixaban ANTICOAGULANT (ELIQUIS) tablet 2.5 mg  2.5 mg Oral BID Lobo Orta MD   2.5 mg at 03/26/25 0755    azithromycin (ZITHROMAX) tablet 250 mg  250 mg Oral QPM Lobo Orta MD   250 mg at 03/25/25 2046    cefTRIAXone (ROCEPHIN) 2 g vial to attach to  ml bag for ADULTS or NS 50 ml bag for PEDS  2 g Intravenous Q24H Lobo Orta MD   2 g at 03/25/25 2046    guaiFENesin (MUCINEX) 12 hr tablet 600 mg  600 mg Oral BID Michael Jeronimo DO   600 mg at 03/26/25 0800    metoprolol succinate ER (TOPROL XL) 24 hr tablet 100 mg  100 mg Oral Daily Lobo Orta MD   100 mg at 03/26/25 0757    multivitamin w/minerals (THERA-VIT-M) tablet 1 tablet  1 tablet Oral Daily Isael Jeronimo DO   1 tablet at 03/26/25 0800    sodium chloride (PF) 0.9% PF flush 3 mL  3 mL Intracatheter Q8H AC Lobo Orta MD   3 mL at 03/26/25 0513    tafluprost (ZIOPTAN) ophthalmic solution 1 drop [PT SUPPLIED}  1 drop Both Eyes At Bedtime Lobo Orta MD   1 drop at 03/25/25 2156    timolol maleate (TIMOPTIC) 0.5 % ophthalmic solution 1 drop {PT SUPPLIED]  1 drop Both Eyes BID Lobo Orta MD   1 drop at 03/26/25 0756     Data     Laboratory:  Recent Labs   Lab 03/26/25  0806 03/25/25  0631 03/24/25  0603   WBC 5.5 5.6 6.6   HGB 8.4* 8.0* 10.5*   HCT 26.0* 25.9* 32.8*   MCV 95 96  "94    159 167     Recent Labs   Lab 03/26/25  0806 03/25/25  0631 03/24/25  0603   * 129* 129*   POTASSIUM 4.7 4.6 4.1   CHLORIDE 96* 97* 94*   CO2 26 24 21*   ANIONGAP 8 8 14   * 90 112*   BUN 17.2 22.2 15.6   CR 0.47* 0.61 0.54   GFRESTIMATED >90 86 88   JERRY 8.6* 8.3* 8.6*     No results for input(s): \"CULT\" in the last 168 hours.    Imaging:  No results found for this or any previous visit (from the past 24 hours).      Michael Jeronimo DO MPH  [facility] Hospitalist  [address] [place] [address]  03/26/2025   "

## 2025-03-26 NOTE — PROGRESS NOTES
Care Management Follow Up    Length of Stay (days): 3    Expected Discharge Date: 03/28/2025     Concerns to be Addressed: discharge planning     Patient plan of care discussed at interdisciplinary rounds: Yes    Anticipated Discharge Disposition:  TCU    Patient/family educated on Medicare website which has current facility and service quality ratings:  yes  Education Provided on the Discharge Plan:  yes  Patient/Family in Agreement with the Plan:  yes    Referrals Placed by CM/SW:  TCU  Private pay costs discussed: private room/amenity fees and transportation costs    Discussed  Partnership in Safe Discharge Planning  document with patient/family: Yes     Handoff Completed: No, handoff not indicated or clinically appropriate    Additional Information:  Therapies are recommending patient discharge to TCU. Called in pt's room and spoke with patient and dtr Monik regarding TCU placement. Pt lives at [facility] and, she would prefer to discharge to [facility] again. Patient has RSV so, will require a private room due to contact/droplet precautions. Dtr states pt will likely require [facility] w/c transport to get to TCU, unless pt regains strength between now and discharge and is able to get into dtr's [place] for her to transport. Dtr would like us to communicate with her (ph [phone]) for any further discharge planning needs.     Called and spoke with  [facility], they can take patient back however, will not have a bed for patient until Tues 4/1. They will be able to take patient sooner if they have cancellations.       Next Steps: Continue to follow and coordinate discharge to TCU when medically ready. PAS needed.         Na Gaitan, RN  Care Coordinator  [facility]  [phone]

## 2025-03-27 ENCOUNTER — APPOINTMENT (OUTPATIENT)
Dept: OCCUPATIONAL THERAPY | Facility: CLINIC | Age: 89
DRG: 193 | End: 2025-03-27
Attending: HOSPITALIST
Payer: MEDICARE

## 2025-03-27 VITALS
SYSTOLIC BLOOD PRESSURE: 153 MMHG | HEART RATE: 77 BPM | OXYGEN SATURATION: 99 % | BODY MASS INDEX: 20.38 KG/M2 | WEIGHT: 119.4 LBS | DIASTOLIC BLOOD PRESSURE: 61 MMHG | RESPIRATION RATE: 18 BRPM | HEIGHT: 64 IN | TEMPERATURE: 98.2 F

## 2025-03-27 LAB
ANION GAP SERPL CALCULATED.3IONS-SCNC: 7 MMOL/L (ref 7–15)
BACTERIA BLD CULT: NORMAL
BACTERIA BLD CULT: NORMAL
BUN SERPL-MCNC: 12.5 MG/DL (ref 8–23)
CALCIUM SERPL-MCNC: 8.7 MG/DL (ref 8.8–10.4)
CHLORIDE SERPL-SCNC: 96 MMOL/L (ref 98–107)
CREAT SERPL-MCNC: 0.44 MG/DL (ref 0.51–0.95)
EGFRCR SERPLBLD CKD-EPI 2021: >90 ML/MIN/1.73M2
ERYTHROCYTE [DISTWIDTH] IN BLOOD BY AUTOMATED COUNT: 19.8 % (ref 10–15)
GLUCOSE SERPL-MCNC: 97 MG/DL (ref 70–99)
HCO3 SERPL-SCNC: 28 MMOL/L (ref 22–29)
HCT VFR BLD AUTO: 28.5 % (ref 35–47)
HGB BLD-MCNC: 9.1 G/DL (ref 11.7–15.7)
MCH RBC QN AUTO: 30.4 PG (ref 26.5–33)
MCHC RBC AUTO-ENTMCNC: 31.9 G/DL (ref 31.5–36.5)
MCV RBC AUTO: 95 FL (ref 78–100)
PLATELET # BLD AUTO: 176 10E3/UL (ref 150–450)
POTASSIUM SERPL-SCNC: 4.8 MMOL/L (ref 3.4–5.3)
RBC # BLD AUTO: 2.99 10E6/UL (ref 3.8–5.2)
SODIUM SERPL-SCNC: 131 MMOL/L (ref 135–145)
WBC # BLD AUTO: 7.4 10E3/UL (ref 4–11)

## 2025-03-27 PROCEDURE — 85014 HEMATOCRIT: CPT | Performed by: HOSPITALIST

## 2025-03-27 PROCEDURE — 94640 AIRWAY INHALATION TREATMENT: CPT

## 2025-03-27 PROCEDURE — 97535 SELF CARE MNGMENT TRAINING: CPT | Mod: GO

## 2025-03-27 PROCEDURE — 82310 ASSAY OF CALCIUM: CPT | Performed by: HOSPITALIST

## 2025-03-27 PROCEDURE — 99232 SBSQ HOSP IP/OBS MODERATE 35: CPT | Performed by: HOSPITALIST

## 2025-03-27 PROCEDURE — 250N000011 HC RX IP 250 OP 636: Performed by: INTERNAL MEDICINE

## 2025-03-27 PROCEDURE — 250N000013 HC RX MED GY IP 250 OP 250 PS 637: Performed by: INTERNAL MEDICINE

## 2025-03-27 PROCEDURE — 36415 COLL VENOUS BLD VENIPUNCTURE: CPT | Performed by: HOSPITALIST

## 2025-03-27 PROCEDURE — 999N000156 HC STATISTIC RCP CONSULT EA 30 MIN

## 2025-03-27 PROCEDURE — 999N000157 HC STATISTIC RCP TIME EA 10 MIN

## 2025-03-27 PROCEDURE — 94640 AIRWAY INHALATION TREATMENT: CPT | Mod: 76

## 2025-03-27 PROCEDURE — 97165 OT EVAL LOW COMPLEX 30 MIN: CPT | Mod: GO

## 2025-03-27 PROCEDURE — 250N000009 HC RX 250: Performed by: HOSPITALIST

## 2025-03-27 PROCEDURE — 250N000013 HC RX MED GY IP 250 OP 250 PS 637: Performed by: HOSPITALIST

## 2025-03-27 PROCEDURE — 80048 BASIC METABOLIC PNL TOTAL CA: CPT | Performed by: HOSPITALIST

## 2025-03-27 PROCEDURE — 120N000001 HC R&B MED SURG/OB

## 2025-03-27 RX ORDER — IPRATROPIUM BROMIDE AND ALBUTEROL SULFATE 2.5; .5 MG/3ML; MG/3ML
3 SOLUTION RESPIRATORY (INHALATION)
Status: DISCONTINUED | OUTPATIENT
Start: 2025-03-27 | End: 2025-03-30

## 2025-03-27 RX ADMIN — AZITHROMYCIN DIHYDRATE 250 MG: 250 TABLET ORAL at 20:13

## 2025-03-27 RX ADMIN — IPRATROPIUM BROMIDE AND ALBUTEROL SULFATE 3 ML: .5; 3 SOLUTION RESPIRATORY (INHALATION) at 16:25

## 2025-03-27 RX ADMIN — TIMOLOL MALEATE 1 DROP: 5 SOLUTION/ DROPS OPHTHALMIC at 09:58

## 2025-03-27 RX ADMIN — TAFLUPROST OPTHALMIC 1 DROP: 0 SOLUTION/ DROPS OPHTHALMIC at 22:26

## 2025-03-27 RX ADMIN — GUAIFENESIN 600 MG: 600 TABLET, EXTENDED RELEASE ORAL at 20:13

## 2025-03-27 RX ADMIN — IPRATROPIUM BROMIDE AND ALBUTEROL SULFATE 3 ML: .5; 3 SOLUTION RESPIRATORY (INHALATION) at 19:29

## 2025-03-27 RX ADMIN — APIXABAN 2.5 MG: 2.5 TABLET, FILM COATED ORAL at 09:58

## 2025-03-27 RX ADMIN — TIMOLOL MALEATE 1 DROP: 5 SOLUTION/ DROPS OPHTHALMIC at 14:30

## 2025-03-27 RX ADMIN — CEFTRIAXONE 2 G: 2 INJECTION, POWDER, FOR SOLUTION INTRAMUSCULAR; INTRAVENOUS at 20:23

## 2025-03-27 RX ADMIN — METOPROLOL SUCCINATE 100 MG: 100 TABLET, EXTENDED RELEASE ORAL at 09:58

## 2025-03-27 RX ADMIN — BENZONATATE 100 MG: 100 CAPSULE ORAL at 05:06

## 2025-03-27 RX ADMIN — GUAIFENESIN 600 MG: 600 TABLET, EXTENDED RELEASE ORAL at 09:58

## 2025-03-27 RX ADMIN — IPRATROPIUM BROMIDE AND ALBUTEROL SULFATE 3 ML: .5; 3 SOLUTION RESPIRATORY (INHALATION) at 12:00

## 2025-03-27 RX ADMIN — IPRATROPIUM BROMIDE AND ALBUTEROL SULFATE 3 ML: .5; 3 SOLUTION RESPIRATORY (INHALATION) at 09:09

## 2025-03-27 RX ADMIN — APIXABAN 2.5 MG: 2.5 TABLET, FILM COATED ORAL at 20:13

## 2025-03-27 RX ADMIN — Medication 1 TABLET: at 09:58

## 2025-03-27 ASSESSMENT — ACTIVITIES OF DAILY LIVING (ADL)
ADLS_ACUITY_SCORE: 58
ADLS_ACUITY_SCORE: 62
ADLS_ACUITY_SCORE: 62
ADLS_ACUITY_SCORE: 58
ADLS_ACUITY_SCORE: 62
ADLS_ACUITY_SCORE: 62
ADLS_ACUITY_SCORE: 58
ADLS_ACUITY_SCORE: 62
ADLS_ACUITY_SCORE: 58
ADLS_ACUITY_SCORE: 62
ADLS_ACUITY_SCORE: 58
ADLS_ACUITY_SCORE: 62
ADLS_ACUITY_SCORE: 58
ADLS_ACUITY_SCORE: 58

## 2025-03-27 NOTE — PROGRESS NOTES
[facility]    Hospitalist Progress Note  Name: Kavita Merlos    MRN: [id number]  Provider:  Michael Jeronimo DO MPH  Date of Service: 03/27/2025    Summary of Stay: Kavita Mrelos is a 89 yea2r old female with a history of HFpEF, hypertension, paroxysmal atrial fibrillation on Eliquis, aortic stenosis status post TAVR, infrarenal AAA, macular degeneration, glaucoma, depression admitted on 3/23/2025 with shortness of breath and cough.  Of note, the patient was recently hospitalized from 3/5 -3/11 for the treatment of right upper lobe pneumonia treated with ceftriaxone and azithromycin followed by Omnicef and azithromycin through 3/12.  The patient was discharged to TCU.  The patient was noted to be hypoxic to 89% on room air by EMS.  In the emergency department, the patient is found of a temperature of 98.3  F, heart rate 75, blood pressure as high as 202/86, respiratory rate as high as 28, SpO2 96% on 2 L nasal cannula.  Initial lab work showed sodium 127, proBNP 4677, high-sensitivity troponin 24, procalcitonin 0.09, venous pH 7.35 with a venous pCO2 of 41, WBC 6.5, hemoglobin 8.8.  The patient tested positive for RSV.  Chest x-ray showed patchy opacities throughout the right lung with upper lobe opacities improved since prior but increasing opacities in the right middle and lower lung that are likely pneumonia.  The patient was started on ceftriaxone and azithromycin.  Unfortunately, overnight on 3/23, the patient had worsening shortness of breath with VBG showing hypercapnia and venous pH of 7.24 with a venous pCO2 of 62.  The patient did briefly require CPAP.     Problem List:   Acute hypoxic and hypercapnic respiratory failure secondary to RSV pneumonia and community acquired pneumonia: Was requiring 2 L oxygen by nasal cannula on admission but even required (but poorly tolerated) CPAP the night of 3/23.  Oxygen requirements are slightly better now only on 1-2 L oxygen by nasal cannula.  She  has tested positive for RSV.  Chest imaging shows what appears to be evolving pneumonia.  Will continue treatment with ceftriaxone and azithromycin for now.  Wean oxygen as able.  Hold off on steroids or diuretics for the time being.  Continue as needed nebulizers, mucolytics, and cough suppressants.  Her weight does appear to be increased so we will recheck this tomorrow as it appears to be an outlier and likely inaccurate.  Hypovolemic hyponatremia: Likely poor oral intake.  Sodium relatively stable.  Hold off on IV fluids and diuretics for now.  RD consulted.  Push oral intake.  Deconditioning and generalized weakness: Likely due to her acute infectious process.  Weakness improved today so starting some PT/OT.  To note, she was just discharged from TCU 1 day prior to admission here.  She became extremely fatigued after being up in the chair this morning.  Planning to return to TCU upon discharge.  Paroxysmal atrial fibrillation: Currently rate controlled.  Cautiously continue Toprol given an isolated soft blood pressure a few nights ago.  Continue Eliquis for stroke prophylaxis.  Chronic HFpEF and aortic stenosis status post TAVR: Appears relatively stable and euvolemic.  Her procedure was in January 2024.  Hypertensive urgency: Blood pressure was initially 206/100.  Has been quite labile but reasonably controlled.  Blood pressure stable today.  Cautiously continuing PTA Toprol.  Chronic anemia: Hemoglobin has been widely fluctuating since her admission.  Hemoglobin largely stable around 9-10.  No reports of bleeding.  She did receive a blood transfusion earlier in the month when she was hospitalized and developed acute pulmonary edema as a complication.  She was seen in consultation by hematology who thought she was having some degree of hemolysis from sepsis and RBC fragmentation from her TAVR.  Recheck CBC in the morning.  Depression: Assessed at the previous hospitalization and some thoughts about prescribing  Lexapro but she never began this and is not interested in it.    DVT Prophylaxis: DOAC  Code Status: No CPR- Do NOT Intubate  Diet: Combination Diet Regular Diet Adult  Snacks/Supplements Adult: Jeanie Adams Standard 1.4 Oral Supplement; Between Meals    Mendieta Catheter: Not present  Disposition: Expected discharge in 2-3 days to TCU. Goals prior to discharge include manage respiratory failure.   Incidental Findings: None.  Family updated today: Yes daughter by phone who is very supportive and helpful.    40 MINUTES SPENT BY ME on the date of service doing chart review, history, exam, documentation & further activities per the note.      Interval History   Patient feels quite a bit better from a shortness of breath standpoint. Also improved weakness and fatigued.  She was getting ready to start therapy.  Denies any chest pain or palpitations.  No fevers.  Oxygen requirement slightly better.    -Data reviewed today: I personally reviewed all new labs and imaging results over the last 24 hours.     Physical Exam   Temp: 97.8  F (36.6  C) Temp src: Oral BP: (!) 161/54 Pulse: 78   Resp: 18 SpO2: 98 % O2 Device: Nasal cannula Oxygen Delivery: 2 LPM  Vitals:    03/25/25 0538 03/26/25 0404 03/27/25 0505   Weight: 53.9 kg (118 lb 13.3 oz) 56.7 kg (125 lb) 54.2 kg (119 lb 6.4 oz)     Vital Signs with Ranges  Temp:  [97.4  F (36.3  C)-98.7  F (37.1  C)] 97.8  F (36.6  C)  Pulse:  [72-95] 78  Resp:  [18-26] 18  BP: (149-195)/(54-87) 161/54  Cuff Mean (mmHg):  [] 87  SpO2:  [93 %-99 %] 98 %  I/O last 3 completed shifts:  In: 1043 [P.O.:1040; I.V.:3]  Out: 1076 [Urine:1076]    GENERAL: No apparent distress. Awake, alert, and fully oriented.  HEENT: Normocephalic, atraumatic. Extraocular movements intact.  CARDIOVASCULAR: Regular rate and rhythm without murmurs or rubs. No S3.  PULMONARY: Coarse bilaterally.  GASTROINTESTINAL: Soft, non-tender, non-distended. Bowel sounds normoactive.   EXTREMITIES: No cyanosis or clubbing. No  edema.  NEUROLOGICAL: CN 2-12 grossly intact, no focal neurological deficits.  DERMATOLOGICAL: No rash, ulcer, bruising, nor jaundice.     Medications   Current Facility-Administered Medications   Medication Dose Route Frequency Provider Last Rate Last Admin    Patient is already receiving anticoagulation with heparin, enoxaparin (LOVENOX), warfarin (COUMADIN)  or other anticoagulant medication   Does not apply Continuous PRN Lobo Orta MD         Current Facility-Administered Medications   Medication Dose Route Frequency Provider Last Rate Last Admin    apixaban ANTICOAGULANT (ELIQUIS) tablet 2.5 mg  2.5 mg Oral BID Lobo Orta MD   2.5 mg at 03/27/25 0958    azithromycin (ZITHROMAX) tablet 250 mg  250 mg Oral QPM Lobo Orta MD   250 mg at 03/26/25 2057    cefTRIAXone (ROCEPHIN) 2 g vial to attach to  ml bag for ADULTS or NS 50 ml bag for PEDS  2 g Intravenous Q24H Lobo Orta  mL/hr at 03/26/25 2056 2 g at 03/26/25 2056    guaiFENesin (MUCINEX) 12 hr tablet 600 mg  600 mg Oral BID Michael Jeronimo DO   600 mg at 03/27/25 0958    ipratropium - albuterol 0.5 mg/2.5 mg/3 mL (DUONEB) neb solution 3 mL  3 mL Nebulization Q4H WA Michael Jeronimo DO   3 mL at 03/27/25 0909    metoprolol succinate ER (TOPROL XL) 24 hr tablet 100 mg  100 mg Oral Daily Lobo Orta MD   100 mg at 03/27/25 0958    multivitamin w/minerals (THERA-VIT-M) tablet 1 tablet  1 tablet Oral Daily Isael Jeronimo DO   1 tablet at 03/27/25 0958    sodium chloride (PF) 0.9% PF flush 3 mL  3 mL Intracatheter Q8H [facility] Lobo Orta MD   3 mL at 03/26/25 1456    tafluprost (ZIOPTAN) ophthalmic solution 1 drop [PT SUPPLIED}  1 drop Both Eyes At Bedtime Lobo Orta MD   1 drop at 03/26/25 2107    timolol maleate (TIMOPTIC) 0.5 % ophthalmic solution 1 drop {PT SUPPLIED]  1 drop Both Eyes BID Lobo Orta MD   1 drop at 03/27/25 0958     Data  "    Laboratory:  Recent Labs   Lab 03/27/25  0804 03/26/25  0806 03/25/25  0631   WBC 7.4 5.5 5.6   HGB 9.1* 8.4* 8.0*   HCT 28.5* 26.0* 25.9*   MCV 95 95 96    168 159     Recent Labs   Lab 03/27/25  0804 03/26/25  0806 03/25/25  0631   * 130* 129*   POTASSIUM 4.8 4.7 4.6   CHLORIDE 96* 96* 97*   CO2 28 26 24   ANIONGAP 7 8 8   GLC 97 103* 90   BUN 12.5 17.2 22.2   CR 0.44* 0.47* 0.61   GFRESTIMATED >90 >90 86   JERRY 8.7* 8.6* 8.3*     No results for input(s): \"CULT\" in the last 168 hours.    Imaging:  No results found for this or any previous visit (from the past 24 hours).      Michael Jeronimo DO MPH  [facility]  [address] [place] [address]  03/27/2025   "

## 2025-03-27 NOTE — PLAN OF CARE
"A&Ox4. Blind. 2L NC. Reg diet. Ax1 walker/GB. Mole on back.  OT/PT/WOC following. Plan to discharge to TCU when medically ready.             Goal Outcome Evaluation:                 Outcome Evaluation: O2 @2L.      Problem: Adult Inpatient Plan of Care  Goal: Plan of Care Review  Description: The Plan of Care Review/Shift note should be completed every shift.  The Outcome Evaluation is a brief statement about your assessment that the patient is improving, declining, or no change.  This information will be displayed automatically on your shiftnote.  Outcome: Progressing  Flowsheets (Taken 3/27/2025 1338)  Outcome Evaluation: O2 @2L.  Goal: Patient-Specific Goal (Individualized)  Description: You can add care plan individualizations to a care plan. Examples of Individualization might be:  \"Parent requests to be called daily at 9am for status\", \"I have a hard time hearing out of my right ear\", or \"Do not touch me to wake me up as it startlesme\".  Outcome: Progressing  Goal: Absence of Hospital-Acquired Illness or Injury  Outcome: Progressing  Intervention: Identify and Manage Fall Risk  Recent Flowsheet Documentation  Taken 3/27/2025 0955 by Reinaldo Liu RN  Safety Promotion/Fall Prevention: safety round/check completed  Intervention: Prevent Skin Injury  Recent Flowsheet Documentation  Taken 3/27/2025 0955 by Reinaldo Liu RN  Body Position: position changed independently  Intervention: Prevent Infection  Recent Flowsheet Documentation  Taken 3/27/2025 0955 by Reinaldo Liu RN  Infection Prevention:   hand hygiene promoted   personal protective equipment utilized   rest/sleep promoted   single patient room provided  Goal: Optimal Comfort and Wellbeing  Outcome: Progressing  Goal: Readiness for Transition of Care  Outcome: Progressing     Problem: Infection  Goal: Absence of Infection Signs and Symptoms  Outcome: Progressing  Intervention: Prevent or Manage Infection  Recent Flowsheet " Documentation  Taken 3/27/2025 0955 by Reinaldo Liu RN  Isolation Precautions:   contact precautions maintained   droplet precautions maintained     Problem: Gas Exchange Impaired  Goal: Optimal Gas Exchange  Outcome: Progressing  Intervention: Optimize Oxygenation and Ventilation  Recent Flowsheet Documentation  Taken 3/27/2025 0955 by Reinaldo Liu RN  Head of Bed (HOB) Positioning: HOB at 30-45 degrees     Problem: Comorbidity Management  Goal: Blood Pressure in Desired Range  Outcome: Progressing  Intervention: Maintain Blood Pressure Management  Recent Flowsheet Documentation  Taken 3/27/2025 0955 by Reinaldo Liu, RN  Medication Review/Management: medications reviewed

## 2025-03-27 NOTE — PROGRESS NOTES
"CLINICAL NUTRITION SERVICES - REASSESSMENT NOTE     RECOMMENDATIONS FOR MDs/PROVIDERS TO ORDER:  Consider appetite stimulant if appropriate    Registered Dietitian Interventions:  Continue current diet order per provider    Nutrition supplements: Will continue to send Jeanie Cuiker supplements TID between meals    Continue MVI/M d/t inadequate oral intake and malnutrition    Appreciate any encouragement with consistent intakes    Reiterated recommendation of        INFORMATION OBTAINED  Assessed patient in room.    CURRENT NUTRITION ORDERS  Diet: Regular  Snacks/Supplements: [employer] 1.4 TID between meals    CURRENT INTAKE/TOLERANCE  Per flowsheet documentation, intakes fluctuating from 25-75% and pt has relatively poor appetite.     Per [facility] review, pt ordering generally 2 meals per day. Meals range from small-medium in size.    Per RN note, pt w/ poor intake. She is drinking water and Jeanie Cuiker supplements.    Discussed intakes with pt and her daughter at bedside. Pt's daughter stated that she is typically drink 1/2-1 Jeanie Farm's supplement per day, but that today she drank a whole shake before lunch. Hopeful that she will be able to get a little more food in today.  Pt stated that she knows that she has not been doing very well with intakes this admission, but that she feels like her hunger is starting to come back. She declined changes in supplement flavors or schedule.  She stated that she has been feeling very tired so she has not been super hungry and mostly wants to sleep. Noted that the last 2 days in particular she has been \"out of it\". Reports that she is feeling a little bit better today and will be trying to eat a little bit more. Notes barriers of lack of appetite, disruptions in meal times for things like therapy, and fatigue.     NEW FINDINGS  GI symptoms:   - Stools: BM x1 on 3/27. Only BM noted this admission.  - No documentation of emesis this admission.    Skin/wounds:   - Edema: No " current documentation of edema  - No documentation of pressure injury or non healing wounds.    Nutrition-relevant labs: Reviewed  Noted: Na 131(L), Creat 0.44(L), BG 97    Nutrition-relevant medications: Reviewed  Noted: MVI/M    Weight: 54.2 kg (only standing scale weight this encounter)  Vitals:    03/23/25 1805 03/24/25 0514 03/25/25 0538 03/26/25 0404   Weight: 53 kg (116 lb 13.5 oz) 53.3 kg (117 lb 8.1 oz) 53.9 kg (118 lb 13.3 oz) 56.7 kg (125 lb)    03/27/25 0505   Weight: 54.2 kg (119 lb 6.4 oz)   Weights appear to be relatively stable. Outlier on 3/26 - assume bed scale inaccuracy.      ASSESSED NUTRITION NEEDS  Dosing Weight: 54.2 kg, based on actual wt  Estimated Energy Needs: >/= 25 - 30 kcals/kg  Justification: Increased needs and Repletion  Estimated Protein Needs: 1.2 - 1.5 grams of pro/kg  Justification:  CHF, Increased needs, and Repletion  Estimated Fluid Needs: per provider pending fluid status    MALNUTRITION  % Intake: </= 50% for >/= 5 days (severe) and </=75% for >/= 1 month (severe)  % Weight Loss: > 5% in 1 month (severe)  and > 7.5% in 3 months (severe)   Subcutaneous Fat Loss: Orbital: Moderate, Buccal: Moderate, Triceps: Severe, and Fat overlying the ribs: Moderate  Muscle Loss: Temples (temporalis muscle): Moderate, Clavicles (pectoralis and deltoids): Severe, Shoulders (deltoids): Severe, Interosseous muscles: Moderate, Thigh (quadriceps): Moderate, and Calf (gastrocnemius): Moderate  Fluid Accumulation/Edema: None noted  Malnutrition Diagnosis: Severe malnutrition in the context of acute on chronic illness  Malnutrition Present on Admission: Yes    EVALUATION OF THE PROGRESS TOWARD GOALS   Previous Goals  Patient to consume >/= 50% of nutritionally adequate meal trays or supplements TID to show improved trending of intakes.   Evaluation: Unable to meet, Progressing    Previous Nutrition Diagnosis  Inadequate oral intake related to poor appetite and increased WOB as evidenced by pt  "reported intake PTA, weight loss of 5.5% BW x 1 month and 9.66% BW x 3 months, moderate-severe fat and muscle losses, and meets malnutrition criteria on admission.   Evaluation: No change    NUTRITION DIAGNOSIS  Inadequate oral intake related to lack of appetite, fatigue, increased needs d/t respiratory status as evidenced by intake this admission and hypovolemic hyponatremia (Na </= 131 this admission) noted to likely be d/t poor oral intake.    INTERVENTIONS  See nutrition interventions above.     GOALS  Patient to consume >/= 50% of nutritionally adequate meal trays or supplements TID to show improved trending of intakes.      MONITORING/EVALUATION  Progress toward goals will be monitored and evaluated per policy.        Jeanie Murphy RD, LD  Clinical Dietitian  [facility] Message Group: \"Dietitian [Laurent]\"  Office Phone: [phone]  Pagers: 3rd floor/ICU: [phone]  All other floors: [phone]  Weekend/holiday: [phone]    "

## 2025-03-27 NOTE — PROVIDER NOTIFICATION
03/27/25 0909   RCAT Assessment   Reason for Assessment Other (see comments)  (SOB)   Pulmonary Status 3   Surgical Status 0   Chest X-ray 4   Respiratory Pattern 0   Mental Status 0   Breath Sounds 2   Cough Effectiveness 0   Level of Activity 1   O2 Required for SpO2>=92% 1   Acuity Level (points) 11   Acuity Level  3   Re-eval Interval Guideline Every 3 days   Re-evaluation Date 03/30/25   $RT Consult Time Spent RCAT (30 minute increments) 1   Clinical Indications/Symptoms   Aerosol Therapy Physician order;RCAT protocol   Broncho-pulmonary Hygiene History of mucous producing disease   Volume Expansion Decreased breath sounds   Aerosol Therapy Plan   RT Treatment Nebulizer   Anticholinergic/Beta-Andrenergic Agonist Duoneb soln (0.5mg/3mg per 3mL) neb Max 6 doses/24h   Aerosol Treatment Frequency Acuity Level 3: QID/PRN @noc-Mod wheezing/Hx asthma/secretion removal   Aerosol Therapy (SVN)   Daily Review of Necessity (SVN) completed   Respiratory Treatment Status (SVN) given   Patient Position HOB elevated   Posttreatment Assessment (SVN) breath sounds unchanged   Signs of Intolerance (SVN) none   Broncho-Pulmonary Hygiene Plan   Broncho-Pulmonary Hygiene Treatment Coughing techniques   Broncho-Pulm Hygiene Frequency Acuity Level 3: TID-Small amounts secretions/poor cough, hx of secretions   Volume Expansion Plan   Volume Expansion Treatment Incentive Spirometer   Volume Expansion Frequency Acuity Level 3: TID-At risk for developing atelectasis   Breath Sounds   Breath Sounds All Fields   All Lung Fields Breath Sounds diminished     [facility] RCAT     Admission Dx: RSV+  Pulmonary History: Former smoker  Home Nebulizer/MDI Use: None on file  Home Oxygen: none on file  Current Oxygen Requirements: 2L NC  Current SpO2: 97%  Patient Education: Education was performed with the patient in regards to indications/benefits and possible side effects of bronchodilators. RT will continue to do education with patient.      Rena  Satish, RT, RT  3/27/2025 6:23 PM

## 2025-03-27 NOTE — PROGRESS NOTES
03/27/25 0800   Appointment Info   Signing Clinician's Name / Credentials (OT) Abril Weller, OTD, OTR/L   Living Environment   People in Home alone   Current Living Arrangements independent living facility   Home Accessibility no concerns   Transportation Anticipated family or friend will provide   Living Environment Comments Lives alone in ILF.   Self-Care   Usual Activity Tolerance moderate   Current Activity Tolerance poor   Regular Exercise No   Equipment Currently Used at Home walker, rolling;shower chair   Fall history within last six months no   Activity/Exercise/Self-Care Comment Pt reports no AD in apartment, 4WW outside of apartment. IND with all ADLs. Does simple meal prep. Daughter does grocery shopping and laundry. Has housekeeping 1x/week.   General Information   Onset of Illness/Injury or Date of Surgery 03/23/25   Referring Physician Michael Jeronimo, DO   Patient/Family Therapy Goal Statement (OT) Return home, get stronger   Additional Occupational Profile Info/Pertinent History of Current Problem Kavita Merlos is a 89 yea2r old female with a history of HFpEF, hypertension, paroxysmal atrial fibrillation on Eliquis, aortic stenosis status post TAVR, infrarenal AAA, macular degeneration, glaucoma, depression admitted on 3/23/2025 with shortness of breath and cough.  Of note, the patient was recently hospitalized from 3/5 -3/11 for the treatment of right upper lobe pneumonia treated with ceftriaxone and azithromycin followed by Omnicef and azithromycin through 3/12.  The patient was discharged to TCU.  The patient was noted to be hypoxic to 89% on room air by EMS.  In the emergency department, the patient is found of a temperature of 98.3  F, heart rate 75, blood pressure as high as 202/86, respiratory rate as high as 28, SpO2 96% on 2 L nasal cannula.  Initial lab work showed sodium 127, proBNP 4677, high-sensitivity troponin 24, procalcitonin 0.09, venous pH 7.35 with a venous pCO2 of 41, WBC  6.5, hemoglobin 8.8.  The patient tested positive for RSV.  Chest x-ray showed patchy opacities throughout the right lung with upper lobe opacities improved since prior but increasing opacities in the right middle and lower lung that are likely pneumonia.  The patient was started on ceftriaxone and azithromycin.  Unfortunately, overnight on 3/23, the patient had worsening shortness of breath with VBG showing hypercapnia and venous pH of 7.24 with a venous pCO2 of 62.  The patient did briefly require CPAP.   Existing Precautions/Restrictions fall;oxygen therapy device and L/min   Cognitive Status Examination   Orientation Status orientation to person, place and time   Visual Perception   Visual Impairment/Limitations legally blind   Sensory   Sensory Quick Adds sensation intact   Pain Assessment   Patient Currently in Pain Yes, see Vital Sign flowsheet   Posture   Posture forward head position;protracted shoulders   Range of Motion Comprehensive   Comment, General Range of Motion BUE WFL   Strength Comprehensive (MMT)   Comment, General Manual Muscle Testing (MMT) Assessment BUE WFL, general weakness and decreased activity tolerance   Bed Mobility   Comment (Bed Mobility) CGA   Transfers   Transfer Comments Min A and FWW   Balance   Balance Comments Mostly steady with fWW   Activities of Daily Living   BADL Assessment/Intervention bathing;upper body dressing;lower body dressing;grooming;toileting   Bathing Assessment/Intervention   [place] Level (Bathing) moderate assist (50% patient effort)   Comment, (Bathing) Clinical judgement   Upper Body Dressing Assessment/Training   Comment, (Upper Body Dressing) Clinical judgement   [place] Level (Upper Body Dressing) minimum assist (75% patient effort)   Lower Body Dressing Assessment/Training   Comment, (Lower Body Dressing) Clinical judgement   [place] Level (Lower Body Dressing) maximum assist (25% patient effort)   Grooming Assessment/Training    [place] Level (Grooming) minimum assist (75% patient effort)   Toileting   [place] Level (Toileting) moderate assist (50% patient effort)   Clinical Impression   Criteria for Skilled Therapeutic Interventions Met (OT) Yes, treatment indicated   OT Diagnosis Impaired ADLS   Influenced by the following impairments hypoxia, weakness   OT Problem List-Impairments impacting ADL problems related to;activity tolerance impaired;mobility;pain;strength   Assessment of Occupational Performance 3-5 Performance Deficits   Identified Performance Deficits bathing, toielting, dressing, all home mgmt   Planned Therapy Interventions (OT) ADL retraining;strengthening;transfer training;progressive activity/exercise   Clinical Decision Making Complexity (OT) problem focused assessment/low complexity   Risk & Benefits of therapy have been explained evaluation/treatment results reviewed;care plan/treatment goals reviewed;risks/benefits reviewed;current/potential barriers reviewed;participants voiced agreement with care plan;participants included;patient   OT Total Evaluation Time   OT Eval, Low Complexity Minutes (92578) 6   OT Goals   Therapy Frequency (OT) 5 times/week   OT Predicted Duration/Target Date for Goal Attainment 04/02/25   OT Goals Hygiene/Grooming;Lower Body Dressing;Lower Body Bathing;Toilet Transfer/Toileting   OT: Hygiene/Grooming modified independent   OT: Lower Body Dressing Modified independent   OT: Lower Body Bathing Modified independent   OT: Toilet Transfer/Toileting Modified independent   Interventions   Interventions Quick Adds Self-Care/Home Management   Self-Care/Home Management   Self-Care/Home Mgmt/ADL, Compensatory, Meal Prep Minutes (38224) 23   Symptoms Noted During/After Treatment (Meal Preparation/Planning Training) fatigue;increased pain   Treatment Detail/Skilled Intervention Pt greeted supine in bed, required increased motivation and encouragement to partiicapte in therapy session. Pt  "very particular with environment set up and placement of room items. Pt reluctantly able to tx to EOB with increased time and effort but overall CGA. Toelrated sitting self supported EOB then completed x1 STS with Min A and FWW. Tolerated standing for ~2 minutes and particiapted in standing marches CGA and FWW. Pt abruptly saying \"I'm done!\" and sitting back to EOB, wanting to continue eating breakfast. Returned to supine with CGA and VCs for body mechanics, pt legally blind so requries increased time for set up. on 2L O2 throughout session, no c/o of SOB but general fatigue. Left supine with HOB raised with all immediate needs met.   OT Discharge Planning   OT Plan seated g/h, progress toileting, FB dressing   OT Discharge Recommendation (DC Rec) Transitional Care Facility   OT Rationale for DC Rec Pt significantly below functional baseline. Would benefit from return to TCU to address current impairments prior to safely returning home again. Was previously at TCU, home for 1 day, then hospitilized. Reluctant for TCU, but knows she needs it. Will follow.   OT Brief overview of current status STS min A and FWW   OT Total Distance Amb During Session (feet) 1   Total Session Time   Timed Code Treatment Minutes 23   Total Session Time (sum of timed and untimed services) 29     "

## 2025-03-27 NOTE — PLAN OF CARE
"Assumed Care from 1900-0730    A & O x 4, legally blind. Up A1 gb/walker, BM during shift. VSS on 2 LPM via NC. LS exp wheezes throughout, congested cough reports feel like things are losing up. Pure wick in place per pt request. Discharge plan pending.         Goal Outcome Evaluation:      Plan of Care Reviewed With: patient    Overall Patient Progress: improvingOverall Patient Progress: improving      Problem: Adult Inpatient Plan of Care  Goal: Plan of Care Review  Description: The Plan of Care Review/Shift note should be completed every shift.  The Outcome Evaluation is a brief statement about your assessment that the patient is improving, declining, or no change.  This information will be displayed automatically on your shiftnote.  Outcome: Progressing  Flowsheets (Taken 3/27/2025 0024)  Plan of Care Reviewed With: patient  Overall Patient Progress: improving  Goal: Patient-Specific Goal (Individualized)  Description: You can add care plan individualizations to a care plan. Examples of Individualization might be:  \"Parent requests to be called daily at 9am for status\", \"I have a hard time hearing out of my right ear\", or \"Do not touch me to wake me up as it startlesme\".  Outcome: Progressing  Goal: Absence of Hospital-Acquired Illness or Injury  Outcome: Progressing  Goal: Optimal Comfort and Wellbeing  Outcome: Progressing  Goal: Readiness for Transition of Care  Outcome: Progressing              "

## 2025-03-28 ENCOUNTER — APPOINTMENT (OUTPATIENT)
Dept: OCCUPATIONAL THERAPY | Facility: CLINIC | Age: 89
DRG: 193 | End: 2025-03-28
Payer: MEDICARE

## 2025-03-28 LAB
BACTERIA BLD CULT: NO GROWTH
BACTERIA BLD CULT: NO GROWTH

## 2025-03-28 PROCEDURE — 94640 AIRWAY INHALATION TREATMENT: CPT | Mod: 76

## 2025-03-28 PROCEDURE — 120N000001 HC R&B MED SURG/OB

## 2025-03-28 PROCEDURE — 250N000009 HC RX 250: Performed by: HOSPITALIST

## 2025-03-28 PROCEDURE — 999N000157 HC STATISTIC RCP TIME EA 10 MIN

## 2025-03-28 PROCEDURE — 99232 SBSQ HOSP IP/OBS MODERATE 35: CPT | Performed by: HOSPITALIST

## 2025-03-28 PROCEDURE — 250N000011 HC RX IP 250 OP 636: Performed by: HOSPITALIST

## 2025-03-28 PROCEDURE — 250N000013 HC RX MED GY IP 250 OP 250 PS 637: Performed by: INTERNAL MEDICINE

## 2025-03-28 PROCEDURE — 250N000013 HC RX MED GY IP 250 OP 250 PS 637: Performed by: HOSPITALIST

## 2025-03-28 PROCEDURE — 97535 SELF CARE MNGMENT TRAINING: CPT | Mod: GO

## 2025-03-28 RX ADMIN — IPRATROPIUM BROMIDE AND ALBUTEROL SULFATE 3 ML: .5; 3 SOLUTION RESPIRATORY (INHALATION) at 11:55

## 2025-03-28 RX ADMIN — APIXABAN 2.5 MG: 2.5 TABLET, FILM COATED ORAL at 10:36

## 2025-03-28 RX ADMIN — METOPROLOL SUCCINATE 100 MG: 100 TABLET, EXTENDED RELEASE ORAL at 10:36

## 2025-03-28 RX ADMIN — APIXABAN 2.5 MG: 2.5 TABLET, FILM COATED ORAL at 20:08

## 2025-03-28 RX ADMIN — Medication 1 TABLET: at 10:36

## 2025-03-28 RX ADMIN — IPRATROPIUM BROMIDE AND ALBUTEROL SULFATE 3 ML: .5; 3 SOLUTION RESPIRATORY (INHALATION) at 19:42

## 2025-03-28 RX ADMIN — TIMOLOL MALEATE 1 DROP: 5 SOLUTION/ DROPS OPHTHALMIC at 14:36

## 2025-03-28 RX ADMIN — ACETAMINOPHEN 650 MG: 325 TABLET, FILM COATED ORAL at 13:37

## 2025-03-28 RX ADMIN — IPRATROPIUM BROMIDE AND ALBUTEROL SULFATE 3 ML: .5; 3 SOLUTION RESPIRATORY (INHALATION) at 07:38

## 2025-03-28 RX ADMIN — TIMOLOL MALEATE 1 DROP: 5 SOLUTION/ DROPS OPHTHALMIC at 10:36

## 2025-03-28 RX ADMIN — GUAIFENESIN 600 MG: 600 TABLET, EXTENDED RELEASE ORAL at 20:08

## 2025-03-28 RX ADMIN — TAFLUPROST OPTHALMIC 1 DROP: 0 SOLUTION/ DROPS OPHTHALMIC at 20:08

## 2025-03-28 RX ADMIN — IPRATROPIUM BROMIDE AND ALBUTEROL SULFATE 3 ML: .5; 3 SOLUTION RESPIRATORY (INHALATION) at 16:08

## 2025-03-28 RX ADMIN — CEFTRIAXONE 2 G: 2 INJECTION, POWDER, FOR SOLUTION INTRAMUSCULAR; INTRAVENOUS at 20:08

## 2025-03-28 RX ADMIN — GUAIFENESIN 600 MG: 600 TABLET, EXTENDED RELEASE ORAL at 10:36

## 2025-03-28 ASSESSMENT — ACTIVITIES OF DAILY LIVING (ADL)
ADLS_ACUITY_SCORE: 58
ADLS_ACUITY_SCORE: 57
ADLS_ACUITY_SCORE: 58
ADLS_ACUITY_SCORE: 58
ADLS_ACUITY_SCORE: 57
ADLS_ACUITY_SCORE: 58
ADLS_ACUITY_SCORE: 57
ADLS_ACUITY_SCORE: 58
ADLS_ACUITY_SCORE: 58
ADLS_ACUITY_SCORE: 57
ADLS_ACUITY_SCORE: 57
ADLS_ACUITY_SCORE: 58
ADLS_ACUITY_SCORE: 57
ADLS_ACUITY_SCORE: 58
ADLS_ACUITY_SCORE: 57
ADLS_ACUITY_SCORE: 57
ADLS_ACUITY_SCORE: 58
ADLS_ACUITY_SCORE: 57
ADLS_ACUITY_SCORE: 58
ADLS_ACUITY_SCORE: 57

## 2025-03-28 NOTE — PLAN OF CARE
"A&Ox4. Intermittent confusion. Reg diet. Ax1 walker/GB. 2L NC. External cath. Legally blind. Gave PRN Tylenol for headache, effective. OT/WOC/PT follow. Plan to discharge back to TCU on Tuesday.                   Goal Outcome Evaluation:      Plan of Care Reviewed With: patient          Outcome Evaluation: Infrequent cough. Gave PRN Tylenol for headache, effective.      Problem: Adult Inpatient Plan of Care  Goal: Plan of Care Review  Description: The Plan of Care Review/Shift note should be completed every shift.  The Outcome Evaluation is a brief statement about your assessment that the patient is improving, declining, or no change.  This information will be displayed automatically on your shiftnote.  Outcome: Adequate for Care Transition  Flowsheets (Taken 3/28/2025 1441)  Outcome Evaluation: Infrequent cough. Gave PRN Tylenol for headache, effective.  Plan of Care Reviewed With: patient  Goal: Patient-Specific Goal (Individualized)  Description: You can add care plan individualizations to a care plan. Examples of Individualization might be:  \"Parent requests to be called daily at 9am for status\", \"I have a hard time hearing out of my right ear\", or \"Do not touch me to wake me up as it startlesme\".  Outcome: Adequate for Care Transition  Goal: Absence of Hospital-Acquired Illness or Injury  Outcome: Adequate for Care Transition  Intervention: Identify and Manage Fall Risk  Recent Flowsheet Documentation  Taken 3/28/2025 1033 by Reinaldo Liu RN  Safety Promotion/Fall Prevention: safety round/check completed  Intervention: Prevent Skin Injury  Recent Flowsheet Documentation  Taken 3/28/2025 1033 by Reinaldo Liu RN  Body Position: position changed independently  Intervention: Prevent Infection  Recent Flowsheet Documentation  Taken 3/28/2025 1033 by Reinaldo Liu RN  Infection Prevention:   hand hygiene promoted   personal protective equipment utilized   rest/sleep promoted   single patient room " provided  Goal: Optimal Comfort and Wellbeing  Outcome: Adequate for Care Transition  Intervention: Monitor Pain and Promote Comfort  Recent Flowsheet Documentation  Taken 3/28/2025 1335 by Reinaldo Liu RN  Pain Management Interventions: medication (see MAR)  Goal: Readiness for Transition of Care  Outcome: Adequate for Care Transition     Problem: Infection  Goal: Absence of Infection Signs and Symptoms  Outcome: Adequate for Care Transition  Intervention: Prevent or Manage Infection  Recent Flowsheet Documentation  Taken 3/28/2025 1033 by Reinaldo Liu RN  Isolation Precautions:   contact precautions maintained   droplet precautions maintained     Problem: Gas Exchange Impaired  Goal: Optimal Gas Exchange  Outcome: Adequate for Care Transition  Intervention: Optimize Oxygenation and Ventilation  Recent Flowsheet Documentation  Taken 3/28/2025 1033 by Reinaldo Liu RN  Head of Bed (HOB) Positioning: HOB at 20-30 degrees     Problem: Comorbidity Management  Goal: Blood Pressure in Desired Range  Outcome: Adequate for Care Transition  Intervention: Maintain Blood Pressure Management  Recent Flowsheet Documentation  Taken 3/28/2025 1033 by Reinaldo Liu RN  Medication Review/Management: medications reviewed

## 2025-03-28 NOTE — CONSULTS
Care Management Follow Up    Length of Stay (days): 5    Expected Discharge Date: 04/01/2025     Concerns to be Addressed: discharge planning     Patient plan of care discussed at interdisciplinary rounds: Yes    Anticipated Discharge Disposition:  [facility] TCU   Anticipated Discharge Services:  possible transportation needs  Anticipated Discharge DME:  none    Patient/family educated on Medicare website which has current facility and service quality ratings:  yes  Education Provided on the Discharge Plan:  yes  Patient/Family in Agreement with the Plan:  yes    Referrals Placed by CM/SW:  TCU  Private pay costs discussed: private room/amenity fees and transportation costs    Discussed  Partnership in Safe Discharge Planning  document with patient/family: No     Handoff Completed: Yes    Additional Information:  CM following for discharge planning to Transitional Care Unit. Patient lives normally at [facility]. She was recently at [facility] Transitional Care Unit and discharged home. She was readmitted and now is recommended for Transitional Care Unit again. Patient would like to return to [facility] as she lives in their facility. Referral has been sent and patient was accepted by [facility] Transitional Care Unit again. Spoke to Admissions at [facility] and their policy is that patient needs to be 8 days from RSV diagnosis to accept patient to Transitional Care Unit. Patient will go into a shared room on 4/1. Spoke to patient's daughter and updated her on plan of care and acceptance on 4/1. She is agreeable to the plan and will update patient. They prefer a private room if possible and are willing to pay the $55/day. Will update [facility] to add patient to the private room list. Will reassess transportation needs on Monday. MD updated.    Next Steps: continue to follow for discharge planning to [facility]            Dolores Chandler RN BSN CM  Inpatient Care Coordination  [facility]  [facility]  [phone]

## 2025-03-28 NOTE — PROGRESS NOTES
[facility] [place] [address] [address]  [phone]               Thank you for choosing us for your health care visit with Mandy Degroot MD.  We are glad to serve you and happy to provide you with this summary of [facility]    Hospitalist Progress Note  Name: Kavita Merlos    MRN: [id number]  Provider:  Michael Jeronimo DO MPH  Date of Service: 03/28/2025    Summary of Stay: Kavita Merlos is a 89 yea2r old female with a history of HFpEF, hypertension, paroxysmal atrial fibrillation on Eliquis, aortic stenosis status post TAVR, infrarenal AAA, macular degeneration, glaucoma, depression admitted on 3/23/2025 with shortness of breath and cough.  Of note, the patient was recently hospitalized from 3/5 -3/11 for the treatment of right upper lobe pneumonia treated with ceftriaxone and azithromycin followed by Omnicef and azithromycin through 3/12.  The patient was discharged to TCU.  The patient was noted to be hypoxic to 89% on room air by EMS.  In the emergency department, the patient is found of a temperature of 98.3  F, heart rate 75, blood pressure as high as 202/86, respiratory rate as high as 28, SpO2 96% on 2 L nasal cannula.  Initial lab work showed sodium 127, proBNP 4677, high-sensitivity troponin 24, procalcitonin 0.09, venous pH 7.35 with a venous pCO2 of 41, WBC 6.5, hemoglobin 8.8.  The patient tested positive for RSV.  Chest x-ray showed patchy opacities throughout the right lung with upper lobe opacities improved since prior but increasing opacities in the right middle and lower lung that are likely pneumonia.  The patient was started on ceftriaxone and azithromycin.  Unfortunately, overnight on 3/23, the patient had worsening shortness of breath with VBG showing hypercapnia and venous pH of 7.24 with a venous pCO2 of 62.  The patient did briefly require CPAP.     Problem List:   Acute hypoxic and hypercapnic respiratory failure secondary to RSV pneumonia and community acquired pneumonia: Was requiring 2 L oxygen by nasal cannula on admission but even required (but poorly tolerated) CPAP the night of 3/23.  Oxygen requirements are slightly better now only on 1-2 L oxygen by nasal cannula.  She  [address]  [address]  [address] [place] [address]    [address]  [address] [place], [place]    It is the patient's responsibility to check with and follow their insurance has tested positive for RSV.  Chest imaging shows what appears to be evolving pneumonia.  Will continue treatment with ceftriaxone and azithromycin for now.  Wean oxygen as able.  Hold off on steroids or diuretics for the time being.  Continue as needed nebulizers, mucolytics, and cough suppressants.  Her weight does appear to be increased so we will recheck this tomorrow as it appears to be an outlier and likely inaccurate.  Hypovolemic hyponatremia: Likely poor oral intake.  Sodium relatively stable.  Hold off on IV fluids and diuretics for now.  RD consulted.  Push oral intake.  Deconditioning and generalized weakness: Likely due to her acute infectious process.  Weakness improved today so starting some PT/OT.  To note, she was just discharged from TCU 1 day prior to admission here.  She became extremely fatigued after being up in the chair this morning.  Planning to return to TCU upon discharge.  Paroxysmal atrial fibrillation: Currently rate controlled.  Cautiously continue Toprol given an isolated soft blood pressure a few nights ago.  Continue Eliquis for stroke prophylaxis.  Chronic HFpEF and aortic stenosis status post TAVR: Appears relatively stable and euvolemic.  Her procedure was in January 2024.  Hypertensive urgency: Blood pressure was initially 206/100.  Has been quite labile but reasonably controlled.  Blood pressure stable today.  Cautiously continuing PTA Toprol.  Chronic anemia: Hemoglobin has been widely fluctuating since her admission.  Hemoglobin largely stable around 9-10.  No reports of bleeding.  She did receive a blood transfusion earlier in the month when she was hospitalized and developed acute pulmonary edema as a complication.  She was seen in consultation by hematology who thought she was having some degree of hemolysis from sepsis and RBC fragmentation from her TAVR.  Recheck CBC in the morning.  Depression: Assessed at the previous hospitalization and some thoughts about prescribing  [address] [facility]   [address], [address]    [place] [id number] [place] [id number] [facility]  [address]  [facility],  [address]   [address], [address] [facility] [place] Lexapro but she never began this and is not interested in it.    DVT Prophylaxis: DOAC  Code Status: No CPR- Do NOT Intubate  Diet: Combination Diet Regular Diet Adult  Snacks/Supplements Adult: Jeanie Adams Standard 1.4 Oral Supplement; Between Meals    Mendieta Catheter: Not present  Disposition: Expected discharge on 4/1 as needs to be 8 days out of RSV testing.  Incidental Findings: None.  Family updated today: Yes daughter by phone who is very supportive and helpful.    40 MINUTES SPENT BY ME on the date of service doing chart review, history, exam, documentation & further activities per the note.      Interval History   Patient feels quite a bit better from a shortness of breath standpoint. Also improved weakness and fatigued.  She was getting ready to start therapy.  Denies any chest pain or palpitations.  No fevers.  Oxygen requirement slightly better.    -Data reviewed today: I personally reviewed all new labs and imaging results over the last 24 hours.     Physical Exam   Temp: 98.9  F (37.2  C) Temp src: Oral BP: 132/51 Pulse: 88   Resp: 17 SpO2: 96 % O2 Device: Nasal cannula Oxygen Delivery: 1 LPM  Vitals:    03/26/25 0404 03/27/25 0505 03/28/25 0609   Weight: 56.7 kg (125 lb) 54.2 kg (119 lb 6.4 oz) 57.6 kg (126 lb 15.8 oz)     Vital Signs with Ranges  Temp:  [97.6  F (36.4  C)-98.9  F (37.2  C)] 98.9  F (37.2  C)  Pulse:  [68-88] 88  Resp:  [16-20] 17  BP: (127-153)/(49-61) 132/51  SpO2:  [93 %-99 %] 96 %  I/O last 3 completed shifts:  In: 375 [P.O.:375]  Out: 1650 [Urine:1650]    GENERAL: No apparent distress. Awake, alert, and fully oriented.  HEENT: Normocephalic, atraumatic. Extraocular movements intact.  CARDIOVASCULAR: Regular rate and rhythm without murmurs or rubs. No S3.  PULMONARY: Coarse bilaterally.  GASTROINTESTINAL: Soft, non-tender, non-distended. Bowel sounds normoactive.   EXTREMITIES: No cyanosis or clubbing. No edema.  NEUROLOGICAL: CN 2-12 grossly intact, no focal neurological  deficits.  DERMATOLOGICAL: No rash, ulcer, bruising, nor jaundice.     Medications   Current Facility-Administered Medications   Medication Dose Route Frequency Provider Last Rate Last Admin    Patient is already receiving anticoagulation with heparin, enoxaparin (LOVENOX), warfarin (COUMADIN)  or other anticoagulant medication   Does not apply Continuous PRN Lobo Orta MD         Current Facility-Administered Medications   Medication Dose Route Frequency Provider Last Rate Last Admin    apixaban ANTICOAGULANT (ELIQUIS) tablet 2.5 mg  2.5 mg Oral BID Lobo Orta MD   2.5 mg at 03/28/25 1036    cefTRIAXone (ROCEPHIN) 2 g vial to attach to  ml bag for ADULTS or NS 50 ml bag for PEDS  2 g Intravenous Q24H Michael Jeronimo  mL/hr at 03/27/25 2023 2 g at 03/27/25 2023    guaiFENesin (MUCINEX) 12 hr tablet 600 mg  600 mg Oral BID Michael Jeronimo DO   600 mg at 03/28/25 1036    ipratropium - albuterol 0.5 mg/2.5 mg/3 mL (DUONEB) neb solution 3 mL  3 mL Nebulization Q4H WA Michael Jeronimo DO   3 mL at 03/28/25 1155    metoprolol succinate ER (TOPROL XL) 24 hr tablet 100 mg  100 mg Oral Daily Lobo Orta MD   100 mg at 03/28/25 1036    multivitamin w/minerals (THERA-VIT-M) tablet 1 tablet  1 tablet Oral Daily Isael Jeronimo DO   1 tablet at 03/28/25 1036    sodium chloride (PF) 0.9% PF flush 3 mL  3 mL Intracatheter Q8H [facility] Lobo Orta MD   3 mL at 03/27/25 2227    tafluprost (ZIOPTAN) ophthalmic solution 1 drop [PT SUPPLIED}  1 drop Both Eyes At Bedtime Lobo Orta MD   1 drop at 03/27/25 2226    timolol maleate (TIMOPTIC) 0.5 % ophthalmic solution 1 drop {PT SUPPLIED]  1 drop Both Eyes BID Lobo Orta MD   1 drop at 03/28/25 1036     Data     Laboratory:  Recent Labs   Lab 03/27/25  0804 03/26/25  0806 03/25/25  0631   WBC 7.4 5.5 5.6   HGB 9.1* 8.4* 8.0*   HCT 28.5* 26.0* 25.9*   MCV 95 95 96    168 159     Recent Labs  medical emergencies, dial 911. Educational Information     Healthy Diet and Regular Exercise  [employer] for making healthy food choices  -   Enjoy your food, but eat less. Fully enjoy your food when eating.    Don’t eat w "  Lab 03/27/25  0804 03/26/25  0806 03/25/25  0631   * 130* 129*   POTASSIUM 4.8 4.7 4.6   CHLORIDE 96* 96* 97*   CO2 28 26 24   ANIONGAP 7 8 8   GLC 97 103* 90   BUN 12.5 17.2 22.2   CR 0.44* 0.47* 0.61   GFRESTIMATED >90 >90 86   JERRY 8.7* 8.6* 8.3*     No results for input(s): \"CULT\" in the last 168 hours.    Imaging:  No results found for this or any previous visit (from the past 24 hours).      Michael Jeronimo DO MPH  [facility] Hospitalist  [address] [place] [address]  03/28/2025   "

## 2025-03-28 NOTE — PLAN OF CARE
"A&OX4. Forgetful.  Fatigued and weak, but with some improvement.Legally blind.   Shortness of breath is improving per patient. VSS. Afebrile.  Moist intermittent cough. Up with assist of 1 with gait. Purwick intact and functioning. Plan is to discharge to TCU when medically ready. Continue POC and monitoring.     Problem: Adult Inpatient Plan of Care  Goal: Plan of Care Review  Description: The Plan of Care Review/Shift note should be completed every shift.  The Outcome Evaluation is a brief statement about your assessment that the patient is improving, declining, or no change.  This information will be displayed automatically on your shiftnote.  Outcome: Progressing  Flowsheets (Taken 3/27/2025 2025)  Outcome Evaluation: Continue on ABX Rocephin and Oral Azithromycin. Patient reports feelig better today.  Plan of Care Reviewed With:   patient   family  Overall Patient Progress: improving  Goal: Patient-Specific Goal (Individualized)  Description: You can add care plan individualizations to a care plan. Examples of Individualization might be:  \"Parent requests to be called daily at 9am for status\", \"I have a hard time hearing out of my right ear\", or \"Do not touch me to wake me up as it startlesme\".  Outcome: Progressing  Goal: Absence of Hospital-Acquired Illness or Injury  Outcome: Progressing  Intervention: Identify and Manage Fall Risk  Recent Flowsheet Documentation  Taken 3/27/2025 1753 by Maryana Harmon RN  Safety Promotion/Fall Prevention: activity supervised  Intervention: Prevent Infection  Recent Flowsheet Documentation  Taken 3/27/2025 1753 by Maryana Harmon RN  Infection Prevention: hand hygiene promoted  Goal: Optimal Comfort and Wellbeing  Outcome: Progressing  Intervention: Provide Person-Centered Care  Recent Flowsheet Documentation  Taken 3/27/2025 1753 by Maryana Harmon RN  Trust Relationship/Rapport:   care explained   questions encouraged   thoughts/feelings acknowledged  Goal: " Readiness for Transition of Care  Outcome: Progressing     Problem: Infection  Goal: Absence of Infection Signs and Symptoms  Outcome: Progressing  Intervention: Prevent or Manage Infection  Recent Flowsheet Documentation  Taken 3/27/2025 1753 by Maryana Harmon RN  Isolation Precautions: contact precautions initiated     Problem: Gas Exchange Impaired  Goal: Optimal Gas Exchange  Outcome: Progressing     Problem: Comorbidity Management  Goal: Blood Pressure in Desired Range  Outcome: Progressing  Intervention: Maintain Blood Pressure Management  Recent Flowsheet Documentation  Taken 3/27/2025 1753 by Maryana Harmon RN  Medication Review/Management: medications reviewed   Goal Outcome Evaluation:      Plan of Care Reviewed With: patient, family    Overall Patient Progress: improvingOverall Patient Progress: improving    Outcome Evaluation: Continue on ABX Rocephin and Oral Azithromycin. Patient reports feelig better today.

## 2025-03-28 NOTE — PLAN OF CARE
"A & O x 4, forgetful times. Legally blind. Remains on 2 LPM. A x 1 with gait belt and walker. Pure wick in place with good output. Discharge plan for TCU.       Goal Outcome Evaluation:      Plan of Care Reviewed With: patient    Overall Patient Progress: improvingOverall Patient Progress: improving      Problem: Adult Inpatient Plan of Care  Goal: Plan of Care Review  Description: The Plan of Care Review/Shift note should be completed every shift.  The Outcome Evaluation is a brief statement about your assessment that the patient is improving, declining, or no change.  This information will be displayed automatically on your shiftnote.  Outcome: Progressing  Flowsheets (Taken 3/28/2025 0604)  Plan of Care Reviewed With: patient  Overall Patient Progress: improving  Goal: Patient-Specific Goal (Individualized)  Description: You can add care plan individualizations to a care plan. Examples of Individualization might be:  \"Parent requests to be called daily at 9am for status\", \"I have a hard time hearing out of my right ear\", or \"Do not touch me to wake me up as it startlesme\".  Outcome: Progressing  Goal: Absence of Hospital-Acquired Illness or Injury  Outcome: Progressing  Intervention: Identify and Manage Fall Risk  Recent Flowsheet Documentation  Taken 3/28/2025 0230 by Ariane Kimble RN  Safety Promotion/Fall Prevention:   activity supervised   safety round/check completed  Intervention: Prevent Skin Injury  Recent Flowsheet Documentation  Taken 3/28/2025 0230 by Ariane Kimble RN  Body Position:   legs elevated   supine, legs elevated   weight shifting  Intervention: Prevent Infection  Recent Flowsheet Documentation  Taken 3/28/2025 0230 by Ariane Kimble RN  Infection Prevention: hand hygiene promoted  Goal: Optimal Comfort and Wellbeing  Outcome: Progressing  Intervention: Provide Person-Centered Care  Recent Flowsheet Documentation  Taken 3/28/2025 0230 by Ariane Kimble RN  Trust Relationship/Rapport:   care " explained   questions encouraged   thoughts/feelings acknowledged  Goal: Readiness for Transition of Care  Outcome: Progressing

## 2025-03-29 ENCOUNTER — APPOINTMENT (OUTPATIENT)
Dept: PHYSICAL THERAPY | Facility: CLINIC | Age: 89
DRG: 193 | End: 2025-03-29
Payer: MEDICARE

## 2025-03-29 PROCEDURE — 250N000013 HC RX MED GY IP 250 OP 250 PS 637: Performed by: INTERNAL MEDICINE

## 2025-03-29 PROCEDURE — 250N000009 HC RX 250: Performed by: HOSPITALIST

## 2025-03-29 PROCEDURE — 250N000013 HC RX MED GY IP 250 OP 250 PS 637: Performed by: HOSPITALIST

## 2025-03-29 PROCEDURE — 94640 AIRWAY INHALATION TREATMENT: CPT

## 2025-03-29 PROCEDURE — 94640 AIRWAY INHALATION TREATMENT: CPT | Mod: 76

## 2025-03-29 PROCEDURE — 120N000001 HC R&B MED SURG/OB

## 2025-03-29 PROCEDURE — 250N000011 HC RX IP 250 OP 636: Performed by: HOSPITALIST

## 2025-03-29 PROCEDURE — 999N000157 HC STATISTIC RCP TIME EA 10 MIN

## 2025-03-29 PROCEDURE — 99232 SBSQ HOSP IP/OBS MODERATE 35: CPT | Performed by: HOSPITALIST

## 2025-03-29 PROCEDURE — 97530 THERAPEUTIC ACTIVITIES: CPT | Mod: GP

## 2025-03-29 RX ADMIN — GUAIFENESIN 600 MG: 600 TABLET, EXTENDED RELEASE ORAL at 20:48

## 2025-03-29 RX ADMIN — Medication 1 TABLET: at 08:06

## 2025-03-29 RX ADMIN — METOPROLOL SUCCINATE 100 MG: 100 TABLET, EXTENDED RELEASE ORAL at 08:06

## 2025-03-29 RX ADMIN — TIMOLOL MALEATE 1 DROP: 5 SOLUTION/ DROPS OPHTHALMIC at 08:08

## 2025-03-29 RX ADMIN — TAFLUPROST OPTHALMIC 1 DROP: 0 SOLUTION/ DROPS OPHTHALMIC at 21:27

## 2025-03-29 RX ADMIN — IPRATROPIUM BROMIDE AND ALBUTEROL SULFATE 3 ML: .5; 3 SOLUTION RESPIRATORY (INHALATION) at 16:11

## 2025-03-29 RX ADMIN — TIMOLOL MALEATE 1 DROP: 5 SOLUTION/ DROPS OPHTHALMIC at 16:08

## 2025-03-29 RX ADMIN — IPRATROPIUM BROMIDE AND ALBUTEROL SULFATE 3 ML: .5; 3 SOLUTION RESPIRATORY (INHALATION) at 11:47

## 2025-03-29 RX ADMIN — GUAIFENESIN 600 MG: 600 TABLET, EXTENDED RELEASE ORAL at 08:06

## 2025-03-29 RX ADMIN — APIXABAN 2.5 MG: 2.5 TABLET, FILM COATED ORAL at 08:07

## 2025-03-29 RX ADMIN — CEFTRIAXONE 2 G: 2 INJECTION, POWDER, FOR SOLUTION INTRAMUSCULAR; INTRAVENOUS at 20:48

## 2025-03-29 RX ADMIN — IPRATROPIUM BROMIDE AND ALBUTEROL SULFATE 3 ML: .5; 3 SOLUTION RESPIRATORY (INHALATION) at 07:41

## 2025-03-29 RX ADMIN — APIXABAN 2.5 MG: 2.5 TABLET, FILM COATED ORAL at 20:48

## 2025-03-29 RX ADMIN — IPRATROPIUM BROMIDE AND ALBUTEROL SULFATE 3 ML: .5; 3 SOLUTION RESPIRATORY (INHALATION) at 19:13

## 2025-03-29 ASSESSMENT — ACTIVITIES OF DAILY LIVING (ADL)
ADLS_ACUITY_SCORE: 59
ADLS_ACUITY_SCORE: 57
ADLS_ACUITY_SCORE: 59
ADLS_ACUITY_SCORE: 57
ADLS_ACUITY_SCORE: 59
ADLS_ACUITY_SCORE: 59
ADLS_ACUITY_SCORE: 57
ADLS_ACUITY_SCORE: 59
ADLS_ACUITY_SCORE: 57
ADLS_ACUITY_SCORE: 59

## 2025-03-29 NOTE — PROGRESS NOTES
Pt progressing, weaned down to 1L 02. Pt denies pain, LS are clear to dim, 97% on 1L. Pt has fair appetite, good fluid intake. Plan to discontinue to TCU on 4/1.

## 2025-03-29 NOTE — PLAN OF CARE
"/57 (BP Location: Right arm, Patient Position: Semi-Kim's, Cuff Size: Adult Regular)   Pulse 79   Temp 97.8  F (36.6  C) (Oral)   Resp 16   Ht 1.626 m (5' 4\")   Wt 54.1 kg (119 lb 4.8 oz)   LMP  (LMP Unknown)   SpO2 96%   BMI 20.48 kg/m      2300 - 0700:     VS: VSS except BP mildly elevated. Sating well on 1/2 L via NC. Attempted to wean to RA, but pt quickly dropped to high 80s.  Pain: Denies pain.  Lines: PIV SL to R hand.  Cognitive: Aox4. Legally blind.  Respiratory: LS dim. No SOB or GUPTA reported. Congested cough at times.  Cardiac: Denies CP.   Peripheral neurovascular: No edema. Pt denies numbness, tingling, and tenderness. Pulses 2+.  GI: Last BM 3/27. Denies N/V.  : Purewick in place.   Skin: Bruising to BUE. Mole on back. See flowsheet for assessment.   Diet: Regular.  Activity: A1 GB and walker.   Plan: Wean O2 as able. IV Rocephin. Discharge to [facility] 4/1 when pt RSV negative. Continue w/ POC.     Goal Outcome Evaluation:         Problem: Adult Inpatient Plan of Care  Goal: Absence of Hospital-Acquired Illness or Injury  Intervention: Identify and Manage Fall Risk  Recent Flowsheet Documentation  Taken 3/28/2025 2339 by Chen Paniagua, RN  Safety Promotion/Fall Prevention: safety round/check completed  Intervention: Prevent Skin Injury  Recent Flowsheet Documentation  Taken 3/28/2025 2339 by Chen Paniagua, RN  Body Position:   weight shifting   supine, head elevated   position changed independently  Intervention: Prevent and Manage VTE (Venous Thromboembolism) Risk  Recent Flowsheet Documentation  Taken 3/28/2025 2339 by Chen Paniagua, RN  VTE Prevention/Management:   patient refused intervention   SCDs off (sequential compression devices)  Intervention: Prevent Infection  Recent Flowsheet Documentation  Taken 3/28/2025 2339 by Chen Paniagua, RN  Infection Prevention:   single patient room provided   rest/sleep promoted   personal protective equipment utilized   hand hygiene " promoted   equipment surfaces disinfected   environmental surveillance performed   cohorting utilized     Problem: Infection  Goal: Absence of Infection Signs and Symptoms  Intervention: Prevent or Manage Infection  Recent Flowsheet Documentation  Taken 3/28/2025 2339 by Chen Paniagua, RN  Isolation Precautions:   contact precautions maintained   droplet precautions maintained     Problem: Gas Exchange Impaired  Goal: Optimal Gas Exchange  Intervention: Optimize Oxygenation and Ventilation  Recent Flowsheet Documentation  Taken 3/28/2025 2339 by Chen Paniagua, RN  Head of Bed (HOB) Positioning: HOB at 20 degrees     Problem: Comorbidity Management  Goal: Blood Pressure in Desired Range  Intervention: Maintain Blood Pressure Management  Recent Flowsheet Documentation  Taken 3/28/2025 2339 by Chen Paniagua, RN  Medication Review/Management: medications reviewed

## 2025-03-29 NOTE — PROGRESS NOTES
[facility]    Hospitalist Progress Note  Name: Kavita Merlos    MRN: [id number]  Provider:  Michael Jeronimo DO MPH  Date of Service: 03/29/2025    Summary of Stay: Kavita Merlos is a 89 yea2r old female with a history of HFpEF, hypertension, paroxysmal atrial fibrillation on Eliquis, aortic stenosis status post TAVR, infrarenal AAA, macular degeneration, glaucoma, depression admitted on 3/23/2025 with shortness of breath and cough.  Of note, the patient was recently hospitalized from 3/5 -3/11 for the treatment of right upper lobe pneumonia treated with ceftriaxone and azithromycin followed by Omnicef and azithromycin through 3/12.  The patient was discharged to TCU.  The patient was noted to be hypoxic to 89% on room air by EMS.  In the emergency department, the patient is found of a temperature of 98.3  F, heart rate 75, blood pressure as high as 202/86, respiratory rate as high as 28, SpO2 96% on 2 L nasal cannula.  Initial lab work showed sodium 127, proBNP 4677, high-sensitivity troponin 24, procalcitonin 0.09, venous pH 7.35 with a venous pCO2 of 41, WBC 6.5, hemoglobin 8.8.  The patient tested positive for RSV.  Chest x-ray showed patchy opacities throughout the right lung with upper lobe opacities improved since prior but increasing opacities in the right middle and lower lung that are likely pneumonia.  The patient was started on ceftriaxone and azithromycin.  Unfortunately, overnight on 3/23, the patient had worsening shortness of breath with VBG showing hypercapnia and venous pH of 7.24 with a venous pCO2 of 62.  The patient did briefly require CPAP.     Problem List:   Acute hypoxic and hypercapnic respiratory failure secondary to RSV pneumonia and community acquired pneumonia: Was requiring 2 L oxygen by nasal cannula on admission but even required (but poorly tolerated) CPAP the night of 3/23.  Oxygen requirements are slightly better now only on 1-2 L oxygen by nasal cannula.  She  has tested positive for RSV.  Chest imaging shows what appears to be evolving pneumonia.  Will continue treatment with ceftriaxone and azithromycin for now.  Wean oxygen as able.  Hold off on steroids or diuretics for the time being.  Continue as needed nebulizers, mucolytics, and cough suppressants.  Her weight does appear to be increased so we will recheck this tomorrow as it appears to be an outlier and likely inaccurate.  Hypovolemic hyponatremia: Likely poor oral intake.  Sodium relatively stable.  Hold off on IV fluids and diuretics for now.  RD consulted.  Push oral intake.  Deconditioning and generalized weakness: Likely due to her acute infectious process.  Weakness improved today so starting some PT/OT.  To note, she was just discharged from TCU 1 day prior to admission here.  She became extremely fatigued after being up in the chair this morning.  Planning to return to TCU upon discharge.  Paroxysmal atrial fibrillation: Currently rate controlled.  Cautiously continue Toprol given an isolated soft blood pressure a few nights ago.  Continue Eliquis for stroke prophylaxis.  Chronic HFpEF and aortic stenosis status post TAVR: Appears relatively stable and euvolemic.  Her procedure was in January 2024.  Hypertensive urgency: Blood pressure was initially 206/100.  Has been quite labile but reasonably controlled.  Blood pressure stable today.  Cautiously continuing PTA Toprol.  Chronic anemia: Hemoglobin has been widely fluctuating since her admission.  Hemoglobin largely stable around 9-10.  No reports of bleeding.  She did receive a blood transfusion earlier in the month when she was hospitalized and developed acute pulmonary edema as a complication.  She was seen in consultation by hematology who thought she was having some degree of hemolysis from sepsis and RBC fragmentation from her TAVR.  Recheck CBC in the morning.  Possible depression: Assessed at the previous hospitalization and some thoughts about  prescribing Lexapro but she never began this and is not interested in it.    DVT Prophylaxis: DOAC  Code Status: No CPR- Do NOT Intubate  Diet: Combination Diet Regular Diet Adult  Snacks/Supplements Adult: Jeanie Adams Standard 1.4 Oral Supplement; Between Meals    Mendieta Catheter: Not present  Disposition: Expected discharge on 4/1 as needs to be 8 days out of RSV testing.  Incidental Findings: None.  Family updated today: Not today.    30 MINUTES SPENT BY ME on the date of service doing chart review, history, exam, documentation & further activities per the note.      Interval History   The patient reports doing well. No chest pain or shortness of breath. Better appetite and strength. No nausea, vomiting, diarrhea, constipation. No fevers. No other specific complaints identified.     -Data reviewed today: I personally reviewed all new labs and imaging results over the last 24 hours.     Physical Exam   Temp: 98.1  F (36.7  C) Temp src: Oral BP: (!) 171/53 Pulse: 82   Resp: 16 SpO2: 98 % O2 Device: Nasal cannula Oxygen Delivery: 1/2 LPM  Vitals:    03/27/25 0505 03/28/25 0609 03/29/25 0434   Weight: 54.2 kg (119 lb 6.4 oz) 57.6 kg (126 lb 15.8 oz) 54.1 kg (119 lb 4.8 oz)     Vital Signs with Ranges  Temp:  [97.6  F (36.4  C)-98.1  F (36.7  C)] 98.1  F (36.7  C)  Pulse:  [74-88] 82  Resp:  [16-18] 16  BP: (122-171)/(44-57) 171/53  SpO2:  [88 %-98 %] 98 %  I/O last 3 completed shifts:  In: 625 [P.O.:625]  Out: 850 [Urine:850]    GENERAL: No apparent distress. Awake, alert, and fully oriented.  HEENT: Normocephalic, atraumatic. Extraocular movements intact.  CARDIOVASCULAR: Regular rate and rhythm without murmurs or rubs. No S3.  PULMONARY: Coarse bilaterally.  GASTROINTESTINAL: Soft, non-tender, non-distended. Bowel sounds normoactive.   EXTREMITIES: No cyanosis or clubbing. No edema.  NEUROLOGICAL: CN 2-12 grossly intact, no focal neurological deficits.  DERMATOLOGICAL: No rash, ulcer, bruising, nor jaundice.      Medications   Current Facility-Administered Medications   Medication Dose Route Frequency Provider Last Rate Last Admin    Patient is already receiving anticoagulation with heparin, enoxaparin (LOVENOX), warfarin (COUMADIN)  or other anticoagulant medication   Does not apply Continuous PRN Lobo Orta MD         Current Facility-Administered Medications   Medication Dose Route Frequency Provider Last Rate Last Admin    apixaban ANTICOAGULANT (ELIQUIS) tablet 2.5 mg  2.5 mg Oral BID Lobo Orta MD   2.5 mg at 03/29/25 0807    cefTRIAXone (ROCEPHIN) 2 g vial to attach to  ml bag for ADULTS or NS 50 ml bag for PEDS  2 g Intravenous Q24H Michael Jeronimo  mL/hr at 03/27/25 2023 2 g at 03/28/25 2008    guaiFENesin (MUCINEX) 12 hr tablet 600 mg  600 mg Oral BID Michael Jeronimo DO   600 mg at 03/29/25 0806    ipratropium - albuterol 0.5 mg/2.5 mg/3 mL (DUONEB) neb solution 3 mL  3 mL Nebulization Q4H WA Michael Jeronimo DO   3 mL at 03/29/25 0741    metoprolol succinate ER (TOPROL XL) 24 hr tablet 100 mg  100 mg Oral Daily Lobo Orta MD   100 mg at 03/29/25 0806    multivitamin w/minerals (THERA-VIT-M) tablet 1 tablet  1 tablet Oral Daily Isael Jeronimo,    1 tablet at 03/29/25 0806    sodium chloride (PF) 0.9% PF flush 3 mL  3 mL Intracatheter Q8H [facility] Loob Orta MD   3 mL at 03/27/25 2227    tafluprost (ZIOPTAN) ophthalmic solution 1 drop [PT SUPPLIED}  1 drop Both Eyes At Bedtime Lobo Orta MD   1 drop at 03/28/25 2008    timolol maleate (TIMOPTIC) 0.5 % ophthalmic solution 1 drop {PT SUPPLIED]  1 drop Both Eyes BID Lobo Orta MD   1 drop at 03/29/25 0808     Data     Laboratory:  Recent Labs   Lab 03/27/25  0804 03/26/25  0806 03/25/25  0631   WBC 7.4 5.5 5.6   HGB 9.1* 8.4* 8.0*   HCT 28.5* 26.0* 25.9*   MCV 95 95 96    168 159     Recent Labs   Lab 03/27/25  0804 03/26/25  0806 03/25/25  0631   * 130*  "129*   POTASSIUM 4.8 4.7 4.6   CHLORIDE 96* 96* 97*   CO2 28 26 24   ANIONGAP 7 8 8   GLC 97 103* 90   BUN 12.5 17.2 22.2   CR 0.44* 0.47* 0.61   GFRESTIMATED >90 >90 86   JERRY 8.7* 8.6* 8.3*     No results for input(s): \"CULT\" in the last 168 hours.    Imaging:  No results found for this or any previous visit (from the past 24 hours).      Michael Jeronimo DO MPH  [facility] Hospitalist  [address] [place] [address]  03/29/2025   "

## 2025-03-29 NOTE — PLAN OF CARE
Temp: 97.9  F (36.6  C) Temp src: Oral BP: (!) 149/55 Pulse: 86   Resp: 18 SpO2: 96 % O2 Device: Nasal cannula Oxygen Delivery: 1/2 LPM     A&Ox4, intermittent confusion. Up 1A GB walker. Remains on 1/2 L via nasal cannula, unable to wean to RA today. Continues on IV rocephin, mucinex, and scheduled nebulizers. Denies SOB. On contact/droplet precautions for RSV. Continue plan of care.      Problem: Adult Inpatient Plan of Care  Goal: Absence of Hospital-Acquired Illness or Injury  Intervention: Identify and Manage Fall Risk  Recent Flowsheet Documentation  Taken 3/29/2025 0815 by Gala Sarabia RN  Safety Promotion/Fall Prevention:   activity supervised   clutter free environment maintained   nonskid shoes/slippers when out of bed   room near nurse's station   room organization consistent   safety round/check completed   supervised activity  Intervention: Prevent Skin Injury  Recent Flowsheet Documentation  Taken 3/29/2025 0815 by Gala Sarabia RN  Body Position:   weight shifting   upper extremity elevated   lower extremity elevated   heels elevated   position changed independently  Intervention: Prevent and Manage VTE (Venous Thromboembolism) Risk  Recent Flowsheet Documentation  Taken 3/29/2025 1610 by Gala Sarabia RN  VTE Prevention/Management: (On Eliquis) other (see comments)  Taken 3/29/2025 0815 by Gala Sarabia RN  VTE Prevention/Management: (on Eliquis) other (see comments)  Intervention: Prevent Infection  Recent Flowsheet Documentation  Taken 3/29/2025 0815 by Gala Saraiba RN  Infection Prevention:   rest/sleep promoted   single patient room provided  Goal: Optimal Comfort and Wellbeing  Intervention: Provide Person-Centered Care  Recent Flowsheet Documentation  Taken 3/29/2025 1610 by Gala Sarabia RN  Trust Relationship/Rapport:   care explained   questions encouraged   thoughts/feelings acknowledged  Taken 3/29/2025 0815 by Gala Sarabia RN  Trust Relationship/Rapport:   care  explained   questions encouraged   thoughts/feelings acknowledged     Problem: Infection  Goal: Absence of Infection Signs and Symptoms  Intervention: Prevent or Manage Infection  Recent Flowsheet Documentation  Taken 3/29/2025 0815 by Gala Sarabia RN  Isolation Precautions:   contact precautions maintained   droplet precautions maintained     Problem: Gas Exchange Impaired  Goal: Optimal Gas Exchange  Intervention: Optimize Oxygenation and Ventilation  Recent Flowsheet Documentation  Taken 3/29/2025 0815 by Gala Sarabia RN  Head of Bed (HOB) Positioning: HOB at 20-30 degrees     Problem: Comorbidity Management  Goal: Blood Pressure in Desired Range  Intervention: Maintain Blood Pressure Management  Recent Flowsheet Documentation  Taken 3/29/2025 0815 by Gala Sarabia RN  Medication Review/Management: medications reviewed   Goal Outcome Evaluation:

## 2025-03-30 ENCOUNTER — APPOINTMENT (OUTPATIENT)
Dept: OCCUPATIONAL THERAPY | Facility: CLINIC | Age: 89
DRG: 193 | End: 2025-03-30
Payer: MEDICARE

## 2025-03-30 PROCEDURE — 999N000157 HC STATISTIC RCP TIME EA 10 MIN

## 2025-03-30 PROCEDURE — 97535 SELF CARE MNGMENT TRAINING: CPT | Mod: GO | Performed by: OCCUPATIONAL THERAPIST

## 2025-03-30 PROCEDURE — 250N000013 HC RX MED GY IP 250 OP 250 PS 637: Performed by: INTERNAL MEDICINE

## 2025-03-30 PROCEDURE — 120N000001 HC R&B MED SURG/OB

## 2025-03-30 PROCEDURE — 94640 AIRWAY INHALATION TREATMENT: CPT

## 2025-03-30 PROCEDURE — 250N000013 HC RX MED GY IP 250 OP 250 PS 637: Performed by: HOSPITALIST

## 2025-03-30 PROCEDURE — 99232 SBSQ HOSP IP/OBS MODERATE 35: CPT | Performed by: HOSPITALIST

## 2025-03-30 PROCEDURE — 250N000009 HC RX 250: Performed by: HOSPITALIST

## 2025-03-30 RX ORDER — POLYETHYLENE GLYCOL 3350 17 G/17G
17 POWDER, FOR SOLUTION ORAL 2 TIMES DAILY PRN
Status: DISCONTINUED | OUTPATIENT
Start: 2025-03-30 | End: 2025-04-01 | Stop reason: HOSPADM

## 2025-03-30 RX ORDER — CEFUROXIME AXETIL 500 MG/1
500 TABLET ORAL EVERY 12 HOURS SCHEDULED
Status: COMPLETED | OUTPATIENT
Start: 2025-03-30 | End: 2025-03-30

## 2025-03-30 RX ORDER — IPRATROPIUM BROMIDE AND ALBUTEROL SULFATE 2.5; .5 MG/3ML; MG/3ML
3 SOLUTION RESPIRATORY (INHALATION) EVERY 4 HOURS PRN
Status: DISCONTINUED | OUTPATIENT
Start: 2025-03-30 | End: 2025-03-30

## 2025-03-30 RX ADMIN — TAFLUPROST OPTHALMIC 1 DROP: 0 SOLUTION/ DROPS OPHTHALMIC at 21:43

## 2025-03-30 RX ADMIN — Medication 1 TABLET: at 10:17

## 2025-03-30 RX ADMIN — GUAIFENESIN 600 MG: 600 TABLET, EXTENDED RELEASE ORAL at 10:20

## 2025-03-30 RX ADMIN — APIXABAN 2.5 MG: 2.5 TABLET, FILM COATED ORAL at 19:40

## 2025-03-30 RX ADMIN — METOPROLOL SUCCINATE 100 MG: 100 TABLET, EXTENDED RELEASE ORAL at 10:17

## 2025-03-30 RX ADMIN — TIMOLOL MALEATE 1 DROP: 5 SOLUTION/ DROPS OPHTHALMIC at 16:50

## 2025-03-30 RX ADMIN — GUAIFENESIN 600 MG: 600 TABLET, EXTENDED RELEASE ORAL at 21:43

## 2025-03-30 RX ADMIN — IPRATROPIUM BROMIDE AND ALBUTEROL SULFATE 3 ML: .5; 3 SOLUTION RESPIRATORY (INHALATION) at 08:19

## 2025-03-30 RX ADMIN — TIMOLOL MALEATE 1 DROP: 5 SOLUTION/ DROPS OPHTHALMIC at 10:20

## 2025-03-30 RX ADMIN — CEFUROXIME AXETIL 500 MG: 500 TABLET, FILM COATED ORAL at 19:40

## 2025-03-30 RX ADMIN — APIXABAN 2.5 MG: 2.5 TABLET, FILM COATED ORAL at 10:17

## 2025-03-30 RX ADMIN — POLYETHYLENE GLYCOL 3350 17 G: 17 POWDER, FOR SOLUTION ORAL at 11:30

## 2025-03-30 RX ADMIN — CEFUROXIME AXETIL 500 MG: 500 TABLET, FILM COATED ORAL at 11:30

## 2025-03-30 ASSESSMENT — ACTIVITIES OF DAILY LIVING (ADL)
ADLS_ACUITY_SCORE: 59
ADLS_ACUITY_SCORE: 59
ADLS_ACUITY_SCORE: 60
ADLS_ACUITY_SCORE: 59
ADLS_ACUITY_SCORE: 60
ADLS_ACUITY_SCORE: 59
ADLS_ACUITY_SCORE: 60
ADLS_ACUITY_SCORE: 59

## 2025-03-30 NOTE — PLAN OF CARE
"BP (!) 150/56 (BP Location: Right arm)   Pulse 57   Temp 97.5  F (36.4  C) (Oral)   Resp 16   Ht 1.626 m (5' 4\")   Wt 54.1 kg (119 lb 3.2 oz)   LMP  (LMP Unknown)   SpO2 95%   BMI 20.46 kg/m      1900 - 0700:     VS: VSS except hypertensive at times. Sating well on 1/2 L via NC.  Pain: Denies pain.  Lines: PIV SL leaking and painful. Removed. Pageewelina JENKINS and okay to wait until AM to place new one if pt needs one more dose of abx. See previous note.   Cognitive: Aox4. Intermittent confusion at times. Legally blind.   Respiratory: LS clear/dim. No SOB or GUPTA reported. Congested cough at times.  Cardiac: Denies CP.   Peripheral neurovascular: No edema. Pt denies numbness, tingling, and tenderness. Pulses 2+.  GI: Last BM 3/27. Denies N/V.  : Purewick in place.   Skin: See flowsheet for assessment.   Diet: Regular.  Activity: A1 GB and walker.   Plan: Wean O2 as able. Discharge back to [facility] 4/1 when RSV negative. Continue w/ POC.     Goal Outcome Evaluation:         Problem: Adult Inpatient Plan of Care  Goal: Absence of Hospital-Acquired Illness or Injury  Intervention: Identify and Manage Fall Risk  Recent Flowsheet Documentation  Taken 3/29/2025 2340 by Chen Paniagua, RN  Safety Promotion/Fall Prevention: safety round/check completed  Taken 3/29/2025 2048 by Chen Paniagua RN  Safety Promotion/Fall Prevention: safety round/check completed  Intervention: Prevent Skin Injury  Recent Flowsheet Documentation  Taken 3/29/2025 2316 by Chen Paniagua, RN  Body Position: position changed independently  Taken 3/29/2025 2048 by Chen Paniagua, RN  Body Position: position changed independently  Intervention: Prevent and Manage VTE (Venous Thromboembolism) Risk  Recent Flowsheet Documentation  Taken 3/29/2025 2048 by Chen Paniagua, RN  VTE Prevention/Management: (eliquis)   patient refused intervention   SCDs off (sequential compression devices)  Intervention: Prevent Infection  Recent Flowsheet " Documentation  Taken 3/29/2025 2340 by Chen Paniagua RN  Infection Prevention:   single patient room provided   rest/sleep promoted   personal protective equipment utilized   hand hygiene promoted   equipment surfaces disinfected   environmental surveillance performed   cohorting utilized  Taken 3/29/2025 2048 by Chen Paniagua RN  Infection Prevention:   single patient room provided   rest/sleep promoted   personal protective equipment utilized   hand hygiene promoted   equipment surfaces disinfected   environmental surveillance performed   cohorting utilized     Problem: Infection  Goal: Absence of Infection Signs and Symptoms  Intervention: Prevent or Manage Infection  Recent Flowsheet Documentation  Taken 3/29/2025 2340 by Chen Paniagua RN  Isolation Precautions:   contact precautions maintained   droplet precautions maintained  Taken 3/29/2025 2048 by Chen Paniagua RN  Isolation Precautions:   contact precautions maintained   droplet precautions maintained     Problem: Gas Exchange Impaired  Goal: Optimal Gas Exchange  Intervention: Optimize Oxygenation and Ventilation  Recent Flowsheet Documentation  Taken 3/29/2025 2316 by Chen Paniagua RN  Head of Bed (HOB) Positioning: HOB at 20 degrees  Taken 3/29/2025 2048 by Chen Paniagua RN  Head of Bed (HOB) Positioning: HOB at 20-30 degrees     Problem: Gas Exchange Impaired  Goal: Optimal Gas Exchange  Outcome: Progressing  Intervention: Optimize Oxygenation and Ventilation  Recent Flowsheet Documentation  Taken 3/29/2025 2316 by Chen Paniagua RN  Head of Bed (HOB) Positioning: HOB at 20 degrees  Taken 3/29/2025 2048 by Chen Paniagua RN  Head of Bed (HOB) Positioning: HOB at 20-30 degrees     Problem: Comorbidity Management  Goal: Blood Pressure in Desired Range  Intervention: Maintain Blood Pressure Management  Recent Flowsheet Documentation  Taken 3/29/2025 2340 by Chen Paniagua RN  Medication Review/Management: medications reviewed  Taken  3/29/2025 2048 by Chen Paniagua RN  Medication Review/Management: medications reviewed

## 2025-03-30 NOTE — PROGRESS NOTES
[facility]    Hospitalist Progress Note  Name: Kavita Merlos    MRN: [id number]  Provider:  Michael Jeronimo DO MPH  Date of Service: 03/30/2025    Summary of Stay: Kavita Merlos is a 89 yea2r old female with a history of HFpEF, hypertension, paroxysmal atrial fibrillation on Eliquis, aortic stenosis status post TAVR, infrarenal AAA, macular degeneration, glaucoma, depression admitted on 3/23/2025 with shortness of breath and cough.  Of note, the patient was recently hospitalized from 3/5 -3/11 for the treatment of right upper lobe pneumonia treated with ceftriaxone and azithromycin followed by Omnicef and azithromycin through 3/12.  The patient was discharged to TCU.  The patient was noted to be hypoxic to 89% on room air by EMS.  In the emergency department, the patient is found of a temperature of 98.3  F, heart rate 75, blood pressure as high as 202/86, respiratory rate as high as 28, SpO2 96% on 2 L nasal cannula.  Initial lab work showed sodium 127, proBNP 4677, high-sensitivity troponin 24, procalcitonin 0.09, venous pH 7.35 with a venous pCO2 of 41, WBC 6.5, hemoglobin 8.8.  The patient tested positive for RSV.  Chest x-ray showed patchy opacities throughout the right lung with upper lobe opacities improved since prior but increasing opacities in the right middle and lower lung that are likely pneumonia.  The patient was started on ceftriaxone and azithromycin.  Unfortunately, overnight on 3/23, the patient had worsening shortness of breath with VBG showing hypercapnia and venous pH of 7.24 with a venous pCO2 of 62.  The patient did briefly require CPAP.     Problem List:   Acute hypoxic and hypercapnic respiratory failure secondary to RSV pneumonia and community acquired pneumonia: Was requiring 2 L oxygen by nasal cannula on admission but even required (but poorly tolerated) CPAP the night of 3/23.  Oxygen requirements are better and only on 1/2 L oxygen by nasal cannula.  She has tested  positive for RSV.  Chest imaging shows what appears to be evolving pneumonia.  Will continue treatment with ceftriaxone (7 days) and azithromycin (completed 5 days).  Wean oxygen as able, likely able to come off by tomorrow.  Hold off on steroids or diuretics for the time being.  Continue as needed nebulizers, mucolytics, and cough suppressants.  Her weight is stable around 54 kg.  Hypovolemic hyponatremia: Likely poor oral intake.  Sodium relatively stable.  Hold off on IV fluids and diuretics for now.  RD consulted.  Push oral intake.  BMP tomorrow.  Deconditioning and generalized weakness: Likely due to her acute infectious process.  Weakness improved today so starting some PT/OT.  To note, she was just discharged from TCU 1 day prior to admission here.  She became extremely fatigued after being up in the chair this morning.  Planning to return to TCU upon discharge.  Paroxysmal atrial fibrillation: Currently rate controlled.  Cautiously continue Toprol given an isolated soft blood pressure a few nights ago.  Continue Eliquis for stroke prophylaxis.  Chronic HFpEF and aortic stenosis status post TAVR: Appears relatively stable and euvolemic.  Her procedure was in January 2024.  Hypertensive urgency: Blood pressure was initially 206/100.  Has been quite labile but reasonably controlled.  Blood pressure stable today.  Cautiously continuing PTA Toprol.  Chronic anemia: Hemoglobin has been widely fluctuating since her admission.  Hemoglobin largely stable around 9-10.  No reports of bleeding.  She did receive a blood transfusion earlier in the month when she was hospitalized and developed acute pulmonary edema as a complication.  She was seen in consultation by hematology who thought she was having some degree of hemolysis from sepsis and RBC fragmentation from her TAVR.  Recheck CBC in the morning.  Possible depression: Assessed at the previous hospitalization and some thoughts about prescribing Lexapro but she  never began this and is not interested in it.    DVT Prophylaxis: DOAC  Code Status: No CPR- Do NOT Intubate  Diet: Combination Diet Regular Diet Adult  Snacks/Supplements Adult: Jeanie Adams Standard 1.4 Oral Supplement; Between Meals    Mendieta Catheter: Not present  Disposition: Expected discharge on 4/1 as needs to be 8 days out of RSV testing.  Incidental Findings: None.  Family updated today: Not today, will call tomorrow.    40 MINUTES SPENT BY ME on the date of service doing chart review, history, exam, documentation & further activities per the note.      Interval History   The patient reports doing well. No chest pain or shortness of breath. Better appetite and strength. No nausea, vomiting, diarrhea, constipation. No fevers. No other specific complaints identified. Anxious for discharge on Tuesday.    -Data reviewed today: I personally reviewed all new labs and imaging results over the last 24 hours.     Physical Exam   Temp: 98.1  F (36.7  C) Temp src: Oral BP: (!) 149/55 Pulse: 85   Resp: 22 SpO2: 93 % O2 Device: Nasal cannula Oxygen Delivery: 1/2 LPM  Vitals:    03/28/25 0609 03/29/25 0434 03/30/25 0428   Weight: 57.6 kg (126 lb 15.8 oz) 54.1 kg (119 lb 4.8 oz) 54.1 kg (119 lb 3.2 oz)     Vital Signs with Ranges  Temp:  [97.5  F (36.4  C)-98.3  F (36.8  C)] 98.1  F (36.7  C)  Pulse:  [57-86] 85  Resp:  [14-22] 22  BP: (119-150)/(42-56) 149/55  SpO2:  [93 %-97 %] 93 %  I/O last 3 completed shifts:  In: 978 [P.O.:975; I.V.:3]  Out: 950 [Urine:950]    GENERAL: No apparent distress. Awake, alert, and fully oriented.  HEENT: Normocephalic, atraumatic. Extraocular movements intact.  CARDIOVASCULAR: Regular rate and rhythm without murmurs or rubs. No S3.  PULMONARY: Coarse bilaterally.  GASTROINTESTINAL: Soft, non-tender, non-distended. Bowel sounds normoactive.   EXTREMITIES: No cyanosis or clubbing. No edema.  NEUROLOGICAL: CN 2-12 grossly intact, no focal neurological deficits.  DERMATOLOGICAL: No rash, ulcer,  bruising, nor jaundice.     Medications   Current Facility-Administered Medications   Medication Dose Route Frequency Provider Last Rate Last Admin    Patient is already receiving anticoagulation with heparin, enoxaparin (LOVENOX), warfarin (COUMADIN)  or other anticoagulant medication   Does not apply Continuous PRN Lobo Orta MD         Current Facility-Administered Medications   Medication Dose Route Frequency Provider Last Rate Last Admin    apixaban ANTICOAGULANT (ELIQUIS) tablet 2.5 mg  2.5 mg Oral BID Lobo Orta MD   2.5 mg at 03/29/25 2048    cefTRIAXone (ROCEPHIN) 2 g vial to attach to  ml bag for ADULTS or NS 50 ml bag for PEDS  2 g Intravenous Q24H Michael Jeronimo  mL/hr at 03/27/25 2023 2 g at 03/29/25 2048    guaiFENesin (MUCINEX) 12 hr tablet 600 mg  600 mg Oral BID Michael Jeronimo DO   600 mg at 03/29/25 2048    metoprolol succinate ER (TOPROL XL) 24 hr tablet 100 mg  100 mg Oral Daily Lobo Orta MD   100 mg at 03/29/25 0806    multivitamin w/minerals (THERA-VIT-M) tablet 1 tablet  1 tablet Oral Daily Isael Jeronimo DO   1 tablet at 03/29/25 0806    sodium chloride (PF) 0.9% PF flush 3 mL  3 mL Intracatheter Q8H [facility] Lobo Orta MD   3 mL at 03/29/25 2128    tafluprost (ZIOPTAN) ophthalmic solution 1 drop [PT SUPPLIED}  1 drop Both Eyes At Bedtime Lobo Orta MD   1 drop at 03/29/25 2127    timolol maleate (TIMOPTIC) 0.5 % ophthalmic solution 1 drop {PT SUPPLIED]  1 drop Both Eyes BID Lobo Orta MD   1 drop at 03/29/25 1608     Data     Laboratory:  Recent Labs   Lab 03/27/25  0804 03/26/25  0806 03/25/25  0631   WBC 7.4 5.5 5.6   HGB 9.1* 8.4* 8.0*   HCT 28.5* 26.0* 25.9*   MCV 95 95 96    168 159     Recent Labs   Lab 03/27/25  0804 03/26/25  0806 03/25/25  0631   * 130* 129*   POTASSIUM 4.8 4.7 4.6   CHLORIDE 96* 96* 97*   CO2 28 26 24   ANIONGAP 7 8 8   GLC 97 103* 90   BUN 12.5 17.2 22.2  "  CR 0.44* 0.47* 0.61   GFRESTIMATED >90 >90 86   JERRY 8.7* 8.6* 8.3*     No results for input(s): \"CULT\" in the last 168 hours.    Imaging:  No results found for this or any previous visit (from the past 24 hours).      Michael Jeronimo DO MPH  [facility] Hospitalist  [address] [place] [address]  03/30/2025   "

## 2025-03-30 NOTE — PLAN OF CARE
Temp: 97.7  F (36.5  C) Temp src: Oral BP: (!) 156/65 Pulse: 90   Resp: 16 SpO2: 94 % O2 Device: Nasal cannula Oxygen Delivery: 1/2 LPM     A&Ox4, intermittent confusion. Up 1A GB walker. VSS, unable to wean to room air today. Remains on 1/2 L via nasal cannula with humidification. Crackles noted bilateral lower lobes. Patient reports productive cough. Continues on mucinex, switched to Ceftin PO. Orders for ok without PIV. Remains on contact/droplet precautions for RSV. Discharge planned for Tuesday April 1 to TCU. Continue plan of care.       Problem: Adult Inpatient Plan of Care  Goal: Absence of Hospital-Acquired Illness or Injury  Intervention: Identify and Manage Fall Risk  Recent Flowsheet Documentation  Taken 3/30/2025 1654 by Gala Sarabia RN  Safety Promotion/Fall Prevention:   activity supervised   clutter free environment maintained   nonskid shoes/slippers when out of bed   room near nurse's station   room organization consistent   safety round/check completed   supervised activity  Taken 3/30/2025 1020 by Gala Sarabia RN  Safety Promotion/Fall Prevention:   activity supervised   clutter free environment maintained   room organization consistent   safety round/check completed   supervised activity  Intervention: Prevent Skin Injury  Recent Flowsheet Documentation  Taken 3/30/2025 1654 by Gala Sarabia RN  Body Position: position changed independently  Taken 3/30/2025 1020 by Gala Sarabia RN  Body Position: position changed independently  Intervention: Prevent and Manage VTE (Venous Thromboembolism) Risk  Recent Flowsheet Documentation  Taken 3/30/2025 1654 by Gala Sarabia RN  VTE Prevention/Management: (On Eliquis) other (see comments)  Taken 3/30/2025 1020 by Gala Sarabia RN  VTE Prevention/Management: (On Eliquis) other (see comments)  Intervention: Prevent Infection  Recent Flowsheet Documentation  Taken 3/30/2025 1654 by Gala Sarabia RN  Infection Prevention:   rest/sleep  promoted   single patient room provided   hand hygiene promoted  Taken 3/30/2025 1020 by Gala Sarabia RN  Infection Prevention:   rest/sleep promoted   single patient room provided   hand hygiene promoted  Goal: Optimal Comfort and Wellbeing  Intervention: Provide Person-Centered Care  Recent Flowsheet Documentation  Taken 3/30/2025 1654 by Gala Sarabia RN  Trust Relationship/Rapport:   care explained   questions encouraged   thoughts/feelings acknowledged  Taken 3/30/2025 1020 by Gala Sarabia RN  Trust Relationship/Rapport:   care explained   questions encouraged   thoughts/feelings acknowledged     Problem: Infection  Goal: Absence of Infection Signs and Symptoms  Intervention: Prevent or Manage Infection  Recent Flowsheet Documentation  Taken 3/30/2025 1654 by Gala Sarabia RN  Isolation Precautions:   contact precautions maintained   droplet precautions maintained  Taken 3/30/2025 1020 by Gala Sarabia RN  Isolation Precautions:   contact precautions maintained   droplet precautions maintained     Problem: Gas Exchange Impaired  Goal: Optimal Gas Exchange  Intervention: Optimize Oxygenation and Ventilation  Recent Flowsheet Documentation  Taken 3/30/2025 1654 by Gala Sarabia RN  Head of Bed (HOB) Positioning: HOB at 20-30 degrees  Taken 3/30/2025 1020 by Gala Sarabia RN  Head of Bed (HOB) Positioning: HOB at 20-30 degrees     Problem: Comorbidity Management  Goal: Blood Pressure in Desired Range  Intervention: Maintain Blood Pressure Management  Recent Flowsheet Documentation  Taken 3/30/2025 1654 by Gala Sarabia RN  Medication Review/Management: medications reviewed  Taken 3/30/2025 1020 by Gala Sarabia RN  Medication Review/Management: medications reviewed   Goal Outcome Evaluation:

## 2025-03-30 NOTE — PROVIDER NOTIFICATION
MD paged: Pt's IV went bad. MAR shows one more dose of Abx tomorrow at 20:00, but pt quite adamant that today was the last day and she doesn't need a new IV. Paging to clarify. Do you want one more dose and a new IV?     Ok to leave IV out tonight and have day hospitalist address in AM.

## 2025-03-31 LAB
ANION GAP SERPL CALCULATED.3IONS-SCNC: 8 MMOL/L (ref 7–15)
BUN SERPL-MCNC: 15.7 MG/DL (ref 8–23)
CALCIUM SERPL-MCNC: 8.8 MG/DL (ref 8.8–10.4)
CHLORIDE SERPL-SCNC: 99 MMOL/L (ref 98–107)
CREAT SERPL-MCNC: 0.41 MG/DL (ref 0.51–0.95)
EGFRCR SERPLBLD CKD-EPI 2021: >90 ML/MIN/1.73M2
ERYTHROCYTE [DISTWIDTH] IN BLOOD BY AUTOMATED COUNT: 20.1 % (ref 10–15)
GLUCOSE SERPL-MCNC: 115 MG/DL (ref 70–99)
HCO3 SERPL-SCNC: 27 MMOL/L (ref 22–29)
HCT VFR BLD AUTO: 31.3 % (ref 35–47)
HGB BLD-MCNC: 9.7 G/DL (ref 11.7–15.7)
MCH RBC QN AUTO: 30 PG (ref 26.5–33)
MCHC RBC AUTO-ENTMCNC: 31 G/DL (ref 31.5–36.5)
MCV RBC AUTO: 97 FL (ref 78–100)
PLATELET # BLD AUTO: 253 10E3/UL (ref 150–450)
POTASSIUM SERPL-SCNC: 4.8 MMOL/L (ref 3.4–5.3)
RBC # BLD AUTO: 3.23 10E6/UL (ref 3.8–5.2)
SODIUM SERPL-SCNC: 134 MMOL/L (ref 135–145)
WBC # BLD AUTO: 8.1 10E3/UL (ref 4–11)

## 2025-03-31 PROCEDURE — 250N000013 HC RX MED GY IP 250 OP 250 PS 637: Performed by: INTERNAL MEDICINE

## 2025-03-31 PROCEDURE — 82310 ASSAY OF CALCIUM: CPT | Performed by: HOSPITALIST

## 2025-03-31 PROCEDURE — 250N000013 HC RX MED GY IP 250 OP 250 PS 637: Performed by: HOSPITALIST

## 2025-03-31 PROCEDURE — 85041 AUTOMATED RBC COUNT: CPT | Performed by: HOSPITALIST

## 2025-03-31 PROCEDURE — 120N000001 HC R&B MED SURG/OB

## 2025-03-31 PROCEDURE — 99232 SBSQ HOSP IP/OBS MODERATE 35: CPT | Performed by: HOSPITALIST

## 2025-03-31 PROCEDURE — 36415 COLL VENOUS BLD VENIPUNCTURE: CPT | Performed by: HOSPITALIST

## 2025-03-31 PROCEDURE — 85018 HEMOGLOBIN: CPT | Performed by: HOSPITALIST

## 2025-03-31 PROCEDURE — 80048 BASIC METABOLIC PNL TOTAL CA: CPT | Performed by: HOSPITALIST

## 2025-03-31 RX ORDER — LIDOCAINE 40 MG/G
CREAM TOPICAL
Status: DISCONTINUED | OUTPATIENT
Start: 2025-03-31 | End: 2025-03-31

## 2025-03-31 RX ADMIN — TIMOLOL MALEATE 1 DROP: 5 SOLUTION/ DROPS OPHTHALMIC at 16:54

## 2025-03-31 RX ADMIN — GUAIFENESIN 600 MG: 600 TABLET, EXTENDED RELEASE ORAL at 21:14

## 2025-03-31 RX ADMIN — TAFLUPROST OPTHALMIC 1 DROP: 0 SOLUTION/ DROPS OPHTHALMIC at 21:15

## 2025-03-31 RX ADMIN — Medication 1 TABLET: at 08:14

## 2025-03-31 RX ADMIN — GUAIFENESIN 600 MG: 600 TABLET, EXTENDED RELEASE ORAL at 08:14

## 2025-03-31 RX ADMIN — APIXABAN 2.5 MG: 2.5 TABLET, FILM COATED ORAL at 21:14

## 2025-03-31 RX ADMIN — APIXABAN 2.5 MG: 2.5 TABLET, FILM COATED ORAL at 08:14

## 2025-03-31 RX ADMIN — TIMOLOL MALEATE 1 DROP: 5 SOLUTION/ DROPS OPHTHALMIC at 08:16

## 2025-03-31 RX ADMIN — METOPROLOL SUCCINATE 100 MG: 100 TABLET, EXTENDED RELEASE ORAL at 08:14

## 2025-03-31 ASSESSMENT — ACTIVITIES OF DAILY LIVING (ADL)
ADLS_ACUITY_SCORE: 59
ADLS_ACUITY_SCORE: 64
ADLS_ACUITY_SCORE: 59
ADLS_ACUITY_SCORE: 61
ADLS_ACUITY_SCORE: 64
ADLS_ACUITY_SCORE: 59
ADLS_ACUITY_SCORE: 64
ADLS_ACUITY_SCORE: 59
ADLS_ACUITY_SCORE: 64
ADLS_ACUITY_SCORE: 61
ADLS_ACUITY_SCORE: 61

## 2025-03-31 NOTE — PLAN OF CARE
"Isolation: Contact/Droplet for RSV  Orientation: x3, disorientated to time  Pain: Denies  Neuro: WDL  Resp: infrequent congested cough  Cardiac: WDL  GI/: External cath at night  Skin/Musculoskeletal: Generalized weakness  Activity: A1 walker / gait belt  Diet: reg  Lines:  Ok for no PIV  Consults: Refused OT and PT consult  Discharge planning: Discharge to TCU tomorrow WC between 1355-8699    Goal Outcome Evaluation:      Plan of Care Reviewed With: patient    Overall Patient Progress: improvingOverall Patient Progress: improving    Outcome Evaluation: Tolerating RA sats.  DC to TCU tomorrow [id number].      Problem: Gas Exchange Impaired  Goal: Optimal Gas Exchange  Outcome: Progressing     Problem: Adult Inpatient Plan of Care  Goal: Plan of Care Review  Description: The Plan of Care Review/Shift note should be completed every shift.  The Outcome Evaluation is a brief statement about your assessment that the patient is improving, declining, or no change.  This information will be displayed automatically on your shiftnote.  Outcome: Progressing  Flowsheets (Taken 3/31/2025 1805)  Outcome Evaluation: Tolerating RA sats.  DC to TCU tomorrow [id number].  Plan of Care Reviewed With: patient  Overall Patient Progress: improving  Goal: Patient-Specific Goal (Individualized)  Description: You can add care plan individualizations to a care plan. Examples of Individualization might be:  \"Parent requests to be called daily at 9am for status\", \"I have a hard time hearing out of my right ear\", or \"Do not touch me to wake me up as it startlesme\".  Outcome: Progressing  Goal: Absence of Hospital-Acquired Illness or Injury  Outcome: Progressing  Intervention: Identify and Manage Fall Risk  Recent Flowsheet Documentation  Taken 3/31/2025 1656 by Yesenia Toledo, RN  Safety Promotion/Fall Prevention:   activity supervised   nonskid shoes/slippers when out of bed   safety round/check completed   supervised " activity  Intervention: Prevent and Manage VTE (Venous Thromboembolism) Risk  Recent Flowsheet Documentation  Taken 3/31/2025 1656 by Yesenia Toledo, RN  VTE Prevention/Management: (eliquis) other (see comments)  Goal: Optimal Comfort and Wellbeing  Outcome: Progressing  Goal: Readiness for Transition of Care  Outcome: Progressing     Problem: Infection  Goal: Absence of Infection Signs and Symptoms  Outcome: Progressing

## 2025-03-31 NOTE — PROGRESS NOTES
Care Management Follow Up    Length of Stay (days): 8    Expected Discharge Date: 04/01/2025     Concerns to be Addressed: discharge planning     Patient plan of care discussed at interdisciplinary rounds: Yes    Anticipated Discharge Disposition: Skilled Nursing Facility, Transitional Care              Anticipated Discharge Services: Transportation Services  Anticipated Discharge DME: None    Patient/family educated on Medicare website which has current facility and service quality ratings: yes  Education Provided on the Discharge Plan: Yes  Patient/Family in Agreement with the Plan: yes  Private pay costs discussed: private room/amenity fees and transportation costs    Discussed  Partnership in Safe Discharge Planning  document with patient/family: No     Handoff Completed: Yes, [facility] PCP: Internal handoff referral completed        Additional Information:  CM following for discharge to Transitional Care Unit. Patient has been accepted at [facility] Transitional Care Unit tomorrow 4/1. Spoke to Admissions this morning and they verified that patient is good to go there tomorrow after lunch.  Per discussion with daughter last week, they would like transport arranged. Call placed to [facility] transportation and wheelchair transport was arranged for  4/1 between [id number].     Call placed to patient's daughter, Monik, and updated her on final plan for discharge tomorrow to [facility] and transport time. Daughter is agreeable to the plan. If patient does not need O2 she may be able to transport her to Transitional Care Unit. We will reassess in the morning. No other questions or concerns at this time.     Next Steps: follow for discharge to [facility] Transitional Care Unit tomorrow          Dolores Chandler RN BSN CM  Inpatient Care Coordination  [facility]  [phone]

## 2025-03-31 NOTE — PROGRESS NOTES
[facility]    Hospitalist Progress Note  Name: Kavita Merlos    MRN: [id number]  Provider:  Michael Jeronimo DO MPH  Date of Service: 03/31/2025    Summary of Stay: Kavita Merlos is a 89 yea2r old female with a history of HFpEF, hypertension, paroxysmal atrial fibrillation on Eliquis, aortic stenosis status post TAVR, infrarenal AAA, macular degeneration, glaucoma, depression admitted on 3/23/2025 with shortness of breath and cough.  Of note, the patient was recently hospitalized from 3/5 -3/11 for the treatment of right upper lobe pneumonia treated with ceftriaxone and azithromycin followed by Omnicef and azithromycin through 3/12.  The patient was discharged to TCU.  The patient was noted to be hypoxic to 89% on room air by EMS.  In the emergency department, the patient is found of a temperature of 98.3  F, heart rate 75, blood pressure as high as 202/86, respiratory rate as high as 28, SpO2 96% on 2 L nasal cannula.  Initial lab work showed sodium 127, proBNP 4677, high-sensitivity troponin 24, procalcitonin 0.09, venous pH 7.35 with a venous pCO2 of 41, WBC 6.5, hemoglobin 8.8.  The patient tested positive for RSV.  Chest x-ray showed patchy opacities throughout the right lung with upper lobe opacities improved since prior but increasing opacities in the right middle and lower lung that are likely pneumonia.  The patient was started on ceftriaxone and azithromycin.  Unfortunately, overnight on 3/23, the patient had worsening shortness of breath with VBG showing hypercapnia and venous pH of 7.24 with a venous pCO2 of 62.  The patient did briefly require CPAP.     Problem List:   Acute hypoxic and hypercapnic respiratory failure secondary to RSV pneumonia and community acquired pneumonia: Was requiring 2 L oxygen by nasal cannula on admission but even required (but poorly tolerated) CPAP the night of 3/23.  Oxygen requirements are better and only on 1/2 L oxygen by nasal cannula.  She has tested  positive for RSV.  Chest imaging shows what appears to be evolving pneumonia.  Completed treatment with ceftriaxone (7 days) and azithromycin (5 days).  Wean oxygen as able, likely able to come off by today as only on half a liter.  Hold off on steroids or diuretics for the time being.  Continue as needed nebulizers, mucolytics, and cough suppressants.  Her weight is stable around 54 kg.  Hypovolemic hyponatremia: Likely poor oral intake.  Sodium relatively stable.  Hold off on IV fluids and diuretics for now.  RD consulted.  Push oral intake.  BMP tomorrow.  Deconditioning and generalized weakness: Likely due to her acute infectious process.  Weakness improved today so starting some PT/OT.  To note, she was just discharged from TCU 1 day prior to admission here.  She became extremely fatigued after being up in the chair this morning.  Planning to return to TCU upon discharge.  Paroxysmal atrial fibrillation: Currently rate controlled.  Cautiously continue Toprol given an isolated soft blood pressure a few nights ago.  Continue Eliquis for stroke prophylaxis.  Chronic HFpEF and aortic stenosis status post TAVR: Appears relatively stable and euvolemic.  Her procedure was in January 2024.  Hypertensive urgency: Blood pressure was initially 206/100.  Has been quite labile but reasonably controlled.  Blood pressure stable today.  Cautiously continuing PTA Toprol.  Chronic anemia: Hemoglobin has been widely fluctuating since her admission.  Hemoglobin largely stable around 9-10.  No reports of bleeding.  She did receive a blood transfusion earlier in the month when she was hospitalized and developed acute pulmonary edema as a complication.  She was seen in consultation by hematology who thought she was having some degree of hemolysis from sepsis and RBC fragmentation from her TAVR.  Recheck CBC in the morning.  Possible depression: Assessed at the previous hospitalization and some thoughts about prescribing Lexapro but  she never began this and is not interested in it.    DVT Prophylaxis: DOAC  Code Status: No CPR- Do NOT Intubate  Diet: Combination Diet Regular Diet Adult  Snacks/Supplements Adult: Jeanie Adams Standard 1.4 Oral Supplement; Between Meals    Mendieta Catheter: Not present  Disposition: Expected discharge on 4/1 as needs to be 8 days out of RSV testing.  Incidental Findings: None.  Family updated today: Left voicemail for daughter.    30 MINUTES SPENT BY ME on the date of service doing chart review, history, exam, documentation & further activities per the note.      Interval History   The patient reports doing well. No chest pain or shortness of breath. No nausea, vomiting, diarrhea, constipation. No fevers. No other specific complaints identified.     -Data reviewed today: I personally reviewed all new labs and imaging results over the last 24 hours.     Physical Exam   Temp: 97.8  F (36.6  C) Temp src: Oral BP: (!) 151/56 Pulse: 91   Resp: 18 SpO2: 92 % O2 Device: None (Room air) Oxygen Delivery: 1/2 LPM  Vitals:    03/29/25 0434 03/30/25 0428 03/31/25 0416   Weight: 54.1 kg (119 lb 4.8 oz) 54.1 kg (119 lb 3.2 oz) 54.3 kg (119 lb 11.2 oz)     Vital Signs with Ranges  Temp:  [97.4  F (36.3  C)-97.9  F (36.6  C)] 97.8  F (36.6  C)  Pulse:  [80-95] 91  Resp:  [15-20] 18  BP: (138-185)/(47-72) 151/56  SpO2:  [88 %-96 %] 92 %  I/O last 3 completed shifts:  In: 450 [P.O.:450]  Out: 925 [Urine:925]    GENERAL: No apparent distress. Awake, alert, and fully oriented.  HEENT: Normocephalic, atraumatic. Extraocular movements intact.  CARDIOVASCULAR: Regular rate and rhythm without murmurs or rubs. No S3.  PULMONARY: Coarse bilaterally.  GASTROINTESTINAL: Soft, non-tender, non-distended. Bowel sounds normoactive.   EXTREMITIES: No cyanosis or clubbing. No edema.  NEUROLOGICAL: CN 2-12 grossly intact, no focal neurological deficits.  DERMATOLOGICAL: No rash, ulcer, bruising, nor jaundice.     Medications   Current  "Facility-Administered Medications   Medication Dose Route Frequency Provider Last Rate Last Admin    Patient is already receiving anticoagulation with heparin, enoxaparin (LOVENOX), warfarin (COUMADIN)  or other anticoagulant medication   Does not apply Continuous PRN Lobo Orta MD         Current Facility-Administered Medications   Medication Dose Route Frequency Provider Last Rate Last Admin    apixaban ANTICOAGULANT (ELIQUIS) tablet 2.5 mg  2.5 mg Oral BID Lobo Orta MD   2.5 mg at 03/31/25 0814    guaiFENesin (MUCINEX) 12 hr tablet 600 mg  600 mg Oral BID Michael Jeronimo DO   600 mg at 03/31/25 0814    metoprolol succinate ER (TOPROL XL) 24 hr tablet 100 mg  100 mg Oral Daily Lobo Orta MD   100 mg at 03/31/25 0814    multivitamin w/minerals (THERA-VIT-M) tablet 1 tablet  1 tablet Oral Daily Isael Jeronimo DO   1 tablet at 03/31/25 0814    sodium chloride (PF) 0.9% PF flush 3 mL  3 mL Intracatheter Q8H AC Lobo Orta MD   3 mL at 03/29/25 2128    tafluprost (ZIOPTAN) ophthalmic solution 1 drop [PT SUPPLIED}  1 drop Both Eyes At Bedtime Lobo Orta MD   1 drop at 03/30/25 2143    timolol maleate (TIMOPTIC) 0.5 % ophthalmic solution 1 drop {PT SUPPLIED]  1 drop Both Eyes BID Lobo Orta MD   1 drop at 03/31/25 0816     Data     Laboratory:  Recent Labs   Lab 03/31/25  0824 03/27/25  0804 03/26/25  0806   WBC 8.1 7.4 5.5   HGB 9.7* 9.1* 8.4*   HCT 31.3* 28.5* 26.0*   MCV 97 95 95    176 168     Recent Labs   Lab 03/31/25  0824 03/27/25  0804 03/26/25  0806   * 131* 130*   POTASSIUM 4.8 4.8 4.7   CHLORIDE 99 96* 96*   CO2 27 28 26   ANIONGAP 8 7 8   * 97 103*   BUN 15.7 12.5 17.2   CR 0.41* 0.44* 0.47*   GFRESTIMATED >90 >90 >90   JERRY 8.8 8.7* 8.6*     No results for input(s): \"CULT\" in the last 168 hours.    Imaging:  No results found for this or any previous visit (from the past 24 hours).      Michael Jeroniom DO " MPH  [facility] Hospitalist  [address] [facility] [place] [address]  03/31/2025

## 2025-03-31 NOTE — PLAN OF CARE
"Vss with isolated elevated SBP this am prior to meds, no co pain/cp/sob, tolerated a wean to RA with sats in the low 90s%. Falls risk, forgetful/intermittent confusion, legally blind, alarms on for safety, up to chair this morning for breakfast, using IS with encouragement. Contact/droplet isolation continues for RSV, PT/OT following, TCU at discharge, wheelchair transport was arranged for  4/1 between [id number]. Up Ax1+gb+walker to bathroom, DNR/DNI, regular diet, ok for no PIV. Continue poc and monitoring.       Goal Outcome Evaluation:      Plan of Care Reviewed With: patient    Overall Patient Progress: improvingOverall Patient Progress: improving    Outcome Evaluation: tolerating RA sats, encouraged oob activity to chair, dc to TCU      Problem: Gas Exchange Impaired  Goal: Optimal Gas Exchange  Outcome: Progressing  Intervention: Optimize Oxygenation and Ventilation  Recent Flowsheet Documentation  Taken 3/31/2025 0914 by Emily Cabrera, RN  Head of Bed (HOB) Positioning: HOB at 30-45 degrees     Problem: Adult Inpatient Plan of Care  Goal: Plan of Care Review  Description: The Plan of Care Review/Shift note should be completed every shift.  The Outcome Evaluation is a brief statement about your assessment that the patient is improving, declining, or no change.  This information will be displayed automatically on your shiftnote.  Outcome: Progressing  Flowsheets (Taken 3/31/2025 1324)  Outcome Evaluation: tolerating RA sats, encouraged oob activity to chair, dc to TCU  Plan of Care Reviewed With: patient  Overall Patient Progress: improving  Goal: Patient-Specific Goal (Individualized)  Description: You can add care plan individualizations to a care plan. Examples of Individualization might be:  \"Parent requests to be called daily at 9am for status\", \"I have a hard time hearing out of my right ear\", or \"Do not touch me to wake me up as it startlesme\".  Outcome: Progressing  Goal: Absence of " Hospital-Acquired Illness or Injury  Outcome: Progressing  Intervention: Identify and Manage Fall Risk  Recent Flowsheet Documentation  Taken 3/31/2025 1315 by Emily Cabrera RN  Safety Promotion/Fall Prevention: safety round/check completed  Taken 3/31/2025 1232 by Emily Cabrera RN  Safety Promotion/Fall Prevention: safety round/check completed  Taken 3/31/2025 1101 by Emily Cabrera RN  Safety Promotion/Fall Prevention: safety round/check completed  Taken 3/31/2025 1055 by Emily Cabrera RN  Safety Promotion/Fall Prevention: safety round/check completed  Taken 3/31/2025 0914 by Emily Cabrera RN  Safety Promotion/Fall Prevention:   activity supervised   assistive device/personal items within reach   treat underlying cause   treat reversible contributory factors   supervised activity   safety round/check completed   room organization consistent   room near nurse's station   patient and family education   nonskid shoes/slippers when out of bed   mobility aid in reach   lighting adjusted   increase visualization of patient   increased rounding and observation   clutter free environment maintained  Taken 3/31/2025 0814 by Emily Cabrera RN  Safety Promotion/Fall Prevention: safety round/check completed  Taken 3/31/2025 0724 by Emily Cabrera RN  Safety Promotion/Fall Prevention: safety round/check completed  Intervention: Prevent Skin Injury  Recent Flowsheet Documentation  Taken 3/31/2025 0914 by Emily Cabrera RN  Body Position: (for skin check)   position changed independently   weight shifting   other (see comments)  Goal: Optimal Comfort and Wellbeing  Outcome: Progressing  Goal: Readiness for Transition of Care  Outcome: Progressing     Problem: Infection  Goal: Absence of Infection Signs and Symptoms  Outcome: Progressing  Intervention: Prevent or Manage Infection  Recent Flowsheet Documentation  Taken 3/31/2025 0724 by Emily Cabrera RN  Isolation Precautions:   contact precautions  maintained   droplet precautions maintained

## 2025-03-31 NOTE — PLAN OF CARE
"BP (!) 157/61 (BP Location: Right arm, Patient Position: Semi-Kim's, Cuff Size: Adult Regular)   Pulse 86   Temp 97.4  F (36.3  C) (Oral)   Resp 20   Ht 1.626 m (5' 4\")   Wt 54.3 kg (119 lb 11.2 oz)   LMP  (LMP Unknown)   SpO2 96%   BMI 20.55 kg/m      1900 - 0700:     VS: VSS on 1/2 L via NC.  Pain: Denies pain.  Lines: No IV access and okay per MD. Patient care order in place.   Cognitive: Aox4. Intermittent confusion at times.   Respiratory: LS dim w/ some fine crackles noted. No SOB or GUPTA reported. IS given to pt and IS use encouraged.   Cardiac: Denies CP.   Peripheral neurovascular: Trace edema to bilateral ankles. Pt denies numbness, tingling, and tenderness. Pulses 2+.  GI: Last BM 3/30. Denies N/V.  : Voiding spontaneously during the day and purewick in place on NOC. Purewick removed in AM.   Skin: See flowsheet for assessment.   Diet: Regular.  Activity: A1 GB and walker.   Plan: Wean O2. Discharge to [facility] on Tuesday 4/1 when RSV negative. Continue w/ POC.     Goal Outcome Evaluation:         Problem: Adult Inpatient Plan of Care  Goal: Absence of Hospital-Acquired Illness or Injury  Intervention: Identify and Manage Fall Risk  Recent Flowsheet Documentation  Taken 3/30/2025 1951 by Chen Paniagua, RN  Safety Promotion/Fall Prevention:   activity supervised   increased rounding and observation   clutter free environment maintained   increase visualization of patient   lighting adjusted   nonskid shoes/slippers when out of bed   patient and family education   room near nurse's station   room organization consistent   safety round/check completed   supervised activity   treat reversible contributory factors   treat underlying cause  Intervention: Prevent Skin Injury  Recent Flowsheet Documentation  Taken 3/30/2025 1950 by Chen Paniagua, RN  Body Position:   log-rolled   supine, head elevated   position changed independently  Intervention: Prevent and Manage VTE (Venous Thromboembolism) " Risk  Recent Flowsheet Documentation  Taken 3/30/2025 1951 by Chen Paniagua RN  VTE Prevention/Management: (eliquis)   patient refused intervention   SCDs off (sequential compression devices)  Intervention: Prevent Infection  Recent Flowsheet Documentation  Taken 3/30/2025 1951 by Chen Paniagua RN  Infection Prevention:   single patient room provided   rest/sleep promoted   personal protective equipment utilized   hand hygiene promoted   equipment surfaces disinfected   environmental surveillance performed   cohorting utilized     Problem: Infection  Goal: Absence of Infection Signs and Symptoms  Intervention: Prevent or Manage Infection  Recent Flowsheet Documentation  Taken 3/30/2025 1951 by Chen Paniagua RN  Isolation Precautions:   contact precautions maintained   droplet precautions maintained     Problem: Gas Exchange Impaired  Goal: Optimal Gas Exchange  Intervention: Optimize Oxygenation and Ventilation  Recent Flowsheet Documentation  Taken 3/30/2025 1950 by Chen Paniagua RN  Head of Bed (HOB) Positioning: HOB at 20-30 degrees     Problem: Gas Exchange Impaired  Goal: Optimal Gas Exchange  Outcome: Progressing  Intervention: Optimize Oxygenation and Ventilation  Recent Flowsheet Documentation  Taken 3/30/2025 1950 by Chen Paniagua RN  Head of Bed (HOB) Positioning: HOB at 20-30 degrees     Problem: Comorbidity Management  Goal: Blood Pressure in Desired Range  Intervention: Maintain Blood Pressure Management  Recent Flowsheet Documentation  Taken 3/30/2025 1951 by Chen Paniagua RN  Medication Review/Management: medications reviewed

## 2025-04-01 ENCOUNTER — LAB REQUISITION (OUTPATIENT)
Dept: LAB | Facility: CLINIC | Age: 89
End: 2025-04-01
Payer: MEDICARE

## 2025-04-01 ENCOUNTER — DOCUMENTATION ONLY (OUTPATIENT)
Dept: GERIATRICS | Facility: CLINIC | Age: 89
End: 2025-04-01
Payer: MEDICARE

## 2025-04-01 VITALS
TEMPERATURE: 97.7 F | OXYGEN SATURATION: 91 % | RESPIRATION RATE: 16 BRPM | HEIGHT: 64 IN | SYSTOLIC BLOOD PRESSURE: 146 MMHG | BODY MASS INDEX: 20.23 KG/M2 | WEIGHT: 118.5 LBS | DIASTOLIC BLOOD PRESSURE: 64 MMHG | HEART RATE: 82 BPM

## 2025-04-01 DIAGNOSIS — Z11.1 ENCOUNTER FOR SCREENING FOR RESPIRATORY TUBERCULOSIS: ICD-10-CM

## 2025-04-01 PROCEDURE — 250N000013 HC RX MED GY IP 250 OP 250 PS 637: Performed by: INTERNAL MEDICINE

## 2025-04-01 PROCEDURE — 250N000013 HC RX MED GY IP 250 OP 250 PS 637: Performed by: HOSPITALIST

## 2025-04-01 RX ORDER — GUAIFENESIN 600 MG/1
600 TABLET, EXTENDED RELEASE ORAL 2 TIMES DAILY
DISCHARGE
Start: 2025-04-01

## 2025-04-01 RX ORDER — BENZONATATE 100 MG/1
100 CAPSULE ORAL 3 TIMES DAILY PRN
DISCHARGE
Start: 2025-04-01

## 2025-04-01 RX ADMIN — TIMOLOL MALEATE 1 DROP: 5 SOLUTION/ DROPS OPHTHALMIC at 09:47

## 2025-04-01 RX ADMIN — METOPROLOL SUCCINATE 100 MG: 100 TABLET, EXTENDED RELEASE ORAL at 09:20

## 2025-04-01 RX ADMIN — GUAIFENESIN 600 MG: 600 TABLET, EXTENDED RELEASE ORAL at 09:21

## 2025-04-01 RX ADMIN — APIXABAN 2.5 MG: 2.5 TABLET, FILM COATED ORAL at 09:21

## 2025-04-01 RX ADMIN — Medication 1 TABLET: at 09:20

## 2025-04-01 ASSESSMENT — ACTIVITIES OF DAILY LIVING (ADL)
ADLS_ACUITY_SCORE: 57

## 2025-04-01 NOTE — PROGRESS NOTES
Care Management Discharge Note    Discharge Date: 04/01/2025       Discharge Disposition: Skilled Nursing Facility, Transitional Care    Discharge Services: none    Discharge DME: None    Discharge Transportation: family or friend will provide at 1315    Private pay costs discussed: private room/amenity fees and transportation costs    PAS Confirmation Code: [id number]    Patient/family educated on Medicare website which has current facility and service quality ratings: yes    Education Provided on the Discharge Plan: Yes  Persons Notified of Discharge Plans: daughter, bedside RN, charge RN, Admissions, MD  Patient/Family in Agreement with the Plan: yes    Handoff Referral Completed: Yes, [facility] PCP: Internal handoff referral completed    Additional Information:  CM following for discharge plan to a Transitional Care Unit. Patient has been accepted by [facility] into a shared room today. Patient is on the list for a private room at additional $55/day. Daughter and patient aware that she is on the list. Orders are in for discharge today and were sent via [employer] to [facility]. Spoke to daughter at bedside to discuss transportation. Patient is now on RA and will not be discharging with oxygen. Daughter will be providing transport at 1315. Updated Transitional Care Unit and they are agreeable to the plan. Call placed and wheelchair transport through [facility] was cancelled. Bedside RN and charge RN were updated. No further CM needs.             Dolores Chandler RN BSN CM  Inpatient Care Coordination  [facility]  [phone]

## 2025-04-01 NOTE — PLAN OF CARE
Physical Therapy Discharge Summary    Reason for therapy discharge:    Discharged to transitional care facility.    Progress towards therapy goal(s). See goals on Care Plan in [facility] electronic health record for goal details.  Goals partially met.  Barriers to achieving goals:   discharge from facility.    Therapy recommendation(s):    Continued therapy is recommended.  Rationale/Recommendations:  Continued Physical Therapy at TCU for progression of independence with functional mobility

## 2025-04-01 NOTE — PLAN OF CARE
"/55 (BP Location: Right arm)   Pulse 78   Temp 97.9  F (36.6  C) (Oral)   Resp 16   Ht 1.626 m (5' 4\")   Wt 53.8 kg (118 lb 8 oz)   LMP  (LMP Unknown)   SpO2 96%   BMI 20.34 kg/m      2300 - 0700:     VS: VSS except hypertensive w/ SBP in 150s at times. Unable to wean to RA. Back on 1/2 L via NC.   Pain: Denies pain.  Lines: No IV access. MD aware.   Cognitive: Aox4. Intermittently confused at times. Legally blind.   Respiratory: LS dim. No SOB or GUPTA reported. Congested cough noted.   Cardiac: Denies CP.   Peripheral neurovascular: Trace edema to bilateral ankles. Pt denies numbness, tingling, and tenderness. Pulses 2+.  GI: Last BM 3/31. Denies N/V.  : Purewick in place on NOC.   Skin: See flowsheet for assessment.   Diet: Regular.  Activity: A1 GB and walker.   Plan: Wean off O2. If able to get to RA, plan to discharge to [facility] TCU via wheelchair transport between 13:55 - 14:25.  Continue w/ POC.     Goal Outcome Evaluation:      Plan of Care Reviewed With: patient    Overall Patient Progress: no changeOverall Patient Progress: no change    Outcome Evaluation: Afebrile. Unable to wean to RA. Sating 89% on RA. Now mid to upper 90s on 1/2 L via NC.      Problem: Adult Inpatient Plan of Care  Goal: Plan of Care Review  Description: The Plan of Care Review/Shift note should be completed every shift.  The Outcome Evaluation is a brief statement about your assessment that the patient is improving, declining, or no change.  This information will be displayed automatically on your shiftnote.  Recent Flowsheet Documentation  Taken 4/1/2025 022 by Chen Paniagua RN  Outcome Evaluation: Afebrile. Unable to wean to RA. Sating 89% on RA. Now mid to upper 90s on 1/2 L via NC.  Plan of Care Reviewed With: patient  Overall Patient Progress: no change  Goal: Absence of Hospital-Acquired Illness or Injury  Intervention: Identify and Manage Fall Risk  Recent Flowsheet Documentation  Taken 3/31/2025 5542 by Siva" "Chen GRUBBS RN  Safety Promotion/Fall Prevention: safety round/check completed  Intervention: Prevent Skin Injury  Recent Flowsheet Documentation  Taken 3/31/2025 2332 by Chen Paniagua RN  Body Position:   position maintained   refuses positioning   position changed independently  Intervention: Prevent and Manage VTE (Venous Thromboembolism) Risk  Recent Flowsheet Documentation  Taken 3/31/2025 2333 by Chen Paniagua RN  VTE Prevention/Management: (eliquis)   SCDs off (sequential compression devices)   patient refused intervention  Intervention: Prevent Infection  Recent Flowsheet Documentation  Taken 3/31/2025 2333 by Chen Paniagua RN  Infection Prevention:   single patient room provided   rest/sleep promoted   personal protective equipment utilized   hand hygiene promoted   equipment surfaces disinfected   environmental surveillance performed   cohorting utilized     Problem: Adult Inpatient Plan of Care  Goal: Plan of Care Review  Description: The Plan of Care Review/Shift note should be completed every shift.  The Outcome Evaluation is a brief statement about your assessment that the patient is improving, declining, or no change.  This information will be displayed automatically on your shiftnote.  Outcome: Progressing  Flowsheets (Taken 4/1/2025 0229)  Outcome Evaluation: Afebrile. Unable to wean to RA. Sating 89% on RA. Now mid to upper 90s on 1/2 L via NC.  Plan of Care Reviewed With: patient  Overall Patient Progress: no change  Goal: Patient-Specific Goal (Individualized)  Description: You can add care plan individualizations to a care plan. Examples of Individualization might be:  \"Parent requests to be called daily at 9am for status\", \"I have a hard time hearing out of my right ear\", or \"Do not touch me to wake me up as it startlesme\".  Outcome: Progressing  Goal: Absence of Hospital-Acquired Illness or Injury  Outcome: Progressing  Intervention: Identify and Manage Fall Risk  Recent Flowsheet " Documentation  Taken 3/31/2025 2333 by Chen Paniagua RN  Safety Promotion/Fall Prevention: safety round/check completed  Intervention: Prevent Skin Injury  Recent Flowsheet Documentation  Taken 3/31/2025 2332 by Chen Paniagua RN  Body Position:   position maintained   refuses positioning   position changed independently  Intervention: Prevent and Manage VTE (Venous Thromboembolism) Risk  Recent Flowsheet Documentation  Taken 3/31/2025 2333 by Chen Paniagua RN  VTE Prevention/Management: (eliquis)   SCDs off (sequential compression devices)   patient refused intervention  Intervention: Prevent Infection  Recent Flowsheet Documentation  Taken 3/31/2025 2333 by Chen Paniagua RN  Infection Prevention:   single patient room provided   rest/sleep promoted   personal protective equipment utilized   hand hygiene promoted   equipment surfaces disinfected   environmental surveillance performed   cohorting utilized  Goal: Optimal Comfort and Wellbeing  Outcome: Progressing  Goal: Readiness for Transition of Care  Outcome: Progressing     Problem: Infection  Goal: Absence of Infection Signs and Symptoms  Intervention: Prevent or Manage Infection  Recent Flowsheet Documentation  Taken 3/31/2025 2333 by Chen Paniagua RN  Isolation Precautions:   contact precautions maintained   droplet precautions maintained     Problem: Infection  Goal: Absence of Infection Signs and Symptoms  Outcome: Adequate for Care Transition  Intervention: Prevent or Manage Infection  Recent Flowsheet Documentation  Taken 3/31/2025 2333 by Chen Paniagua RN  Isolation Precautions:   contact precautions maintained   droplet precautions maintained     Problem: Gas Exchange Impaired  Goal: Optimal Gas Exchange  Intervention: Optimize Oxygenation and Ventilation  Recent Flowsheet Documentation  Taken 3/31/2025 2332 by Chen Paniagua RN  Head of Bed (HOB) Positioning: HOB at 20-30 degrees     Problem: Gas Exchange Impaired  Goal: Optimal Gas  Exchange  Outcome: Not Progressing  Intervention: Optimize Oxygenation and Ventilation  Recent Flowsheet Documentation  Taken 3/31/2025 2332 by Chen Paniagua, RN  Head of Bed (HOB) Positioning: HOB at 20-30 degrees     Problem: Comorbidity Management  Goal: Blood Pressure in Desired Range  Intervention: Maintain Blood Pressure Management  Recent Flowsheet Documentation  Taken 3/31/2025 2333 by Chen Paniagua, RN  Medication Review/Management: medications reviewed

## 2025-04-01 NOTE — PLAN OF CARE
Occupational Therapy Discharge Summary    Reason for therapy discharge:    Discharged to transitional care facility.    Progress towards therapy goal(s). See goals on Care Plan in Epic electronic health record for goal details.  Goals partially met.  Barriers to achieving goals:   discharge from facility.    Therapy recommendation(s):    Continued therapy is recommended.  Rationale/Recommendations:  Per treating therapist, pt requires TCU to return to prior level, see OT note for details.

## 2025-04-01 NOTE — DISCHARGE SUMMARY
Hospitalist Discharge Summary  [facility]    Kavita Merlos MRN# [id number]   YOB: 1935 Age: 89 year old     Date of Admission:  3/23/2025  Date of Discharge:  4/1/2025  Admitting Physician:  Lobo Orta MD  Discharge Physician:  Michael Jeronimo DO  Discharging Service:  Hospitalist     Primary Provider: Ty Cordero          Discharge Diagnosis:     Acute hypoxic/hypercapnic respiratory failure secondary to RSV pneumonia and community-acquired pneumonia  Hypovolemic hyponatremia  Deconditioning and generalized weakness  Paroxysmal atrial fibrillation  Chronic HFpEF and aortic stenosis status post TAVR  Hypertension  Chronic anemia             Discharge Disposition:     Discharged to rehabilitation facility           Allergies:     Allergies   Allergen Reactions    Hydralazine Palpitations     Patient's family states she had this adverse reaction during her last hospital stay earlier in March 2025.    Albuterol     Amlodipine     Bimatoprost Itching    Brimonidine Itching    Clotrimazole Itching    Dorzolamide Hcl-Timolol Mal Itching    Latanoprost Itching    Lisinopril     Losartan      depression    Neomycin-Polymyxin-Gramicidin Swelling    Neosporin [Neomycin-Polymyxin-Gramicidin]     Tape [Adhesive Tape]     Timolol Itching    Travoprost Itching    Vicodin [Hydrocodone-Acetaminophen]     Penicillins Unknown     Extensive use and toleration of cephalosporins              Discharge Medications:     Current Discharge Medication List        START taking these medications    Details   benzonatate (TESSALON) 100 MG capsule Take 1 capsule (100 mg) by mouth 3 times daily as needed for cough.    Associated Diagnoses: RSV bronchiolitis      guaiFENesin (MUCINEX) 600 MG 12 hr tablet Take 1 tablet (600 mg) by mouth 2 times daily.    Associated Diagnoses: RSV bronchiolitis      ipratropium (ATROVENT) 0.02 % neb solution Take 2.5 mLs (0.5 mg) by nebulization every 4  "hours as needed for wheezing.    Associated Diagnoses: RSV bronchiolitis           CONTINUE these medications which have NOT CHANGED    Details   apixaban ANTICOAGULANT (ELIQUIS) 2.5 MG tablet Take 1 tablet (2.5 mg) by mouth 2 times daily.  Qty: 180 tablet, Refills: 3    Associated Diagnoses: Atrial fibrillation, unspecified type (H)      metoprolol succinate ER (TOPROL XL) 100 MG 24 hr tablet Take 1 tablet (100 mg) by mouth daily.    Associated Diagnoses: Hypertensive emergency      polyethylene glycol (MIRALAX) 17 g packet Take 1 packet by mouth every other day.      tafluprost (ZIOPTAN) 0.0015 % SOLN ophthalmic solution Place 1 drop into both eyes at bedtime      timolol maleate (TIMOPTIC) 0.5 % ophthalmic solution Place 1 drop into both eyes 2 times daily.                    Condition on Discharge:     Discharge condition: Stable   Discharge vitals: Blood pressure (!) 146/64, pulse 82, temperature 97.7  F (36.5  C), temperature source Oral, resp. rate 16, height 1.626 m (5' 4\"), weight 53.8 kg (118 lb 8 oz), SpO2 (!) 91%, not currently breastfeeding.   Code status on discharge: DNR / DNI      BASIC PHYSICAL EXAMINATION:  GENERAL: No apparent distress.  CARDIOVASCULAR: Regular rate and rhythm without murmurs.  PULMONARY: Clear to auscultation bilaterally.   GASTROINTESTINAL: Abdomen soft, non-tender.  EXTREMITIES: No edema, pulses intact.  NEUROLOGIC: No focal deficits.            History of Illness:   See detailed admission note for full details.               Procedures excluding imaging which is summarized below:     Please see details in the electronic medical record.           Consultations:     PHYSICAL THERAPY ADULT IP CONSULT  CARE MANAGEMENT / SOCIAL WORK IP CONSULT  CARE MANAGEMENT / SOCIAL WORK IP CONSULT  CARE MANAGEMENT / SOCIAL WORK IP CONSULT  NUTRITION SERVICES ADULT IP CONSULT  WOUND OSTOMY CONTINENCE NURSE  IP CONSULT  OCCUPATIONAL THERAPY ADULT IP CONSULT  CARE MANAGEMENT / SOCIAL WORK IP " CONSULT  VASCULAR ACCESS ADULT IP CONSULT  PHYSICAL THERAPY ADULT IP CONSULT  OCCUPATIONAL THERAPY ADULT IP CONSULT          Significant Results:     Results for orders placed or performed during the hospital encounter of 03/23/25   XR Chest 2 Views    Narrative    EXAM: XR CHEST 2 VIEWS  LOCATION: [facility]  DATE: 3/23/2025    INDICATION: Shortness of breath  COMPARISON: 3/7/2025      Impression    IMPRESSION: Aortic valve prosthesis. Patchy opacities throughout the right lung with the upper lobe opacities improved since prior study but increasing opacities in the right mid and lower lung likely new pneumonia.   XR Chest Port 1 View    Narrative    EXAM: XR CHEST PORT 1 VIEW  LOCATION: [facility]  DATE: 3/24/2025    INDICATION: SOB  COMPARISON: Chest radiographs 3/23/2025. CT chest 3/5/2025.      Impression    IMPRESSION: Extensive pulmonary infiltrate on the right similar to previous and concerning for multifocal pneumonia. Mild interstitial prominence. Small bilateral pleural effusions and bibasilar atelectasis. Emphysema better demonstrated by prior chest   CT. Normal heart size. Endovascular repair of the aortic valve.       Transthoracic Echocardiogram Results:  No results found for this or any previous visit (from the past 4320 hours).             Pending Results:     Unresulted Labs Ordered in the Past 30 Days of this Admission       No orders found from 2/21/2025 to 3/24/2025.                        Discharge Instructions and Follow-Up:     Discharge instructions and follow-up:   Discharge Procedure Orders   Primary Care - Care Coordination Referral   Standing Status: Future   Referral Priority: Routine: Next available opening Referral Type: Care Coordination   Number of Visits Requested: 1     General info for SNF   Order Comments: Length of Stay Estimate: Short Term Care: Estimated # of Days <30  Condition at Discharge: Stable  Level of care:skilled    Rehabilitation Potential: Fair  Admission H&P remains valid and up-to-date: Yes  Recent Chemotherapy: N/A  Use Nursing Home Standing Orders: Yes     Mantoux instructions   Order Comments: Give two-step Mantoux (PPD) Per Facility Policy Yes     Follow Up and recommended labs and tests   Order Comments: Follow up with Nursing home physician.  No follow up labs or test are needed.     Reason for your hospital stay   Order Comments: RSV.     Activity - Up with nursing assistance     Order Specific Question Answer Comments   Is discharge order? Yes      No CPR- Do NOT Intubate     Order Specific Question Answer Comments   Code status determined by: Discussion with patient/ legal decision maker      Physical Therapy Adult Consult   Order Comments: Evaluate and treat as clinically indicated.    Reason:  weak     Occupational Therapy Adult Consult   Order Comments: Evaluate and treat as clinically indicated.    Reason:  weak     Fall precautions     Diet   Order Comments: Follow this diet upon discharge: Current Diet:Orders Placed This Encounter      Snacks/Supplements Adult: Access [place] Standard 1.4 Oral Supplement; Between Meals      Combination Diet Regular Diet Adult     Order Specific Question Answer Comments   Is discharge order? Yes              Hospital Course:     Kavita Merlos is a 89 yea2r old female with a history of HFpEF, hypertension, paroxysmal atrial fibrillation on Eliquis, aortic stenosis status post TAVR, infrarenal AAA, macular degeneration, glaucoma, depression admitted on 3/23/2025 with shortness of breath and cough.  Of note, the patient was recently hospitalized from 3/5 -3/11 for the treatment of right upper lobe pneumonia treated with ceftriaxone and azithromycin followed by Omnicef and azithromycin through 3/12.  The patient was discharged to TCU but was able to return home about 1 day prior to admission.      The patient was noted to be hypoxic to 89% on room air at home by EMS.  In the  emergency department, the patient is found of a temperature of 98.3  F, heart rate 75, blood pressure as high as 202/86, respiratory rate as high as 28, SpO2 96% on 2 L nasal cannula.  Initial lab work showed sodium 127, proBNP 4677, high-sensitivity troponin 24, procalcitonin 0.09, venous pH 7.35 with a venous pCO2 of 41, WBC 6.5, hemoglobin 8.8.  The patient tested positive for RSV.  Chest x-ray showed patchy opacities throughout the right lung with upper lobe opacities improved since prior but increasing opacities in the right middle and lower lung that are likely pneumonia.  The patient was started on ceftriaxone and azithromycin.      Unfortunately, overnight on 3/23, the patient had worsening shortness of breath with VBG showing hypercapnia and venous pH of 7.24 with a venous pCO2 of 62.  The patient did briefly require CPAP but was quickly transitioned to nasal cannula.  Over the course of her hospitalization she was eventually weaned to room air successfully.  She completed a full course of antibiotics for community-acquired pneumonia.  She was deemed medically stable for discharge several days ago but needed to complete her period of isolation prior to returning to TCU.  She is discharging this afternoon.     Problem List:   Acute hypoxic and hypercapnic respiratory failure secondary to RSV pneumonia and community acquired pneumonia: Was requiring 2 L oxygen by nasal cannula on admission but even required (but poorly tolerated) CPAP the night of 3/23.  Oxygen requirements are much better and she has now weaned successfully to room air.  She has tested positive for RSV.  Chest imaging shows what appears to be evolving pneumonia.  Completed treatment with ceftriaxone (7 days) and azithromycin (5 days).  Will continue as needed nebulizers, mucolytics, and cough suppressants.  Her weight is stable around 54 kg.  Hypovolemic hyponatremia: Likely poor oral intake.  Sodium relatively stable.  Hold off on IV fluids  and diuretics for now.  RD consulted.  Push oral intake.    Deconditioning and generalized weakness: Likely due to her acute infectious process.  Weakness improved today so starting some PT/OT.  To note, she was just discharged from TCU 1 day prior to admission here.  Planning to return to TCU today.  Her activity tolerance is gradually improving as she recovers from her infections.  Paroxysmal atrial fibrillation: Currently rate controlled.  Remains on Toprol-XL.  Continue Eliquis for stroke prophylaxis.  Chronic HFpEF and aortic stenosis status post TAVR: Appears relatively stable and euvolemic.  Her procedure was in January 2024.  Weights have been stable.  Hypertensive urgency: Blood pressure was initially 206/100.  Has been quite labile but reasonably controlled now that she is recovering from her illness.  Continue Toprol-XL.  Chronic anemia: Hemoglobin was fluctuating in the earlier part of her hospitalization but now has been gradually improving and is now near 10.  No reports of bleeding.  She did receive a blood transfusion earlier in the month when she was hospitalized and developed acute pulmonary edema as a complication.  She was seen in consultation by hematology who thought she was having some degree of hemolysis from sepsis and RBC fragmentation from her TAVR.    Possible depression: Assessed at the previous hospitalization and some thoughts about prescribing Lexapro but she never began this and is not interested in it.  She denies any depression.    The patient was seen, examined, and counseled on this day. The hospitalization and plan of care was reviewed with the patient extensively. All questions were addressed and the patient agreed to follow-up as noted above.      Total time spent in face to face contact with the patient and coordinating discharge was:  35 Minutes    Michael Jeronimo DO, MPH  [facility] Hospitalist  [address] [place] [address]  04/01/2025

## 2025-04-01 NOTE — PROGRESS NOTES
Pt is in stable condition, vss, no co pain/cp/sob.  Pt dc to TCU today.  All dc education reviewed with pt/family in regards to: diet, activity, safety, s/s to report, OT/PT, medications, follow up appointments/care, etc.  Questions were answered and pt/family verbalized understanding.  All belongings (including home medications) were packed up to be sent with pt, pt will dc via wc by staff to main entrance, daughter to transport to facility at 1315.

## 2025-04-02 ENCOUNTER — PATIENT OUTREACH (OUTPATIENT)
Dept: CARE COORDINATION | Facility: CLINIC | Age: 89
End: 2025-04-02

## 2025-04-02 ENCOUNTER — TRANSITIONAL CARE UNIT VISIT (OUTPATIENT)
Dept: GERIATRICS | Facility: CLINIC | Age: 89
End: 2025-04-02
Payer: MEDICARE

## 2025-04-02 VITALS
DIASTOLIC BLOOD PRESSURE: 72 MMHG | HEIGHT: 64 IN | RESPIRATION RATE: 18 BRPM | WEIGHT: 117.5 LBS | BODY MASS INDEX: 20.06 KG/M2 | TEMPERATURE: 97.2 F | HEART RATE: 82 BPM | SYSTOLIC BLOOD PRESSURE: 140 MMHG | OXYGEN SATURATION: 90 %

## 2025-04-02 DIAGNOSIS — I50.32 CHRONIC DIASTOLIC (CONGESTIVE) HEART FAILURE (H): ICD-10-CM

## 2025-04-02 DIAGNOSIS — J96.01 ACUTE RESPIRATORY FAILURE WITH HYPOXIA (H): ICD-10-CM

## 2025-04-02 DIAGNOSIS — H40.9 GLAUCOMA, UNSPECIFIED GLAUCOMA TYPE, UNSPECIFIED LATERALITY: ICD-10-CM

## 2025-04-02 DIAGNOSIS — R53.81 PHYSICAL DECONDITIONING: Primary | ICD-10-CM

## 2025-04-02 DIAGNOSIS — J21.0 RSV BRONCHIOLITIS: ICD-10-CM

## 2025-04-02 DIAGNOSIS — I48.20 CHRONIC ATRIAL FIBRILLATION (H): ICD-10-CM

## 2025-04-02 DIAGNOSIS — D64.9 NORMOCYTIC ANEMIA: ICD-10-CM

## 2025-04-02 PROCEDURE — P9604 ONE-WAY ALLOW PRORATED TRIP: HCPCS | Mod: ORL | Performed by: PHYSICIAN ASSISTANT

## 2025-04-02 PROCEDURE — 86481 TB AG RESPONSE T-CELL SUSP: CPT | Mod: ORL | Performed by: PHYSICIAN ASSISTANT

## 2025-04-02 PROCEDURE — 99310 SBSQ NF CARE HIGH MDM 45: CPT | Performed by: PHYSICIAN ASSISTANT

## 2025-04-02 PROCEDURE — 36415 COLL VENOUS BLD VENIPUNCTURE: CPT | Mod: ORL | Performed by: PHYSICIAN ASSISTANT

## 2025-04-02 NOTE — LETTER
4/2/2025      Kavita Merlos  [address]  [address]  [facility] [address]        [facility] GERIATR[facility]    PRIMARY CARE PROVIDER AND CLINIC:  Ty Cordero MD, [address] / [facility] [address]  Chief Complaint   Patient presents with     Hospital F/U      [place] Medical Record Number:  [id number]  Place of Service where encounter took place:  [facility] ([facility]) [[id number]]    Kavita Merlos  is a 89 year old  (1935), admitted to the above facility from  [facility]. Hospital stay 3/23/25 through 4/1/25..   HPI:    Notes from hospitalization reviewed including H&P  and D/c summary    Kavita Merlos is an 89-year-old female with a past medical history of atrial fibrillation, hypertension, glaucoma, recent hospitalization and TCU stay for pneumonia who was rehospitalized at [facility].  Initially hospitalized 3/5 - 3/11/2025 for right upper lobe pneumonia.  Completed course of antibiotics.  Discharged to TCU.  Discharged from U 3/22/2025.  Unfortunately developed increasing shortness of breath and upper respiratory symptoms shortly after discharge.  Dionisio presented to the hospital.  Found to be hypoxic.  Chest x-ray with increasing infiltrate on the right.  Found to be RSV positive.  Treated with a course of antibiotics.  Briefly required CPAP.  Stabilized and discharged back to [facility]    Kavita is evaluated in her room.  Overall doing quite well.  Off oxygen.  Feels deconditioned.  No residual cough.      Additionally, called and spoke with daughter Janette.  Reintroduced self and role.  She does recall me from previous TCU stay.  Hospital course discussed.  Plan for labs on Monday discussed.  Questions answered    Review of nursing home EMR:  -157      CODE STATUS/ADVANCE DIRECTIVES DISCUSSION:  No CPR- Do NOT Intubate  DNR / DNI  ALLERGIES:   Allergies   Allergen Reactions     Hydralazine Palpitations     Patient's family states she had  this adverse reaction during her last hospital stay earlier in March 2025.     Albuterol      Amlodipine      Bimatoprost Itching     Brimonidine Itching     Clotrimazole Itching     Dorzolamide Hcl-Timolol Mal Itching     Latanoprost Itching     Lisinopril      Losartan      depression     Neomycin-Polymyxin-Gramicidin Swelling     Neosporin [Neomycin-Polymyxin-Gramicidin]      Tape [Adhesive Tape]      Timolol Itching     Travoprost Itching     Vicodin [Hydrocodone-Acetaminophen]      Penicillins Unknown     Extensive use and toleration of cephalosporins      PAST MEDICAL HISTORY:   Past Medical History:   Diagnosis Date     AAA (abdominal aortic aneurysm)      Aortic stenosis      Benign essential hypertension with BP difference between arms      Hyperlipidemia LDL goal <70       PAST SURGICAL HISTORY:   has a past surgical history that includes Coronary Angiogram (N/A, 12/19/2023); Right Heart Catheterization (N/A, 12/19/2023); Transcatheter Aortic Valve Replacement-Femoral Approach (N/A, 2/20/2024); and Peripheral Vascular Lithotripsy (N/A, 2/20/2024).  FAMILY HISTORY: family history is not on file.  SOCIAL HISTORY:   reports that she quit smoking about 18 years ago. Her smoking use included cigarettes. She has never been exposed to tobacco smoke. She has never used smokeless tobacco. She reports that she does not currently use alcohol. She reports that she does not use drugs.  Patient's living condition: lives alone    Post Discharge Medication Reconciliation Status:   MED REC REQUIRED  Post Medication Reconciliation Status: discharge medications reconciled and changed, per note/orders       Current Outpatient Medications   Medication Sig Dispense Refill     apixaban ANTICOAGULANT (ELIQUIS) 2.5 MG tablet Take 1 tablet (2.5 mg) by mouth 2 times daily. 180 tablet 3     benzonatate (TESSALON) 100 MG capsule Take 1 capsule (100 mg) by mouth 3 times daily as needed for cough.       guaiFENesin (MUCINEX) 600 MG 12  "hr tablet Take 1 tablet (600 mg) by mouth 2 times daily.       ipratropium (ATROVENT) 0.02 % neb solution Take 2.5 mLs (0.5 mg) by nebulization every 4 hours as needed for wheezing.       metoprolol succinate ER (TOPROL XL) 100 MG 24 hr tablet Take 1 tablet (100 mg) by mouth daily.       polyethylene glycol (MIRALAX) 17 g packet Take 1 packet by mouth every other day.       tafluprost (ZIOPTAN) 0.0015 % SOLN ophthalmic solution Place 1 drop into both eyes at bedtime       timolol maleate (TIMOPTIC) 0.5 % ophthalmic solution Place 1 drop into both eyes 2 times daily.       No current facility-administered medications for this visit.       ROS:  10 point ROS of systems including Constitutional, Eyes, Respiratory, Cardiovascular, Gastroenterology, Genitourinary, Integumentary, Musculoskeletal, Psychiatric were all negative except for pertinent positives noted in my HPI.    Vitals:  BP (!) 140/72   Pulse 82   Temp 97.2  F (36.2  C)   Resp 18   Ht 1.626 m (5' 4\")   Wt 53.3 kg (117 lb 8 oz)   LMP  (LMP Unknown)   SpO2 (!) 90%   BMI 20.17 kg/m    Exam:  Physical Exam  Vitals (Facility EMR) reviewed.   Constitutional:       General: She is not in acute distress.  HENT:      Head: Normocephalic and atraumatic.   Eyes:      General: No scleral icterus.  Cardiovascular:      Rate and Rhythm: Normal rate and regular rhythm.      Heart sounds: No murmur heard.  Pulmonary:      Effort: Pulmonary effort is normal.      Breath sounds: No wheezing.   Musculoskeletal:      Right lower leg: No edema.      Left lower leg: No edema.   Skin:     General: Skin is warm and dry.      Findings: No rash.   Neurological:      Mental Status: She is alert. Mental status is at baseline.   Psychiatric:         Behavior: Behavior normal.           Lab/Diagnostic data:  Recent labs in [facility] reviewed by me today.  and Most Recent 3 CBC's:  Recent Labs   Lab Test 03/31/25  0824 03/27/25  0804 03/26/25  0806   WBC 8.1 7.4 5.5   HGB 9.7* 9.1* " 8.4*   MCV 97 95 95    176 168     Most Recent 3 BMP's:  Recent Labs   Lab Test 03/31/25  0824 03/27/25  0804 03/26/25  0806   * 131* 130*   POTASSIUM 4.8 4.8 4.7   CHLORIDE 99 96* 96*   CO2 27 28 26   BUN 15.7 12.5 17.2   CR 0.41* 0.44* 0.47*   ANIONGAP 8 7 8   JERRY 8.8 8.7* 8.6*   * 97 103*     Most Recent 2 LFT's:  Recent Labs   Lab Test 03/10/25  0719 03/09/25  0749   AST 52* 42   ALT 13 13   ALKPHOS 67 69   BILITOTAL 1.1 0.9     7-Day Micro Results       Collected Updated Procedure Result Status      04/02/2025 0608 04/02/2025 0929 Quantiferon TB Gold Plus Grey Tube [[id number]]   Blood from Arm, Right    In process Component Value   No component results            04/02/2025 0608 04/02/2025 0929 Quantiferon TB Gold Plus Green Tube [[id number]]   Blood from Arm, Right    In process Component Value   No component results            04/02/2025 0608 04/02/2025 0929 Quantiferon TB Gold Plus Yellow Tube [[id number]]   Blood from Arm, Right    In process Component Value   No component results            04/02/2025 0608 04/02/2025 0929 Quantiferon TB Gold Plus Purple Tube [[id number]]   Blood from Arm, Right    In process Component Value   No component results                  Most Recent TSH and T4:  Recent Labs   Lab Test 10/24/24  0950   TSH 1.85       Narrative & Impression   EXAM: XR CHEST PORT 1 VIEW  LOCATION: [facility]  DATE: 3/24/2025     INDICATION: SOB  COMPARISON: Chest radiographs 3/23/2025. CT chest 3/5/2025.                                                                      IMPRESSION: Extensive pulmonary infiltrate on the right similar to previous and concerning for multifocal pneumonia. Mild interstitial prominence. Small bilateral pleural effusions and bibasilar atelectasis. Emphysema better demonstrated by prior chest   CT. Normal heart size. Endovascular repair of the aortic valve.       ASSESSMENT/PLAN:  Acute Hypoxic Respiratory  Failure  RSV  Community Acquire Pneumonia: Briefly required CPAP. Completed 7 days of IV antibiotics  Now stable off oxygen  Monitor off antibiotics    Hyponatremia: Discharge   BMP 4/7    Paroxysmal Afib  Rate control with metoprolol  Anticoagulation with apixaban    Hypertension: SBPs at -140s  Continue Toprol- mg daily  Has allergies to multiple classes of antihypertensives    Chronic HFpEF  AS s/p TAVR: EF 55-60%. During previous hospitalization started on lasix but d/c at TCU per patient request  Monitor off Lasix  2 times weekly weights    Chronic Anemia: Discharge Hgb 9.7 Did require transfusion during previous hospital stay.  Hematology felt anemia likely secondary to acute illness  CBC 4/7    Physical deconditioning  PT/OT    Glaucoma  Continue eyedrops      Electronically signed by:  Felicita Dumont PA-C         This note was completed in part using Dragon voice recognition software. Although reviewed after completion, some word and grammatical errors may occur.    A total 47 min were spent on this hospital follow-up visit including review of hospitalization, medication reconciliation, evaluating patient discussing plan of care, separate phone call to patient's daughter, writing orders and documenting.  All services performed on day of visit             Sincerely,        Felicita Dumont PA-C    Electronically signed

## 2025-04-02 NOTE — PROGRESS NOTES
Clinic Care Coordination Contact  Care Coordination Transition Communication    Clinical Data: Patient was hospitalized at [facility] from 03/23/25 to 04/01/25 with diagnosis of     Acute hypoxic/hypercapnic respiratory failure secondary to RSV pneumonia and community-acquired pneumonia  Hypovolemic hyponatremia  Deconditioning and generalized weakness  Paroxysmal atrial fibrillation  Chronic HFpEF and aortic stenosis status post TAVR  Hypertension  Chronic anemia     Assessment: Patient has transitioned to TCU/ARU for short term rehabilitation:    Facility Name: [facility]   Transition Communication:  Notified facility of Primary Care- Care Coordination support via Epic fax.    Plan: Care Coordinator will await notification from facility staff informing of patient's discharge plans/needs. Care Coordinator will review chart and outreach to facility staff every 4 weeks and as needed.     TSERING Person  [facility]   Primary Care Social Work Care Coordinator  [facility] [place]   Phone: [phone]

## 2025-04-02 NOTE — LETTER
[facility]   To:   Please give to facility  or Discharge Planner   From:   TSERING Person      Patient Name:  Kavita Merlos YOB: 1935   Admit date: 04/01/25      Please let me know the discharge date and plan. If home health is set up, please let me know the same of the agency. If going to a long term care facility, please let me know the name/location. I will plan to follow up with the patient once discharged home and if appropriate. I'm happy to attend any care conferences if needed and if I can provide any further assistance.     Please call or email me!     Thanks,  TSERING Person  Primary Care Social Work Care Coordinator  [facility], & [place]   PH: [phone]  E: [email or web address]

## 2025-04-02 NOTE — PROGRESS NOTES
[facility] GERIATRICS    PRIMARY CARE PROVIDER AND CLINIC:  Ty Cordero MD, [address] / [facility] [address]  Chief Complaint   Patient presents with    Hospital F/U      [facility] Medical Record Number:  [id number]  Place of Service where encounter took place:  [facility] ([facility]) [[id number]]    Kavita Merlos  is a 89 year old  (1935), admitted to the above facility from  [facility]. Hospital stay 3/23/25 through 4/1/25..   HPI:    Notes from hospitalization reviewed including H&P  and D/c summary    Kavita Merlos is an 89-year-old female with a past medical history of atrial fibrillation, hypertension, glaucoma, recent hospitalization and TCU stay for pneumonia who was rehospitalized at [facility][note 3].  Initially hospitalized 3/5 - 3/11/2025 for right upper lobe pneumonia.  Completed course of antibiotics.  Discharged to TCU.  Discharged from U 3/22/2025.  Unfortunately developed increasing shortness of breath and upper respiratory symptoms shortly after discharge.  Dionisio presented to the hospital.  Found to be hypoxic.  Chest x-ray with increasing infiltrate on the right.  Found to be RSV positive.  Treated with a course of antibiotics.  Briefly required CPAP.  Stabilized and discharged back to U    Kavita is evaluated in her room.  Overall doing quite well.  Off oxygen.  Feels deconditioned.  No residual cough.      Additionally, called and spoke with daughter Janette.  Reintroduced self and role.  She does recall me from previous TCU stay.  Hospital course discussed.  Plan for labs on Monday discussed.  Questions answered    Review of nursing home EMR:  -157      CODE STATUS/ADVANCE DIRECTIVES DISCUSSION:  No CPR- Do NOT Intubate  DNR / DNI  ALLERGIES:   Allergies   Allergen Reactions    Hydralazine Palpitations     Patient's family states she had this adverse reaction during her last hospital stay earlier in March 2025.    Albuterol      Amlodipine     Bimatoprost Itching    Brimonidine Itching    Clotrimazole Itching    Dorzolamide Hcl-Timolol Mal Itching    Latanoprost Itching    Lisinopril     Losartan      depression    Neomycin-Polymyxin-Gramicidin Swelling    Neosporin [Neomycin-Polymyxin-Gramicidin]     Tape [Adhesive Tape]     Timolol Itching    Travoprost Itching    Vicodin [Hydrocodone-Acetaminophen]     Penicillins Unknown     Extensive use and toleration of cephalosporins      PAST MEDICAL HISTORY:   Past Medical History:   Diagnosis Date    AAA (abdominal aortic aneurysm)     Aortic stenosis     Benign essential hypertension with BP difference between arms     Hyperlipidemia LDL goal <70       PAST SURGICAL HISTORY:   has a past surgical history that includes Coronary Angiogram (N/A, 12/19/2023); Right Heart Catheterization (N/A, 12/19/2023); Transcatheter Aortic Valve Replacement-Femoral Approach (N/A, 2/20/2024); and Peripheral Vascular Lithotripsy (N/A, 2/20/2024).  FAMILY HISTORY: family history is not on file.  SOCIAL HISTORY:   reports that she quit smoking about 18 years ago. Her smoking use included cigarettes. She has never been exposed to tobacco smoke. She has never used smokeless tobacco. She reports that she does not currently use alcohol. She reports that she does not use drugs.  Patient's living condition: lives alone    Post Discharge Medication Reconciliation Status:   MED REC REQUIRED  Post Medication Reconciliation Status: discharge medications reconciled and changed, per note/orders       Current Outpatient Medications   Medication Sig Dispense Refill    apixaban ANTICOAGULANT (ELIQUIS) 2.5 MG tablet Take 1 tablet (2.5 mg) by mouth 2 times daily. 180 tablet 3    benzonatate (TESSALON) 100 MG capsule Take 1 capsule (100 mg) by mouth 3 times daily as needed for cough.      guaiFENesin (MUCINEX) 600 MG 12 hr tablet Take 1 tablet (600 mg) by mouth 2 times daily.      ipratropium (ATROVENT) 0.02 % neb solution Take 2.5  "mLs (0.5 mg) by nebulization every 4 hours as needed for wheezing.      metoprolol succinate ER (TOPROL XL) 100 MG 24 hr tablet Take 1 tablet (100 mg) by mouth daily.      polyethylene glycol (MIRALAX) 17 g packet Take 1 packet by mouth every other day.      tafluprost (ZIOPTAN) 0.0015 % SOLN ophthalmic solution Place 1 drop into both eyes at bedtime      timolol maleate (TIMOPTIC) 0.5 % ophthalmic solution Place 1 drop into both eyes 2 times daily.       No current facility-administered medications for this visit.       ROS:  10 point ROS of systems including Constitutional, Eyes, Respiratory, Cardiovascular, Gastroenterology, Genitourinary, Integumentary, Musculoskeletal, Psychiatric were all negative except for pertinent positives noted in my HPI.    Vitals:  BP (!) 140/72   Pulse 82   Temp 97.2  F (36.2  C)   Resp 18   Ht 1.626 m (5' 4\")   Wt 53.3 kg (117 lb 8 oz)   LMP  (LMP Unknown)   SpO2 (!) 90%   BMI 20.17 kg/m    Exam:  Physical Exam  Vitals (Facility EMR) reviewed.   Constitutional:       General: She is not in acute distress.  HENT:      Head: Normocephalic and atraumatic.   Eyes:      General: No scleral icterus.  Cardiovascular:      Rate and Rhythm: Normal rate and regular rhythm.      Heart sounds: No murmur heard.  Pulmonary:      Effort: Pulmonary effort is normal.      Breath sounds: No wheezing.   Musculoskeletal:      Right lower leg: No edema.      Left lower leg: No edema.   Skin:     General: Skin is warm and dry.      Findings: No rash.   Neurological:      Mental Status: She is alert. Mental status is at baseline.   Psychiatric:         Behavior: Behavior normal.           Lab/Diagnostic data:  Recent labs in [facility] reviewed by me today.  and Most Recent 3 CBC's:  Recent Labs   Lab Test 03/31/25  0824 03/27/25  0804 03/26/25  0806   WBC 8.1 7.4 5.5   HGB 9.7* 9.1* 8.4*   MCV 97 95 95    176 168     Most Recent 3 BMP's:  Recent Labs   Lab Test 03/31/25  0824 03/27/25  0804 " 03/26/25  0806   * 131* 130*   POTASSIUM 4.8 4.8 4.7   CHLORIDE 99 96* 96*   CO2 27 28 26   BUN 15.7 12.5 17.2   CR 0.41* 0.44* 0.47*   ANIONGAP 8 7 8   JERRY 8.8 8.7* 8.6*   * 97 103*     Most Recent 2 LFT's:  Recent Labs   Lab Test 03/10/25  0719 03/09/25  0749   AST 52* 42   ALT 13 13   ALKPHOS 67 69   BILITOTAL 1.1 0.9     7-Day Micro Results       Collected Updated Procedure Result Status      04/02/2025 0608 04/02/2025 0929 Quantiferon TB Gold Plus Grey Tube [[id number]]   Blood from Arm, Right    In process Component Value   No component results            04/02/2025 0608 04/02/2025 0929 Quantiferon TB Gold Plus Green Tube [[id number]]   Blood from Arm, Right    In process Component Value   No component results            04/02/2025 0608 04/02/2025 0929 Quantiferon TB Gold Plus Yellow Tube [[id number]]   Blood from Arm, Right    In process Component Value   No component results            04/02/2025 0608 04/02/2025 0929 Quantiferon TB Gold Plus Purple Tube [[id number]]   Blood from Arm, Right    In process Component Value   No component results                  Most Recent TSH and T4:  Recent Labs   Lab Test 10/24/24  0950   TSH 1.85       Narrative & Impression   EXAM: XR CHEST PORT 1 VIEW  LOCATION: [facility]  DATE: 3/24/2025     INDICATION: SOB  COMPARISON: Chest radiographs 3/23/2025. CT chest 3/5/2025.                                                                      IMPRESSION: Extensive pulmonary infiltrate on the right similar to previous and concerning for multifocal pneumonia. Mild interstitial prominence. Small bilateral pleural effusions and bibasilar atelectasis. Emphysema better demonstrated by prior chest   CT. Normal heart size. Endovascular repair of the aortic valve.       ASSESSMENT/PLAN:  Acute Hypoxic Respiratory Failure  RSV  Community Acquire Pneumonia: Briefly required CPAP. Completed 7 days of IV antibiotics  Now stable off oxygen  Monitor  off antibiotics    Hyponatremia: Discharge   BMP 4/7    Paroxysmal Afib  Rate control with metoprolol  Anticoagulation with apixaban    Hypertension: SBPs at -140s  Continue Toprol- mg daily  Has allergies to multiple classes of antihypertensives    Chronic HFpEF  AS s/p TAVR: EF 55-60%. During previous hospitalization started on lasix but d/c at TCU per patient request  Monitor off Lasix  2 times weekly weights    Chronic Anemia: Discharge Hgb 9.7 Did require transfusion during previous hospital stay.  Hematology felt anemia likely secondary to acute illness  CBC 4/7    Physical deconditioning  PT/OT    Glaucoma  Continue eyedrops      Electronically signed by:  Felicita Dumont PA-C         This note was completed in part using Dragon voice recognition software. Although reviewed after completion, some word and grammatical errors may occur.    A total 47 min were spent on this hospital follow-up visit including review of hospitalization, medication reconciliation, evaluating patient discussing plan of care, separate phone call to patient's daughter, writing orders and documenting.  All services performed on day of visit

## 2025-04-03 ENCOUNTER — LAB REQUISITION (OUTPATIENT)
Dept: LAB | Facility: CLINIC | Age: 89
End: 2025-04-03
Payer: MEDICARE

## 2025-04-03 DIAGNOSIS — J12.1 RESPIRATORY SYNCYTIAL VIRUS PNEUMONIA: ICD-10-CM

## 2025-04-03 LAB
GAMMA INTERFERON BACKGROUND BLD IA-ACNC: 0.02 IU/ML
M TB IFN-G BLD-IMP: NEGATIVE
M TB IFN-G CD4+ BCKGRND COR BLD-ACNC: 9.98 IU/ML
MITOGEN IGNF BCKGRD COR BLD-ACNC: 0.01 IU/ML
MITOGEN IGNF BCKGRD COR BLD-ACNC: 0.01 IU/ML
QUANTIFERON MITOGEN: 10 IU/ML
QUANTIFERON NIL TUBE: 0.02 IU/ML
QUANTIFERON TB1 TUBE: 0.03 IU/ML
QUANTIFERON TB2 TUBE: 0.03

## 2025-04-07 ENCOUNTER — TRANSITIONAL CARE UNIT VISIT (OUTPATIENT)
Dept: GERIATRICS | Facility: CLINIC | Age: 89
End: 2025-04-07
Payer: MEDICARE

## 2025-04-07 VITALS
BODY MASS INDEX: 20.38 KG/M2 | RESPIRATION RATE: 14 BRPM | HEIGHT: 64 IN | TEMPERATURE: 97.6 F | HEART RATE: 72 BPM | DIASTOLIC BLOOD PRESSURE: 67 MMHG | SYSTOLIC BLOOD PRESSURE: 136 MMHG | WEIGHT: 119.4 LBS | OXYGEN SATURATION: 96 %

## 2025-04-07 DIAGNOSIS — H69.91 DYSFUNCTION OF RIGHT EUSTACHIAN TUBE: ICD-10-CM

## 2025-04-07 DIAGNOSIS — R53.81 PHYSICAL DECONDITIONING: Primary | ICD-10-CM

## 2025-04-07 DIAGNOSIS — I50.32 CHRONIC DIASTOLIC (CONGESTIVE) HEART FAILURE (H): ICD-10-CM

## 2025-04-07 DIAGNOSIS — J21.0 RSV BRONCHIOLITIS: ICD-10-CM

## 2025-04-07 DIAGNOSIS — E87.1 HYPONATREMIA: ICD-10-CM

## 2025-04-07 DIAGNOSIS — D64.9 NORMOCYTIC ANEMIA: ICD-10-CM

## 2025-04-07 LAB
ANION GAP SERPL CALCULATED.3IONS-SCNC: 9 MMOL/L (ref 7–15)
BUN SERPL-MCNC: 14.8 MG/DL (ref 8–23)
CALCIUM SERPL-MCNC: 9 MG/DL (ref 8.8–10.4)
CHLORIDE SERPL-SCNC: 104 MMOL/L (ref 98–107)
CREAT SERPL-MCNC: 0.52 MG/DL (ref 0.51–0.95)
EGFRCR SERPLBLD CKD-EPI 2021: 89 ML/MIN/1.73M2
ERYTHROCYTE [DISTWIDTH] IN BLOOD BY AUTOMATED COUNT: 19.6 % (ref 10–15)
GLUCOSE SERPL-MCNC: 94 MG/DL (ref 70–99)
HCO3 SERPL-SCNC: 24 MMOL/L (ref 22–29)
HCT VFR BLD AUTO: 31.2 % (ref 35–47)
HGB BLD-MCNC: 9.3 G/DL (ref 11.7–15.7)
MCH RBC QN AUTO: 30.8 PG (ref 26.5–33)
MCHC RBC AUTO-ENTMCNC: 29.8 G/DL (ref 31.5–36.5)
MCV RBC AUTO: 103 FL (ref 78–100)
PLATELET # BLD AUTO: 210 10E3/UL (ref 150–450)
POTASSIUM SERPL-SCNC: 4.4 MMOL/L (ref 3.4–5.3)
RBC # BLD AUTO: 3.02 10E6/UL (ref 3.8–5.2)
SODIUM SERPL-SCNC: 137 MMOL/L (ref 135–145)
WBC # BLD AUTO: 6.2 10E3/UL (ref 4–11)

## 2025-04-07 PROCEDURE — 85027 COMPLETE CBC AUTOMATED: CPT | Mod: ORL | Performed by: INTERNAL MEDICINE

## 2025-04-07 PROCEDURE — P9604 ONE-WAY ALLOW PRORATED TRIP: HCPCS | Mod: ORL | Performed by: INTERNAL MEDICINE

## 2025-04-07 PROCEDURE — 99309 SBSQ NF CARE MODERATE MDM 30: CPT | Performed by: PHYSICIAN ASSISTANT

## 2025-04-07 PROCEDURE — 36415 COLL VENOUS BLD VENIPUNCTURE: CPT | Mod: ORL | Performed by: INTERNAL MEDICINE

## 2025-04-07 PROCEDURE — 80048 BASIC METABOLIC PNL TOTAL CA: CPT | Mod: ORL | Performed by: INTERNAL MEDICINE

## 2025-04-07 NOTE — LETTER
" 4/7/2025      Kavita Merlos  [address]  [address]  [place] [address]          Chief Complaint   Patient presents with     Nursing Home Acute       HPI:  Kavita Merlos is a 89 year old  (1935), who is being seen today for an episodic care visit at: [facility] ([facility]) [[id number]].     Brief summary: Kavita Merlos is an 89-year-old female with a past medical history of atrial fibrillation, hypertension, glaucoma, recent hospitalization and TCU stay for pneumonia who was rehospitalized at [facility].  Initially hospitalized 3/5 - 3/11/2025 for right upper lobe pneumonia.  Completed course of antibiotics.  Discharged to TCU.  Discharged from U 3/22/2025.  Unfortunately developed increasing shortness of breath and upper respiratory symptoms shortly after discharge.  Dionisio presented to the hospital.  Found to be hypoxic.  Chest x-ray with increasing infiltrate on the right.  Found to be RSV positive.  Treated with a course of antibiotics.  Briefly required CPAP.  Stabilized and discharged back to U    Today's concern is: Kavita continues to do well. No cough or SOB. Remains off O2. Her right ear has been bothering her. No plugged but rather her voice sounds loud and echos. No ear pain. Not congestion. Has has happened in the past and saw ENT `but did not receive specific tx        Review of nursing home EMR:-148, Wt 119.4,     Allergies, and PMH/PSH reviewed in [facility] today.    REVIEW OF SYSTEMS:  4 point ROS including Respiratory, CV, GI and , other than that noted in the HPI,  is negative    Objective:   /67   Pulse 72   Temp 97.6  F (36.4  C)   Resp 14   Ht 1.626 m (5' 4\")   Wt 54.2 kg (119 lb 6.4 oz)   LMP  (LMP Unknown)   SpO2 96%   BMI 20.49 kg/m      Physical Exam  Vitals (Facility EMR) reviewed.   Constitutional:       General: She is not in acute distress.  HENT:      Head: Normocephalic and atraumatic.      Right Ear: Tympanic membrane and ear canal " normal. There is no impacted cerumen.   Eyes:      General: No scleral icterus.  Cardiovascular:      Rate and Rhythm: Normal rate and regular rhythm.   Pulmonary:      Effort: Pulmonary effort is normal.      Breath sounds: No wheezing.   Musculoskeletal:      Right lower leg: No edema.      Left lower leg: No edema.   Skin:     General: Skin is warm and dry.      Findings: No rash.   Neurological:      Mental Status: She is alert. Mental status is at baseline.   Psychiatric:         Behavior: Behavior normal.          MED REC REQUIRED  Post Medication Reconciliation Status: discharge medications reconciled and changed, per note/orders      Recent labs in [facility] reviewed by me today.  and Most Recent 3 CBC's:  Recent Labs   Lab Test 04/07/25  0620 03/31/25  0824 03/27/25  0804   WBC 6.2 8.1 7.4   HGB 9.3* 9.7* 9.1*   * 97 95    253 176     Most Recent 3 BMP's:  Recent Labs   Lab Test 04/07/25  0620 03/31/25  0824 03/27/25  0804    134* 131*   POTASSIUM 4.4 4.8 4.8   CHLORIDE 104 99 96*   CO2 24 27 28   BUN 14.8 15.7 12.5   CR 0.52 0.41* 0.44*   ANIONGAP 9 8 7   JERRY 9.0 8.8 8.7*   GLC 94 115* 97     Most Recent 3 Hemoglobins:  Recent Labs   Lab Test 04/07/25  0620 03/31/25  0824 03/27/25  0804   HGB 9.3* 9.7* 9.1*       Assessment/Plan:  Acute Hypoxic Respiratory Failure  RSV  Community Acquire Pneumonia: Briefly required CPAP. Completed 7 days of IV antibiotics  Now stable off oxygen  Monitor off antibiotics    Eustachian Tube Dysfunction: Primary complaint is autophony. Exam without obstruction or effusion  Monitor  Encourage hydration     Hyponatremia, resolved: Discharge   NA today 137    Paroxysmal Afib  Rate control with metoprolol  Anticoagulation with apixaban    Hypertension: SBPs at -150s  Continue Toprol- mg daily  Has allergies to multiple classes of antihypertensives    Chronic HFpEF  AS s/p TAVR: EF 55-60%. During previous hospitalization started on lasix but d/c at  TCU per patient request  Monitor off Lasix  2 times weekly weights  Appears Euvolemic    Chronic Anemia: Discharge Hgb 9.7 Did require transfusion during previous hospital stay.  Hematology felt anemia likely secondary to acute illness  Hgb today stable at 9.3    Physical deconditioning  PT/OT    Glaucoma  Continue eyedrops    Orders:  NNO    Electronically signed by: Felicita Dumont PA-C       Sincerely,        Felicita Dumont PA-C    Electronically signed

## 2025-04-07 NOTE — PROGRESS NOTES
"  Chief Complaint   Patient presents with    Nursing Home Acute       HPI:  Kavita Merlos is a 89 year old  (1935), who is being seen today for an episodic care visit at: [facility] ([facility]) [[id number]].     Brief summary: Kavita Merlos is an 89-year-old female with a past medical history of atrial fibrillation, hypertension, glaucoma, recent hospitalization and TCU stay for pneumonia who was rehospitalized at [facility].  Initially hospitalized 3/5 - 3/11/2025 for right upper lobe pneumonia.  Completed course of antibiotics.  Discharged to TCU.  Discharged from U 3/22/2025.  Unfortunately developed increasing shortness of breath and upper respiratory symptoms shortly after discharge.  Dionisio presented to the hospital.  Found to be hypoxic.  Chest x-ray with increasing infiltrate on the right.  Found to be RSV positive.  Treated with a course of antibiotics.  Briefly required CPAP.  Stabilized and discharged back to U    Today's concern is: Kavita continues to do well. No cough or SOB. Remains off O2. Her right ear has been bothering her. No plugged but rather her voice sounds loud and echos. No ear pain. Not congestion. Has has happened in the past and saw ENT `but did not receive specific tx        Review of nursing home EMR:-148, Wt 119.4,     Allergies, and PMH/PSH reviewed in [facility] today.    REVIEW OF SYSTEMS:  4 point ROS including Respiratory, CV, GI and , other than that noted in the HPI,  is negative    Objective:   /67   Pulse 72   Temp 97.6  F (36.4  C)   Resp 14   Ht 1.626 m (5' 4\")   Wt 54.2 kg (119 lb 6.4 oz)   LMP  (LMP Unknown)   SpO2 96%   BMI 20.49 kg/m      Physical Exam  Vitals (Facility EMR) reviewed.   Constitutional:       General: She is not in acute distress.  HENT:      Head: Normocephalic and atraumatic.      Right Ear: Tympanic membrane and ear canal normal. There is no impacted cerumen.   Eyes:      General: No scleral " icterus.  Cardiovascular:      Rate and Rhythm: Normal rate and regular rhythm.   Pulmonary:      Effort: Pulmonary effort is normal.      Breath sounds: No wheezing.   Musculoskeletal:      Right lower leg: No edema.      Left lower leg: No edema.   Skin:     General: Skin is warm and dry.      Findings: No rash.   Neurological:      Mental Status: She is alert. Mental status is at baseline.   Psychiatric:         Behavior: Behavior normal.          MED REC REQUIRED  Post Medication Reconciliation Status: discharge medications reconciled and changed, per note/orders      Recent labs in [facility] reviewed by me today.  and Most Recent 3 CBC's:  Recent Labs   Lab Test 04/07/25  0620 03/31/25  0824 03/27/25  0804   WBC 6.2 8.1 7.4   HGB 9.3* 9.7* 9.1*   * 97 95    253 176     Most Recent 3 BMP's:  Recent Labs   Lab Test 04/07/25 0620 03/31/25  0824 03/27/25  0804    134* 131*   POTASSIUM 4.4 4.8 4.8   CHLORIDE 104 99 96*   CO2 24 27 28   BUN 14.8 15.7 12.5   CR 0.52 0.41* 0.44*   ANIONGAP 9 8 7   JERRY 9.0 8.8 8.7*   GLC 94 115* 97     Most Recent 3 Hemoglobins:  Recent Labs   Lab Test 04/07/25 0620 03/31/25  0824 03/27/25  0804   HGB 9.3* 9.7* 9.1*       Assessment/Plan:  Acute Hypoxic Respiratory Failure  RSV  Community Acquire Pneumonia: Briefly required CPAP. Completed 7 days of IV antibiotics  Now stable off oxygen  Monitor off antibiotics    Eustachian Tube Dysfunction: Primary complaint is autophony. Exam without obstruction or effusion  Monitor  Encourage hydration     Hyponatremia, resolved: Discharge   NA today 137    Paroxysmal Afib  Rate control with metoprolol  Anticoagulation with apixaban    Hypertension: SBPs at -150s  Continue Toprol- mg daily  Has allergies to multiple classes of antihypertensives    Chronic HFpEF  AS s/p TAVR: EF 55-60%. During previous hospitalization started on lasix but d/c at TCU per patient request  Monitor off Lasix  2 times weekly  weights  Appears Euvolemic    Chronic Anemia: Discharge Hgb 9.7 Did require transfusion during previous hospital stay.  Hematology felt anemia likely secondary to acute illness  Hgb today stable at 9.3    Physical deconditioning  PT/OT    Glaucoma  Continue eyedrops    Orders:  NNO        Documentation of Face to Face and Certification for Home Health Services    I certify that services are/were furnished while this patient was under the care of a physician and that a physician or an allowed non-physician practitioner (NPP), had a face-to-face encounter that meets the physician face-to-face encounter requirements. The encounter was in whole, or in part, related to the primary reason for home health. The patient is confined to his/her home and needs intermittent skilled nursing, physical therapy, speech-language pathology, or the continued need for occupational therapy. A plan of care has been established by a physician and is periodically reviewed by a physician.  Date of Face-to-Face Encounter: 4/10/2025.    I certify that, based on my findings, the following services are medically necessary home health services: Nursing, Occupational Therapy, and Physical Therapy.    My clinical findings support the need for the above skilled services because: Requires assistance of another person or specialized equipment to access medical services because patient: Requires supervision of another for safe transfer...    Patient to re-establish plan of care with their PCP within 7-10 days after leaving the facility to reestablish care.  Medicare certified PECOS provider: Felicita Dumont PA-C  Date: April 10, 2025    Electronically signed by: Felicita Dumont PA-C

## 2025-04-08 ENCOUNTER — TRANSFERRED RECORDS (OUTPATIENT)
Dept: HEALTH INFORMATION MANAGEMENT | Facility: CLINIC | Age: 89
End: 2025-04-08
Payer: MEDICARE

## 2025-04-10 ENCOUNTER — TELEPHONE (OUTPATIENT)
Dept: GERIATRICS | Facility: CLINIC | Age: 89
End: 2025-04-10
Payer: MEDICARE

## 2025-04-10 NOTE — TELEPHONE ENCOUNTER
[facility]   April 10, 2025     Name: Kavita Merlos   : 1935     Background:  Notified by facility SW that patient pursing a patient driven discharge. Ambulating over 200 ft. Feels better than she did last TCU stay. No conerns per therapy. Will d/c with [facility]    Orders:  Discharge paper work signed  F2F added     Electronically signed by Felicita Dumont PA-C

## 2025-04-14 ENCOUNTER — PATIENT OUTREACH (OUTPATIENT)
Dept: CARE COORDINATION | Facility: CLINIC | Age: 89
End: 2025-04-14
Payer: MEDICARE

## 2025-04-14 ENCOUNTER — TELEPHONE (OUTPATIENT)
Dept: INTERNAL MEDICINE | Facility: CLINIC | Age: 89
End: 2025-04-14
Payer: MEDICARE

## 2025-04-14 ENCOUNTER — MEDICAL CORRESPONDENCE (OUTPATIENT)
Dept: HEALTH INFORMATION MANAGEMENT | Facility: CLINIC | Age: 89
End: 2025-04-14

## 2025-04-14 NOTE — LETTER
[facility]  [address]  [place] [address]    April 14, 2025    Kavita Merlos  [address]    [place] [address]      Dear Janette,    I am a clinic care coordinator who works with Ty Cordero MD with the [facility]. I wanted to thank you for spending the time to talk with me.  Below is a description of clinic care coordination and how I can further assist you.       The clinic care coordination team is made up of a registered nurse, , financial resource worker and community health worker who understand the health care system. The goal of clinic care coordination is to help you manage your health and improve access to the health care system. Our team works alongside your provider to assist you in determining your health and social needs. We can help you obtain health care and community resources, providing you with necessary information and education. We can work with you through any barriers and develop a care plan that helps coordinate and strengthen the communication between you and your care team.  Our services are voluntary and are offered without charge to you personally.    Please feel free to contact me with any questions or concerns regarding care coordination and what we can offer.      We are focused on providing you with the highest-quality healthcare experience possible.    Sincerely,     Afsaneh Wisdom [facility]  [facility]   Primary Care Social Work Care Coordinator  [facility] [place] [facility] [place]   Phone: [phone]

## 2025-04-14 NOTE — TELEPHONE ENCOUNTER
Juancho Perales [facility] is calling regarding an established patient with [facility]. Orders written by pts TCU discharge.     Requesting orders from: Ty Cordero  RN APPROVED: RN able to provide verbal orders.  Home Care will send orders for signature.  RN will close encounter.  Is this a request for a temporary pause in the home care episode?  No    Orders Requested    Skilled Nursing  Request for initial certification (first set of orders)   Frequency: 2 x a week, then 1 x a week for 8 weeks.   RN gave verbal order: Yes    Phone number Home Care can be reached at: [phone]  Okay to leave a detailed message?: Yes    Freda Canchola RN

## 2025-04-14 NOTE — PROGRESS NOTES
"Clinic Care Coordination Contact  Transitions of Care Outreach  Chief Complaint   Patient presents with    Clinic Care Coordination - Post Hospital     Most Recent Admission Date: 3/23/2025   Most Recent Admission Diagnosis: Hypoxia - R09.02  RSV bronchiolitis - J21.0  Pneumonia of right lower lobe due to infectious organism - J18.9     Most Recent Discharge Date: 4/1/2025   Most Recent Discharge Diagnosis: Pneumonia of right lower lobe due to infectious organism - J18.9  Hypoxia - R09.02  RSV bronchiolitis - J21.0     Transitions of Care Assessment    Discharge Assessment  How are you doing now that you are home?: \"For the most part, she is ok. She is just really tired but the home care RN was out this morning and was stressing that it will take time\" per pt dtr Janette  How are your symptoms? (Red Flag symptoms escalate to triage hotline per guidelines): Improved  Do you know how to contact your clinic care team if you have future questions or changes to your health status? : Yes  Does the patient have their discharge instructions? : Yes  Does the patient have questions regarding their discharge instructions? : No  Were you started on any new medications or were there changes to any of your previous medications? : Yes  Does the patient have all of their medications?: Yes  Do you have questions regarding any of your medications? : No  Do you have all of your needed medical supplies or equipment (DME)?  (i.e. oxygen tank, CPAP, cane, etc.): Yes  Post-op (Clinicians Only)  Did the patient have surgery or a procedure: No  Fever: No  Chills: No  Eating & Drinking: eating and drinking without complaints/concerns  Additional Symptoms: decreased appetite (But picking up, per pt dtr)  Bowel Function: normal  Date of last BM: 04/13/25  Urinary Status: voiding without complaint/concerns (Pt dtr reported some urgency but stated that it's been something she has dealt with for a long time)    JUAN CC received a return call from pt " dtr Janette. C2C on file. Post discharge assessment questions completed and listed above for reference. HC agency ([facility]) was out this morning and no concerns expressed. Full assessment not indicated as patient dtr declined to have Care Coordination support at this time. The patient dtr was agreeable to receiving an introduction letter with additional information on Care Coordination and that letter was sent via "[employer]. Pt dtr acknowledged that she has received this letter from [facility] in the past and appreciative of the follow-up. Encouraged pt dtr to call back if questions, concerns, or needs arise. No PCP follow-up apt in place. [facility] offered to call and assist with scheduling. Pt dtr politely declined [facility] offer and stated that she would reach out to the clinic after this call to schedule a follow-up. No further outreaches planned at this time. Please enter PCP CC referral if needs arise.     Follow up Plan     Discharge Follow-Up  Discharge follow up appointment scheduled in alignment with recommended follow up timeframe or Transitions of Risk Category? (Low = within 30 days; Moderate= within 14 days; High= within 7 days): No  Patient's follow up appointment not scheduled: Patient declined scheduling support. Education on the importance of transitions of care follow up. Provided scheduling phone number.    No future appointments.    Outpatient Plan as outlined on AVS reviewed with patient.    For any urgent concerns, please contact our 24 hour nurse triage line: [phone] ([phone])       TSERING Bautista

## 2025-04-14 NOTE — PROGRESS NOTES
Clinic Care Coordination Contact  [facility]/Voicemail    Clinical Data: Care Coordinator Outreach    Outreach Documentation Number of Outreach Attempt   4/14/2025   9:54 AM 1     Last week, JUAN CC received hand off from [facility] ([facility]) that pt would be discharging home Friday 04/11/25 with [facility] RN, PT, OT, and HA.     This writer called and left a message on patient's daughters voicemail with call back information and requested return call. Patient's daughter number is listed as the primary number to contact and C2C on file.     Plan: Care Coordinator will try to reach patient again in 1-2 business days.    TSERING Person  [facility]   Primary Care Social Work Care Coordinator  [facility] [place]   Phone: [phone]

## 2025-04-15 ENCOUNTER — TELEPHONE (OUTPATIENT)
Dept: OTHER | Facility: CLINIC | Age: 89
End: 2025-04-15
Payer: MEDICARE

## 2025-04-15 NOTE — TELEPHONE ENCOUNTER
Hx: AAA; 12/11/2024 LOV with Dr. Barkley.    Patient due for follow up in April 2025 and CTA of abdomen/pelvis.  Patient completed CT aortic survey 02/15/2025.  Please review and advise if patient will still need CTA abdomen/pelvis for follow up appointment.    Routing to Dr. Barkley to review and advise.

## 2025-04-15 NOTE — TELEPHONE ENCOUNTER
[facility]    Who is the name of the provider?:  KAYODE BARKLEY   What is the location you see this provider at/preferred location?: [place]  Person calling / Facility: Janette Mendieta daughter of Kavita Merlos  Phone number:  [phone]  Nurse call back needed:  ?     Reason for call:    Spoke with patient's daughter, Janette.  She called to make a follow up appointment for her Mom with Dr. Barkley.    Per Janette, her Mom had an aortic CT scan on 2/15/25 in [facility].  Does the patient still need the CT that Dr. Barkley has ordered. Please review and advise what needs to be scheduled.    No appointments have been scheduled.      Pharmacy location:     [facility][id number] - [facility] [address] AT [address] [facility] [address] [place] [address] [address] [place] [address]  Outside Imaging: n/a   Can we leave a detailed message on this number?  YES     4/15/2025, 9:43 AM

## 2025-04-17 NOTE — TELEPHONE ENCOUNTER
Hx: AAA; 4 month follow up to 12/11/2024 office visit with Dr. Barkley      Routing to scheduling to coordinate the following:  RETURN VASCULAR PATIENT consult with Dr. Barkley  Please schedule this at next available      Appt note:  Hx: AAA; 4 month follow up to 12/11/2024 office visit with Dr. Barkley

## 2025-04-22 ENCOUNTER — TELEPHONE (OUTPATIENT)
Dept: INTERNAL MEDICINE | Facility: CLINIC | Age: 89
End: 2025-04-22
Payer: MEDICARE

## 2025-04-22 NOTE — TELEPHONE ENCOUNTER
Home Care is calling regarding an established patient with [facility].  Requesting orders from: Ty Cordero RN APPROVED: RN able to provide verbal orders.  Home Care will send orders for signature.  RN will close encounter.  Is this a request for a temporary pause in the home care episode?  No    Orders Requested    Physical Therapy  Request for initial certification (first set of orders)     Frequency: 1x a week for 3 weeks, then 1 x every other week for 4 weeks, then 1 x 1 week  For deconditioning after Pneumonia    RN gave verbal order: Yes      Phone number Home Care can be reached at: [phone]  Okay to leave a detailed message?: Yes    Freda Canchola RN

## 2025-04-29 ENCOUNTER — TELEPHONE (OUTPATIENT)
Dept: INTERNAL MEDICINE | Facility: CLINIC | Age: 89
End: 2025-04-29
Payer: MEDICARE

## 2025-04-29 NOTE — TELEPHONE ENCOUNTER
[facility] * order# [id number] received via fax     Forms in Dr. mail box for review and signature.

## 2025-04-29 NOTE — TELEPHONE ENCOUNTER
Home care nurse [facility] # [phone] ok LM     Pt BP is 190/82 on left arm  at 4:18 pm    /84 on right arm      Pt was agitated when BP was taken   Denies: SOB, CP, headaches, dizziness, nausea, vomiting, and blurry vision.     Wanted to verify that pt has heart failure on problem list and how often pt should check weights  -   Pt and daughter stated that pt did not have a diagnosis of heart failure on her list  - Rn advised that it on her problem list    180/74 left arm - BP recheck at 4:57 pm     Daughter of pt stated that pt's SON will be the point of contact for weds 4/30/2025 through the weekend until 5/6/2025 as daughter will be out of town.      Please advise     Thank you     Conchita Oliva RN, BSN  [facility]

## 2025-04-30 ENCOUNTER — MEDICAL CORRESPONDENCE (OUTPATIENT)
Dept: HEALTH INFORMATION MANAGEMENT | Facility: CLINIC | Age: 89
End: 2025-04-30

## 2025-04-30 NOTE — TELEPHONE ENCOUNTER
We could consider adding a low-dose of hydrochlorothiazide to her medication regimen to see if this would help improve her blood pressure readings.  If the family is agreeable, I can submit a prescription for hydrochlorothiazide 12.5 mg by mouth once per day.

## 2025-04-30 NOTE — TELEPHONE ENCOUNTER
Son calls back. Relayed primary care provider message to son. Son says patient was agitated yesterday because patient's heart failure was mentioned and patient didn't understand what heart failure meant.     Son says he is not against starting hydrochlorothiazide, but wants to possibly see if blood pressure readings get better and monitor for now. Patient's son will be seeing patient tomorrow.     Thank you,  Joaquin, Triage RN [facility]    2:20 PM 4/30/2025

## 2025-05-03 ENCOUNTER — HEALTH MAINTENANCE LETTER (OUTPATIENT)
Age: 89
End: 2025-05-03

## 2025-05-07 ENCOUNTER — OFFICE VISIT (OUTPATIENT)
Dept: SURGERY | Facility: CLINIC | Age: 89
End: 2025-05-07
Payer: MEDICARE

## 2025-05-07 VITALS
HEIGHT: 64 IN | WEIGHT: 118 LBS | HEART RATE: 78 BPM | BODY MASS INDEX: 20.14 KG/M2 | DIASTOLIC BLOOD PRESSURE: 76 MMHG | OXYGEN SATURATION: 96 % | SYSTOLIC BLOOD PRESSURE: 124 MMHG

## 2025-05-07 DIAGNOSIS — I71.43 INFRARENAL ABDOMINAL AORTIC ANEURYSM (AAA) WITHOUT RUPTURE: Primary | ICD-10-CM

## 2025-05-07 PROCEDURE — 3078F DIAST BP <80 MM HG: CPT | Performed by: SURGERY

## 2025-05-07 PROCEDURE — 99214 OFFICE O/P EST MOD 30 MIN: CPT | Performed by: SURGERY

## 2025-05-07 PROCEDURE — 3074F SYST BP LT 130 MM HG: CPT | Performed by: SURGERY

## 2025-05-07 NOTE — PROGRESS NOTES
Mrs. 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  In December 2024 we had done a sonography which showed that the aneurysm had increased to 4.6 x 5 cm.    My plan was to get a CT angiogram in 4 months.  Since that time she has had multiple hospitalization including for pneumonia.  Twice she has presented to the emergency department with chest or abdominal pain where she has had the aneurysm investigated.  In January 2025 she underwent a CT aortic survey.  Again she presented in February 2025 with abdominal pain and concern for aneurysm rupture.  The aneurysm measured 4.6 cm by my measurement.  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    I explained the dichotomy of the decision making that I am looking at.  On the one hand we could say that she keeps having abdominal and chest pain that we should repair the aneurysm so it is not a part of that revision.  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  Although I do not see an inferior mesenteric artery I think if we were to repair the aneurysm with the stent graft the superior mesenteric artery should at least be stented to maintain intestinal perfusion.    On the other hand 1 could say that her health is failing and she has had multiple recent hospitalizations and that we should just leave the aneurysm alone.  No additional surveillance or discussions about potential repair.    After a very russ and open discussion we have decided that we will leave this abdominal aortic aneurysm alone.  She has accepted the fact that it may or may not be the cause of her demise in the future.  She is, however, very clear in her decision making that if she presents to the emergency department with abdominal or back pain and this aneurysm has ruptured then no surgical intervention will be undertaken.    Her children agree.    No need for additional follow-up with  vascular surgery as we have no plans of fixing this either electively or emergently.

## 2025-05-13 ENCOUNTER — TELEPHONE (OUTPATIENT)
Dept: INTERNAL MEDICINE | Facility: CLINIC | Age: 89
End: 2025-05-13
Payer: MEDICARE

## 2025-05-13 NOTE — TELEPHONE ENCOUNTER
[facility] * Plan of Care order# [id number] received via fax     Forms in [employer] mail box for review and signature.

## 2025-05-14 ENCOUNTER — OFFICE VISIT (OUTPATIENT)
Dept: INTERNAL MEDICINE | Facility: CLINIC | Age: 89
End: 2025-05-14
Payer: MEDICARE

## 2025-05-14 VITALS
RESPIRATION RATE: 16 BRPM | HEART RATE: 68 BPM | TEMPERATURE: 97.2 F | WEIGHT: 118.8 LBS | HEIGHT: 64 IN | SYSTOLIC BLOOD PRESSURE: 132 MMHG | OXYGEN SATURATION: 95 % | BODY MASS INDEX: 20.28 KG/M2 | DIASTOLIC BLOOD PRESSURE: 66 MMHG

## 2025-05-14 DIAGNOSIS — I10 HYPERTENSION, UNSPECIFIED TYPE: ICD-10-CM

## 2025-05-14 DIAGNOSIS — J18.9 PNEUMONIA OF RIGHT LOWER LOBE DUE TO INFECTIOUS ORGANISM: ICD-10-CM

## 2025-05-14 DIAGNOSIS — B33.8 INFECTION DUE TO RESPIRATORY SYNCYTIAL VIRUS (RSV), UNSPECIFIED INFECTION TYPE: Primary | ICD-10-CM

## 2025-05-14 PROCEDURE — 3075F SYST BP GE 130 - 139MM HG: CPT | Performed by: INTERNAL MEDICINE

## 2025-05-14 PROCEDURE — 99214 OFFICE O/P EST MOD 30 MIN: CPT | Performed by: INTERNAL MEDICINE

## 2025-05-14 PROCEDURE — 3078F DIAST BP <80 MM HG: CPT | Performed by: INTERNAL MEDICINE

## 2025-05-14 RX ORDER — METOPROLOL SUCCINATE 100 MG/1
100 TABLET, EXTENDED RELEASE ORAL DAILY
Qty: 90 TABLET | Refills: 1 | Status: SHIPPED | OUTPATIENT
Start: 2025-05-14

## 2025-05-14 NOTE — PROGRESS NOTES
Assessment & Plan     Infection due to respiratory syncytial virus (RSV), unspecified infection type and Pneumonia of right lower lobe due to infectious organism  At this time, patient appears to have made a full recovery from her recent bout of RSV and pneumonia. Her lung examination did not reveal any adventitious sounds.  Her oxygen saturation was 95% on room air with a respiratory rate of 16.  Patient reports that she is feeling significant better after her recent hospitalization.  Patient has no further questions or concerns at this regard.    Hypertension, unspecified type  Patient's blood pressure appears to be under good control today.  Will continue with metoprolol succinate ER at 100 mg daily.  Side effects of medication were reviewed.  I did encourage her to monitor her blood pressure outside the clinic setting.  - metoprolol succinate ER (TOPROL XL) 100 MG 24 hr tablet; Take 1 tablet (100 mg) by mouth daily.        MED REC REQUIRED  Post Medication Reconciliation Status: discharge medications reconciled, continue medications without change    Follow-up       Juice Walls is a 89 year old, presenting for the following health issues:  Hospital F/U        5/14/2025     9:58 AM   Additional Questions   Roomed by marsha Hairston   Accompanied by daughter         5/14/2025     9:58 AM   Patient Reported Additional Medications   Patient reports taking the following new medications none     Patient is an 89-year-old  female who presents to the clinic for a hospital follow-up visit.  She was previously admitted to the hospital in March 2025 after being diagnosed with a right-sided pneumonia.  She did remain in the hospital until March 2025.  At that time, she was discharged to a transitional care unit with a course of oral antibiotics that consisisted of omnicef and azithromycin.  She was supplemental oxygen, but this was being weaned off at the time of discharge. Treatment: Presented to the  "transitional care unit, where she did present back to the shortness of breath,.  She was noted to be hypoxic at 89% room air.  She did have elevated blood pressure as well as a respiratory of 48.  Patient did test positive for RSV.  Chest x-ray continues to show opacities throughout the right lower lobe (slightly improved, will increase opacities in the right middle lobe.  She was admitted to the hospital for further evaluation and treatment.  Patient was placed back on ceftriaxone and azithromycin.  She did briefly require CPAP while in the hospital at this time.  She was she was quickly transitioned back to nasal cannula.  Patient did remain in the hospital until April 1.  She was discharged back to the transitional care unit.  Patient did return for lab work.  Since being at home, patient has had no further episodes of shortness of breath, fever, chills, or cough.  She feels that her energy levels are improving.  Patient did have some issues with hypotension while in the hospital, and her metoprolol was increased to 100 mg daily.  Her blood pressure has been much better controlled on this current medication dose.  Her blood pressure today is 132/66.            Review of Systems  CONSTITUTIONAL: NEGATIVE for fever, chills, change in weight  INTEGUMENTARY/SKIN: NEGATIVE for worrisome rashes, moles or lesions  ENT/MOUTH: NEGATIVE for ear, mouth and throat problems  RESP: NEGATIVE for significant cough or SOB  CV: NEGATIVE for chest pain, palpitations or peripheral edema  GI: NEGATIVE for nausea, abdominal pain, heartburn, or change in bowel habits  MUSCULOSKELETAL: NEGATIVE for significant arthralgias or myalgia  NEURO: NEGATIVE for weakness, dizziness or paresthesias      Objective    /66 (BP Location: Right arm, Patient Position: Sitting, Cuff Size: Adult Regular)   Pulse 68   Temp 97.2  F (36.2  C) (Oral)   Resp 16   Ht 1.626 m (5' 4\")   Wt 53.9 kg (118 lb 12.8 oz)   LMP  (LMP Unknown)   SpO2 95%   " BMI 20.39 kg/m    Body mass index is 20.39 kg/m .  Physical Exam  Vitals reviewed.   Constitutional:       Appearance: Normal appearance.   HENT:      Head: Normocephalic and atraumatic.      Mouth/Throat:      Mouth: Mucous membranes are moist.      Pharynx: Oropharynx is clear.   Eyes:      Extraocular Movements: Extraocular movements intact.      Conjunctiva/sclera: Conjunctivae normal.      Pupils: Pupils are equal, round, and reactive to light.   Cardiovascular:      Rate and Rhythm: Normal rate and regular rhythm.      Pulses: Normal pulses.      Heart sounds: Normal heart sounds.   Pulmonary:      Effort: Pulmonary effort is normal.      Breath sounds: Normal breath sounds.   Skin:     General: Skin is warm and dry.      Capillary Refill: Capillary refill takes less than 2 seconds.   Neurological:      Mental Status: She is alert.            Signed Electronically by: Ty Cordero MD

## 2025-05-20 ENCOUNTER — TELEPHONE (OUTPATIENT)
Dept: INTERNAL MEDICINE | Facility: CLINIC | Age: 89
End: 2025-05-20
Payer: MEDICARE

## 2025-05-20 NOTE — TELEPHONE ENCOUNTER
[facility] * order# [id number] received via fax     Forms in Dr. mail box for review and signature.

## 2025-05-20 NOTE — TELEPHONE ENCOUNTER
[facility] * order# [id number] received via fax     Forms in Dr. mail box for review and signature.

## 2025-07-30 ENCOUNTER — VIRTUAL VISIT (OUTPATIENT)
Dept: INTERNAL MEDICINE | Facility: CLINIC | Age: 89
End: 2025-07-30
Payer: MEDICARE

## 2025-07-30 ENCOUNTER — TELEPHONE (OUTPATIENT)
Dept: INTERNAL MEDICINE | Facility: CLINIC | Age: 89
End: 2025-07-30

## 2025-07-30 DIAGNOSIS — R30.0 DYSURIA: ICD-10-CM

## 2025-07-30 DIAGNOSIS — R21 RASH: ICD-10-CM

## 2025-07-30 DIAGNOSIS — R39.9 URINARY SYMPTOM OR SIGN: Primary | ICD-10-CM

## 2025-07-30 DIAGNOSIS — I73.9 PAD (PERIPHERAL ARTERY DISEASE): ICD-10-CM

## 2025-07-30 LAB
ALBUMIN UR-MCNC: 100 MG/DL
APPEARANCE UR: CLEAR
BACTERIA #/AREA URNS HPF: ABNORMAL /HPF
BILIRUB UR QL STRIP: NEGATIVE
COLOR UR AUTO: YELLOW
GLUCOSE UR STRIP-MCNC: NEGATIVE MG/DL
HGB UR QL STRIP: ABNORMAL
HYALINE CASTS #/AREA URNS LPF: ABNORMAL /LPF
KETONES UR STRIP-MCNC: NEGATIVE MG/DL
LEUKOCYTE ESTERASE UR QL STRIP: NEGATIVE
MUCOUS THREADS #/AREA URNS LPF: PRESENT /LPF
NITRATE UR QL: NEGATIVE
PH UR STRIP: 7 [PH] (ref 5–7)
RBC #/AREA URNS AUTO: ABNORMAL /HPF
SP GR UR STRIP: 1.02 (ref 1–1.03)
SQUAMOUS #/AREA URNS AUTO: ABNORMAL /LPF
UROBILINOGEN UR STRIP-ACNC: 2 E.U./DL
WBC #/AREA URNS AUTO: ABNORMAL /HPF

## 2025-07-30 PROCEDURE — 81001 URINALYSIS AUTO W/SCOPE: CPT | Performed by: INTERNAL MEDICINE

## 2025-07-30 PROCEDURE — 98005 SYNCH AUDIO-VIDEO EST LOW 20: CPT | Performed by: INTERNAL MEDICINE

## 2025-07-30 RX ORDER — TRIAMCINOLONE ACETONIDE 1 MG/G
CREAM TOPICAL 2 TIMES DAILY
Qty: 30 G | Refills: 0 | Status: SHIPPED | OUTPATIENT
Start: 2025-07-30

## 2025-07-30 RX ORDER — CEPHALEXIN 500 MG/1
500 CAPSULE ORAL 2 TIMES DAILY
Qty: 14 CAPSULE | Refills: 0 | Status: SHIPPED | OUTPATIENT
Start: 2025-07-30 | End: 2025-08-06

## 2025-07-30 NOTE — TELEPHONE ENCOUNTER
Daughter calls to inquire about test results, provider has not reviewed. Writer huddled with provider and he reports test was not definitive of a UTI but given positive for WBC we could try an antibiotic for a week and see how patient does if agreeable.     Writer called daughter and they confirm they would like prescription for antibiotic to try. Updated provider.     Summer RN 4:16 PM July 30, 2025   [facility]

## 2025-07-30 NOTE — TELEPHONE ENCOUNTER
Huddled with Dr Martin. He faxed in Keflex. Call to dtr and advised. She verbalized understanding. Advisd to call back if symptom persist or get better then return. She agrees.    one inserted 8/14/15, Disp: , Rfl:     Family History   Problem Relation Age of Onset    Heart Disease Father     Other Sister      sepsis in blood stream and pneumonia       Social History     Social History    Marital status: Single     Spouse name: N/A    Number of children: N/A    Years of education: N/A     Occupational History    Not on file. Social History Main Topics    Smoking status: Former Smoker     Quit date: 1/1/2016    Smokeless tobacco: Never Used    Alcohol use No    Drug use: No    Sexual activity: Yes     Other Topics Concern    Not on file     Social History Narrative    No narrative on file       Review of Systems:  Review of Systems   Constitution: Negative for fever and night sweats. HENT: Negative for nosebleeds and stridor. Eyes: Negative for discharge. Cardiovascular: Negative for syncope. Respiratory: Negative for hemoptysis and sputum production. Endocrine: Negative for polyphagia. Skin: Negative for suspicious lesions and unusual hair distribution. Musculoskeletal: Positive for back pain. Gastrointestinal: Negative for bowel incontinence and jaundice. Genitourinary: Negative for bladder incontinence and urgency. Neurological: Negative for brief paralysis and tremors. 10 point review of system was performed and negative as pertinent to chief complaint, except as stated in HPI. Physical Exam:  /62   Temp 97.9 °F (36.6 °C) (Oral)   Resp 18   Ht 5' 3\" (1.6 m)   Wt 234 lb (106.1 kg)   BMI 41.45 kg/m²     Physical Exam   Constitutional: She is oriented to person, place, and time and well-developed, well-nourished, and in no distress. HENT:   Right Ear: External ear normal.   Left Ear: External ear normal.   Eyes: Conjunctivae are normal. Right eye exhibits no discharge. Left eye exhibits no discharge. Neck: No tracheal deviation present. No thyromegaly present. Pulmonary/Chest: Effort normal. No stridor. No respiratory distress. Musculoskeletal:        Cervical back: She exhibits tenderness. Thoracic back: She exhibits tenderness. Lumbar back: She exhibits tenderness. Neurological: She is alert and oriented to person, place, and time. She has normal strength. She displays no weakness. Gait normal.   Skin: Skin is warm and dry. She is not diaphoretic. Psychiatric: Mood and memory normal.   Nursing note and vitals reviewed. Assessment:  Chronic pain syndrome  Neck pain  Mid back pain  Low back pain      Treatment Plan:  DISCUSSION: Treatment options discussed with patient and all questions answered to patient's satisfaction. OARRS Review: Reviewed and acceptable . TREATMENT OPTIONS:     Really complaining of quite diffuse pain all over. Unsure of the benefit of specific imaging at this point as her symptoms are very widespread. We'll have her continue physical therapy. Try dry needling. Laboratory try compounding cream to the affected area. We'll start Neurontin and slowly titrate to 300 mg 3 times a day and see how she does. Consider Lyrica in the future as well. If therapy helps localize some of the pain, then may then benefit from further imaging. Does have neurology follow up pending as well.         Recommendations to be sent to referring physician: Dr Jarrett Garcia M.D.

## 2025-07-30 NOTE — PROGRESS NOTES
Kavita is a 89 year old who is being evaluated via a billable video visit.    How would you like to obtain your AVS? MyChart  If the video visit is dropped, the invitation should be resent by: Text to cell phone: [phone]  Will anyone else be joining your video visit? Yes: daughter, Kamla. How would they like to receive their invitation? Text to cell phone: [phone]      Assessment & Plan   (R39.9) Urinary symptom or sign  (primary encounter diagnosis)  Comment: - Possible bladder infection; urinalysis ordered to confirm diagnosis.  - Urinalysis to be conducted at the lab. If a bladder infection is confirmed, a prescription will be sent.  Plan: UA with Microscopic reflex to Culture - lab         collect, UA Microscopic with Reflex to Culture,        cephALEXin (KEFLEX) 500 MG capsule    (R30.0) Dysuria  Comment: UA shows few WBC's. We agreed to treat empirically for UTI.   Plan: cephALEXin (KEFLEX) 500 MG capsule    (R21) Rash  (I73.9) PAD (peripheral artery disease)  Comment: - Rash on the ankle does not appear to be contact dermatitis; more likely related to circulation issues.  - Refill of triamcinolone cream prescribed. Consideration of compression stockings for circulation issues. Follow-up with the vascular department recommended if necessary.   Plan: triamcinolone (KENALOG) 0.1 % external cream      Patient Instructions   For your urinary symptoms, I've placed an order to have you come to the [facility] lab to run a urine test. This will help us to determine whether you do have a bladder infection.     For the rash on your ankles, I've sent a prescription to your pharmacy for triamcinolone 0.1% cream, which is the same prescription that you received in [facility] in 2022 for your neck rash. If this is not helpful, the next step may be to see the Vascular Medicine specialist again (you've seen Dr Mendoza in the past--in December 2023).          Subjective   Kavita is a 89 year old, presenting for  the following health issues:  UTI      7/30/2025    11:21 AM   Additional Questions   Roomed by Sahara BURGOS CMA   Accompanied by daughter, Kamla     Video Start Time: 1140    UTI          - Bladder symptoms: urinary frequency (every hour), urgency, difficulty initiating urination, pain with urination, sensation of incomplete emptying, ongoing for an unspecified duration  - Avoids certain foods known to aggravate bladder symptoms, including vitamin C  - Bladder symptoms described as different and persistent compared to previous episodes  - Soreness and stinging on the inside of the labia, with tenderness  - History of bladder infections, most recent a few months ago, and another in August 2024  - No fever, no shaking chills, no visible blood in urine, no abdominal or back pain reported  - Rash on right ankle, described as thick and red, present for an unspecified duration, reminds her of previous contact dermatitis on neck three years ago  - Previous swelling of the ankle, intermittent, not involving the legs  - Previous episode of contact dermatitis on neck approximately three years ago, resolved with treatment. Care Everywhere reviewed: Rx was triamcinolone 0.1% cream.  - Rash on right ankle not associated with severe itching      Past medical, family and social histories as well as medications reviewed and updated as needed.    REVIEW OF SYSTEMS: The following systems have been completely reviewed and are negative except as noted above:   Constitutional, gastrointestinal, genitourinary, dermatologic, hematologic systems.        Objective    Vitals - Patient Reported  Weight (Patient Reported): 55.3 kg (122 lb)        Physical Exam   GENERAL: alert and no distress  EYES: Eyes grossly normal to inspection.  No discharge or erythema, or obvious scleral/conjunctival abnormalities.  RESP: No audible wheeze, cough, or visible cyanosis.    SKIN: patchy erythema visible over posterior ankle/lower posterior calf region, right  worse than left.   NEURO: Cranial nerves grossly intact.  Mentation and speech appropriate for age.  PSYCH: Appropriate affect, tone, and pace of words    Recent Results (from the past 24 hours)   UA with Microscopic reflex to Culture - lab collect    Specimen: Urine, Clean Catch   Result Value Ref Range    Color Urine Yellow Colorless, Straw, Light Yellow, Yellow    Appearance Urine Clear Clear    Glucose Urine Negative Negative mg/dL    Bilirubin Urine Negative Negative    Ketones Urine Negative Negative mg/dL    Specific Gravity Urine 1.020 1.003 - 1.035    Blood Urine Trace (A) Negative    pH Urine 7.0 5.0 - 7.0    Protein Albumin Urine 100 (A) Negative mg/dL    Urobilinogen Urine 2.0 (A) 0.2, 1.0 E.U./dL    Nitrite Urine Negative Negative    Leukocyte Esterase Urine Negative Negative   UA Microscopic with Reflex to Culture   Result Value Ref Range    Bacteria Urine Many (A) None Seen /HPF    RBC Urine 2-5 (A) 0-2 /HPF /HPF    WBC Urine 5-10 (A) 0-5 /HPF /HPF    Squamous Epithelials Urine Few (A) None Seen /LPF    Mucus Urine Present (A) None Seen /LPF    Hyaline Casts Urine 0-2 (A) None Seen /LPF    Narrative    Urine Culture not indicated         Video-Visit Details    Type of service:  Video Visit   Video End Time: 1156  Originating Location (pt. Location): Home    Distant Location (provider location):  On-site  Platform used for Video Visit: Brock  Signed Electronically by: Rufus Martin MD,

## 2025-07-30 NOTE — PATIENT INSTRUCTIONS
For your urinary symptoms, I've placed an order to have you come to the [facility] lab to run a urine test. This will help us to determine whether you do have a bladder infection.     For the rash on your ankles, I've sent a prescription to your pharmacy for triamcinolone 0.1% cream, which is the same prescription that you received in [facility] in 2022 for your neck rash. If this is not helpful, the next step may be to see the Vascular Medicine specialist again (you've seen Dr Mendoza in the past--in December 2023).

## 2025-08-13 ENCOUNTER — MYC MEDICAL ADVICE (OUTPATIENT)
Dept: INTERNAL MEDICINE | Facility: CLINIC | Age: 89
End: 2025-08-13
Payer: MEDICARE

## (undated) DEVICE — INTRO SHEATH 45CM  7FR PNCL DEST

## (undated) DEVICE — GUIDEWIRE HI-TORQUE VERSACORE MOD J 1

## (undated) DEVICE — WIRE GUIDE 0.035"X260CM SAFE-T-J EXCHANGE G00517

## (undated) DEVICE — CATH DIAG 4FR JL 4.5 538417

## (undated) DEVICE — INTRODUCER CATH VASC 5FRX10CM  MPIS-501-NT-U-SST

## (undated) DEVICE — Device

## (undated) DEVICE — WIRE GUIDE LUNDERQUIST 0.035"X260CM DBL CVD

## (undated) DEVICE — KIT HAND CONTROL ANGIOTOUCH ACIST 65CM AT-P65

## (undated) DEVICE — CABLE PACING ALLIGATOR CLIP 12FT 5833SL

## (undated) DEVICE — CATH ANGIO JUDKINS R4 6FRX100CM INFINITI 534621T

## (undated) DEVICE — GUIDEWIRE VASC 0.014"X300CM PLATINUM TIP 25-1013

## (undated) DEVICE — WIRE GUIDE 0.035"X150CM EMERALD STR 502542

## (undated) DEVICE — INFL DVC BASIXCOMPAK PLYCRB 30 ATM 13IN 20ML IN4530

## (undated) DEVICE — MANIFOLD KIT ANGIO AUTOMATED 014613

## (undated) DEVICE — LEAD PACER MYOCARDIAL BIPOLAR TEMPORARY 53CM 6495F

## (undated) DEVICE — DEFIB PRO-PADZ LVP LQD GEL ADULT 8900-2105-01

## (undated) DEVICE — INTRO SHEATH 7FRX10CM PINNACLE RSS702

## (undated) DEVICE — GAUGE DEPTH LOCATOR MANTA 8FR 188F

## (undated) DEVICE — SAVVYWIRE XS

## (undated) DEVICE — CATH ANGIO INFINITI 3DRC 4FRX100CM 538476

## (undated) DEVICE — INFLATION DEVICE ATRION QL2530

## (undated) DEVICE — TOTE ANGIO CORP PC15AT SAN32CC83O

## (undated) DEVICE — RAD CLOSURE ANGIOSEAL 8FR  610131

## (undated) DEVICE — 4 FR X 10CM PINNACLE R/O II RADIOPAQUE SHEATH INTRODUCER, W/ 2.5CM DILATOR PROTRUDING, NO GUIDEWIRE

## (undated) DEVICE — CATH BALLOON Z-MED II 8FR 20MMX4X100CM 611757

## (undated) DEVICE — INTRO TERUMO 8FRX25CM W/MARKER RSB803

## (undated) DEVICE — CATH ANGIO SUPERTORQUE AL1 6FRX100CM 532-645

## (undated) DEVICE — INTRO SHEATH 6FRX10CM PINNACLE RSS602

## (undated) DEVICE — SHEATH PNCL 25CM 8FR NO WR

## (undated) DEVICE — CATH ANGIO INFINITI PIGTAIL 145 6 SH 6FRX110CM  534-652S

## (undated) DEVICE — CATH ANGIO ANG GLIDE 5FRX65CM CG507

## (undated) RX ORDER — PROTAMINE SULFATE 10 MG/ML
INJECTION, SOLUTION INTRAVENOUS
Status: DISPENSED
Start: 2024-02-20

## (undated) RX ORDER — LIDOCAINE HYDROCHLORIDE 10 MG/ML
INJECTION, SOLUTION EPIDURAL; INFILTRATION; INTRACAUDAL; PERINEURAL
Status: DISPENSED
Start: 2023-12-19

## (undated) RX ORDER — FENTANYL CITRATE 50 UG/ML
INJECTION, SOLUTION INTRAMUSCULAR; INTRAVENOUS
Status: DISPENSED
Start: 2024-02-20

## (undated) RX ORDER — HEPARIN SODIUM 1000 [USP'U]/ML
INJECTION, SOLUTION INTRAVENOUS; SUBCUTANEOUS
Status: DISPENSED
Start: 2023-12-19

## (undated) RX ORDER — LIDOCAINE HYDROCHLORIDE 10 MG/ML
INJECTION, SOLUTION EPIDURAL; INFILTRATION; INTRACAUDAL; PERINEURAL
Status: DISPENSED
Start: 2024-02-20

## (undated) RX ORDER — HEPARIN SODIUM 1000 [USP'U]/ML
INJECTION, SOLUTION INTRAVENOUS; SUBCUTANEOUS
Status: DISPENSED
Start: 2024-02-20

## (undated) RX ORDER — HYDRALAZINE HYDROCHLORIDE 20 MG/ML
INJECTION INTRAMUSCULAR; INTRAVENOUS
Status: DISPENSED
Start: 2024-02-20

## (undated) RX ORDER — METOPROLOL TARTRATE 1 MG/ML
INJECTION, SOLUTION INTRAVENOUS
Status: DISPENSED
Start: 2024-02-20

## (undated) RX ORDER — HEPARIN SODIUM 200 [USP'U]/100ML
INJECTION, SOLUTION INTRAVENOUS
Status: DISPENSED
Start: 2023-12-19

## (undated) RX ORDER — HEPARIN SODIUM 200 [USP'U]/100ML
INJECTION, SOLUTION INTRAVENOUS
Status: DISPENSED
Start: 2024-02-20

## (undated) RX ORDER — CLINDAMYCIN PHOSPHATE 900 MG/50ML
INJECTION, SOLUTION INTRAVENOUS
Status: DISPENSED
Start: 2024-02-20

## (undated) RX ORDER — FENTANYL CITRATE 50 UG/ML
INJECTION, SOLUTION INTRAMUSCULAR; INTRAVENOUS
Status: DISPENSED
Start: 2023-12-19